# Patient Record
Sex: FEMALE | Race: BLACK OR AFRICAN AMERICAN | Employment: PART TIME | URBAN - METROPOLITAN AREA
[De-identification: names, ages, dates, MRNs, and addresses within clinical notes are randomized per-mention and may not be internally consistent; named-entity substitution may affect disease eponyms.]

---

## 2020-02-14 ENCOUNTER — OFFICE VISIT (OUTPATIENT)
Dept: PAIN MANAGEMENT | Age: 30
End: 2020-02-14
Payer: COMMERCIAL

## 2020-02-14 VITALS
HEIGHT: 62 IN | WEIGHT: 163 LBS | OXYGEN SATURATION: 99 % | RESPIRATION RATE: 16 BRPM | SYSTOLIC BLOOD PRESSURE: 108 MMHG | TEMPERATURE: 98.6 F | HEART RATE: 88 BPM | DIASTOLIC BLOOD PRESSURE: 78 MMHG | BODY MASS INDEX: 30 KG/M2

## 2020-02-14 PROCEDURE — 20553 NJX 1/MLT TRIGGER POINTS 3/>: CPT | Performed by: ANESTHESIOLOGY

## 2020-02-14 PROCEDURE — 99204 OFFICE O/P NEW MOD 45 MIN: CPT | Performed by: ANESTHESIOLOGY

## 2020-02-14 RX ORDER — BUPIVACAINE HYDROCHLORIDE 2.5 MG/ML
30 INJECTION, SOLUTION INFILTRATION; PERINEURAL ONCE
Status: DISCONTINUED | OUTPATIENT
Start: 2020-02-14 | End: 2020-02-14

## 2020-02-14 RX ORDER — NABUMETONE 750 MG/1
750 TABLET, FILM COATED ORAL 2 TIMES DAILY PRN
Qty: 60 TABLET | Refills: 1 | Status: SHIPPED
Start: 2020-02-14 | End: 2020-05-06 | Stop reason: SDUPTHER

## 2020-02-14 RX ORDER — BUPIVACAINE HYDROCHLORIDE 2.5 MG/ML
30 INJECTION, SOLUTION INFILTRATION; PERINEURAL ONCE
Status: COMPLETED | OUTPATIENT
Start: 2020-02-14 | End: 2020-02-14

## 2020-02-14 RX ORDER — CYCLOBENZAPRINE HCL 5 MG
5 TABLET ORAL 2 TIMES DAILY PRN
Qty: 30 TABLET | Refills: 1 | Status: SHIPPED | OUTPATIENT
Start: 2020-02-14 | End: 2020-03-15

## 2020-02-14 RX ADMIN — BUPIVACAINE HYDROCHLORIDE 25 MG: 2.5 INJECTION, SOLUTION INFILTRATION; PERINEURAL at 14:49

## 2020-02-14 NOTE — PROGRESS NOTES
Patient:  ANT Hills 1990  Date of Service:  20        Patient presents with complaints of left shoulder and across neck and down arm pain that started 2 months ago and has been getting unchanged. She states the pain began following Trauma work    Pain is constant and is described as aching, throbbing, stabbing, sharp, tender, burning, exhausting, nagging and numb. She rates the pain as a 10/10 on her worst day , 9/10 on her best day, and a 9/10 on average on the VAS scale. Pain does radiate to left arm and across neck. She  has numbness, tingling, weakness of the left arm. Alleviating factors include: nothing. Aggravating factors include:  movement, lifting, touching. She states that the pain does not keep her from sleeping at night. She took her last dose of nothing    She is not on NSAIDS and  is not on anticoagulation medications to include none and is managed by . Previous treatments: Physical Therapy. Personal Expectations from this treatment: return to work, increase activity and decrease pain    /78   Pulse 88   Temp 98.6 °F (37 °C) (Oral)   Resp 16   Ht 5' 2\" (1.575 m)   Wt 163 lb (73.9 kg)   SpO2 99%   BMI 29.81 kg/m²     No LMP recorded.

## 2020-02-14 NOTE — PROGRESS NOTES
[facility]        [address] [facility], [address] [facility]      [phone]                  Consult Note      Patient:  ANT Slade 1990    Date of Service:  20     Requesting Physician:  Kathryn Warren    Reason for Consult:      Patient presents with complaints of left shoulder pain    HISTORY OF PRESENT ILLNESS:      Ms. Julian Andrews is a 35 y.o. female presented today to the Pain Management Center for evaluation of Recent onset left shoulder/ shoulder blade pain. Work related injury on  when she noticed sharp pain. [facility] case. Pain mainly over the left shoulder. X-ray shoulder unremarkable. She has been managed conservatively and is doing therapy/ exercises  And is followed by Dr. Brielle Jarrett. Gets shoulder blade pain and lower neck pain. Pain is constant and is described as aching, sharp and throbbing. Occasional tingling / numbness of the left UE    Patient does not have bladder or bowel dysfunction. Alleviating factors include: rest.  Aggravating factors include: movement, lifting. Pain causes functional limitations/ limits Adl's : Yes    Nursing notes and details of the pain history reviewed. Please see intake notes for details. Previous treatments:   Physical Therapy/Chiropractic treatment : yes      Medications: - NSAID's : yes             - Membrane stabilizers : no            - Opioids : no            - Adjuvants or Others : no    TENS Unit: no    Surgeries: no- shoulder surgery/ C  spine surgery. Interventional Pain procedures/ nerve blocks: no    She has not been on anticoagulation medications no. She has not been on herbal supplements. She is not diabetic. 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     Imaging:   X-ray of the left shoulder done on 2020 (the detailed report scanned left trapezius, left lower cervical paraspinal muscle and left supraspinatus. We will give a prescription for Relafen 750 mg p.o. twice daily for as needed use. Dosing and side effects explained to the patient. Will give prescription for Flexeril 5 mg p.o. twice daily as needed for muscle spasm. Dosing and side effects explained to the patient. ZT lido patch for local use. Dosing and side effects explained to the patient. If she continues to have shoulder pain, recommend imaging (MRI) of the left shoulder and a left shoulder injection. Seems like she is in the process of getting an MRI of the shoulder. She will follow-up in 4 weeks. Encouraged the patient to follow-up with Dr. Harley Monroy for therapy and continued conservative care. Urine screen today: no    Counseling :    Patient encouraged to stay active and to watch/lose weight    Encouraged to continue Regular home exercise program as tolerated - stretching / strengthening. Smoking cessation counseling : yes    Treatment plan discussed with the patient including medication and procedure side effects. Controlled Substances Monitoring:   OARRS reviewed-consistent    Tamara Hernandez MD     2020    Patient: Phillip Sandoval  :  1990  Age:  35 y.o. Sex:  female     PRE-PROCEDURE DIAGNOSIS: Myofascial pain. POST-PROCEDURE DIAGNOSIS: Same. PROCEDURE: Left trapezius, lower cervical and supraspinatus muscle trigger point injections. SURGEON:   Tamara Hernandez MD    ANESTHESIA: local    ESTIMATED BLOOD LOSS:  None.  ______________________________________________________________________    BRIEF HISTORY: After discussing the potential risks and benefits of the procedure with the patient. Bárbara Montano did request that we proceed. A complete History & Physical was reviewed and it is unchanged.      DESCRIPTION OF PROCEDURE:   Each trigger points were marked with patient input and prepped with ChloraPrep and draped, a #25-gauge, 1.5-inch needle was passed into the area of greatest tenderness. A total of 6 trigger point injections were performed. Each trigger point  received 1.5 cc of 0.25% Marcaine and a total of 40 mg kenalog after negative aspiration of blood, air or paresthesia. The needle was removed intact and Band-Aid was applied. Disposition the patient tolerated the procedure well and there were no complications . Chiquis Navarro will follow up in our comprehensive [facility] Center as scheduled. She was encouraged to call with questions, concerns or if worsening of symptoms occurs. Erick Elise MD      Dear Dr. Debbie Jacob,   Thank you for referring Ms. Amira Deluna and allowing us to participate in her care. Please do not hesitate to contact me if you have any questions regarding her care.     Fuad Phillip MD      CC:    Yahaira Workman, [address] [place] [address], [facility]  [address] [place] [id number]

## 2020-03-13 ENCOUNTER — OFFICE VISIT (OUTPATIENT)
Dept: PAIN MANAGEMENT | Age: 30
End: 2020-03-13
Payer: COMMERCIAL

## 2020-03-13 VITALS
TEMPERATURE: 98.5 F | SYSTOLIC BLOOD PRESSURE: 108 MMHG | DIASTOLIC BLOOD PRESSURE: 70 MMHG | RESPIRATION RATE: 16 BRPM | HEART RATE: 78 BPM

## 2020-03-13 PROCEDURE — 99214 OFFICE O/P EST MOD 30 MIN: CPT | Performed by: ANESTHESIOLOGY

## 2020-03-13 PROCEDURE — 99213 OFFICE O/P EST LOW 20 MIN: CPT

## 2020-03-13 RX ORDER — IBUPROFEN 200 MG
400 TABLET ORAL PRN
COMMUNITY
End: 2022-04-17

## 2020-03-13 NOTE — PROGRESS NOTES
and glenoid are normal.  AC joint is normal.  Soft tissues are normal.  Adjacent ribs are unremarkable.     Impression: shoulder within normal limit    MRI of the left shoulder: 2020:      Urine Drug Screening:    OARRS report[de-identified]  3/13/2020: Consistent: not on chronic opioids. History reviewed. No pertinent past medical history. Past Surgical History:   Procedure Laterality Date     SECTION         Prior to Admission medications    Medication Sig Start Date End Date Taking?  Authorizing Provider   ibuprofen (ADVIL;MOTRIN) 200 MG tablet Take 400 mg by mouth as needed for Pain   Yes Historical Provider, MD   nabumetone (RELAFEN) 750 MG tablet Take 1 tablet by mouth 2 times daily as needed for Pain 20  Yes Tamara Hernandez MD   cyclobenzaprine (FLEXERIL) 5 MG tablet Take 1 tablet by mouth 2 times daily as needed for Muscle spasms 2/14/20 3/15/20 Yes Tamara Hernandez MD   Lidocaine (ZTLIDO) 1.8 % PTCH 12 hours on 12 hours off 20  Yes Tamara Hernandez MD       No Known Allergies    Social History     Socioeconomic History    Marital status: Single     Spouse name: Not on file    Number of children: Not on file    Years of education: Not on file    Highest education level: Not on file   Occupational History    Not on file   Social Needs    Financial resource strain: Not on file    Food insecurity     Worry: Not on file     Inability: Not on file    Transportation needs     Medical: Not on file     Non-medical: Not on file   Tobacco Use    Smoking status: Light Tobacco Smoker     Types: Cigarettes    Smokeless tobacco: Never Used    Tobacco comment: black and mild   Substance and Sexual Activity    Alcohol use: No    Drug use: No    Sexual activity: Yes     Partners: Male   Lifestyle    Physical activity     Days per week: Not on file     Minutes per session: Not on file    Stress: Not on file   Relationships    Social connections     Talks on phone: Not on file Gets together: Not on file     Attends Yazidism service: Not on file     Active member of club or organization: Not on file     Attends meetings of clubs or organizations: Not on file     Relationship status: Not on file    Intimate partner violence     Fear of current or ex partner: Not on file     Emotionally abused: Not on file     Physically abused: Not on file     Forced sexual activity: Not on file   Other Topics Concern    Not on file   Social History Narrative    Not on file       Family History   Problem Relation Age of Onset    Asthma Father     Asthma Brother     Asthma Brother        REVIEW OF SYSTEMS:     Jean Marie Pulliam denies fever/chills, chest pain, shortness of breath, new bowel or bladder complaints. All other review of systems was negative. PHYSICAL EXAMINATION:      /70   Pulse 78   Temp 98.5 °F (36.9 °C)   Resp 16   General:       General appearance:  Pleasant and well-hydrated, in no distress and A & O x 3  Build:Overweight  Function: Rises from seated position easily and Moves about room without difficulty     HEENT:     Head:normocephalic, atraumatic  Pupils:regular, round, equal  Sclera: icterus absent     Lungs:     Breathing:normal breathing pattern      CVS:     RRR     Abdomen:     Shape:non-distended and normal  Tenderness:none  Guarding:none     Cervical spine:     Inspection:normal  Palpation:tenderness paravertebral muscles, tenderness trapezium, left, right and positive- left side more  Range of motion:reduced flexion, extension, rotation bilaterally and is painful.   Spurling's: negative bilaterally  Facet tenderness + left side.     Thoracic spine:                Spine inspection:normal   Palpation:No tenderness over the midline and paraspinal area, bilaterally  Range of motion:normal in flexion, extension rotation bilateral and is not painful.     Lumbar spine:     Spine inspection: Normal   Palpation: Tenderness paravertebral muscles No bilaterally  Range of motion: Normal, flexion Decreased  SLR : negative bilaterally  Trochanteric bursa tenderness: negative bilaterally  CVA tenderness:No         Musculoskeletal:     Trigger points in trapezius: Yes bilaterally (left > right)  Trigger points in rhomboids: Yes left  Trigger points in Paravertebral: Yes left cervical      Extremities:  No edema     Left shoulder: Moderate tenderness over the shoulder/ subacromial area, ROM pain + mainly of abduction. Movement limited by pain.     Significant pain over the left trapezius area. Trigger points + over left trapezius, lower cervical paraspinal and supraspinatus area.     Knee:     ROM : no pain   Joint line tenderness : no     Neurological:     Sensory: Normal to light touch      Motor:   Right  5/5              Left  5/5               Right Bicep 5/5           Left Bicep 5/5              Right Triceps 5/5       Left Triceps 5/5          Right Deltoid 5/5     Left Deltoid 5/5                  Right Quadriceps 5/5          Left Quadriceps 5/5           Right Gastrocnemius 5/5    Left Gastrocnemius 5/5  Right Ant Tibialis 5/5  Left Ant Tibialis 5/5     Reflexes:    Bilaterally equal     Gait:normal Yes     Dermatology:     Skin:no rashes or lesions noted         Assessment/Plan:   Diagnosis Orders   1. Sprain of left shoulder, unspecified shoulder sprain type, initial encounter         35 y.o. female with H/O work-related injury in January 2020. She has been having left shoulder blade pain and neck pain. Examination shows significant tenderness over the left trapezius, lower cervical paraspinal muscle and supraspinatus muscle.     Her left shoulder range of motion is significantly reduced. MRI of shoulder done on 2/20/2020 reviewed and discussed the findings with the patient: No rotator cuff tear, peritendinosis noted.     Has significant tenderness over the left trapezius, cervical paraspinal muscles. Trigger point injection during the last visit only moderate relief. She has recently finished a course of steroids. Continues to work. Her significant pain is over the lower neck with features of Left sided cervical facet pain. Apparently Cervical claim is being added. She has not taken Relafen prescribed during the last visit. Recommend to take Relafen for prn use. Dosing and side effects explained. ZT lido patch for local use. Flexeril 5 mg p.o. twice daily as needed for muscle spasm. Dosing and side effects explained to the patient. If cervical issue is approved, consider Cervical spine imaging and cervical facet MBNB in future. F/U once she hears about C-spine claim. Counseling regarding the importance of ongoing exercise program, appropriate medication use and treatment plan.       Tamara Hernandez MD    CC:   Dr. Hannah Adams

## 2020-03-13 NOTE — PROGRESS NOTES
Claudell Rains presents to the [facility] on 3/13/2020. Gela Jones is complaining of pain in her neck into th left shoulder. The pain is constant. The pain is described as aching, stabbing, sharp and nagging. Pain is rated on her best day at a 5, on her worst day at a 10, and on average at a 8 on the VAS scale. She took her last dose of Motrin today. Gela Jones does not have issues with constipation. Any procedures since your last visit: Yes, TPI with 10 % relief. She is  on NSAIDS and  is not on anticoagulation medications. Pacemaker or defibrilator: No Physician managing device is N/A.       /70   Pulse 78   Temp 98.5 °F (36.9 °C)   Resp 16      No LMP recorded.

## 2020-05-06 ENCOUNTER — VIRTUAL VISIT (OUTPATIENT)
Dept: PAIN MANAGEMENT | Age: 30
End: 2020-05-06
Payer: COMMERCIAL

## 2020-05-06 PROCEDURE — 99213 OFFICE O/P EST LOW 20 MIN: CPT | Performed by: ANESTHESIOLOGY

## 2020-05-06 RX ORDER — NABUMETONE 750 MG/1
750 TABLET, FILM COATED ORAL 2 TIMES DAILY PRN
Qty: 60 TABLET | Refills: 1 | Status: SHIPPED
Start: 2020-05-06 | End: 2022-04-17

## 2020-06-17 ENCOUNTER — PREP FOR PROCEDURE (OUTPATIENT)
Dept: PAIN MANAGEMENT | Age: 30
End: 2020-06-17

## 2020-06-17 ENCOUNTER — OFFICE VISIT (OUTPATIENT)
Dept: PAIN MANAGEMENT | Age: 30
End: 2020-06-17
Payer: COMMERCIAL

## 2020-06-17 VITALS
SYSTOLIC BLOOD PRESSURE: 106 MMHG | TEMPERATURE: 98.5 F | RESPIRATION RATE: 16 BRPM | DIASTOLIC BLOOD PRESSURE: 70 MMHG | HEART RATE: 84 BPM

## 2020-06-17 PROBLEM — M54.2 CERVICALGIA: Status: ACTIVE | Noted: 2020-06-17

## 2020-06-17 PROBLEM — S13.4XXA SPRAIN OF LIGAMENTS OF CERVICAL SPINE: Status: ACTIVE | Noted: 2020-06-17

## 2020-06-17 PROBLEM — S43.402A SPRAIN OF SHOULDER, LEFT: Status: ACTIVE | Noted: 2020-06-17

## 2020-06-17 PROBLEM — M43.02 CERVICAL SPONDYLOLYSIS: Status: ACTIVE | Noted: 2020-06-17

## 2020-06-17 PROBLEM — S16.1XXA CERVICAL STRAIN: Status: ACTIVE | Noted: 2020-06-17

## 2020-06-17 PROCEDURE — 99214 OFFICE O/P EST MOD 30 MIN: CPT | Performed by: ANESTHESIOLOGY

## 2020-06-17 PROCEDURE — 99213 OFFICE O/P EST LOW 20 MIN: CPT

## 2020-06-17 RX ORDER — CYCLOBENZAPRINE HCL 5 MG
5 TABLET ORAL 2 TIMES DAILY PRN
COMMUNITY
End: 2020-11-04 | Stop reason: ALTCHOICE

## 2020-11-03 ENCOUNTER — PREP FOR PROCEDURE (OUTPATIENT)
Dept: PAIN MANAGEMENT | Age: 30
End: 2020-11-03

## 2020-11-03 ENCOUNTER — TELEPHONE (OUTPATIENT)
Dept: PAIN MANAGEMENT | Age: 30
End: 2020-11-03

## 2020-11-03 NOTE — TELEPHONE ENCOUNTER
11-3-20-received a message from [employer] workers [employer]  that her procedure has been approved. Will wait until the paperwork comes in, after 11-16-20 to schedule her. Call to her, unable to leave voice mail message, mailbox full.     Jose Martin Oseguera RN  Pain Management

## 2020-11-04 ENCOUNTER — TELEPHONE (OUTPATIENT)
Dept: PAIN MANAGEMENT | Age: 30
End: 2020-11-04

## 2020-11-04 NOTE — PROGRESS NOTES
[facility]  INSTRUCTIONS  . .......................................................................................................................................... [] Parking the day of Surgery is located in the [facility].   Upon entering the door, someone will be there to greet you    []  Bring photo ID and insurance card     [] You may have a light breakfast day of procedure    []  Wear loose comfortable clothing    []  Please follow instructions for medications as given per Dr's office     [] Stop blood thinners as per Dr's office instructions    [] You can expect a call the business day prior to procedure to notify you of your arrival time     [] Please arrange for     []  Other instructions

## 2020-11-04 NOTE — PROGRESS NOTES

## 2020-11-04 NOTE — TELEPHONE ENCOUNTER
11-4-20-upon chart review it is noted that Sharlene Holman takes relafen and advil. Call to her, advised her to hold the relafen 6 days before her 11-9-20 procedure, last dose to be 11-2-20. She states she has not taken it for a couple weeks. Also advised her to hold her advil 24 hours before her procedure. She shows an understanding.     Jose Martin Oseguera RN  Pain Management

## 2020-11-05 ENCOUNTER — HOSPITAL ENCOUNTER (OUTPATIENT)
Age: 30
Discharge: HOME | End: 2020-11-07
Payer: COMMERCIAL

## 2020-11-05 ENCOUNTER — HOSPITAL ENCOUNTER (OUTPATIENT)
Dept: GENERAL RADIOLOGY | Age: 30
Discharge: HOME | End: 2020-11-07
Payer: COMMERCIAL

## 2020-11-05 PROCEDURE — 72040 X-RAY EXAM NECK SPINE 2-3 VW: CPT

## 2020-11-09 ENCOUNTER — HOSPITAL ENCOUNTER (OUTPATIENT)
Age: 30
Setting detail: OUTPATIENT SURGERY
Discharge: HOME | End: 2020-11-09
Attending: ANESTHESIOLOGY | Admitting: ANESTHESIOLOGY
Payer: COMMERCIAL

## 2020-11-09 ENCOUNTER — HOSPITAL ENCOUNTER (OUTPATIENT)
Dept: GENERAL RADIOLOGY | Age: 30
Setting detail: OUTPATIENT SURGERY
Discharge: HOME | End: 2020-11-11
Attending: ANESTHESIOLOGY
Payer: COMMERCIAL

## 2020-11-09 VITALS
OXYGEN SATURATION: 100 % | DIASTOLIC BLOOD PRESSURE: 87 MMHG | HEART RATE: 79 BPM | SYSTOLIC BLOOD PRESSURE: 140 MMHG | TEMPERATURE: 97.7 F | BODY MASS INDEX: 29.44 KG/M2 | HEIGHT: 62 IN | RESPIRATION RATE: 16 BRPM | WEIGHT: 160 LBS

## 2020-11-09 LAB — HCG(URINE) PREGNANCY TEST: NEGATIVE

## 2020-11-09 PROCEDURE — 7100000011 HC PHASE II RECOVERY - ADDTL 15 MIN: Performed by: ANESTHESIOLOGY

## 2020-11-09 PROCEDURE — 81025 URINE PREGNANCY TEST: CPT

## 2020-11-09 PROCEDURE — 6360000002 HC RX W HCPCS: Performed by: ANESTHESIOLOGY

## 2020-11-09 PROCEDURE — 64490 INJ PARAVERT F JNT C/T 1 LEV: CPT | Performed by: ANESTHESIOLOGY

## 2020-11-09 PROCEDURE — 2500000003 HC RX 250 WO HCPCS: Performed by: ANESTHESIOLOGY

## 2020-11-09 PROCEDURE — 64492 INJ PARAVERT F JNT C/T 3 LEV: CPT | Performed by: ANESTHESIOLOGY

## 2020-11-09 PROCEDURE — 64491 INJ PARAVERT F JNT C/T 2 LEV: CPT | Performed by: ANESTHESIOLOGY

## 2020-11-09 PROCEDURE — 7100000010 HC PHASE II RECOVERY - FIRST 15 MIN: Performed by: ANESTHESIOLOGY

## 2020-11-09 PROCEDURE — 3600000002 HC SURGERY LEVEL 2 BASE: Performed by: ANESTHESIOLOGY

## 2020-11-09 PROCEDURE — 3209999900 FLUORO FOR SURGICAL PROCEDURES

## 2020-11-09 PROCEDURE — 2709999900 HC NON-CHARGEABLE SUPPLY: Performed by: ANESTHESIOLOGY

## 2020-11-09 PROCEDURE — 3600000012 HC SURGERY LEVEL 2 ADDTL 15MIN: Performed by: ANESTHESIOLOGY

## 2020-11-09 RX ORDER — DEXAMETHASONE SODIUM PHOSPHATE 10 MG/ML
INJECTION, SOLUTION INTRAMUSCULAR; INTRAVENOUS PRN
Status: DISCONTINUED | OUTPATIENT
Start: 2020-11-09 | End: 2020-11-09 | Stop reason: ALTCHOICE

## 2020-11-09 RX ORDER — BUPIVACAINE HYDROCHLORIDE 5 MG/ML
INJECTION, SOLUTION EPIDURAL; INTRACAUDAL PRN
Status: DISCONTINUED | OUTPATIENT
Start: 2020-11-09 | End: 2020-11-09 | Stop reason: ALTCHOICE

## 2020-11-09 RX ORDER — LIDOCAINE HYDROCHLORIDE 5 MG/ML
INJECTION, SOLUTION INFILTRATION; INTRAVENOUS PRN
Status: DISCONTINUED | OUTPATIENT
Start: 2020-11-09 | End: 2020-11-09 | Stop reason: ALTCHOICE

## 2020-11-09 ASSESSMENT — PAIN SCALES - GENERAL
PAINLEVEL_OUTOF10: 0

## 2020-11-09 NOTE — H&P
[address] [facility]        [address] [facility], [address] [place] [address]  Dept: [phone]    Procedure History & Physical      Ignacio Charles     HPI:    Patient  is here for cervical MBNB for neck pain. Labs/imaging studies reviewed   All question and concerns addressed including R/B/A associated with the procedure    Past Medical History:   Diagnosis Date    Back pain     Neck pain        Past Surgical History:   Procedure Laterality Date     SECTION         Prior to Admission medications    Medication Sig Start Date End Date Taking?  Authorizing Provider   nabumetone (RELAFEN) 750 MG tablet Take 1 tablet by mouth 2 times daily as needed for Pain 20  Yes Corinne Clarity, MD   ibuprofen (ADVIL;MOTRIN) 200 MG tablet Take 400 mg by mouth as needed for Pain   Yes Historical Provider, MD       No Known Allergies    Social History     Socioeconomic History    Marital status: Single     Spouse name: Not on file    Number of children: Not on file    Years of education: Not on file    Highest education level: Not on file   Occupational History    Not on file   Social Needs    Financial resource strain: Not on file    Food insecurity     Worry: Not on file     Inability: Not on file    Transportation needs     Medical: Not on file     Non-medical: Not on file   Tobacco Use    Smoking status: Current Every Day Smoker     Packs/day: 0.50     Types: Cigarettes, Cigars    Smokeless tobacco: Never Used    Tobacco comment: black and mild   Substance and Sexual Activity    Alcohol use: No    Drug use: No    Sexual activity: Yes     Partners: Male   Lifestyle    Physical activity     Days per week: Not on file     Minutes per session: Not on file    Stress: Not on file   Relationships    Social connections     Talks on phone: Not on file     Gets together: Not on file     Attends Synagogue service: Not on file     Active member of club or organization: Not on file     Attends meetings of clubs or organizations: Not on file     Relationship status: Not on file    Intimate partner violence     Fear of current or ex partner: Not on file     Emotionally abused: Not on file     Physically abused: Not on file     Forced sexual activity: Not on file   Other Topics Concern    Not on file   Social History Narrative    Not on file       Family History   Problem Relation Age of Onset    Asthma Father     Asthma Brother     Asthma Brother     No Known Problems Mother          REVIEW OF SYSTEMS:    CONSTITUTIONAL:  negative for  fevers, chills, sweats and fatigue    RESPIRATORY:  negative for  dry cough, cough with sputum, dyspnea, wheezing and chest pain    CARDIOVASCULAR:  negative for chest pain, dyspnea, palpitations, syncope    GASTROINTESTINAL:  negative for nausea, vomiting, change in bowel habits, diarrhea, constipation and abdominal pain    MUSCULOSKELETAL: negative for muscle weakness    SKIN: negative for itching or rashes. BEHAVIOR/PSYCH:  negative for poor appetite, increased appetite, decreased sleep and poor concentration    All other systems negative      PHYSICAL EXAM:    VITALS:  /73   Pulse 87   Temp 97.7 °F (36.5 °C) (Temporal)   Resp 14   Ht 5' 1.5\" (1.562 m)   Wt 160 lb (72.6 kg)   SpO2 100%   BMI 29.74 kg/m²     CONSTITUTIONAL:  awake, alert, cooperative, no apparent distress, and appears stated age    EYES: PERRLA, EOMI    LUNGS:  No increased work of breathing, no audible wheezing    CARDIOVASCULAR:  regular rate and rhythm    ABDOMEN:  Soft non tender non distended     EXTREMITIES: no signs of clubbing or cyanosis. MUSCULOSKELETAL: negative for flaccid muscle tone or spastic movements. SKIN: gross examination reveals no signs of rashes, or diaphoresis. NEURO: Cranial nerves II-XII grossly intact. No signs of agitated mood. Assessment/Plan:    Patient  is here for cervical MBNB for neck pain.     The patient was counseled at length about the risks of librado Covid-19 during their perioperative period and any recovery window from their procedure. The patient was made aware that librado Covid-19  may worsen their prognosis for recovering from their procedure  and lend to a higher morbidity and/or mortality risk. All material risks, benefits, and reasonable alternatives including postponing the procedure were discussed. The patient does wish to proceed with the procedure at this time.       Clair Morrow MD

## 2020-11-09 NOTE — OP NOTE
Operative Note      Patient: Norman Bowling  YOB: 1990  MRN: [id number]    Date of Procedure: 2020    Pre-Op Diagnosis: CERVICAL SPONDYLOSIS, cervical sprain      Post-Op Diagnosis: Same       Procedure(s):  LEFT CERVICAL MEDIAL BRANCH NERVE  BLOCK UNDER FLUOROSCOPIC GUIDANCE C3, C4, C5 AND C6     Surgeon(s):  Sharri Weiner MD    Assistant:   * No surgical staff found *    Anesthesia: None    Estimated Blood Loss (mL): Minimal    Complications: None    Specimens:   * No specimens in log *    Implants:  * No implants in log *      Drains: * No LDAs found *    Findings: good needle eplacement    Detailed Description of Procedure:   2020    Patient: Norman Bowling  :  1990  Age:  35 y.o. Sex:  female     PRE-PROCEDURE DIAGNOSIS: Cervical facet syndrome, cervical spondylosis. POST-PROCEDURE DIAGNOSIS: Same. PROCEDURE: # 1  Left Cervical medial branch blocks at  Levels C3, C4, C5 and C6    SURGEON: Sharri Weiner MD    ANESTHESIA: Local    ESTIMATED BLOOD LOSS:  None.  ______________________________________________________________________  BRIEF HISTORY:  Norman Bowling comes in today for Left cervical facet medial branch block at levels C3, C4, C5 and C6 . The potential complications of this procedure were discussed with her again today. She has elected to undergo the aforementioned procedure. Gitas complete History & Physical examination were reviewed in depth, a copy of which is in the chart. DESCRIPTION OF PROCEDURE:    After confirming written and informed consent, a time-out was performed and Gitas name and date of birth, the procedure to be performed as well as the plan for the location of the needle insertion were confirmed. The patient was brought into the procedure room and placed in the prone position on the fluoroscopy table. Standard monitors were placed and vital signs were observed throughout the procedure.  The area of the cervical spine was prepped with chloraprep and draped in the usual sterile manner. AP fluoroscopy views were used to identify and shan the mid lateral aspect of the articular pillars of the targeted levels. FOUR # 25 G spinal needles was directed until bone was contacted at each level. Once bone was contacted, the needle was walked off laterally. Lateral fluoroscopy was then utilized during final needle positioning in placing the tip of the needle in the middle of the articular pillar. After negative aspiration was confirmed, 1 cc's of marcaine 0.5% and 2 mg of Dexamethasone was injected equally at each level after negative aspiration. The needles were removed and the patient body part was cleaned and bandage was placed over the needle insertion. Disposition the patient tolerated the procedure well and there were no complications . Vital signs remained stable throughout the procedure. The patient was escorted to the recovery area where they remained until discharge and written discharge instructions for the procedure were given. Plan: Caden Syed will return to our pain management center as scheduled.      Ada Almonte MD

## 2020-11-09 NOTE — PROGRESS NOTES
Patient given discharge instructions and verbalizes understanding  Adhesive bandage x2 intact to cervical neck

## 2020-12-01 ENCOUNTER — OFFICE VISIT (OUTPATIENT)
Dept: PAIN MANAGEMENT | Age: 30
End: 2020-12-01
Payer: COMMERCIAL

## 2020-12-01 VITALS
SYSTOLIC BLOOD PRESSURE: 112 MMHG | TEMPERATURE: 99.3 F | WEIGHT: 160 LBS | RESPIRATION RATE: 16 BRPM | BODY MASS INDEX: 29.44 KG/M2 | DIASTOLIC BLOOD PRESSURE: 80 MMHG | HEART RATE: 82 BPM | HEIGHT: 62 IN

## 2020-12-01 PROCEDURE — 99214 OFFICE O/P EST MOD 30 MIN: CPT | Performed by: ANESTHESIOLOGY

## 2020-12-01 PROCEDURE — 99213 OFFICE O/P EST LOW 20 MIN: CPT | Performed by: ANESTHESIOLOGY

## 2020-12-01 NOTE — PROGRESS NOTES
[facility]  [facility], [address] [place] [address] Harry  [phone]    Follow up Note      Madan Gilliam     Date of Visit:  12/1/2020    CC:  Patient presents for follow up   Chief Complaint   Patient presents with    Follow Up After Procedure     LEFT CERVICAL MEDIAL BRANCH NERVE  BLOCK UNDER FLUOROSCOPIC GUIDANCE C3, C4, C5 AND C6        HPI:  H/o left shoulder blade/ neck pain- from a work related injury.     Work related injury on Jan 9th when she noticed sharp pain.     [facility] case. Dr. Dakota Stephenson is her POR.     Pain mainly over the left shoulder blade/ neck. Intermittent pain on the right side but predominant pain on the left side.     X-ray shoulder unremarkable. MRI of the left shoulder : NO rotator cuff tear.     She has been managed conservatively and is doing therapy/ exercises  and is followed by Dr. Libia Perry. Prior interventional procedures:   Trigger point injection helped moderately- for very short duration. S/p Left Cervical facet MBNB on 11/9/2020: Not much relief. Pain is unchanged. Change in quality of symptoms:no. Medication side effects:none. Pain causes functional limitations/ limits Adl's : Yes     Nursing notes and details of the pain history reviewed. Please see intake notes for details.     Previous treatments:   Physical Therapy/Chiropractic treatment : yes       Medications: - NSAID's : yes                        - Membrane stabilizers : no                       - Opioids : no                       - Adjuvants or Others : no     TENS Unit: no     Surgeries: no- shoulder surgery/ C  spine surgery.     She has not been on anticoagulation medications no.     She has not been on herbal supplements.       She is not diabetic.     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     Imaging:     Xray C spine: 11/5/2020:  FINDINGS:    No fracture, dislocation, or focal osseous lesion is noted.  No significant    degenerate change.  No significant soft tissue abnormality seen.              Impression    No fracture or dislocation. X-ray of the left shoulder done on 2020 (the detailed report scanned in epic) shows the following findings:     No fracture or dislocation, articular surface of the humeral head and glenoid are normal.  AC joint is normal.  Soft tissues are normal.  Adjacent ribs are unremarkable.     Impression: shoulder within normal limit    MRI of the left shoulder: 2020:      Urine Drug Screening:    OARRS report[de-identified]  3/13/2020: Consistent: not on chronic opioids. 2020: Consistent  2020; Consistent    Past Medical History:   Diagnosis Date    Back pain     Neck pain        Past Surgical History:   Procedure Laterality Date     SECTION      NERVE BLOCK Left 2020    LEFT CERVICAL MEDIAL BRANCH NERVE  BLOCK UNDER FLUOROSCOPIC GUIDANCE C3, C4, C5 AND C6 performed by Ada Almonte MD at [facility] OR       Prior to Admission medications    Medication Sig Start Date End Date Taking?  Authorizing Provider   ibuprofen (ADVIL;MOTRIN) 200 MG tablet Take 400 mg by mouth as needed for Pain   Yes Historical Provider, MD   nabumetone (RELAFEN) 750 MG tablet Take 1 tablet by mouth 2 times daily as needed for Pain  Patient not taking: Reported on 2020   Ada Almonte MD       No Known Allergies    Social History     Socioeconomic History    Marital status: Single     Spouse name: Not on file    Number of children: Not on file    Years of education: Not on file    Highest education level: Not on file   Occupational History    Not on file   Social Needs    Financial resource strain: Not on file    Food insecurity     Worry: Not on file     Inability: Not on file    Transportation needs     Medical: Not on file     Non-medical: Not on file   Tobacco Use    Smoking status: Current Every Day Smoker     Packs/day: 0.50     Types: Cigarettes, Cigars    Smokeless tobacco: Never Used    Tobacco comment: black and mild   Substance and Sexual Activity    Alcohol use: No    Drug use: No    Sexual activity: Yes     Partners: Male   Lifestyle    Physical activity     Days per week: Not on file     Minutes per session: Not on file    Stress: Not on file   Relationships    Social connections     Talks on phone: Not on file     Gets together: Not on file     Attends Yazidi service: Not on file     Active member of club or organization: Not on file     Attends meetings of clubs or organizations: Not on file     Relationship status: Not on file    Intimate partner violence     Fear of current or ex partner: Not on file     Emotionally abused: Not on file     Physically abused: Not on file     Forced sexual activity: Not on file   Other Topics Concern    Not on file   Social History Narrative    Not on file       Family History   Problem Relation Age of Onset    Asthma Father     Asthma Brother     Asthma Brother     No Known Problems Mother        REVIEW OF SYSTEMS:     Jennifer De Dios denies fever/chills, chest pain, shortness of breath, new bowel or bladder complaints. All other review of systems was negative.     PHYSICAL EXAMINATION:      /80   Pulse 82   Temp 99.3 °F (37.4 °C) (Infrared)   Resp 16   Ht 5' 2\" (1.575 m)   Wt 160 lb (72.6 kg)   BMI 29.26 kg/m²   General:       General appearance:  Pleasant and well-hydrated, in no distress and A & O x 3  Build:Overweight  Function: Rises from seated position easily and Moves about room without difficulty     HEENT:     Head:normocephalic, atraumatic  Pupils:regular, round, equal  Sclera: icterus absent     Lungs:     Breathing:normal breathing pattern      CVS:     RRR     Abdomen:     Shape:non-distended and normal  Tenderness:none  Guarding:none     Cervical spine:     Inspection:normal  Palpation:tenderness paravertebral muscles, tenderness Strain of neck muscle, sequela     6. Sprain of ligaments of cervical spine, sequela         35 y.o.  female with H/O work-related injury in January 2020. She has been having left shoulder blade pain and neck pain. MRI of shoulder done on 2/20/2020 reviewed and discussed the findings with the patient: No rotator cuff tear, arlette tendinosis noted. Trigger point injection during the previous visits provided only moderate short term relief. Continues to work- light duty. Recent cervical facet MBNB- no significant pain relief. Recommend to take Relafen for prn use. Dosing and side effects explained. ZT lido patch - did not help. Flexeril 5 mg p.o. twice daily as needed for muscle spasm. Dosing and side effects explained to the patient. At this point of time, recommend MRI of C spine. Apparently has been ordered by Dr. Nadia Bautista. Counseling regarding the importance of ongoing exercise program, appropriate medication use and treatment plan. Smoking cessation counseling done. Recommend to Continue PT for improved ROM. Counseling done regarding the importance of exercise program, PT/HEP, appropriate medication use. Detailed discussion about the procedures done. WiIl prescribe Local compound cream.   Formula #1: Diclofenac, Gabapentin, Baclofen, Lidocaine, Doxepin. Apply 1-2 grams TID over the painful area. Dispense #240 gram with X 2 refills. Use instructions and side effects explained. F/U in 6-8 weeks.       Esdras Rodriguez MD    CC:  Dr. Quin Arnold,

## 2021-06-26 VITALS
TEMPERATURE: 97.5 F | RESPIRATION RATE: 16 BRPM | SYSTOLIC BLOOD PRESSURE: 136 MMHG | DIASTOLIC BLOOD PRESSURE: 78 MMHG | OXYGEN SATURATION: 97 % | HEART RATE: 107 BPM

## 2021-06-26 LAB
ALBUMIN SERPL-MCNC: 3.9 G/DL (ref 3.5–5.2)
ALP BLD-CCNC: 73 U/L (ref 35–104)
ALT SERPL-CCNC: 10 U/L (ref 0–32)
ANION GAP SERPL CALCULATED.3IONS-SCNC: 10 MMOL/L (ref 7–16)
ANISOCYTOSIS: ABNORMAL
AST SERPL-CCNC: 13 U/L (ref 0–31)
BACTERIA: ABNORMAL /HPF
BASOPHILS ABSOLUTE: 0 E9/L (ref 0–0.2)
BASOPHILS RELATIVE PERCENT: 0.3 % (ref 0–2)
BILIRUB SERPL-MCNC: <0.2 MG/DL (ref 0–1.2)
BILIRUBIN URINE: NEGATIVE
BLASTS RELATIVE PERCENT: 0.9 % (ref 0–0)
BLOOD, URINE: ABNORMAL
BUN BLDV-MCNC: 17 MG/DL (ref 6–20)
CALCIUM SERPL-MCNC: 8.8 MG/DL (ref 8.6–10.2)
CHLORIDE BLD-SCNC: 108 MMOL/L (ref 98–107)
CLARITY: CLEAR
CO2: 24 MMOL/L (ref 22–29)
COLOR: YELLOW
CREAT SERPL-MCNC: 0.9 MG/DL (ref 0.5–1)
EOSINOPHILS ABSOLUTE: 0.19 E9/L (ref 0.05–0.5)
EOSINOPHILS RELATIVE PERCENT: 1.7 % (ref 0–6)
EPITHELIAL CELLS, UA: ABNORMAL /HPF
GFR AFRICAN AMERICAN: >60
GFR NON-AFRICAN AMERICAN: >60 ML/MIN/1.73
GLUCOSE BLD-MCNC: 62 MG/DL (ref 74–99)
GLUCOSE URINE: NEGATIVE MG/DL
HCG, URINE, POC: NEGATIVE
HCT VFR BLD CALC: 43.8 % (ref 34–48)
HEMOGLOBIN: 13.6 G/DL (ref 11.5–15.5)
KETONES, URINE: ABNORMAL MG/DL
LACTIC ACID: 0.9 MMOL/L (ref 0.5–2.2)
LEUKOCYTE ESTERASE, URINE: ABNORMAL
LIPASE: 31 U/L (ref 13–60)
LYMPHOCYTES ABSOLUTE: 4.58 E9/L (ref 1.5–4)
LYMPHOCYTES RELATIVE PERCENT: 41.7 % (ref 20–42)
Lab: NORMAL
MCH RBC QN AUTO: 28 PG (ref 26–35)
MCHC RBC AUTO-ENTMCNC: 31.1 % (ref 32–34.5)
MCV RBC AUTO: 90.1 FL (ref 80–99.9)
MONOCYTES ABSOLUTE: 0.76 E9/L (ref 0.1–0.95)
MONOCYTES RELATIVE PERCENT: 7 % (ref 2–12)
NEGATIVE QC PASS/FAIL: NORMAL
NEUTROPHILS ABSOLUTE: 5.34 E9/L (ref 1.8–7.3)
NEUTROPHILS RELATIVE PERCENT: 48.7 % (ref 43–80)
NITRITE, URINE: NEGATIVE
PDW BLD-RTO: 14.3 FL (ref 11.5–15)
PH UA: 5.5 (ref 5–9)
PLATELET # BLD: 305 E9/L (ref 130–450)
PMV BLD AUTO: 9.5 FL (ref 7–12)
POSITIVE QC PASS/FAIL: NORMAL
POTASSIUM SERPL-SCNC: 3.7 MMOL/L (ref 3.5–5)
PROTEIN UA: ABNORMAL MG/DL
RBC # BLD: 4.86 E12/L (ref 3.5–5.5)
RBC UA: ABNORMAL /HPF (ref 0–2)
SODIUM BLD-SCNC: 142 MMOL/L (ref 132–146)
SPECIFIC GRAVITY UA: >=1.03 (ref 1–1.03)
TOTAL PROTEIN: 7 G/DL (ref 6.4–8.3)
UROBILINOGEN, URINE: 1 E.U./DL
WBC # BLD: 10.9 E9/L (ref 4.5–11.5)
WBC UA: ABNORMAL /HPF (ref 0–5)

## 2021-06-26 PROCEDURE — 83605 ASSAY OF LACTIC ACID: CPT

## 2021-06-26 PROCEDURE — 83690 ASSAY OF LIPASE: CPT

## 2021-06-26 PROCEDURE — 81001 URINALYSIS AUTO W/SCOPE: CPT

## 2021-06-26 PROCEDURE — 85025 COMPLETE CBC W/AUTO DIFF WBC: CPT

## 2021-06-26 PROCEDURE — 99283 EMERGENCY DEPT VISIT LOW MDM: CPT

## 2021-06-26 PROCEDURE — 80053 COMPREHEN METABOLIC PANEL: CPT

## 2021-06-27 ENCOUNTER — HOSPITAL ENCOUNTER (EMERGENCY)
Age: 31
Discharge: LEFT BEFORE DISCHARGE | End: 2021-06-27
Payer: MEDICAID

## 2021-06-27 DIAGNOSIS — N39.0 URINARY TRACT INFECTION WITHOUT HEMATURIA, SITE UNSPECIFIED: Primary | ICD-10-CM

## 2021-06-27 DIAGNOSIS — R10.30 LOWER ABDOMINAL PAIN: ICD-10-CM

## 2021-06-27 DIAGNOSIS — R11.0 NAUSEA: ICD-10-CM

## 2021-06-27 NOTE — ED NOTES
FIRST PROVIDER CONTACT ASSESSMENT NOTE        Department of Emergency Medicine            ED  First Provider Note            6/26/21  9:14 PM EDT    Chief Complaint: Abdominal Cramping (lower abd cramping since tuesday), Diarrhea, and Nausea      History of Present Illness:    Jaz Stephens is a 28 y.o. female who presents to the emergency department for diffuse abdominal cramping, nausea, diarrhea all that started on Tuesday. Patient reports symptoms constant. Reports 3 episodes of diarrhea today. No noted vomiting no fevers noted. Focused Screening Exam:  Constitutional:  Alert, appears stated age and is in no distress. *ALLERGIES*     Patient has no known allergies.      ED Triage Vitals   BP Temp Temp Source Pulse Resp SpO2 Height Weight   -- 06/26/21 2050 06/26/21 2050 06/26/21 2050 06/26/21 2114 06/26/21 2050 -- --    97.5 °F (36.4 °C) Temporal 107 16 97 %          Initial Plan of Care:  Initiate Treatment-Testing, Proceed toTreatment Area When Bed Available for ED Attending/MLP to Continue Care    -----------------END OF FIRST PROVIDER CONTACT ASSESSMENT NOTE--------------  Electronically signed by VAMSHI Palmer CNP   DD: 6/26/21         VAMSHI Palmer CNP  06/26/21 2115

## 2021-06-27 NOTE — ED PROVIDER NOTES
Independent   HPI:  21, Time: 12:07 AM EDT         Freddi Schirmer is a 28 y.o. female presenting to the ED for diffuse abdominal cramping, nausea, diarrhea all that started on Tuesday. Patient reports symptoms constant. Reports 3 episodes of diarrhea today. No noted vomiting no fevers noted. Patient reports symptoms started 3 days ago. She denies any other family members ill with similar symptoms. She also denies noted any blood in her stool, urine or emesis. She also denies any associated back or flank pain with this. Patient reports trying over-the-counter meds at home without relief. Symptoms moderate in severity but persistent and describing them as aching. Focused Screening Exam:    Review of Systems:   A complete review of systems was performed and pertinent positives and negatives are stated within HPI, all other systems reviewed and are negative.          --------------------------------------------- PAST HISTORY ---------------------------------------------  Past Medical History:  has a past medical history of Back pain and Neck pain. Past Surgical History:  has a past surgical history that includes  section and Nerve Block (Left, 2020). Social History:  reports that she has been smoking cigarettes and cigars. She has been smoking about 0.50 packs per day. She has never used smokeless tobacco. She reports that she does not drink alcohol and does not use drugs. Family History: family history includes Asthma in her brother, brother, and father; No Known Problems in her mother. The patients home medications have been reviewed. Allergies: Patient has no known allergies.     -------------------------------------------------- RESULTS -------------------------------------------------  All laboratory and radiology results have been personally reviewed by myself   LABS:  Results for orders placed or performed during the hospital encounter of 21   CBC Auto Differential   Result Value Ref Range    WBC 10.9 4.5 - 11.5 E9/L    RBC 4.86 3.50 - 5.50 E12/L    Hemoglobin 13.6 11.5 - 15.5 g/dL    Hematocrit 43.8 34.0 - 48.0 %    MCV 90.1 80.0 - 99.9 fL    MCH 28.0 26.0 - 35.0 pg    MCHC 31.1 (L) 32.0 - 34.5 %    RDW 14.3 11.5 - 15.0 fL    Platelets 948 794 - 892 E9/L    MPV 9.5 7.0 - 12.0 fL    Neutrophils % 48.7 43.0 - 80.0 %    Lymphocytes % 41.7 20.0 - 42.0 %    Monocytes % 7.0 2.0 - 12.0 %    Eosinophils % 1.7 0.0 - 6.0 %    Basophils % 0.3 0.0 - 2.0 %    Neutrophils Absolute 5.34 1.80 - 7.30 E9/L    Lymphocytes Absolute 4.58 (H) 1.50 - 4.00 E9/L    Monocytes Absolute 0.76 0.10 - 0.95 E9/L    Eosinophils Absolute 0.19 0.05 - 0.50 E9/L    Basophils Absolute 0.00 0.00 - 0.20 E9/L    Blasts Relative 0.9 0 - 0 %    Anisocytosis 1+    Comprehensive Metabolic Panel   Result Value Ref Range    Sodium 142 132 - 146 mmol/L    Potassium 3.7 3.5 - 5.0 mmol/L    Chloride 108 (H) 98 - 107 mmol/L    CO2 24 22 - 29 mmol/L    Anion Gap 10 7 - 16 mmol/L    Glucose 62 (L) 74 - 99 mg/dL    BUN 17 6 - 20 mg/dL    CREATININE 0.9 0.5 - 1.0 mg/dL    GFR Non-African American >60 >=60 mL/min/1.73    GFR African American >60     Calcium 8.8 8.6 - 10.2 mg/dL    Total Protein 7.0 6.4 - 8.3 g/dL    Albumin 3.9 3.5 - 5.2 g/dL    Total Bilirubin <0.2 0.0 - 1.2 mg/dL    Alkaline Phosphatase 73 35 - 104 U/L    ALT 10 0 - 32 U/L    AST 13 0 - 31 U/L   Lactic Acid, Plasma   Result Value Ref Range    Lactic Acid 0.9 0.5 - 2.2 mmol/L   Lipase   Result Value Ref Range    Lipase 31 13 - 60 U/L   Urinalysis   Result Value Ref Range    Color, UA Yellow Straw/Yellow    Clarity, UA Clear Clear    Glucose, Ur Negative Negative mg/dL    Bilirubin Urine Negative Negative    Ketones, Urine TRACE (A) Negative mg/dL    Specific Gravity, UA >=1.030 1.005 - 1.030    Blood, Urine SMALL (A) Negative    pH, UA 5.5 5.0 - 9.0    Protein, UA TRACE Negative mg/dL    Urobilinogen, Urine 1.0 <2.0 E.U./dL    Nitrite, Urine Negative Negative    Leukocyte Esterase, Urine MODERATE (A) Negative   Microscopic Urinalysis   Result Value Ref Range    WBC, UA 10-20 (A) 0 - 5 /HPF    RBC, UA 1-3 0 - 2 /HPF    Epithelial Cells, UA MODERATE /HPF    Bacteria, UA MODERATE (A) None Seen /HPF   POC Pregnancy Urine   Result Value Ref Range    HCG, Urine, POC Negative Negative    Lot Number [id number]     Positive QC Pass/Fail Pass     Negative QC Pass/Fail Pass        RADIOLOGY:  Interpreted by Radiologist.  No orders to display       ------------------------- NURSING NOTES AND VITALS REVIEWED ---------------------------   The nursing notes within the ED encounter and vital signs as below have been reviewed. /78   Pulse 107   Temp 97.5 °F (36.4 °C) (Temporal)   Resp 16   SpO2 97%   Oxygen Saturation Interpretation: Normal      ---------------------------------------------------PHYSICAL EXAM--------------------------------------      Constitutional/General: Alert and oriented x3, well appearing, non toxic in NAD  Head: Normocephalic and atraumatic  Eyes: PERRL, EOMI  Mouth: Oropharynx clear, handling secretions, no trismus  Neck: Supple, full ROM,   Pulmonary: Lungs clear to auscultation bilaterally, no wheezes, rales, or rhonchi. Not in respiratory distress  Cardiovascular:  Regular rate and rhythm, no murmurs, gallops, or rubs. 2+ distal pulses  Abdomen: Soft tender, non distended, point tenderness to lower mid abdomen. Extremities: Moves all extremities x 4. Warm and well perfused  Skin: warm and dry without rash  Neurologic: GCS 15,  Psych: Normal Affect      ------------------------------ ED COURSE/MEDICAL DECISION MAKING----------------------  Medications - No data to display      ED COURSE:       Medical Decision Making: Plan will be for labs will also obtain imaging once patient gets back to the main emergency department.   Urine pregnancy negative CBC negative, chemistry panel negative, lactic acid level negative lipase negative, urinalysis is positive for urinary tract infection, will send off urine culture. I did go out to the waiting room to find patient to make her aware of these results and to recheck on her. I was not not able to locate her, nursing then when out later and still was not able to locate patient. Patient eloped. Counseling: The emergency provider has spoken with the patient and discussed todays results, in addition to providing specific details for the plan of care and counseling regarding the diagnosis and prognosis. Questions are answered at this time and they are agreeable with the plan.      --------------------------------- IMPRESSION AND DISPOSITION ---------------------------------    IMPRESSION  1. Urinary tract infection without hematuria, site unspecified    2. Nausea    3. Lower abdominal pain        DISPOSITION  Disposition: Eloped  Patient condition is good      NOTE: This report was transcribed using voice recognition software.  Every effort was made to ensure accuracy; however, inadvertent computerized transcription errors may be present     VAMSHI Jerome - CNP  06/28/21 2858  ATTENDING PROVIDER ATTESTATION:     Supervising Physician, on-site, available for consultation, non-participatory in the evaluation or care of this patient       Nena Garcia MD  06/28/21 [id number]

## 2022-04-17 ENCOUNTER — APPOINTMENT (OUTPATIENT)
Dept: CT IMAGING | Age: 32
DRG: 566 | End: 2022-04-17
Payer: MEDICAID

## 2022-04-17 ENCOUNTER — HOSPITAL ENCOUNTER (INPATIENT)
Age: 32
LOS: 2 days | Discharge: HOME | DRG: 566 | End: 2022-04-19
Attending: EMERGENCY MEDICINE | Admitting: INTERNAL MEDICINE
Payer: MEDICAID

## 2022-04-17 DIAGNOSIS — I63.9 CEREBROVASCULAR ACCIDENT (CVA), UNSPECIFIED MECHANISM (HCC): Primary | ICD-10-CM

## 2022-04-17 DIAGNOSIS — Z3A.10 10 WEEKS GESTATION OF PREGNANCY: ICD-10-CM

## 2022-04-17 LAB
ACETAMINOPHEN LEVEL: <5 MCG/ML (ref 10–30)
ALBUMIN SERPL-MCNC: 3.8 G/DL (ref 3.5–5.2)
ALP BLD-CCNC: 59 U/L (ref 35–104)
ALT SERPL-CCNC: 11 U/L (ref 0–32)
AMPHETAMINE SCREEN, URINE: NOT DETECTED
ANION GAP SERPL CALCULATED.3IONS-SCNC: 11 MMOL/L (ref 7–16)
AST SERPL-CCNC: 14 U/L (ref 0–31)
BACTERIA: ABNORMAL /HPF
BARBITURATE SCREEN URINE: NOT DETECTED
BASOPHILS ABSOLUTE: 0.03 E9/L (ref 0–0.2)
BASOPHILS RELATIVE PERCENT: 0.3 % (ref 0–2)
BENZODIAZEPINE SCREEN, URINE: NOT DETECTED
BILIRUB SERPL-MCNC: <0.2 MG/DL (ref 0–1.2)
BILIRUBIN URINE: NEGATIVE
BLOOD, URINE: ABNORMAL
BUN BLDV-MCNC: 13 MG/DL (ref 6–20)
CALCIUM SERPL-MCNC: 8.9 MG/DL (ref 8.6–10.2)
CANNABINOID SCREEN URINE: POSITIVE
CHLORIDE BLD-SCNC: 104 MMOL/L (ref 98–107)
CLARITY: ABNORMAL
CO2: 23 MMOL/L (ref 22–29)
COCAINE METABOLITE SCREEN URINE: NOT DETECTED
COLOR: YELLOW
CREAT SERPL-MCNC: 0.8 MG/DL (ref 0.5–1)
EOSINOPHILS ABSOLUTE: 0.05 E9/L (ref 0.05–0.5)
EOSINOPHILS RELATIVE PERCENT: 0.5 % (ref 0–6)
EPITHELIAL CELLS, UA: ABNORMAL /HPF
ETHANOL: <10 MG/DL (ref 0–0.08)
FENTANYL SCREEN, URINE: NOT DETECTED
GFR AFRICAN AMERICAN: >60
GFR NON-AFRICAN AMERICAN: >60 ML/MIN/1.73
GLUCOSE BLD-MCNC: 76 MG/DL (ref 74–99)
GLUCOSE URINE: NEGATIVE MG/DL
HCG, URINE, POC: POSITIVE
HCT VFR BLD CALC: 39.5 % (ref 34–48)
HEMOGLOBIN: 13.1 G/DL (ref 11.5–15.5)
IMMATURE GRANULOCYTES #: 0.05 E9/L
IMMATURE GRANULOCYTES %: 0.5 % (ref 0–5)
KETONES, URINE: 40 MG/DL
LEUKOCYTE ESTERASE, URINE: ABNORMAL
LYMPHOCYTES ABSOLUTE: 3.38 E9/L (ref 1.5–4)
LYMPHOCYTES RELATIVE PERCENT: 31.6 % (ref 20–42)
Lab: ABNORMAL
Lab: ABNORMAL
MCH RBC QN AUTO: 28.9 PG (ref 26–35)
MCHC RBC AUTO-ENTMCNC: 33.2 % (ref 32–34.5)
MCV RBC AUTO: 87.2 FL (ref 80–99.9)
METHADONE SCREEN, URINE: NOT DETECTED
MONOCYTES ABSOLUTE: 0.98 E9/L (ref 0.1–0.95)
MONOCYTES RELATIVE PERCENT: 9.2 % (ref 2–12)
NEGATIVE QC PASS/FAIL: ABNORMAL
NEUTROPHILS ABSOLUTE: 6.2 E9/L (ref 1.8–7.3)
NEUTROPHILS RELATIVE PERCENT: 57.9 % (ref 43–80)
NITRITE, URINE: POSITIVE
OPIATE SCREEN URINE: NOT DETECTED
OXYCODONE URINE: NOT DETECTED
PDW BLD-RTO: 14.2 FL (ref 11.5–15)
PH UA: 6 (ref 5–9)
PHENCYCLIDINE SCREEN URINE: NOT DETECTED
PLATELET # BLD: 325 E9/L (ref 130–450)
PMV BLD AUTO: 9.3 FL (ref 7–12)
POSITIVE QC PASS/FAIL: ABNORMAL
POTASSIUM SERPL-SCNC: 4 MMOL/L (ref 3.5–5)
PROTEIN UA: NEGATIVE MG/DL
RBC # BLD: 4.53 E12/L (ref 3.5–5.5)
RBC UA: ABNORMAL /HPF (ref 0–2)
SALICYLATE, SERUM: <0.3 MG/DL (ref 0–30)
SODIUM BLD-SCNC: 138 MMOL/L (ref 132–146)
SPECIFIC GRAVITY UA: 1.02 (ref 1–1.03)
TOTAL PROTEIN: 6.9 G/DL (ref 6.4–8.3)
TRICYCLIC ANTIDEPRESSANTS SCREEN SERUM: NEGATIVE NG/ML
UROBILINOGEN, URINE: 0.2 E.U./DL
WBC # BLD: 10.7 E9/L (ref 4.5–11.5)
WBC UA: >20 /HPF (ref 0–5)

## 2022-04-17 PROCEDURE — 81001 URINALYSIS AUTO W/SCOPE: CPT

## 2022-04-17 PROCEDURE — 96366 THER/PROPH/DIAG IV INF ADDON: CPT

## 2022-04-17 PROCEDURE — 6370000000 HC RX 637 (ALT 250 FOR IP): Performed by: NURSE PRACTITIONER

## 2022-04-17 PROCEDURE — 80143 DRUG ASSAY ACETAMINOPHEN: CPT

## 2022-04-17 PROCEDURE — 80053 COMPREHEN METABOLIC PANEL: CPT

## 2022-04-17 PROCEDURE — 87088 URINE BACTERIA CULTURE: CPT

## 2022-04-17 PROCEDURE — 70450 CT HEAD/BRAIN W/O DYE: CPT

## 2022-04-17 PROCEDURE — 96365 THER/PROPH/DIAG IV INF INIT: CPT

## 2022-04-17 PROCEDURE — 87077 CULTURE AEROBIC IDENTIFY: CPT

## 2022-04-17 PROCEDURE — 2060000000 HC ICU INTERMEDIATE R&B

## 2022-04-17 PROCEDURE — 87186 SC STD MICRODIL/AGAR DIL: CPT

## 2022-04-17 PROCEDURE — 96375 TX/PRO/DX INJ NEW DRUG ADDON: CPT

## 2022-04-17 PROCEDURE — 80179 DRUG ASSAY SALICYLATE: CPT

## 2022-04-17 PROCEDURE — 6360000002 HC RX W HCPCS: Performed by: GENERAL PRACTICE

## 2022-04-17 PROCEDURE — 85025 COMPLETE CBC W/AUTO DIFF WBC: CPT

## 2022-04-17 PROCEDURE — 99285 EMERGENCY DEPT VISIT HI MDM: CPT

## 2022-04-17 PROCEDURE — 80307 DRUG TEST PRSMV CHEM ANLYZR: CPT

## 2022-04-17 PROCEDURE — 82077 ASSAY SPEC XCP UR&BREATH IA: CPT

## 2022-04-17 PROCEDURE — 93005 ELECTROCARDIOGRAM TRACING: CPT | Performed by: EMERGENCY MEDICINE

## 2022-04-17 RX ORDER — ACETAMINOPHEN 500 MG
1000 TABLET ORAL ONCE
Status: COMPLETED | OUTPATIENT
Start: 2022-04-17 | End: 2022-04-17

## 2022-04-17 RX ORDER — METHYLPREDNISOLONE SODIUM SUCCINATE 125 MG/2ML
125 INJECTION, POWDER, LYOPHILIZED, FOR SOLUTION INTRAMUSCULAR; INTRAVENOUS ONCE
Status: COMPLETED | OUTPATIENT
Start: 2022-04-17 | End: 2022-04-17

## 2022-04-17 RX ORDER — MAGNESIUM SULFATE IN WATER 40 MG/ML
2000 INJECTION, SOLUTION INTRAVENOUS ONCE
Status: COMPLETED | OUTPATIENT
Start: 2022-04-17 | End: 2022-04-17

## 2022-04-17 RX ORDER — METOCLOPRAMIDE HYDROCHLORIDE 5 MG/ML
10 INJECTION INTRAMUSCULAR; INTRAVENOUS ONCE
Status: COMPLETED | OUTPATIENT
Start: 2022-04-17 | End: 2022-04-17

## 2022-04-17 RX ADMIN — ACETAMINOPHEN 1000 MG: 500 TABLET ORAL at 14:37

## 2022-04-17 RX ADMIN — METOCLOPRAMIDE HYDROCHLORIDE 10 MG: 5 INJECTION INTRAMUSCULAR; INTRAVENOUS at 16:36

## 2022-04-17 RX ADMIN — MAGNESIUM SULFATE HEPTAHYDRATE 2000 MG: 40 INJECTION, SOLUTION INTRAVENOUS at 16:42

## 2022-04-17 RX ADMIN — METHYLPREDNISOLONE SODIUM SUCCINATE 125 MG: 125 INJECTION, POWDER, FOR SOLUTION INTRAMUSCULAR; INTRAVENOUS at 16:36

## 2022-04-17 ASSESSMENT — ENCOUNTER SYMPTOMS
DIARRHEA: 0
ABDOMINAL DISTENTION: 0
NAUSEA: 1
SINUS PRESSURE: 0
EYE REDNESS: 0
SHORTNESS OF BREATH: 0
EYE PAIN: 0
EYE DISCHARGE: 0
BACK PAIN: 0
COUGH: 0
WHEEZING: 0
SORE THROAT: 0
VOMITING: 0

## 2022-04-17 ASSESSMENT — PAIN DESCRIPTION - FREQUENCY: FREQUENCY: CONTINUOUS

## 2022-04-17 ASSESSMENT — PAIN SCALES - GENERAL
PAINLEVEL_OUTOF10: 0
PAINLEVEL_OUTOF10: 10
PAINLEVEL_OUTOF10: 10

## 2022-04-17 ASSESSMENT — PAIN DESCRIPTION - PAIN TYPE: TYPE: ACUTE PAIN

## 2022-04-17 ASSESSMENT — PAIN DESCRIPTION - LOCATION: LOCATION: HEAD

## 2022-04-17 ASSESSMENT — PAIN - FUNCTIONAL ASSESSMENT: PAIN_FUNCTIONAL_ASSESSMENT: 0-10

## 2022-04-17 ASSESSMENT — PAIN DESCRIPTION - ORIENTATION: ORIENTATION: RIGHT;LEFT;MID;POSTERIOR;ANTERIOR

## 2022-04-17 ASSESSMENT — PAIN DESCRIPTION - DESCRIPTORS: DESCRIPTORS: ACHING;THROBBING

## 2022-04-17 NOTE — ED NOTES
Stat desk confirmed that they can run UDS from urine previously sent for UA & UCx.      Yuridia Donis RN  04/17/22 [id number]

## 2022-04-17 NOTE — ED NOTES
CT during pregnancy waiver signed by pt, witnessed by this RN.  CT notified     Nicole Rodriguez RN  04/17/22 [id number]    ADDENDUM  Faxed to [phone] [place]ager  04/17/22 [id number]

## 2022-04-17 NOTE — ED NOTES
Pt placed on cardiac monitor, BP cuff, and continuous pulse ox. Dr. Zonia Martinez at bedside to update pt on plan of care.      Katie Perdomo RN  04/17/22 1283

## 2022-04-17 NOTE — ED PROVIDER NOTES
ED  Provider Note  Admit Date/RoomTime: 4/17/2022  2:26 PM  ED Room: /     HPI:   Billy Deluna is a 33 y.o. female presenting to the ED for ehadache, beginning 4 days ago. History comes primarily from the patient. Patient Active Problem List:     Sprain of shoulder, left     Cervicalgia     Cervical spondylolysis     Cervical strain     Sprain of ligaments of cervical spine  . The complaint has been persistent, moderate in severity, improved by nothing and worsened by bright lights. Associated symptoms include numbness. Patient states that she is currently 10 weeks pregnant, and 4 days ago developed a headache that woke her up out of sleep and has been present ever since the start of the headache 4 days ago. She is also noted some intermittent numbness and tingling of her right hand, which is made it difficult to drive a vehicle or fix her hair. In addition, her significant other states that the patient had a recent episode of aphasia approximately 2 days ago where she had several minutes of word finding inability. Concerned by the persistent nature of the symptoms, she presented to [facility] emergency department for further evaluation and treatment. Review of Systems   Constitutional: Positive for activity change. Negative for chills and fever. HENT: Negative for ear pain, sinus pressure and sore throat. Eyes: Negative for pain, discharge and redness. Respiratory: Negative for cough, shortness of breath and wheezing. Cardiovascular: Negative for chest pain. Gastrointestinal: Positive for nausea. Negative for abdominal distention, diarrhea and vomiting. Genitourinary: Negative for dysuria and frequency. Musculoskeletal: Negative for arthralgias and back pain. Skin: Negative for rash and wound. Neurological: Positive for headaches. Negative for weakness. Hematological: Negative for adenopathy.    All other systems reviewed and are negative. Physical Exam  Vitals and nursing note reviewed. Constitutional:       Appearance: She is well-developed. HENT:      Head: Normocephalic and atraumatic. Eyes:      Pupils: Pupils are equal, round, and reactive to light. Cardiovascular:      Rate and Rhythm: Normal rate and regular rhythm. Pulmonary:      Effort: Pulmonary effort is normal. No respiratory distress. Breath sounds: Normal breath sounds. No wheezing or rales. Abdominal:      General: Bowel sounds are normal.      Palpations: Abdomen is soft. Tenderness: There is no abdominal tenderness. There is no guarding or rebound. Musculoskeletal:      Cervical back: Normal range of motion and neck supple. Skin:     General: Skin is warm and dry. Neurological:      Mental Status: She is alert and oriented to person, place, and time. Cranial Nerves: No cranial nerve deficit. Coordination: Coordination normal.                Procedures     MDM  Number of Diagnoses or Management Options  10 weeks gestation of pregnancy  Cerebrovascular accident (CVA), unspecified mechanism ([facility] [address]  Diagnosis management comments: Emergency department evaluation was notable for findings of a subacute infarct in a young, otherwise healthy female at 10 weeks gestation of pregnancy with associated focal neurologic deficit. This places the patient at high risk for further complication, and treatment for from close observation in the inpatient setting with neurologic assessment     This information was relayed to the patient who understood this plan of care and was amenable to the plan.   Patient was discussed with the admitting service (Dr. Karrie Macdonald) who concurred with the decision for admission, and have agreed to admit the patient to telemetry.             --------------------------------------------- PAST HISTORY ---------------------------------------------  Past Medical History:  has a past medical history of Back pain and Neck pain.    Past Surgical History:  has a past surgical history that includes  section and Nerve Block (Left, 2020). Social History:  reports that she has been smoking cigarettes and cigars. She has been smoking about 0.50 packs per day. She has never used smokeless tobacco. She reports that she does not drink alcohol and does not use drugs. Family History: family history includes Asthma in her brother, brother, and father; No Known Problems in her mother. The patients home medications have been reviewed. Allergies: Patient has no known allergies.     -------------------------------------------------- RESULTS -------------------------------------------------    LABS:  Results for orders placed or performed during the hospital encounter of 22   Urinalysis with Microscopic   Result Value Ref Range    Color, UA Yellow Straw/Yellow    Clarity, UA SL CLOUDY Clear    Glucose, Ur Negative Negative mg/dL    Bilirubin Urine Negative Negative    Ketones, Urine 40 (A) Negative mg/dL    Specific Gravity, UA 1.025 1.005 - 1.030    Blood, Urine TRACE-INTACT Negative    pH, UA 6.0 5.0 - 9.0    Protein, UA Negative Negative mg/dL    Urobilinogen, Urine 0.2 <2.0 E.U./dL    Nitrite, Urine POSITIVE (A) Negative    Leukocyte Esterase, Urine SMALL (A) Negative    WBC, UA >20 (A) 0 - 5 /HPF    RBC, UA 1-3 0 - 2 /HPF    Epithelial Cells, UA MODERATE /HPF    Bacteria, UA MODERATE (A) None Seen /HPF   Comprehensive Metabolic Panel   Result Value Ref Range    Sodium 138 132 - 146 mmol/L    Potassium 4.0 3.5 - 5.0 mmol/L    Chloride 104 98 - 107 mmol/L    CO2 23 22 - 29 mmol/L    Anion Gap 11 7 - 16 mmol/L    Glucose 76 74 - 99 mg/dL    BUN 13 6 - 20 mg/dL    CREATININE 0.8 0.5 - 1.0 mg/dL    GFR Non-African American >60 >=60 mL/min/1.73    GFR African American >60     Calcium 8.9 8.6 - 10.2 mg/dL    Total Protein 6.9 6.4 - 8.3 g/dL    Albumin 3.8 3.5 - 5.2 g/dL    Total Bilirubin <0.2 0.0 - 1.2 mg/dL    Alkaline Phosphatase 59 35 - 104 U/L    ALT 11 0 - 32 U/L    AST 14 0 - 31 U/L   CBC with Auto Differential   Result Value Ref Range    WBC 10.7 4.5 - 11.5 E9/L    RBC 4.53 3.50 - 5.50 E12/L    Hemoglobin 13.1 11.5 - 15.5 g/dL    Hematocrit 39.5 34.0 - 48.0 %    MCV 87.2 80.0 - 99.9 fL    MCH 28.9 26.0 - 35.0 pg    MCHC 33.2 32.0 - 34.5 %    RDW 14.2 11.5 - 15.0 fL    Platelets 565 488 - 571 E9/L    MPV 9.3 7.0 - 12.0 fL    Neutrophils % 57.9 43.0 - 80.0 %    Immature Granulocytes % 0.5 0.0 - 5.0 %    Lymphocytes % 31.6 20.0 - 42.0 %    Monocytes % 9.2 2.0 - 12.0 %    Eosinophils % 0.5 0.0 - 6.0 %    Basophils % 0.3 0.0 - 2.0 %    Neutrophils Absolute 6.20 1.80 - 7.30 E9/L    Immature Granulocytes # 0.05 E9/L    Lymphocytes Absolute 3.38 1.50 - 4.00 E9/L    Monocytes Absolute 0.98 (H) 0.10 - 0.95 E9/L    Eosinophils Absolute 0.05 0.05 - 0.50 E9/L    Basophils Absolute 0.03 0.00 - 0.20 E9/L   URINE DRUG SCREEN   Result Value Ref Range    Drug Screen Comment: see below    POC Pregnancy Urine Qual   Result Value Ref Range    HCG, Urine, POC Positive Negative    Lot Number [id number]     Positive QC Pass/Fail Pass     Negative QC Pass/Fail Pass    EKG 12 Lead   Result Value Ref Range    Ventricular Rate 77 BPM    Atrial Rate 77 BPM    P-R Interval 158 ms    QRS Duration 82 ms    Q-T Interval 388 ms    QTc Calculation (Bazett) 439 ms    P Axis 46 degrees    R Axis -9 degrees    T Axis -5 degrees       RADIOLOGY:  CT HEAD WO CONTRAST   Final Result   Hypodensity within the left insular cortex with abnormal white matter   hypodensity extending into the adjacent subinsular white matter and left   corona radiata. Findings may be suggestive of subacute infarction. For   further evaluation brain MRI is recommended. No intracranial hemorrhage or mass lesion. The findings were sent to the Radiology Results [address] at 5:30   pm on 4/17/2022to be communicated to a licensed caregiver.       RECOMMENDATIONS: Unavailable               ------------------------- NURSING NOTES AND VITALS REVIEWED ---------------------------  Date / Time Roomed:  4/17/2022  2:26 PM  ED Bed Assignment:  20/71    The nursing notes within the ED encounter and vital signs as below have been reviewed. Patient Vitals for the past 24 hrs:   BP Temp Temp src Pulse Resp SpO2 Height Weight   04/17/22 1745 123/74 -- -- 85 15 98 % -- --   04/17/22 1413 (!) 135/96 -- -- -- -- -- 5' 2\" (1.575 m) 163 lb (73.9 kg)   04/17/22 1411 -- 97.9 °F (36.6 °C) Oral -- -- -- -- --   04/17/22 1410 -- -- -- 97 17 99 % -- --       Oxygen Saturation Interpretation: Normal    ------------------------------------------ PROGRESS NOTES ------------------------------------------  Re-evaluation(s):  Time: 7:03 PM EDT  Patients symptoms show no change  Repeat physical examination is not changed    Counseling:  I have spoken with the patient and discussed todays results, in addition to providing specific details for the plan of care and counseling regarding the diagnosis and prognosis. Their questions are answered at this time and they are agreeable with the plan of admission.    --------------------------------- ADDITIONAL PROVIDER NOTES ---------------------------------  Consultations:  Time: 7:03 PM EDT. Spoke with Dr. Marivel Proctor. Discussed case. They will admit the patient. This patient's ED course included: a personal history and physicial examination, re-evaluation prior to disposition and multiple bedside re-evaluations    This patient has remained hemodynamically stable during their ED course. Diagnosis:  1. Cerebrovascular accident (CVA), unspecified mechanism ([facility] [address]    2. 10 weeks gestation of pregnancy        Disposition:  Patient's disposition: Admit to telemetry  Patient's condition is fair.          [address] DO  Resident  04/17/22 1903      ATTENDING PROVIDER ATTESTATION:     Chante Schultz presented to the emergency department for evaluation of Headache (HA for 4 days and had a hot sensation in right hand while fixing hair)   and was initially evaluated by the Medical Resident. See Original ED Note for H&P and ED course above. I have reviewed and discussed the case, including pertinent history (medical, surgical, family and social) and exam findings with the Medical Resident assigned to Daniel Mckinney. I have personally performed and/or participated in the history, exam, medical decision making, and procedures and agree with all pertinent clinical information. I have reviewed my findings and recommendations with the assigned Medical Resident, Daniel Mckinney and members of family present at the time of disposition. My findings/plan: The primary encounter diagnosis was Cerebrovascular accident (CVA), unspecified mechanism ([facility] [address] A diagnosis of 10 weeks gestation of pregnancy was also pertinent to this visit.   Discharge Medication List as of 4/19/2022  7:44 PM      START taking these medications    Details   aspirin 81 MG chewable tablet Take 1 tablet by mouth daily, Disp-90 tablet, R-3Normal      cephALEXin (KEFLEX) 250 MG capsule Take 1 capsule by mouth 4 times daily for 5 days, Disp-20 capsule, R-0Normal           MD Adrian Viera MD  04/19/22 2543

## 2022-04-17 NOTE — ED NOTES
Requested urine sample from patient; specimen cup provided and left and bedside. Pt verbalized understanding.      Moriah Maldonado RN  04/17/22 0863

## 2022-04-18 ENCOUNTER — APPOINTMENT (OUTPATIENT)
Dept: MRI IMAGING | Age: 32
DRG: 566 | End: 2022-04-18
Payer: MEDICAID

## 2022-04-18 ENCOUNTER — APPOINTMENT (OUTPATIENT)
Dept: ULTRASOUND IMAGING | Age: 32
DRG: 566 | End: 2022-04-18
Payer: MEDICAID

## 2022-04-18 LAB
ANION GAP SERPL CALCULATED.3IONS-SCNC: 12 MMOL/L (ref 7–16)
BASOPHILS ABSOLUTE: 0.01 E9/L (ref 0–0.2)
BASOPHILS RELATIVE PERCENT: 0.1 % (ref 0–2)
BETA-HYDROXYBUTYRATE: 0.42 MMOL/L (ref 0.02–0.27)
BUN BLDV-MCNC: 15 MG/DL (ref 6–20)
CALCIUM SERPL-MCNC: 8.9 MG/DL (ref 8.6–10.2)
CHLORIDE BLD-SCNC: 99 MMOL/L (ref 98–107)
CHOLESTEROL, TOTAL: 169 MG/DL (ref 0–199)
CO2: 18 MMOL/L (ref 22–29)
CREAT SERPL-MCNC: 0.6 MG/DL (ref 0.5–1)
EKG ATRIAL RATE: 77 BPM
EKG P AXIS: 46 DEGREES
EKG P-R INTERVAL: 158 MS
EKG Q-T INTERVAL: 388 MS
EKG QRS DURATION: 82 MS
EKG QTC CALCULATION (BAZETT): 439 MS
EKG R AXIS: -9 DEGREES
EKG T AXIS: -5 DEGREES
EKG VENTRICULAR RATE: 77 BPM
EOSINOPHILS ABSOLUTE: 0.01 E9/L (ref 0.05–0.5)
EOSINOPHILS RELATIVE PERCENT: 0.1 % (ref 0–6)
GFR AFRICAN AMERICAN: >60
GFR NON-AFRICAN AMERICAN: >60 ML/MIN/1.73
GLUCOSE BLD-MCNC: 125 MG/DL (ref 74–99)
HBA1C MFR BLD: 4.7 % (ref 4–5.6)
HCT VFR BLD CALC: 38.2 % (ref 34–48)
HDLC SERPL-MCNC: 62 MG/DL
HEMOGLOBIN: 12.6 G/DL (ref 11.5–15.5)
IMMATURE GRANULOCYTES #: 0.08 E9/L
IMMATURE GRANULOCYTES %: 0.7 % (ref 0–5)
LACTIC ACID: 0.8 MMOL/L (ref 0.5–2.2)
LDL CHOLESTEROL CALCULATED: 92 MG/DL (ref 0–99)
LYMPHOCYTES ABSOLUTE: 1.6 E9/L (ref 1.5–4)
LYMPHOCYTES RELATIVE PERCENT: 13.9 % (ref 20–42)
MCH RBC QN AUTO: 29.1 PG (ref 26–35)
MCHC RBC AUTO-ENTMCNC: 33 % (ref 32–34.5)
MCV RBC AUTO: 88.2 FL (ref 80–99.9)
MONOCYTES ABSOLUTE: 0.17 E9/L (ref 0.1–0.95)
MONOCYTES RELATIVE PERCENT: 1.5 % (ref 2–12)
NEUTROPHILS ABSOLUTE: 9.68 E9/L (ref 1.8–7.3)
NEUTROPHILS RELATIVE PERCENT: 83.7 % (ref 43–80)
PDW BLD-RTO: 13.7 FL (ref 11.5–15)
PLATELET # BLD: 315 E9/L (ref 130–450)
PMV BLD AUTO: 9.5 FL (ref 7–12)
POTASSIUM SERPL-SCNC: 4.3 MMOL/L (ref 3.5–5)
RBC # BLD: 4.33 E12/L (ref 3.5–5.5)
SODIUM BLD-SCNC: 129 MMOL/L (ref 132–146)
TRIGL SERPL-MCNC: 74 MG/DL (ref 0–149)
TSH SERPL DL<=0.05 MIU/L-ACNC: 0.28 UIU/ML (ref 0.27–4.2)
VLDLC SERPL CALC-MCNC: 15 MG/DL
WBC # BLD: 11.6 E9/L (ref 4.5–11.5)

## 2022-04-18 PROCEDURE — 86038 ANTINUCLEAR ANTIBODIES: CPT

## 2022-04-18 PROCEDURE — 6370000000 HC RX 637 (ALT 250 FOR IP): Performed by: STUDENT IN AN ORGANIZED HEALTH CARE EDUCATION/TRAINING PROGRAM

## 2022-04-18 PROCEDURE — 6360000002 HC RX W HCPCS: Performed by: STUDENT IN AN ORGANIZED HEALTH CARE EDUCATION/TRAINING PROGRAM

## 2022-04-18 PROCEDURE — 99231 SBSQ HOSP IP/OBS SF/LOW 25: CPT | Performed by: INTERNAL MEDICINE

## 2022-04-18 PROCEDURE — 81240 F2 GENE: CPT

## 2022-04-18 PROCEDURE — 85300 ANTITHROMBIN III ACTIVITY: CPT

## 2022-04-18 PROCEDURE — 2580000003 HC RX 258: Performed by: STUDENT IN AN ORGANIZED HEALTH CARE EDUCATION/TRAINING PROGRAM

## 2022-04-18 PROCEDURE — 85306 CLOT INHIBIT PROT S FREE: CPT

## 2022-04-18 PROCEDURE — 86147 CARDIOLIPIN ANTIBODY EA IG: CPT

## 2022-04-18 PROCEDURE — 85610 PROTHROMBIN TIME: CPT

## 2022-04-18 PROCEDURE — 70551 MRI BRAIN STEM W/O DYE: CPT

## 2022-04-18 PROCEDURE — 36415 COLL VENOUS BLD VENIPUNCTURE: CPT

## 2022-04-18 PROCEDURE — 83090 ASSAY OF HOMOCYSTEINE: CPT

## 2022-04-18 PROCEDURE — 82010 KETONE BODYS QUAN: CPT

## 2022-04-18 PROCEDURE — 70544 MR ANGIOGRAPHY HEAD W/O DYE: CPT

## 2022-04-18 PROCEDURE — 83605 ASSAY OF LACTIC ACID: CPT

## 2022-04-18 PROCEDURE — 83036 HEMOGLOBIN GLYCOSYLATED A1C: CPT

## 2022-04-18 PROCEDURE — 85303 CLOT INHIBIT PROT C ACTIVITY: CPT

## 2022-04-18 PROCEDURE — 85613 RUSSELL VIPER VENOM DILUTED: CPT

## 2022-04-18 PROCEDURE — 76801 OB US < 14 WKS SINGLE FETUS: CPT

## 2022-04-18 PROCEDURE — 80061 LIPID PANEL: CPT

## 2022-04-18 PROCEDURE — 6370000000 HC RX 637 (ALT 250 FOR IP): Performed by: PSYCHIATRY & NEUROLOGY

## 2022-04-18 PROCEDURE — 2060000000 HC ICU INTERMEDIATE R&B

## 2022-04-18 PROCEDURE — 85025 COMPLETE CBC W/AUTO DIFF WBC: CPT

## 2022-04-18 PROCEDURE — 86146 BETA-2 GLYCOPROTEIN ANTIBODY: CPT

## 2022-04-18 PROCEDURE — 82164 ANGIOTENSIN I ENZYME TEST: CPT

## 2022-04-18 PROCEDURE — 93880 EXTRACRANIAL BILAT STUDY: CPT

## 2022-04-18 PROCEDURE — 85307 ASSAY ACTIVATED PROTEIN C: CPT

## 2022-04-18 PROCEDURE — 83516 IMMUNOASSAY NONANTIBODY: CPT

## 2022-04-18 PROCEDURE — 84443 ASSAY THYROID STIM HORMONE: CPT

## 2022-04-18 PROCEDURE — 99222 1ST HOSP IP/OBS MODERATE 55: CPT | Performed by: PSYCHIATRY & NEUROLOGY

## 2022-04-18 PROCEDURE — 80048 BASIC METABOLIC PNL TOTAL CA: CPT

## 2022-04-18 PROCEDURE — 93880 EXTRACRANIAL BILAT STUDY: CPT | Performed by: RADIOLOGY

## 2022-04-18 PROCEDURE — 85730 THROMBOPLASTIN TIME PARTIAL: CPT

## 2022-04-18 RX ORDER — CEPHALEXIN 250 MG/1
250 CAPSULE ORAL EVERY 6 HOURS SCHEDULED
Status: DISCONTINUED | OUTPATIENT
Start: 2022-04-18 | End: 2022-04-19 | Stop reason: HOSPADM

## 2022-04-18 RX ORDER — POLYETHYLENE GLYCOL 3350 17 G/17G
17 POWDER, FOR SOLUTION ORAL DAILY PRN
Status: DISCONTINUED | OUTPATIENT
Start: 2022-04-18 | End: 2022-04-19 | Stop reason: HOSPADM

## 2022-04-18 RX ORDER — DEXTROSE MONOHYDRATE 50 MG/ML
100 INJECTION, SOLUTION INTRAVENOUS PRN
Status: DISCONTINUED | OUTPATIENT
Start: 2022-04-18 | End: 2022-04-19 | Stop reason: HOSPADM

## 2022-04-18 RX ORDER — ONDANSETRON 2 MG/ML
4 INJECTION INTRAMUSCULAR; INTRAVENOUS EVERY 6 HOURS PRN
Status: DISCONTINUED | OUTPATIENT
Start: 2022-04-18 | End: 2022-04-19 | Stop reason: HOSPADM

## 2022-04-18 RX ORDER — NICOTINE POLACRILEX 4 MG
15 LOZENGE BUCCAL PRN
Status: DISCONTINUED | OUTPATIENT
Start: 2022-04-18 | End: 2022-04-19 | Stop reason: HOSPADM

## 2022-04-18 RX ORDER — SODIUM CHLORIDE 0.9 % (FLUSH) 0.9 %
5-40 SYRINGE (ML) INJECTION EVERY 12 HOURS SCHEDULED
Status: DISCONTINUED | OUTPATIENT
Start: 2022-04-18 | End: 2022-04-19 | Stop reason: HOSPADM

## 2022-04-18 RX ORDER — SODIUM CHLORIDE 0.9 % (FLUSH) 0.9 %
5-40 SYRINGE (ML) INJECTION PRN
Status: DISCONTINUED | OUTPATIENT
Start: 2022-04-18 | End: 2022-04-19 | Stop reason: HOSPADM

## 2022-04-18 RX ORDER — ASPIRIN 81 MG/1
81 TABLET, CHEWABLE ORAL DAILY
Status: DISCONTINUED | OUTPATIENT
Start: 2022-04-18 | End: 2022-04-19 | Stop reason: HOSPADM

## 2022-04-18 RX ORDER — ONDANSETRON 4 MG/1
4 TABLET, ORALLY DISINTEGRATING ORAL EVERY 8 HOURS PRN
Status: DISCONTINUED | OUTPATIENT
Start: 2022-04-18 | End: 2022-04-19 | Stop reason: HOSPADM

## 2022-04-18 RX ORDER — DEXTROSE MONOHYDRATE 25 G/50ML
12.5 INJECTION, SOLUTION INTRAVENOUS PRN
Status: DISCONTINUED | OUTPATIENT
Start: 2022-04-18 | End: 2022-04-19 | Stop reason: HOSPADM

## 2022-04-18 RX ORDER — ACETAMINOPHEN 650 MG/1
650 SUPPOSITORY RECTAL EVERY 6 HOURS PRN
Status: DISCONTINUED | OUTPATIENT
Start: 2022-04-18 | End: 2022-04-19 | Stop reason: HOSPADM

## 2022-04-18 RX ORDER — SODIUM CHLORIDE 9 MG/ML
INJECTION, SOLUTION INTRAVENOUS PRN
Status: DISCONTINUED | OUTPATIENT
Start: 2022-04-18 | End: 2022-04-19 | Stop reason: HOSPADM

## 2022-04-18 RX ORDER — ACETAMINOPHEN 325 MG/1
650 TABLET ORAL EVERY 6 HOURS PRN
Status: DISCONTINUED | OUTPATIENT
Start: 2022-04-18 | End: 2022-04-19 | Stop reason: HOSPADM

## 2022-04-18 RX ORDER — PRENATAL WITH FERROUS FUM AND FOLIC ACID 3080; 920; 120; 400; 22; 1.84; 3; 20; 10; 1; 12; 200; 27; 25; 2 [IU]/1; [IU]/1; MG/1; [IU]/1; MG/1; MG/1; MG/1; MG/1; MG/1; MG/1; UG/1; MG/1; MG/1; MG/1; MG/1
1 TABLET ORAL DAILY
Status: DISCONTINUED | OUTPATIENT
Start: 2022-04-18 | End: 2022-04-19 | Stop reason: HOSPADM

## 2022-04-18 RX ADMIN — ACETAMINOPHEN 650 MG: 325 TABLET ORAL at 09:13

## 2022-04-18 RX ADMIN — ASPIRIN 81 MG 81 MG: 81 TABLET ORAL at 11:20

## 2022-04-18 RX ADMIN — ACETAMINOPHEN 650 MG: 325 TABLET ORAL at 16:18

## 2022-04-18 RX ADMIN — CEPHALEXIN 250 MG: 250 CAPSULE ORAL at 06:36

## 2022-04-18 RX ADMIN — CEPHALEXIN 250 MG: 250 CAPSULE ORAL at 17:34

## 2022-04-18 RX ADMIN — CEPHALEXIN 250 MG: 250 CAPSULE ORAL at 11:20

## 2022-04-18 RX ADMIN — SODIUM CHLORIDE, PRESERVATIVE FREE 10 ML: 5 INJECTION INTRAVENOUS at 09:10

## 2022-04-18 RX ADMIN — CEPHALEXIN 250 MG: 250 CAPSULE ORAL at 02:53

## 2022-04-18 RX ADMIN — ENOXAPARIN SODIUM 40 MG: 100 INJECTION SUBCUTANEOUS at 09:39

## 2022-04-18 RX ADMIN — METFORMIN HYDROCHLORIDE 1 TABLET: 500 TABLET, EXTENDED RELEASE ORAL at 09:10

## 2022-04-18 ASSESSMENT — PAIN DESCRIPTION - ORIENTATION
ORIENTATION: ANTERIOR;RIGHT;LEFT;MID
ORIENTATION: RIGHT;LEFT;MID

## 2022-04-18 ASSESSMENT — PAIN DESCRIPTION - PAIN TYPE
TYPE: OTHER (COMMENT)
TYPE: ACUTE PAIN;CHRONIC PAIN
TYPE: ACUTE PAIN

## 2022-04-18 ASSESSMENT — PAIN DESCRIPTION - ONSET: ONSET: ON-GOING

## 2022-04-18 ASSESSMENT — PAIN DESCRIPTION - LOCATION
LOCATION: HEAD

## 2022-04-18 ASSESSMENT — PAIN SCALES - GENERAL
PAINLEVEL_OUTOF10: 0
PAINLEVEL_OUTOF10: 5
PAINLEVEL_OUTOF10: 9
PAINLEVEL_OUTOF10: 5
PAINLEVEL_OUTOF10: 0
PAINLEVEL_OUTOF10: 9
PAINLEVEL_OUTOF10: 7

## 2022-04-18 ASSESSMENT — PAIN DESCRIPTION - FREQUENCY
FREQUENCY: CONTINUOUS
FREQUENCY: CONTINUOUS

## 2022-04-18 ASSESSMENT — PAIN - FUNCTIONAL ASSESSMENT: PAIN_FUNCTIONAL_ASSESSMENT: ACTIVITIES ARE NOT PREVENTED

## 2022-04-18 ASSESSMENT — PAIN DESCRIPTION - DESCRIPTORS
DESCRIPTORS: ACHING;PRESSURE
DESCRIPTORS: ACHING

## 2022-04-18 ASSESSMENT — PAIN DESCRIPTION - PROGRESSION: CLINICAL_PROGRESSION: GRADUALLY IMPROVING

## 2022-04-18 NOTE — PLAN OF CARE
Problem: Falls - Risk of:  Goal: Will remain free from falls  Description: Will remain free from falls  4/18/2022 0945 by Garcia Zamudio RN  Outcome: Met This Shift     Problem: Falls - Risk of:  Goal: Absence of physical injury  Description: Absence of physical injury  4/18/2022 0945 by Garcia Zamudio RN  Outcome: Met This Shift     Problem: Pain:  Description: Pain management should include both nonpharmacologic and pharmacologic interventions.   Goal: Pain level will decrease  Description: Pain level will decrease  Outcome: Not Met This Shift

## 2022-04-18 NOTE — PROGRESS NOTES
[address] [facility]  Internal Medicine Residency / [address]    Attending Physician Statement  I have discussed the case, including pertinent history and exam findings with the resident and the team.  I have seen and examined the patient and the key elements of the encounter have been performed by me. I agree with the assessment, plan and orders as documented by the resident. Admitted with S&S of CVA  during pregnancy in a smoker  No evidence of Landrum aneurysm  Neurology following  No arm drift   Face slight asymmetry  Grasp intact  CT with hypodensity within the left incular cortex and left corona radiata  MRI and MRA imagery to follow   No hx of cardiac disease  Keflex for colonization/UTI    Remainder of medical problems as per resident note.       Unice Galeazzi, MD [facility] SPECIALTY [facility]   Internal Medicine Residency Faculty

## 2022-04-18 NOTE — H&P
[facility]  Internal Medicine Residency Program  History and Physical    Patient:  Adan Sewell 33 y.o. female MRN: [id number]     Date of Service: 2022    Hospital Day: 1      Chief complaint: had concerns including Headache (HA for 4 days and had a hot sensation in right hand while fixing hair). History of Present Illness   The patient is a 33 y.o. pregnant (10 weeks) female with a no significant past medical history who presents for headache. Patient states that she had a pounding headache that woke her up from sleep four days ago which resolved 4 hours after it woke her up. She endorses blurry vision and dizziness with it. Then, 2 days ago she had some aphasia and slurred speech which lasted for 20 minutes and resolved. Shortly after the episode of aphasia, patient had paresthesia of the right arm. States that something like this happened one year ago and she never went to the doctor for it. Endorses nausea and vomiting as well, but attributes it to pregnancy. Denies having any fevers, chills, chest pain, shortness of breath, abdominal pain, diarrhea, constipation, loss of sensation, muscle weakness. Denies any family history of TIA, CVA. In the ED, patient was hemodynamically stable. Labs were remarkable for UTI, WBC > 20 with moderate bacteria and small LE. UDS + marijuana. CT head shows findings concerning for a left insular cortex subacute infarction. Patient was given tylenol, mag sulfate, solumedrol and reglan in the ED. She was then admitted for further management of subacute infarction.     Past Medical History:      Diagnosis Date    Back pain     Neck pain        Past Surgical History:        Procedure Laterality Date     SECTION      NERVE BLOCK Left 2020    LEFT CERVICAL MEDIAL BRANCH NERVE  BLOCK UNDER FLUOROSCOPIC GUIDANCE C3, C4, C5 AND C6 performed by Gailen Cockayne, MD at [facility] OR       Medications Prior to Admission:    Prior to Admission medications    Medication Sig Start Date End Date Taking? Authorizing Provider   Prenatal MV-Min-Fe Fum-FA-DHA (PRENATAL 1 PO) Take by mouth   Yes Historical Provider, MD       Allergies:  Patient has no known allergies. Social History:   TOBACCO:   reports that she has been smoking cigarettes and cigars. She has been smoking about 0.50 packs per day. She has never used smokeless tobacco.  ETOH:   reports no history of alcohol use. OCCUPATION:      Family History:       Problem Relation Age of Onset    Asthma Father     Asthma Brother     Asthma Brother     No Known Problems Mother        REVIEW OF SYSTEMS:    · Constitutional: No fever, no chills, no change in weight; good appetite  · HEENT: + blurred vision and headache, no ear problems, no sore throat, no rhinorrhea. · Respiratory: No cough, no sputum production, no pleuritic chest pain, no shortness of breath  · Cardiology: No angina, no dyspnea on exertion, no paroxysmal nocturnal dyspnea, no orthopnea, no palpitation, no leg swelling. · Gastroenterology: No dysphagia, no reflux; no abdominal pain, no nausea or vomiting; no constipation or diarrhea.  No hematochezia   · Genitourinary: No dysuria, no frequency, hesitancy; no hematuria  · Musculoskeletal: no joint pain, no myalgia, no change in range of movement  · Neurology: + slurred speech, paresthesia no focal weakness in extremities, no double vision  · Endocrinology: no temperature intolerance, no polyphagia, polydipsia or polyuria  · Hematology: no increased bleeding, no bruising, no lymphadenopathy  · Skin: no skin changes noticed by patient  · Psychology: no depressed mood, no suicidal ideation    Physical Exam   · Vitals: /88   Pulse 86   Temp 98 °F (36.7 °C)   Resp 16   Ht 5' 2\" (1.575 m)   Wt 163 lb (73.9 kg)   SpO2 97%   BMI 29.81 kg/m²     · General Appearance: alert and oriented to person, place and time, well developed and well- nourished, in no acute distress  · Skin: warm and dry, no rash or erythema  · Head: normocephalic and atraumatic  · Eyes: pupils equal, round, and reactive to light, extraocular eye movements intact, conjunctivae normal  · ENT: tympanic membrane, external ear and ear canal normal bilaterally, nose without deformity, nasal mucosa and turbinates normal without polyps  · Neck: supple and non-tender without mass, no thyromegaly or thyroid nodules, no cervical lymphadenopathy  · Pulmonary/Chest: clear to auscultation bilaterally- no wheezes, rales or rhonchi, normal air movement, no respiratory distress  · Cardiovascular: normal rate, regular rhythm, normal S1 and S2, no murmurs, rubs, clicks, or gallops, distal pulses intact, no carotid bruits  · Abdomen: soft, non-tender, non-distended, normal bowel sounds, no masses or organomegaly  · Extremities: no cyanosis, clubbing or edema  · Musculoskeletal: normal range of motion, no joint swelling, deformity or tenderness  · Neurologic: reflexes normal and symmetric, no cranial nerve deficit, gait, coordination and speech normal, no ataxia. Labs and Imaging Studies   Basic Labs  Recent Labs     04/17/22  1446      K 4.0      CO2 23   BUN 13   CREATININE 0.8   GLUCOSE 76   CALCIUM 8.9       Recent Labs     04/17/22  1446   WBC 10.7   RBC 4.53   HGB 13.1   HCT 39.5   MCV 87.2   MCH 28.9   MCHC 33.2   RDW 14.2      MPV 9.3         Imaging Studies:  CT HEAD WO CONTRAST   Final Result   Hypodensity within the left insular cortex with abnormal white matter   hypodensity extending into the adjacent subinsular white matter and left   corona radiata. Findings may be suggestive of subacute infarction. For   further evaluation brain MRI is recommended. No intracranial hemorrhage or mass lesion. The findings were sent to the Radiology Results [address] at 5:30   pm on 4/17/2022to be communicated to a licensed caregiver.       RECOMMENDATIONS:   Unavailable              EKG: normal sinus rhythm, nonspecific T waves changes. Resident's Assessment and Plan     Assessment:    1. Subacute CVA  2. Asymptomatic bacteruria in pregnancy  3.  Pregnancy    Plan:  · F/u TSH, HbA1c, lipid panel  · F/u MRI, carotid US, echo  · F/u neuro recs  · Continue prenatal vitamins  · Start Keflex 250 QID x 7 days  · Monitor daily CBC and BMP    PT/OT evaluation: not indicated  DVT prophylaxis/ GI prophylaxis: LVX/diet   Disposition: admit to telemetry    Charis Hernandez MD, PGY-1  Attending physician: Dr. Cecil Reece

## 2022-04-18 NOTE — CARE COORDINATION
4/18/2022  - admitted for CVA, 10 weeks pregnant. Neurology and OB/GYN consult. MRI brain, MRA head, MRV head, US carotid arteries, US transvaginal. Spoke with pt in room to discuss discharge planning. PCP is Dr Sukhwinder Padilla. Pharmacy is [facility] on [address]. Lives with her brother in a 2 story home but she will be moving to an apartment. She denies hx HHC, NOEMÍ/ARU and DME. Independent in ADLs. She plans to discharge home when medically stable. Her car is in the ED parking lot - she will call someone to come and drive her car home. SW/CM will follow.

## 2022-04-18 NOTE — PROGRESS NOTES
[facility]  Internal Medicine Residency Program  Progress Note - House Team     Patient:  Ignacio Charles 33 y.o. female MRN: [id number]     Date of Service: 4/18/2022     CC: headache, hot sensation in hand     Subjective     Patient was seen and examined this morning at bedside in no acute distress. Continues to have diffuse headache and numbness on entire right side. Denies weakness, vision changes, nausea,vomiting, cp, sob and abdominal pain. State she has h/o stroke in paternal grandfather and PE in mother. No personal history of stroke, clots or bleeding disorders. No history of miscarriages, had 2 successful pregnancies previously     Objective     Physical Exam:  Vitals: /68   Pulse 75   Temp 98 °F (36.7 °C) (Oral)   Resp 16   Ht 5' 2\" (1.575 m)   Wt 166 lb 7.2 oz (75.5 kg)   SpO2 99%   BMI 30.44 kg/m²     I & O - 24hr: No intake/output data recorded. General Appearance: alert, appears stated age and cooperative  HEENT:  Head: Normocephalic, no lesions, without obvious abnormality. Eye: Normal external eye, conjunctiva, lids cornea, DORIAN. Pharynx: Dental Hygiene adequate. Normal buccal mucosa. Normal pharynx. Neck: supple, symmetrical, trachea midline  Lung: clear to auscultation bilaterally  Heart: regular rate and rhythm, S1, S2 normal, no murmur, click, rub or gallop  Abdomen: soft, non-tender; bowel sounds normal; no masses,  no organomegaly  Extremities:  extremities normal, atraumatic, no cyanosis or edema  Musculokeletal: No joint swelling, no muscle tenderness. ROM normal in all joints of extremities. Neurologic: Mental status: Alert, oriented, thought content appropriate. Decreased sensation on entire right side. Strength 4/5 on RLE.    Subject  Pertinent Labs & Imaging Studies   mili  CBC:   Lab Results   Component Value Date    WBC 11.6 04/18/2022    RBC 4.33 04/18/2022    HGB 12.6 04/18/2022    HCT 38.2 04/18/2022    MCV 88.2 04/18/2022    MCH 29.1 04/18/2022    MCHC 33.0 04/18/2022    RDW 13.7 04/18/2022     04/18/2022    MPV 9.5 04/18/2022     WBC:    Lab Results   Component Value Date    WBC 11.6 04/18/2022     CMP:    Lab Results   Component Value Date     04/18/2022    K 4.3 04/18/2022    CL 99 04/18/2022    CO2 18 04/18/2022    BUN 15 04/18/2022    CREATININE 0.6 04/18/2022    GFRAA >60 04/18/2022    LABGLOM >60 04/18/2022    GLUCOSE 125 04/18/2022    PROT 6.9 04/17/2022    LABALBU 3.8 04/17/2022    CALCIUM 8.9 04/18/2022    BILITOT <0.2 04/17/2022    ALKPHOS 59 04/17/2022    AST 14 04/17/2022    ALT 11 04/17/2022   U/A:    Lab Results   Component Value Date    COLORU Yellow 04/17/2022    PROTEINU Negative 04/17/2022    PHUR 6.0 04/17/2022    WBCUA >20 04/17/2022    RBCUA 1-3 04/17/2022    RBCUA NONE 12/05/2013    BACTERIA MODERATE 04/17/2022    CLARITYU SL CLOUDY 04/17/2022    SPECGRAV 1.025 04/17/2022    LEUKOCYTESUR SMALL 04/17/2022    UROBILINOGEN 0.2 04/17/2022    BILIRUBINUR Negative 04/17/2022    BLOODU TRACE-INTACT 04/17/2022    GLUCOSEU Negative 04/17/2022     HgBA1c:    Lab Results   Component Value Date    LABA1C 4.7 04/18/2022     TSH:    Lab Results   Component Value Date    TSH 0.284 04/18/2022       CT HEAD WO CONTRAST   Final Result   Hypodensity within the left insular cortex with abnormal white matter   hypodensity extending into the adjacent subinsular white matter and left   corona radiata. Findings may be suggestive of subacute infarction. For   further evaluation brain MRI is recommended. No intracranial hemorrhage or mass lesion. The findings were sent to the Radiology Results [address] at 5:30   pm on 4/17/2022to be communicated to a licensed caregiver.       RECOMMENDATIONS:   Unavailable         MRI BRAIN WO CONTRAST    (Results Pending)   US CAROTID ARTERY BILATERAL    (Results Pending)        Resident's Assessment and Plan     Assessment and Plan:       Subacute CVA   Asymptomatic bacteruria in pregnancy  Pregnancy ,GA 10w     Plan:  TSH, HbA1c, lipids wnl   WBC trending up, continue keflex   Continue prenatal vitamins  Significant ketonuria, but glc 124 and nl A1C,maybe 2/2 to dehydration.  Check LA and BHB   F/u MRI, carotid US, echo  F/u neuro recs, will likely need to start ASA   MRA, MRV; if shows clotting, will need to start thrombophilia workup       PT/OT evaluation: not indicated   DVT prophylaxis/ GI prophylaxis: lovenox, diet   Disposition: continue current care    Lou Coronel MD, PGY-1  Attending physician: Dr. Андрей Singer

## 2022-04-18 NOTE — PROGRESS NOTES
Admitted to [phone] for subacute cva. Patient is alert oriented x 4. Moves all extremties. Vs wnl. Afebrile. No complaints at this time. Patient oriented to room.

## 2022-04-18 NOTE — CONSULTS
[facility]  Neurology Consult    Date:  4/18/2022  Patient Name:  Chelita Patricia  YOB: 1990  MRN: [id number]     PCP:  Dyllan Mata DO   Referring:  No ref. provider found      Chief Complaint: headache    History obtained from: patient    Assessment  Chelita Patricia is a 33 y.o. female with no significant past medical history who presented to the ED on 4/17/22 with new onset headache. CT Imaging is concerning for subacute infarction. The patient is a smoker but has no other known risk factors at this time. Plan  · MRI brain pending  · MRV and MRA head pending  · Hypercoagulability workup   · Start aspirin 81mg  · US Carotids pending  · Continue Keflex for asymptomatic bacteruria        History of Present Illness:  Chelita Patricia is a 33 y.o. pregnant, right handed female presenting for evaluation of new onset headache with concerns. of a subacute cerebrovascular accident. A headache awoke her from sleep 4 days prior to presentation at the hospital. She denies a headache history. After the onset of headache, she experienced dizziness while bending over, seeing spots of bright light, and right sided paresthesia of her arm. 2 days after the onset of headache, she experienced a 20 minute episode of aphasia and slurred speech. She has experienced an episode of aphasia prior to this ~ 1 year ago where she had trouble texting and speaking for 2 days. Today, she is experiencing headache, tingling in her R arm, and numbness on the R side of body. She describes her headache as frontal (sometimes unilateral, sometimes bilateral), constant, and throbbing. It is aggravated by bright lights and coughing. It is relieved by sleep. She rates the pain as a 7/10 at this time. She denies trouble speaking at this time. She denies history of miscarriages and autoimmune disease. Review of Systems:  Constitutional  · Fatigue:  Yes  · Weight loss: No  · Fever: No    Eyes  · Double Vision: No  · Visions loss: No    Ears, Nose, Mouth, and Throat  · Difficulty swallowing: No    Cardiovascular  · Chest Pain: No    Respiratory  · Shortness of Breath: No    Gastrointestinal  · Abdominal Pain: No (denies loss of control of bowel movements)    Genitourinary  · Difficulty with Urination: No    Integumentary  · Rash: No    Musculoskeletal  · Back Pain: Yes (started 1 wk ago in lower back)    Neurological  · Headaches: Yes  · Weakness:  Yes  · Numbness: Yes (R upper extremity)  · Seizures: No  · Difficulty with Memory: No  · Further symptoms noted in HPI    Psychiatric  · Anxiety: No  · Depression: No    Complete 10-point review of systems is negative except as noted above in my HPI      Medical History:   Past Medical History:   Diagnosis Date    Back pain     Neck pain         Surgical History:   Past Surgical History:   Procedure Laterality Date     SECTION      NERVE BLOCK Left 2020    LEFT CERVICAL MEDIAL BRANCH NERVE  BLOCK UNDER FLUOROSCOPIC GUIDANCE C3, C4, C5 AND C6 performed by Debbe Lennox, MD at [facility] OR        Family History: Family history is significant for father diagnosed with epilepsy and mother had a PE  Family History   Problem Relation Age of Onset    Asthma Father     Asthma Brother     Asthma Brother     No Known Problems Mother        Social History:  Social History     Tobacco Use    Smoking status: Current Every Day Smoker     Packs/day: 0.50     Types: Cigarettes, Cigars    Smokeless tobacco: Never Used    Tobacco comment: black and mild   Vaping Use    Vaping Use: Never used   Substance Use Topics    Alcohol use: No    Drug use: No        Current Medications:      Current Facility-Administered Medications   Medication Dose Route Frequency Provider Last Rate Last Admin    prenatal vitamin 27-1 MG tablet 1 tablet  1 tablet Oral Daily Alana Brice MD        sodium chloride flush 0.9 % injection 5-40 mL  5-40 mL IntraVENous 2 times per day Jake Colon MD        sodium chloride flush 0.9 % injection 5-40 mL  5-40 mL IntraVENous PRN Jake Colon MD        0.9 % sodium chloride infusion   IntraVENous PRN Jake Colon MD        ondansetron (ZOFRAN-ODT) disintegrating tablet 4 mg  4 mg Oral Q8H PRN Jake Colon MD        Or    ondansetron [facility]) injection 4 mg  4 mg IntraVENous Q6H PRN Jake Colon MD        polyethylene glycol [facility]) packet 17 g  17 g Oral Daily PRN Jake Colon MD        acetaminophen (TYLENOL) tablet 650 mg  650 mg Oral Q6H PRN Jake Colon MD        Or    acetaminophen (TYLENOL) suppository 650 mg  650 mg Rectal Q6H PRN Jake Colon MD        enoxaparin (LOVENOX) injection 40 mg  40 mg SubCUTAneous Daily Jake Colon MD        perflutren lipid microspheres (DEFINITY) injection 1.65 mg  1.5 mL IntraVENous ONCE PRN Jake Colon MD        cephALEXin [facility]) capsule 250 mg  250 mg Oral 4 times per day Jake Colon MD   250 mg at 04/18/22 0636    glucose (GLUTOSE) 40 % oral gel 15 g  15 g Oral PRN Isaias Mckeon MD        dextrose 50 % IV solution  12.5 g IntraVENous PRN Isaias Mckeon MD        glucagon (rDNA) injection 1 mg  1 mg IntraMUSCular PRN Isaias Mckeon MD        dextrose 5 % solution  100 mL/hr IntraVENous PRN Isaias Mckeon MD            Allergies:      No Known Allergies     Physical Examination  Vitals   Vitals:    04/18/22 0300 04/18/22 0400 04/18/22 0500 04/18/22 0600   BP: 106/60 112/62 108/60 114/68   Pulse: 82 78 69 75   Resp:       Temp:  98 °F (36.7 °C)     TempSrc:  Oral     SpO2: 98% 100% 97% 99%   Weight:       Height:            General: Patient appears in no acute distress. Awake and oriented x4. HEENT: Normocephalic, atraumatic  Chest: Clear to auscultation bilaterally  Heart: No murmurs appreciated  Extremities/Peripheral vascular: No edema/swelling noted. No cold limbs noted. Neurologic Examination    Mental Status  Alert, and oriented to person, place and time.  Speech is fluent with intact comprehension. No evidence of memory impairment. Attention and concentration appeared normal.     Cranial Nerves  II. Visual fields full to confrontation bilaterally  III, IV, VI: Pupils equally round and reactive to light, 3 to 2 mm bilaterally. EOMs: full, no nystagmus. V. Facial sensation intact to light touch bilaterally, R light touch sensation decreased over forehead  VII: Facial movements symmetric and strong  VIII: Hearing intact to voice  IX,X: Palate elevates symmetrically. No dysarthria  XI: Sternocleidomastoid and trapezius 5/5 bilaterally   XII: Tongue is midline    Motor     Right Left   Right Left   Deltoid 5 5  Hip Flexion 5 5   Biceps      5  5  Knee Extension 5 5   Triceps 5 5  Knee Flexion 5 5   Handgrip 5 5  Ankle Dorsiflexion 5 5       Ankle Plantarflexion 5 5     Tone: Normal in all four limbs    Bulk: Normal in all four limbs with no evidence of atrophy    Pronator drift: absent bilaterally    Sensation  · Light Touch: Decreased in R extremities  · Pinprick: Decreased in R extremities  · Vibration: not assessed  · Proprioception: Intact distally in all four limbs    Reflexes     Right Left   Biceps 2 2   Brachioradialis 2 2   Triceps 2 2   Patellar 2 2   Achilles 2 2   ankle clonus none none     Toes down going bilaterally.     Coordination  Rapid alternating movements normal in bilateral upper extremities  Finger to nose testing normal bilaterally      Gait  Deferred for safety/fall consideration      Labs  Recent Labs     04/17/22  1446 04/17/22  1446 04/18/22  0417      < > 129*   K 4.0   < > 4.3      < > 99   CO2 23   < > 18*   BUN 13   < > 15   CREATININE 0.8   < > 0.6   GLUCOSE 76   < > 125*   CALCIUM 8.9   < > 8.9   PROT 6.9  --   --    LABALBU 3.8  --   --    BILITOT <0.2  --   --    ALKPHOS 59  --   --    AST 14  --   --    ALT 11  --   --    WBC 10.7   < > 11.6*   RBC 4.53   < > 4.33   HGB 13.1   < > 12.6   HCT 39.5   < > 38.2   MCV 87.2   < > 88.2   MCH 28.9   < > 29.1   MCHC 33.2   < > 33.0   RDW 14.2   < > 13.7      < > 315   MPV 9.3   < > 9.5   HDL  --   --  62   LDLCALC  --   --  92   LABA1C  --   --  4.7    < > = values in this interval not displayed. Imaging  CT HEAD WO CONTRAST   Final Result   Hypodensity within the left insular cortex with abnormal white matter   hypodensity extending into the adjacent subinsular white matter and left   corona radiata. Findings may be suggestive of subacute infarction. For   further evaluation brain MRI is recommended. No intracranial hemorrhage or mass lesion. The findings were sent to the Radiology Results [address] at 5:30   pm on 4/17/2022to be communicated to a licensed caregiver.       RECOMMENDATIONS:   Unavailable         MRI BRAIN WO CONTRAST    (Results Pending)   US CAROTID ARTERY BILATERAL    (Results Pending)           Other Testing Results    Cannabinoid urine screen positive on 4/17/22        Electronically signed by Jalen Craig on 4/18/2022 at 8:32 AM

## 2022-04-19 ENCOUNTER — APPOINTMENT (OUTPATIENT)
Dept: ULTRASOUND IMAGING | Age: 32
DRG: 566 | End: 2022-04-19
Payer: MEDICAID

## 2022-04-19 VITALS
BODY MASS INDEX: 29.74 KG/M2 | TEMPERATURE: 98.7 F | WEIGHT: 161.6 LBS | RESPIRATION RATE: 21 BRPM | HEIGHT: 62 IN | SYSTOLIC BLOOD PRESSURE: 114 MMHG | DIASTOLIC BLOOD PRESSURE: 79 MMHG | OXYGEN SATURATION: 98 % | HEART RATE: 80 BPM

## 2022-04-19 LAB
ANION GAP SERPL CALCULATED.3IONS-SCNC: 14 MMOL/L (ref 7–16)
BASOPHILS ABSOLUTE: 0.02 E9/L (ref 0–0.2)
BASOPHILS RELATIVE PERCENT: 0.2 % (ref 0–2)
BUN BLDV-MCNC: 16 MG/DL (ref 6–20)
C-REACTIVE PROTEIN: 0.6 MG/DL (ref 0–0.4)
CALCIUM SERPL-MCNC: 8.8 MG/DL (ref 8.6–10.2)
CHLORIDE BLD-SCNC: 104 MMOL/L (ref 98–107)
CO2: 20 MMOL/L (ref 22–29)
CREAT SERPL-MCNC: 0.6 MG/DL (ref 0.5–1)
D DIMER: 220 NG/ML DDU
EOSINOPHILS ABSOLUTE: 0.06 E9/L (ref 0.05–0.5)
EOSINOPHILS RELATIVE PERCENT: 0.6 % (ref 0–6)
GFR AFRICAN AMERICAN: >60
GFR NON-AFRICAN AMERICAN: >60 ML/MIN/1.73
GLUCOSE BLD-MCNC: 83 MG/DL (ref 74–99)
HCT VFR BLD CALC: 37.3 % (ref 34–48)
HEMOGLOBIN: 12.8 G/DL (ref 11.5–15.5)
HOMOCYSTEINE: 7.8 UMOL/L (ref 0–15)
IMMATURE GRANULOCYTES #: 0.04 E9/L
IMMATURE GRANULOCYTES %: 0.4 % (ref 0–5)
LV EF: 55 %
LVEF MODALITY: NORMAL
LYMPHOCYTES ABSOLUTE: 5.06 E9/L (ref 1.5–4)
LYMPHOCYTES RELATIVE PERCENT: 48.8 % (ref 20–42)
MCH RBC QN AUTO: 29.5 PG (ref 26–35)
MCHC RBC AUTO-ENTMCNC: 34.3 % (ref 32–34.5)
MCV RBC AUTO: 85.9 FL (ref 80–99.9)
MONOCYTES ABSOLUTE: 0.74 E9/L (ref 0.1–0.95)
MONOCYTES RELATIVE PERCENT: 7.1 % (ref 2–12)
NEUTROPHILS ABSOLUTE: 4.44 E9/L (ref 1.8–7.3)
NEUTROPHILS RELATIVE PERCENT: 42.9 % (ref 43–80)
PDW BLD-RTO: 14 FL (ref 11.5–15)
PLATELET # BLD: 325 E9/L (ref 130–450)
PMV BLD AUTO: 9.4 FL (ref 7–12)
POTASSIUM SERPL-SCNC: 4.1 MMOL/L (ref 3.5–5)
RBC # BLD: 4.34 E12/L (ref 3.5–5.5)
SEDIMENTATION RATE, ERYTHROCYTE: 12 MM/HR (ref 0–20)
SODIUM BLD-SCNC: 138 MMOL/L (ref 132–146)
WBC # BLD: 10.4 E9/L (ref 4.5–11.5)

## 2022-04-19 PROCEDURE — 85378 FIBRIN DEGRADE SEMIQUANT: CPT

## 2022-04-19 PROCEDURE — 85230 CLOT FACTOR VII PROCONVERTIN: CPT

## 2022-04-19 PROCEDURE — 99231 SBSQ HOSP IP/OBS SF/LOW 25: CPT | Performed by: INTERNAL MEDICINE

## 2022-04-19 PROCEDURE — 85025 COMPLETE CBC W/AUTO DIFF WBC: CPT

## 2022-04-19 PROCEDURE — 86592 SYPHILIS TEST NON-TREP QUAL: CPT

## 2022-04-19 PROCEDURE — 86787 VARICELLA-ZOSTER ANTIBODY: CPT

## 2022-04-19 PROCEDURE — 80048 BASIC METABOLIC PNL TOTAL CA: CPT

## 2022-04-19 PROCEDURE — 85613 RUSSELL VIPER VENOM DILUTED: CPT

## 2022-04-19 PROCEDURE — 36415 COLL VENOUS BLD VENIPUNCTURE: CPT

## 2022-04-19 PROCEDURE — 6360000002 HC RX W HCPCS: Performed by: STUDENT IN AN ORGANIZED HEALTH CARE EDUCATION/TRAINING PROGRAM

## 2022-04-19 PROCEDURE — 86147 CARDIOLIPIN ANTIBODY EA IG: CPT

## 2022-04-19 PROCEDURE — 93971 EXTREMITY STUDY: CPT | Performed by: RADIOLOGY

## 2022-04-19 PROCEDURE — 83695 ASSAY OF LIPOPROTEIN(A): CPT

## 2022-04-19 PROCEDURE — 86146 BETA-2 GLYCOPROTEIN ANTIBODY: CPT

## 2022-04-19 PROCEDURE — 6370000000 HC RX 637 (ALT 250 FOR IP): Performed by: STUDENT IN AN ORGANIZED HEALTH CARE EDUCATION/TRAINING PROGRAM

## 2022-04-19 PROCEDURE — 93306 TTE W/DOPPLER COMPLETE: CPT

## 2022-04-19 PROCEDURE — 83090 ASSAY OF HOMOCYSTEINE: CPT

## 2022-04-19 PROCEDURE — 93970 EXTREMITY STUDY: CPT

## 2022-04-19 PROCEDURE — 86703 HIV-1/HIV-2 1 RESULT ANTBDY: CPT

## 2022-04-19 PROCEDURE — 86140 C-REACTIVE PROTEIN: CPT

## 2022-04-19 PROCEDURE — 2580000003 HC RX 258: Performed by: STUDENT IN AN ORGANIZED HEALTH CARE EDUCATION/TRAINING PROGRAM

## 2022-04-19 PROCEDURE — 85240 CLOT FACTOR VIII AHG 1 STAGE: CPT

## 2022-04-19 PROCEDURE — 6370000000 HC RX 637 (ALT 250 FOR IP): Performed by: PSYCHIATRY & NEUROLOGY

## 2022-04-19 PROCEDURE — 85651 RBC SED RATE NONAUTOMATED: CPT

## 2022-04-19 PROCEDURE — 99233 SBSQ HOSP IP/OBS HIGH 50: CPT | Performed by: NURSE PRACTITIONER

## 2022-04-19 PROCEDURE — 99253 IP/OBS CNSLTJ NEW/EST LOW 45: CPT | Performed by: OBSTETRICS & GYNECOLOGY

## 2022-04-19 PROCEDURE — 85306 CLOT INHIBIT PROT S FREE: CPT

## 2022-04-19 RX ORDER — CEPHALEXIN 250 MG/1
250 CAPSULE ORAL 4 TIMES DAILY
Qty: 20 CAPSULE | Refills: 0 | Status: SHIPPED | OUTPATIENT
Start: 2022-04-19 | End: 2022-04-24

## 2022-04-19 RX ORDER — ASPIRIN 81 MG/1
81 TABLET, CHEWABLE ORAL DAILY
Qty: 90 TABLET | Refills: 3 | Status: SHIPPED | OUTPATIENT
Start: 2022-04-20

## 2022-04-19 RX ADMIN — CEPHALEXIN 250 MG: 250 CAPSULE ORAL at 18:06

## 2022-04-19 RX ADMIN — ASPIRIN 81 MG 81 MG: 81 TABLET ORAL at 09:00

## 2022-04-19 RX ADMIN — CEPHALEXIN 250 MG: 250 CAPSULE ORAL at 12:05

## 2022-04-19 RX ADMIN — ENOXAPARIN SODIUM 40 MG: 100 INJECTION SUBCUTANEOUS at 08:58

## 2022-04-19 RX ADMIN — CEPHALEXIN 250 MG: 250 CAPSULE ORAL at 00:05

## 2022-04-19 RX ADMIN — SODIUM CHLORIDE, PRESERVATIVE FREE 10 ML: 5 INJECTION INTRAVENOUS at 00:04

## 2022-04-19 RX ADMIN — CEPHALEXIN 250 MG: 250 CAPSULE ORAL at 05:43

## 2022-04-19 RX ADMIN — ACETAMINOPHEN 650 MG: 325 TABLET ORAL at 07:50

## 2022-04-19 RX ADMIN — SODIUM CHLORIDE, PRESERVATIVE FREE 10 ML: 5 INJECTION INTRAVENOUS at 09:00

## 2022-04-19 RX ADMIN — POLYETHYLENE GLYCOL 3350 17 G: 17 POWDER, FOR SOLUTION ORAL at 08:57

## 2022-04-19 RX ADMIN — METFORMIN HYDROCHLORIDE 1 TABLET: 500 TABLET, EXTENDED RELEASE ORAL at 08:59

## 2022-04-19 ASSESSMENT — PAIN DESCRIPTION - DESCRIPTORS
DESCRIPTORS: ACHING

## 2022-04-19 ASSESSMENT — PAIN - FUNCTIONAL ASSESSMENT
PAIN_FUNCTIONAL_ASSESSMENT: ACTIVITIES ARE NOT PREVENTED

## 2022-04-19 ASSESSMENT — PAIN DESCRIPTION - PROGRESSION
CLINICAL_PROGRESSION: NOT CHANGED
CLINICAL_PROGRESSION: GRADUALLY WORSENING
CLINICAL_PROGRESSION: GRADUALLY IMPROVING
CLINICAL_PROGRESSION: GRADUALLY WORSENING

## 2022-04-19 ASSESSMENT — PAIN SCALES - GENERAL
PAINLEVEL_OUTOF10: 5
PAINLEVEL_OUTOF10: 4
PAINLEVEL_OUTOF10: 2
PAINLEVEL_OUTOF10: 1
PAINLEVEL_OUTOF10: 5

## 2022-04-19 ASSESSMENT — PAIN DESCRIPTION - ONSET
ONSET: ON-GOING

## 2022-04-19 ASSESSMENT — PAIN DESCRIPTION - FREQUENCY
FREQUENCY: CONTINUOUS

## 2022-04-19 ASSESSMENT — PAIN DESCRIPTION - LOCATION
LOCATION: HEAD

## 2022-04-19 ASSESSMENT — PAIN DESCRIPTION - ORIENTATION
ORIENTATION: RIGHT;LEFT;MID

## 2022-04-19 NOTE — DISCHARGE SUMMARY
Discharge Summary    Paige Chilel  :  1990  MRN:  [id number]    Admit date:  2022  Discharge date:      Admitting Physician:  Ruperto Roberson MD    Discharge Diagnoses:    Patient Active Problem List   Diagnosis    Sprain of shoulder, left    Cervicalgia    Cervical spondylolysis    Cervical strain    Sprain of ligaments of cervical spine    [facility]    [facility]erebrovascular accident (CVA) [facility]       Admission Condition:  fair    Discharged Condition:  good    Hospital Course: The patient is a 33 y.o. pregnant (10 weeks) female who presents for sever headache to ED. Described the headache as pounding accompanied by dizziness and blurry vision. Patient She also experienced aphasia and slurred speech which lasted for 20 minutes and resolved spontaneously. Shortly after the episode of aphasia, patient had paresthesia of the right arm. States that something like this happened one year ago and she never went to the doctor for it. In the ED labs were remarkable for UTI, WBC > 20 with moderate bacteria and small LE. UDS + marijuana. CT head shows findings concerning for a left insular cortex subacute infarction. Patient was given tylenol, mag sulfate, solumedrol and reglan in the ED. She was then admitted for further management of subacute infarction. During her stay she received IVF and Keflex and ASA. She continued to improved and her UTI completely resolved by day 3 of her hospital stay. She was discharged on .     Discharge plan:   1. Discharge home  2. Close follow up with PCP  3. Close monitoring by OB  4. Thrombophilia workup recommended    5.  Neurology follow up recommended        Discharge Medications:         Medication List      START taking these medications    aspirin 81 MG chewable tablet  Take 1 tablet by mouth daily  Start taking on: 2022     cephALEXin 250 MG capsule  Commonly known as: KEFLEX  Take 1 capsule by mouth 4 times daily for 5 days        CONTINUE taking these medications    PRENATAL 1 PO           Where to Get Your Medications      These medications were sent to [facility], [address] [address]    Phone: [phone]   · aspirin 81 MG chewable tablet  · cephALEXin 250 MG capsule         Consults:  neurology and OBGYN    Significant Diagnostic Studies:  labs, microbiology, radiology, nuclear medicine, cardiac graphics: See below    Labs:  Lab Results   Component Value Date    WBC 10.4 04/19/2022    HGB 12.8 04/19/2022    HCT 37.3 04/19/2022     04/19/2022    CHOL 169 04/18/2022    TRIG 74 04/18/2022    HDL 62 04/18/2022    ALT 11 04/17/2022    AST 14 04/17/2022     04/19/2022    K 4.1 04/19/2022     04/19/2022    CREATININE 0.6 04/19/2022    BUN 16 04/19/2022    CO2 20 (L) 04/19/2022    TSH 0.284 04/18/2022    LABA1C 4.7 04/18/2022      Lab Results   Component Value Date    COLORU Yellow 04/17/2022    NITRU POSITIVE 04/17/2022    GLUCOSEU Negative 04/17/2022    KETUA 40 04/17/2022    UROBILINOGEN 0.2 04/17/2022    BILIRUBINUR Negative 04/17/2022      No results found for: INR, PROTIME    BP Readings from Last 3 Encounters:   04/19/22 114/79   06/26/21 136/78   12/01/20 112/80         New Imaging:  US DUP LOWER EXTREMITIES BILATERAL VENOUS   Final Result   Within the visualized vessels there is no evidence for deep venous   thrombosis               MRA HEAD WO CONTRAST   Final Result   1. Acute infarct noted in the left insula/left temporal lobe/left   periventricular white matter with associated thrombosis of a superior M2   branch of the left middle cerebral artery. 2. Chronic infarcts noted in the right corona radiata and left centrum   semiovale. 3. Unremarkable MRV. MRV HEAD WO CONTRAST   Final Result   1.  Acute infarct noted in the left insula/left temporal lobe/left   periventricular white matter with associated thrombosis of a superior M2   branch of the left middle cerebral artery. 2. Chronic infarcts noted in the right corona radiata and left centrum   semiovale. 3. Unremarkable MRV. MRI BRAIN WO CONTRAST   Final Result   1. Acute infarct noted in the left insula/left temporal lobe/left   periventricular white matter with associated thrombosis of a superior M2   branch of the left middle cerebral artery. 2. Chronic infarcts noted in the right corona radiata and left centrum   semiovale. 3. Unremarkable MRV. US OB LESS THAN 14 WEEKS SINGLE OR FIRST GESTATION   Final Result   1. Single living intrauterine gestation with an estimated gestational age by   ultrasound of 9 weeks and 4 days which is concordant to the clinical age   provided of 9 weeks and 4 days. Estimated date of delivery is November 17, 2022. Fetal heart rate was 172 beats per minute. 2.  There are few intrauterine fibroids. Cervix appears closed. 3.  Normal appearance of the bilateral ovaries. There are no adnexal masses. Normal ovarian vascularity. US CAROTID ARTERY BILATERAL   Final Result   Atherosclerotic disease. No hemodynamically significant stenosis is   identified   Estimated stenosis by NASCET criteria in the proximal right carotid   artery is between 0% and 49%. Estimated stenosis by NASCET criteria in the proximal left carotid   artery is between 0% and 49%. CT HEAD WO CONTRAST   Final Result   Hypodensity within the left insular cortex with abnormal white matter   hypodensity extending into the adjacent subinsular white matter and left   corona radiata. Findings may be suggestive of subacute infarction. For   further evaluation brain MRI is recommended. No intracranial hemorrhage or mass lesion. The findings were sent to the Radiology Results [address] at 5:30   pm on 4/17/2022to be communicated to a licensed caregiver.       RECOMMENDATIONS: Unavailable               Treatments/Interventions /Studies:  Orders Placed This Encounter   Procedures    Culture, Urine    CT HEAD WO CONTRAST    MRI BRAIN WO CONTRAST    US CAROTID ARTERY BILATERAL    MRA HEAD WO CONTRAST    MRV HEAD WO CONTRAST    US OB LESS THAN 14 WEEKS SINGLE OR FIRST GESTATION    US DUP LOWER EXTREMITIES BILATERAL VENOUS    Urinalysis with Microscopic    Comprehensive Metabolic Panel    CBC with Auto Differential    Serum Drug Screen    URINE DRUG SCREEN    CBC with Auto Differential    Basic Metabolic Panel    TSH    Hemoglobin A1C    Lipid panel    Beta-Hydroxybutyrate    Lactic Acid    Miscellaneous Sendout    Angiotensin Converting Enzyme    PARIS    MPO and PR3 Antibody Reflex    Sedimentation Rate    C-Reactive Protein    VARICELLA ZOSTER ANTIBODY, IGG    VARICELLA ZOSTER ANTIBODY, IGM    HIV Screen    RPR Reflex to Titer and TPPA    Protein S Antigen, Free    Antithrombin III    Lipoprotein A (LPA)    Homocysteine    Prothrombin Gene Mutation    Factor 5 Leiden    Factor 8 Activity    Factor 7 Activity    CARDIOLIPIN AB IGG, IGM, IGA    DRVVT-Lupus Like Anticoag    B-2 GLYCOPROTEIN (IGA)    D-Dimer, Quantitative    ADULT DIET;  Regular    Telemetry monitoring - 48 hour duration    Vital signs per unit routine    Up with assistance    Notify physician    Intake and output    Place intermittent pneumatic compression device    HYPOGLYCEMIA TREATMENT: blood glucose less than 50 mg/dL and patient  ALERT and TOLERATING PO    HYPOGLYCEMIA TREATMENT: blood glucose less than 70 mg/dL and patient ALERT and TOLERATING PO    HYPOGLYCEMIA TREATMENT: blood glucose less than 70 mg/dL and patient NOT ALERT or NPO    Full Code    Inpatient consult to Hospitalist    Inpatient consult to Neurology    Inpatient consult to Obstetrics / Gynecology    Initiate Oxygen Therapy Protocol    POC Pregnancy Urine Qual    POCT Glucose    EKG 12 Lead   

## 2022-04-19 NOTE — PROGRESS NOTES
[address]   Internal Medicine Residency / [address]    Attending Physician Statement  I have discussed the case, including pertinent history and exam findings with the resident and the team.  I have seen and examined the patient and the key elements of the encounter have been performed by me. I agree with the assessment, plan and orders as documented by the resident. A&O  Very mild right arm driift  Grasp intact   Gait, speech and swallowing normal  Imagery reviewed  On ASA  Hypercoagulable workup in progress  Plan; Discharge plaaning when OK with Neurology    Remainder of medical problems as per resident note.       Unice Galeazzi, MD [facility]    Internal Medicine Residency Faculty

## 2022-04-19 NOTE — PROGRESS NOTES
[facility]  Internal Medicine Residency Program  Progress Note - House Team     Patient:  Mabel Trejo 33 y.o. female MRN: [id number]     Date of Service: 4/19/2022     CC: headache and right sided weakness     Days since admission: 2    Subjective     Overnight events: No acute overnight events     Patient seen and examined at bedside in no acute distress. Patient states she is feeling about the same as yesterday. Still endorses weakness and numbness on her right extremities and headache. Denies any tingling or problems with balance. Denies any vison changes. Patient states her mother had a miscarriage but did not know the exact reason. Denies any family history of clotting problems or strokes. Denies chest pain. Objective     Physical Exam:  Vitals: /71   Pulse 85   Temp 98.7 °F (37.1 °C) (Oral)   Resp 18   Ht 5' 2\" (1.575 m)   Wt 161 lb 9.6 oz (73.3 kg)   SpO2 98%   BMI 29.56 kg/m²     I & O - 24hr:   Intake/Output Summary (Last 24 hours) at 4/19/2022 1115  Last data filed at 4/19/2022 0015  Gross per 24 hour   Intake 450 ml   Output --   Net 450 ml      General Appearance: alert, appears stated age and cooperative  HEENT:  Head: Normocephalic, no lesions, without obvious abnormality. Neck: no adenopathy, no carotid bruit, no JVD, supple, symmetrical, trachea midline and thyroid not enlarged, symmetric, no tenderness/mass/nodules  Lung: clear to auscultation bilaterally  Heart: regular rate and rhythm, S1, S2 normal, no murmur, click, rub or gallop  Abdomen: soft, non-tender; bowel sounds normal; no masses,  no organomegaly  Extremities:  extremities normal, atraumatic, no cyanosis or edema  Musculokeletal: No joint swelling, no muscle tenderness. ROM normal in all joints of extremities. Slight arm drift downward with outstretched arms and closed eyes.    Neurologic: Mental status: Alert, oriented, thought content appropriate  Subject  Pertinent Information & Imaging Studies, Consults   mili  CBC:   Lab Results   Component Value Date    WBC 10.4 04/19/2022    RBC 4.34 04/19/2022    HGB 12.8 04/19/2022    HCT 37.3 04/19/2022    MCV 85.9 04/19/2022    MCH 29.5 04/19/2022    MCHC 34.3 04/19/2022    RDW 14.0 04/19/2022     04/19/2022    MPV 9.4 04/19/2022     BMP:    Lab Results   Component Value Date     04/19/2022    K 4.1 04/19/2022     04/19/2022    CO2 20 04/19/2022    BUN 16 04/19/2022    LABALBU 3.8 04/17/2022    CREATININE 0.6 04/19/2022    CALCIUM 8.8 04/19/2022    GFRAA >60 04/19/2022    LABGLOM >60 04/19/2022    GLUCOSE 83 04/19/2022       IMAGING:   Imaging Studies:    CT HEAD WO CONTRAST    Result Date: 4/17/2022  Hypodensity within the left insular cortex with abnormal white matter hypodensity extending into the adjacent subinsular white matter and left corona radiata. Findings may be suggestive of subacute infarction. For further evaluation brain MRI is recommended. No intracranial hemorrhage or mass lesion. The findings were sent to the Radiology Results [address] at 5:30 pm on 4/17/2022to be communicated to a licensed caregiver. RECOMMENDATIONS: Unavailable     MRA HEAD WO CONTRAST    Result Date: 4/18/2022  1. Acute infarct noted in the left insula/left temporal lobe/left periventricular white matter with associated thrombosis of a superior M2 branch of the left middle cerebral artery. 2. Chronic infarcts noted in the right corona radiata and left centrum semiovale. 3. Unremarkable MRV. US OB LESS THAN 14 WEEKS SINGLE OR FIRST GESTATION    Result Date: 4/18/2022  1. Single living intrauterine gestation with an estimated gestational age by ultrasound of 9 weeks and 4 days which is concordant to the clinical age provided of 9 weeks and 4 days. Estimated date of delivery is November 17, 2022. Fetal heart rate was 172 beats per minute. 2.  There are few intrauterine fibroids. Cervix appears closed.  3.  Normal appearance of the bilateral ovaries. There are no adnexal masses. Normal ovarian vascularity. MRV HEAD WO CONTRAST    Result Date: 4/18/2022  1. Acute infarct noted in the left insula/left temporal lobe/left periventricular white matter with associated thrombosis of a superior M2 branch of the left middle cerebral artery. 2. Chronic infarcts noted in the right corona radiata and left centrum semiovale. 3. Unremarkable MRV. MRI BRAIN WO CONTRAST    Result Date: 4/18/2022  1. Acute infarct noted in the left insula/left temporal lobe/left periventricular white matter with associated thrombosis of a superior M2 branch of the left middle cerebral artery. 2. Chronic infarcts noted in the right corona radiata and left centrum semiovale. 3. Unremarkable MRV. US CAROTID ARTERY BILATERAL    Result Date: 4/18/2022  Atherosclerotic disease. No hemodynamically significant stenosis is identified Estimated stenosis by NASCET criteria in the proximal right carotid artery is between 0% and 49%. Estimated stenosis by NASCET criteria in the proximal left carotid artery is between 0% and 49%. Notable Cultures:      Blood cultures No results found for: BC  Respiratory cultures No results found for: RESPCULTURE No results found for: LABGRAM  Urine   Urine Culture, Routine   Date Value Ref Range Status   04/17/2022 >100,000 CFU/ml  Sensitivity to follow    Preliminary     Legionella No results found for: LABLEGI  C Diff PCR No results found for: CDIFPCR  Wound culture/abscess: No results for input(s): WNDABS in the last 72 hours. Tip culture:No results for input(s): CXCATHTIP in the last 72 hours. Antibiotic  Day started   Keflex  4/18                       OXYGENATION: 98% room air     DIET: regular         Student's Assessment and Plan     Madan Gilliam is a 33 y.o. female with  has a past medical history of Back pain and Neck pain.   came here with CC   Chief Complaint   Patient presents with    Headache     HA for 4 days and had a hot sensation in right hand while fixing hair            Consults:   Neurology   OB    Assessment/Plan  Subacute CVA   MRI, MRA, MRV; if shows acute left temporal lobe infarct MCA territory  Follow neurology recommendations  Aspirin 81 mg   Echo pending   Asymptomatic bacteruria in pregnancy  Continue keflex 250 mg 4 times a day   Pregnancy ,GA 10w  Follow up outpatient in women's clinic        DVT ppx: Lovenox   GI ppx: diet    Code Status:   full      Kim Gomez  Attending physician: Dr. Viktoria Schafer

## 2022-04-19 NOTE — CARE COORDINATION
4/19/2022 - Updated CM note - Neurology following. OB/GYN consult done - to follow up after discharge at [facility]. Echo pending. Plan is to discharge home when medically stable. SW/CM will follow.

## 2022-04-19 NOTE — PROGRESS NOTES
CLINICAL PHARMACY NOTE: MEDS TO BEDS    Total # of Prescriptions Filled: 2   The following medications were delivered to the patient:  · Cephalexin 500 mg  · Aspirin low dose 81 mg chewable  · Delivered to pt    Additional Documentation:

## 2022-04-19 NOTE — PROGRESS NOTES
Physician at bedside. Patient cleared for discharge per provider. Patient instructed to call for follow up appointment with dr Gentry Webster within the week.

## 2022-04-19 NOTE — CONSULTS
OB CONSULT     Asked to see this patient who is in NICU with a CVA. Also noted to be an early pregnancy, with dating by Sonogram of [facility] 11/17/2022. Has not had any prenatal care to this point. Unable to examine here today. Will need to start prenatal vitamins and then be seen in the [facility]'s clinic for [facility] care. Marika Jimenez ordervitamins for the hospital stay and her her discharing physician send Rx to pharmacy for prenatal Vitamins homegoing. Also please get her scheduled with the [facility] for Prenatal care at discharge, [phone]. Discussed with the patient today.

## 2022-04-19 NOTE — PROGRESS NOTES
[facility]  Neurology Follow Up    Date:  4/19/2022  Patient Name:  Mabel Trejo  YOB: 1990  MRN: [id number]     Chief Complaint: headache    History obtained from: patient    Assessment  Mabel Trejo is a 33 y.o. female with no significant past medical history who presented to the ED on 4/17/22 with new onset headache. CT Imaging is concerning for subacute infarction. MRI brain shows an acute infarct in the left insula/left temporal lobe/left periventricular white matter with a thrombus in the M2 branch of the left MCA; chronic infarcts also noted in the right corona radiata and left centrum semiovale. Patient's MRV was unremarkable. Carotid ultrasound showed 0 to 49% stenosis bilaterally. The patient is a smoker but has no other known risk factors at this time. Plan  · Continue supportive care  · Hypercoagulable panel ordered   · Continue aspirin 81mg  · Await complete echo  · A1c 4.7, LDL 92  · Continue Keflex for asymptomatic bacteruria        History of Present Illness:  Mabel Trejo is a 33 y.o. pregnant, right handed female presenting for evaluation of new onset headache with concerns. of a subacute cerebrovascular accident. A headache awoke her from sleep 4 days prior to presentation at the hospital. She denies a headache history. After the onset of headache, she experienced dizziness with bending over, seeing spots of bright light, and right sided paresthesia of her arm. 2 days after the onset of headache, she experienced a 20 minute episode of aphasia and slurred speech. She has experienced an episode of aphasia prior to this ~ 1 year ago where she had trouble texting and speaking for 2 days. On initial neuro consult, she was experiencing headache, tingling in her R arm, and numbness on the R side of body. She describes her headache as frontal (sometimes unilateral, sometimes bilateral), constant, and throbbing.  It is aggravated by bright lights and coughing. It is relieved by sleep. She rated her pain as a 7/10 at the time of the initial neuro consult. She denies trouble speaking at this time. She denies history of miscarriages and autoimmune disease. Today patient states that her head feels much better. On exam she continues to have right sided numbness but no other complaints voiced. Vital signs today are stable. General: Patient appears in no acute distress. Awake and oriented x4. HEENT: Normocephalic, atraumatic  Lungs: Unlabored breaths on room air  Chest: Regular rate and rhythm  Heart: No murmurs appreciated  Extremities/Peripheral vascular: No edema/swelling noted. No cold limbs noted. Neurologic Examination    Mental Status  Alert, and oriented to person, place, time and situation. Speech is fluent with intact comprehension. No evidence of memory impairment. Attention and concentration appeared normal.  Follows commands well    Speech:  No dysarthria or aphasia    Cranial Nerves  II. Visual fields full to confrontation bilaterally  III, IV, VI: Pupils equally round and reactive to light, 3 to 2 mm bilaterally. EOMs: full, no nystagmus. V. Facial sensation intact to light touch bilaterally, R light touch sensation decreased to face  VII: Facial movements symmetric and strong  VIII: Hearing intact to voice  IX,X: Palate elevates symmetrically.  No dysarthria  XI: Sternocleidomastoid and trapezius 5/5 bilaterally   XII: Tongue is midline    Motor     Right Left   Right Left   Deltoid -5 5  Hip Flexion 5 5   Biceps      -5  5  Knee Extension 5 5   Triceps -5 5  Knee Flexion 5 5   Handgrip 5 5  Ankle Dorsiflexion 5 5       Ankle Plantarflexion 5 5     Tone: Normal in all four limbs    Bulk: Normal in all four limbs with no evidence of atrophy    Pronator drift: absent bilaterally    Sensation  · Light Touch: Decreased in R extremities  · Pinprick: Decreased in R extremities  · Vibration: not assessed  · Proprioception: Intact distally in all four limbs    Reflexes     Right Left   Biceps 2 2   Brachioradialis 2 2   Triceps 2 2   Patellar 2 2   Achilles 2 2   ankle clonus none none     Toes down going bilaterally. Coordination  Rapid alternating movements normal in bilateral upper extremities  Finger to nose testing normal bilaterally    Gait  Deferred for safety/fall consideration      Labs  Recent Labs     04/17/22  1446 04/17/22  1446 04/18/22  0417 04/18/22  0417 04/18/22  1111 04/19/22  0451      < > 129*   < >  --  138   K 4.0   < > 4.3   < >  --  4.1      < > 99   < >  --  104   CO2 23   < > 18*   < >  --  20*   BUN 13   < > 15   < >  --  16   CREATININE 0.8   < > 0.6   < >  --  0.6   GLUCOSE 76   < > 125*   < >  --  83   CALCIUM 8.9   < > 8.9   < >  --  8.8   PROT 6.9  --   --   --   --   --    LABALBU 3.8  --   --   --   --   --    BILITOT <0.2  --   --   --   --   --    ALKPHOS 59  --   --   --   --   --    AST 14  --   --   --   --   --    ALT 11  --   --   --   --   --    WBC 10.7   < > 11.6*   < >  --  10.4   RBC 4.53   < > 4.33   < >  --  4.34   HGB 13.1   < > 12.6   < >  --  12.8   HCT 39.5   < > 38.2   < >  --  37.3   MCV 87.2   < > 88.2   < >  --  85.9   MCH 28.9   < > 29.1   < >  --  29.5   MCHC 33.2   < > 33.0   < >  --  34.3   RDW 14.2   < > 13.7   < >  --  14.0      < > 315   < >  --  325   MPV 9.3   < > 9.5   < >  --  9.4   LACTA  --   --   --   --  0.8  --    HDL  --   --  62  --   --   --    LDLCALC  --   --  92  --   --   --    LABA1C  --   --  4.7  --   --   --     < > = values in this interval not displayed. Imaging  MRA HEAD WO CONTRAST   Final Result   1. Acute infarct noted in the left insula/left temporal lobe/left   periventricular white matter with associated thrombosis of a superior M2   branch of the left middle cerebral artery. 2. Chronic infarcts noted in the right corona radiata and left centrum   semiovale. 3. Unremarkable MRV. MRV HEAD WO CONTRAST   Final Result   1. Acute infarct noted in the left insula/left temporal lobe/left   periventricular white matter with associated thrombosis of a superior M2   branch of the left middle cerebral artery. 2. Chronic infarcts noted in the right corona radiata and left centrum   semiovale. 3. Unremarkable MRV. MRI BRAIN WO CONTRAST   Final Result   1. Acute infarct noted in the left insula/left temporal lobe/left   periventricular white matter with associated thrombosis of a superior M2   branch of the left middle cerebral artery. 2. Chronic infarcts noted in the right corona radiata and left centrum   semiovale. 3. Unremarkable MRV. US OB LESS THAN 14 WEEKS SINGLE OR FIRST GESTATION   Final Result   1. Single living intrauterine gestation with an estimated gestational age by   ultrasound of 9 weeks and 4 days which is concordant to the clinical age   provided of 9 weeks and 4 days. Estimated date of delivery is November 17, 2022. Fetal heart rate was 172 beats per minute. 2.  There are few intrauterine fibroids. Cervix appears closed. 3.  Normal appearance of the bilateral ovaries. There are no adnexal masses. Normal ovarian vascularity. US CAROTID ARTERY BILATERAL   Final Result   Atherosclerotic disease. No hemodynamically significant stenosis is   identified   Estimated stenosis by NASCET criteria in the proximal right carotid   artery is between 0% and 49%. Estimated stenosis by NASCET criteria in the proximal left carotid   artery is between 0% and 49%. CT HEAD WO CONTRAST   Final Result   Hypodensity within the left insular cortex with abnormal white matter   hypodensity extending into the adjacent subinsular white matter and left   corona radiata. Findings may be suggestive of subacute infarction. For   further evaluation brain MRI is recommended. No intracranial hemorrhage or mass lesion.       The findings were sent to the Radiology Results [address] at 5:30   pm on 4/17/2022to be communicated to a licensed caregiver.       RECOMMENDATIONS:   Unavailable         US LOWER EXTREMITY BILATERAL VEIN MAPPING W DVT    (Results Pending)           Other Testing Results    Cannabinoid urine screen positive on 4/17/22        Electronically signed by VAMSHI Durán NP on 4/19/2022 at 1:20 PM

## 2022-04-20 LAB
ANTI-NUCLEAR ANTIBODY (ANA): NEGATIVE
HIV-1 AND HIV-2 ANTIBODIES: NORMAL
RPR: NORMAL
URINE CULTURE, ROUTINE: NORMAL

## 2022-04-21 LAB
ANGIOTENSIN CONVERTING ENZYME: 31 U/L (ref 9–67)
FACTOR VII ACTIVITY: 133 % (ref 50–150)
FACTOR VIII ACTIVITY: 134 % (ref 50–150)
LUPUS ANTICOAG DVVT: NORMAL
MYELOPEROXIDASE AB: 0 AU/ML (ref 0–19)
SERINE PROTEASE 3 AB: 0 AU/ML (ref 0–19)
VARICELLA-ZOSTER VIRUS AB, IGG: NORMAL

## 2022-04-22 ENCOUNTER — TELEPHONE (OUTPATIENT)
Dept: ADMINISTRATIVE | Age: 32
End: 2022-04-22

## 2022-04-22 LAB
ANTICARDIOLIPIN IGA ANTIBODY: <10 APL
ANTICARDIOLIPIN IGG ANTIBODY: <10 GPL
CARDIOLIPIN AB IGM: 17 MPL
LIPOPROTEIN (A): 21 MG/DL

## 2022-04-23 LAB
BETA-2 GLYCOPROTEIN 1 IGA ANTIBODY: <10 SAU
PROTEIN S ANTIGEN, FREE: 27 % (ref 55–123)
VARICELLA ZOSTER AB IGM: 0.25 ISR

## 2022-04-25 NOTE — TELEPHONE ENCOUNTER
Called patient with no answer  A message was left for patient to call this office to schedule an appt

## 2022-04-25 NOTE — TELEPHONE ENCOUNTER
I saw her last week as a consult. She needs to see one of us in the next 2 weeks for OB intake. Please.

## 2022-04-28 LAB
Lab: NORMAL
REPORT: NORMAL
THIS TEST SENT TO: NORMAL

## 2022-05-05 ENCOUNTER — INITIAL PRENATAL (OUTPATIENT)
Dept: OBGYN | Age: 32
End: 2022-05-05
Payer: MEDICAID

## 2022-05-05 VITALS
SYSTOLIC BLOOD PRESSURE: 116 MMHG | BODY MASS INDEX: 29.45 KG/M2 | DIASTOLIC BLOOD PRESSURE: 73 MMHG | HEART RATE: 91 BPM | WEIGHT: 161 LBS

## 2022-05-05 DIAGNOSIS — I63.9 CEREBROVASCULAR ACCIDENT (CVA), UNSPECIFIED MECHANISM (HCC): ICD-10-CM

## 2022-05-05 DIAGNOSIS — Z34.91 PRENATAL CARE IN FIRST TRIMESTER: ICD-10-CM

## 2022-05-05 DIAGNOSIS — O09.91 HIGH-RISK PREGNANCY IN FIRST TRIMESTER: ICD-10-CM

## 2022-05-05 DIAGNOSIS — Z34.91 PRENATAL CARE IN FIRST TRIMESTER: Primary | ICD-10-CM

## 2022-05-05 DIAGNOSIS — Z12.4 PAP SMEAR FOR CERVICAL CANCER SCREENING: ICD-10-CM

## 2022-05-05 LAB
ABO/RH: NORMAL
AMPHETAMINE SCREEN, URINE: NOT DETECTED
ANTIBODY SCREEN: NORMAL
BARBITURATE SCREEN URINE: NOT DETECTED
BENZODIAZEPINE SCREEN, URINE: NOT DETECTED
CANNABINOID SCREEN URINE: POSITIVE
COCAINE METABOLITE SCREEN URINE: NOT DETECTED
FENTANYL SCREEN, URINE: NOT DETECTED
Lab: ABNORMAL
METHADONE SCREEN, URINE: NOT DETECTED
OPIATE SCREEN URINE: NOT DETECTED
OXYCODONE URINE: NOT DETECTED
PHENCYCLIDINE SCREEN URINE: NOT DETECTED

## 2022-05-05 PROCEDURE — 99203 OFFICE O/P NEW LOW 30 MIN: CPT | Performed by: OBSTETRICS & GYNECOLOGY

## 2022-05-05 PROCEDURE — 4004F PT TOBACCO SCREEN RCVD TLK: CPT | Performed by: OBSTETRICS & GYNECOLOGY

## 2022-05-05 PROCEDURE — 1111F DSCHRG MED/CURRENT MED MERGE: CPT | Performed by: OBSTETRICS & GYNECOLOGY

## 2022-05-05 PROCEDURE — 99213 OFFICE O/P EST LOW 20 MIN: CPT | Performed by: OBSTETRICS & GYNECOLOGY

## 2022-05-05 PROCEDURE — G8419 CALC BMI OUT NRM PARAM NOF/U: HCPCS | Performed by: OBSTETRICS & GYNECOLOGY

## 2022-05-05 PROCEDURE — G8427 DOCREV CUR MEDS BY ELIG CLIN: HCPCS | Performed by: OBSTETRICS & GYNECOLOGY

## 2022-05-05 NOTE — PROGRESS NOTES
This is a new prenatal intake today. I acuaally saw her in the Hospital on the Neuro service 2 weeks ago. Was never seen in the [facility] woman's clinic.prior to that. Had apparently a CVA as well as a UTI which was treated with Keflex. Placed on ASA at discharge and on prenatal vitamins. Not back yet Needs to see MFM rather urgently. Dating by her sonogram. Feels well today. Pelvic: Vulva:Normal   Vagina:Normal   Cx:Clear, TPP and cultures done   Ut: Mid enlarged to 10 week size   Kiel:I feel no masses. IMP: high risk pregnancy,s/p cva,     MFM asap, full labs and thromophelia panel. On vitamins.

## 2022-05-05 NOTE — PROGRESS NOTES
Patient here today for new OB visit. She states LMP was 2/10/22. Had + home pregnancy test and was seen at a women's clinic in [facility]. Patient was hospitalized in April 2022 for a CVA. Ultrasound was done during that time and she was found to be 9w4d with an EDC of 11/17/22. Assisted Dr. Nancy Aj with pelvic exam.  Pap smear obtained, labeled and hand delivered to lab. Clean catch urine collected, labeled and hand delivered to lab. Blood work drawn, labeled and sent to lab. Discharge instructions have been discussed with the patient by Dr. Nancy Aj and she voiced understanding of plan of care. Patient advised to call our office with any questions or concerns.

## 2022-05-06 LAB — HEPATITIS B SURFACE ANTIGEN INTERPRETATION: NORMAL

## 2022-05-07 LAB — URINE CULTURE, ROUTINE: NORMAL

## 2022-05-09 ENCOUNTER — ANCILLARY PROCEDURE (OUTPATIENT)
Dept: OBGYN CLINIC | Age: 32
End: 2022-05-09
Payer: MEDICAID

## 2022-05-09 ENCOUNTER — TELEPHONE (OUTPATIENT)
Dept: OBGYN CLINIC | Age: 32
End: 2022-05-09

## 2022-05-09 ENCOUNTER — INITIAL PRENATAL (OUTPATIENT)
Dept: OBGYN CLINIC | Age: 32
End: 2022-05-09
Payer: MEDICAID

## 2022-05-09 VITALS
HEART RATE: 101 BPM | SYSTOLIC BLOOD PRESSURE: 103 MMHG | DIASTOLIC BLOOD PRESSURE: 71 MMHG | WEIGHT: 161.9 LBS | BODY MASS INDEX: 29.61 KG/M2

## 2022-05-09 DIAGNOSIS — Z87.440 HISTORY OF UTI: ICD-10-CM

## 2022-05-09 DIAGNOSIS — O21.9 NAUSEA/VOMITING IN PREGNANCY: ICD-10-CM

## 2022-05-09 DIAGNOSIS — O99.119 PROTEIN S DEFICIENCY AFFECTING PREGNANCY (HCC): ICD-10-CM

## 2022-05-09 DIAGNOSIS — Z3A.12 12 WEEKS GESTATION OF PREGNANCY: Primary | ICD-10-CM

## 2022-05-09 DIAGNOSIS — I63.9 CEREBROVASCULAR ACCIDENT (CVA), UNSPECIFIED MECHANISM (HCC): ICD-10-CM

## 2022-05-09 DIAGNOSIS — D68.59 PROTEIN S DEFICIENCY AFFECTING PREGNANCY (HCC): ICD-10-CM

## 2022-05-09 DIAGNOSIS — Z86.718 HISTORY OF THROMBOSIS: ICD-10-CM

## 2022-05-09 LAB
CHLAMYDIA BY THIN PREP: NEGATIVE
GLUCOSE URINE, POC: NEGATIVE
N. GONORRHOEAE DNA, THIN PREP: NEGATIVE
PROTEIN UA: NEGATIVE
RUBELLA ANTIBODY IGG: NORMAL
SOURCE: NORMAL
VARICELLA-ZOSTER VIRUS AB, IGG: NORMAL

## 2022-05-09 PROCEDURE — 36415 COLL VENOUS BLD VENIPUNCTURE: CPT | Performed by: OBSTETRICS & GYNECOLOGY

## 2022-05-09 PROCEDURE — 81002 URINALYSIS NONAUTO W/O SCOPE: CPT | Performed by: OBSTETRICS & GYNECOLOGY

## 2022-05-09 PROCEDURE — G8427 DOCREV CUR MEDS BY ELIG CLIN: HCPCS | Performed by: OBSTETRICS & GYNECOLOGY

## 2022-05-09 PROCEDURE — 99245 OFF/OP CONSLTJ NEW/EST HI 55: CPT | Performed by: OBSTETRICS & GYNECOLOGY

## 2022-05-09 PROCEDURE — 76801 OB US < 14 WKS SINGLE FETUS: CPT | Performed by: OBSTETRICS & GYNECOLOGY

## 2022-05-09 PROCEDURE — 99203 OFFICE O/P NEW LOW 30 MIN: CPT | Performed by: OBSTETRICS & GYNECOLOGY

## 2022-05-09 PROCEDURE — 76813 OB US NUCHAL MEAS 1 GEST: CPT | Performed by: OBSTETRICS & GYNECOLOGY

## 2022-05-09 PROCEDURE — G8419 CALC BMI OUT NRM PARAM NOF/U: HCPCS | Performed by: OBSTETRICS & GYNECOLOGY

## 2022-05-09 RX ORDER — DIPHENHYDRAMINE HYDROCHLORIDE 25 MG/1
25 CAPSULE ORAL 3 TIMES DAILY
Qty: 90 TABLET | Refills: 3 | Status: SHIPPED
Start: 2022-05-09 | End: 2022-06-01

## 2022-05-09 RX ORDER — ENOXAPARIN SODIUM 100 MG/ML
40 INJECTION SUBCUTANEOUS DAILY
Qty: 12 ML | Refills: 3 | Status: ON HOLD
Start: 2022-05-09 | End: 2022-06-03 | Stop reason: HOSPADM

## 2022-05-09 NOTE — PROGRESS NOTES
May 9, 2022      Rubbie Plana, [address]  [place],   [facility]     RE:  Patt ARAUJO  : 1990   AGE: 33 y.o. This report has been created using voice recognition software. It may contain errors which are inherent in voice recognition technology. Dear Dr. Dave Arce:    I had the pleasure of meeting with Ms. Rolanda Jimenes for a consultation. As you know, Ms. Rolanda Jimenes is a 33 y.o.  at 12w4d (LMP = [address]) who was referred to our office for counseling secondary to a history of a stroke during pregnancy. The patient's medical records and laboratory workup were reviewed. The patient's history was reviewed and outlined below. Today, Ms. Rolanda Jimenes reports that she feels well. She denies any symptoms of leaking of fluid, vaginal bleeding, and/or contractions. She had a fetal ultrasound that was notable for the following. There is a single intrauterine gestation in a variable presentation with a heart rate of 159 beats per minute. The placenta is posterior. The crown rump length is consistent with 12w5d. The nuchal translucency is normal at 1.3 mm. PAST OBSTETRICAL HISTORY:     2/10/2009 -- 40 week elective IOL, primary  for arrest of dilation and non reassuring fetal heart tones. The patient reports that her heart rate also dropped. She delivered a 6lb 11oz male (Shanita Nath). She denies having any other complications with the pregnancy, delivery, and/or postpartum recovery. She reports that her son is living and well. Partner #1     -- 6 week TAB, surgical, no complications. Partner #1    Current pregnancy, partner #2        PAST GYNECOLOGICAL  HISTORY:  Negative for abnormal pap smears. Negative for sexually transmitted diseases. Negative for cervical LEEP / conization /cryosurgery. Positive for uterine surgery.  x1  Negative for ovarian or tubal surgery.      PAST MEDICAL HISTORY:    MEDICATIONS:  Prenatal vitamin  LDASA    ILLNESSES:  CVA x2 during pregnancy    BLOOD TRANSFUSIONS:  None    IMMUNIZATIONS:  Up-to-date, no flu vaccine, no COVID vaccine    SURGERIES:    No issues with anesthesia    HOSPITALIZATIONS:  Child birth, stroke x2    ALLERGIES:  NKDA, she denies any latex or shellfish allergies    SOCIAL HISTORY:  She smokes black and milds (2-3 per day), she is smoking THC daily. She reports drinking wine occasionally. She is currently working at [employer]. She was working in a [address] [place] [address] [employer]. FAMILY MEDICAL HISTORY:   Negative for congenital abnormalities, autism, genetic disease and mental retardation, not listed above. Cancer PGM -- ? Type; MGF -- throat cancer  CAD PGM  CVA None  Congenital anomalies None  DM None  DVT Mother, following a hysterectomy  Bleeding disorders None  Autoimmune disorders None    Review of Systems :   CONSTITUTIONAL : No fever, no chills   HEENT : No headache, no visual changes, no rhinorrhea, no sore throat   CARDIOVASCULAR : No pain, no palpitations, no edema   RESPIRATORY : No pain, no shortness of breath   GASTROINTESTINAL : No N/V, no D/C, no abdominal pain   GENITOURINARY : No dysuria, hematuria and no incontinence   MUSCULOSKELETAL : No myalgia, No back pain  NEUROLOGICAL : No numbness, no tingling, no tremors. No history of seizures  ALL OTHER SYSTEMS WERE REPORTED AS NEGATIVE. PERTINENT PHYSICAL EXAMINATION:   /71   Pulse 101   Wt 161 lb 14.4 oz (73.4 kg)   LMP 02/10/2022   BMI 29.61 kg/m²     Urine dipstick:   Negative for Glucose    Negative for Albumin      GENERAL:   The patient is a well developed, female who is alert cooperative and oriented times three in no acute distress. HEENT:  Normo cephalic and atraumatic. No facial edema. ABDOMEN:   Her uterus is gravid. She had no complaint of abdominal pain or tenderness. EXTREMITIES:  No peripheral edema is noted. A fetal ultrasound assessment was performed today. A report is enclosed for your review.       A long conversation was had with the patient regarding her pregnancy and management. The following counseling was provided to the patient:      Assessment & Plan:  33 y.o.  at 12w4d (LMP = [address]) with:    1. Pregnancy dating -- The patient's pregnancy dating was reviewed. The patient reports that was having monthly periods. She discontinued Depo-Provera in 2021. She reports a sure last menstrual period. Her reported LMP is 2/10/2022, GUERO 2022. The patient's first ultrasound was 1822. The crown-rump length was 9 weeks 4 days, GUERO 2022. Given the patient had a sure LMP that agreed with the 9-week ultrasound, the recommendation was made to use an GUERO of 2022 based on her LMP. Fetal growth was appropriate today. 2.  History of stroke -- The patient reports that she initially experienced symptoms of headache and aphasia in 2021. She did not seek medical care at this time. The patient presented to the emergency department on 2022 with complaints of a severe headache that woke her up from sleep. She had a headache 4 days prior to her evaluation in the emergency department. Two days prior to her evaluation, she had slurred speech and aphasia which lasted for approximately 20 minutes and then resolved. The patient then developed paresthesias on her right arm. A CT of the head was initially completed. Report stated, \" hypodensity within the left insular cortex with abnormal white matter hypodensity extending into the adjacent subinsular white matter and left corona radiata. Findings may be suggestive of subacute infarction. For further evaluation brain MRI is recommended. \"    An MRI, MRV, and MRA of the head with and completed. Per the MRI report from 2022, acute infarct noted in left insula/left temporal lobe/left periventricular white matter with associated thrombosis of a superior M2 branch of the left middle cerebral artery. Chronic infarcts noted in the right corona radiata and left centrum semiovale. The MRV was unremarkable. No dural sinus thrombosis was noted. The patient was diagnosed with a subacute CVA and admitted to the hospital.  She was also noted to have bacteriuria. She was cared for by her primary care physician and she had a neurology consultation. A thrombophilia evaluation was completed. She was started on a low-dose aspirin and received Lovenox for DVT prophylaxis. A maternal echocardiogram was completed on 4/19/2022. Per the report, the study was normal.    The patient remained stable and was discharged home on 4/19/2022. She was discharged home on Keflex and a low-dose aspirin. She was to follow-up with obstetrics and her primary care physician. The patient's thrombophilia evaluation was completed on 4/18/2022, her results included: Homocystine 5, protein C functional 111%, protein S antigen free 25% (), Antithrombin activity 99%, APC resistance 3.54 (normal), lupus anticoagulant negative, beta-2 glycoprotein antibody negative, cardiolipin antibody negative, prothrombin 2 gene mutation negative. A screening PARIS was completed and negative. A protein S antigen free was repeated on 4/19/2022 and again low at 27 (). Homocystine was repeated on 4/19/2022 and again normal at 7.8. Factor VIII activity was normal at 134% and factor VII activity was normal at 133%. Anticardiolipin antibody was repeated on 4/19/2022 and IgM was indeterminate at 17. Repeat screening for lupus anticoagulant was negative on 4/19/2022. A D-dimer was checked on 4/19/2022 and normal.  Patient's urine drug screen was positive for marijuana. The patient's urinalysis was abnormal.  The urine culture showed mixed jair including E. coli staph and Corynebacterium. No sensitivities were provided. Counseling was provided to the patient.   Pregnancy and the puerperium (postpartum period) are well-established risk factors for thromboembolism. Normal pregnancy is accompanied by an increase in clotting factors. The resulting hypercoagulable state likely evolved to protect women from hemorrhage in the event of a miscarriage and during childbirth. Thromboembolic disease during pregnancy and the puerperium is a significant cause of maternal morbidity and mortality. During pregnancy, the risk of venous thromboembolism increases 4-5 fold and the risk of arterial thromboembolism, myocardial infarction, and stroke increases 3-4 fold compared to the nonpregnant state. Postpartum, the risk of venous thromboembolism is 20 fold higher and the risk of arterial thromboembolism is similarly elevated compared to nonpregnant individuals. Venous events are more common during pregnancy accounting for 4 out of 5 thromboembolic events. However, the risk of death is higher following an arterial thrombosis. Approximately 80% of venous thromboembolic events are deep venous thromboses and approximately 20% of pulmonary emboli. The most important risk factor for thrombosis during pregnancy is a history of thrombosis. The risk for a thrombotic event is further increased in women with an underlying acquired or inherited thrombophilia. Most studies have reported and equal distribution of thrombosis risk across all trimesters of pregnancy however, 2 large conflicting studies have reported a first trimester (50% prior to 15 weeks) and third trimester (60%) predominance. Additional risk factors for thrombosis during pregnancy include multifetal gestation, varicose veins, inflammatory bowel disease, urinary tract infection, diabetes, hospitalization, obesity, and maternal age greater than 28 years. Compared to the antepartum period, the thrombosis risk is 2-5 times higher during the postpartum period. This risk is highest during the first 6 weeks postpartum and declines to prepregnancy rates by 13-18 weeks postpartum.  Risk factors for postpartum thrombosis include  section, medical co morbidities, obesity,  delivery, hemorrhage, fetal demise, advanced maternal age, hypertensive disorders of pregnancy, tobacco use, and infection. I contacted neurology following the patient's consultation to discuss her case on 2022. Per Dr. Enma Kennedy, there is no definitive etiology of the patient's arterial thrombosis. She was supposed to have been discharged on a antiplatelet agent for approximately 3 weeks and then continue a daily low-dose aspirin. She was not discharged on Plavix. Per Dr. Enma Kennedy, typically, close follow-up is recommended following an arterial thrombosis with no definitive etiology. He indicated that there would be no contraindication to prophylactic Lovenox at this time. He agreed with a hematology consultation for additional evaluation and management recommendations. The patient was also referred back to neurology for continued monitoring and care. I also referred the patient to hematology, Dr. Anastacia Brice. I contacted Dr. Anastacia Brice regarding the patient following her consultation on 2022. At this time, completion of the thrombophilia evaluation and repeat protein S testing was recommended, as ordered. She agreed with starting prophylactic Lovenox at this time. She indicated that she would see the patient for a consultation and provide additional recommendations. A prescription for Lovenox, 40 mg daily was sent to the patient's pharmacy. The patient was contacted to schedule a nursing visit for injection teaching. The risks and benefits of Lovenox during pregnancy and postpartum were reviewed with the patient during her consultation. Prophylactic anticoagulation should be continued throughout the pregnancy and for 6-8 weeks postpartum. She will be transitioned to unfractionated heparin, 10,000 units every 12 hours, at 36 weeks' gestation.   A baseline platelet count should be obtained with repeat levels at 7 and 14 days after the transition to monitor for heparin induced thrombocytopenia. Lovenox can be initiated 12 hours following a vaginal delivery and 24 hours following a  section (if there is no ongoing concern for bleeding). The risks and benefits of prophylactic anticoagulation in pregnancy were reviewed including the development of heparin-induced thrombocytopenia (HIT), osteopenia, bleeding, and poor fetal growth. · An anesthesia consultation is also recommended in the third trimester given she is on anticoagulation. · A hematology consult was recommended. A referral was provided  · A neurology consult was recommended. A referral was provided. · The patient should avoid estrogen containing birth control postpartum. Increased fetal surveillance is also recommended. Fetal growth to be monitored serially, every 3 to 4 weeks starting at 24 to 26 weeks gestation. Patient should monitor fetal kick counts daily starting at 28 weeks gestation. She should be scheduled for twice-weekly fetal testing starting at 32 weeks gestation. Delivery is recommended at/by 39 weeks gestation. Counseling will be provided regarding the radiation exposure from the CT scan. Increased risks associated with radiation exposure during pregnancy include that of early pregnancy loss, fetal malformations, poor fetal growth, and an increase for childhood cancers (increased from 2800 to 2000). Generally, these risks are not significantly increased unless the radiation exposure exceeds 50 mGy (5 rads). The estimated radiation exposure associated with a head CT is 1-10 mGy (0.1-1 rads). The International Commission of Radiologic Protection suggests that the radiation doses delivered in utero by imaging tests (such as those performed in the diagnosis of PE) present no measurable increased risk of fetal death or developmental abnormalities over the background incidence of these entities.  The [facility] of Radiation Protection and Measurements considers the risk of radiation-associated abnormalities to be negligible, at less than 50 mGy when compared with other risks of pregnancy. Per available data, diagnostic imaging studies that expose the fetus to less than 0.05 Gray (50 mGy, 5 rads), do not appear to increase the risk for fetal anomalies, intellectual disability, growth restriction, or pregnancy loss. With exposure to more than 0.05 Gy (50 mGy, 5 rads), a definitive threshold at which the risk for, locations increases has not been determined. Evidence suggests the risks begin to increase at doses above 0.1 Gy (100 mGy, 10 rads) and especially above 0.15-0.2 Gy (150-200 mGy, 15-20 rads). 3.  Genetic counseling -- The patient was counseled regarding her options for genetic screening and/or diagnostic testing. She was counseled that based on her age, her risk of having a child with Down syndrome is approximately 1/590. Her risk of having a child with any chromosomal abnormality is approximately 1/401. The patient's options for genetic screening and/or diagnostic testing were reviewed including a quad screen, NIPT, and or amniocentesis. The risks and benefits of each were discussed. After this discussion, the patient indicated that she would like to have NIPT screening. Testing was completed today. She will be contacted with results. The patient was also counseled regarding the recommendations for screening for cystic fibrosis, spinal muscular atrophy, and Fragile X. The risks and benefits of screening were reviewed. After this discussion, she indicated that she would like to also have a Horizon panel. Testing was completed today. She will be contacted with results. The patient was also counseled regarding the recommendation for maternal serum AFP to screen for open neural tube defects. Risks and benefits of screening were reviewed.   Screening is recommended at 15 to 22 weeks gestation. 4.  History of  X1 -- The patient's first pregnancy was delivered via  secondary to arrest of dilation and nonreassuring fetal heart tones. The placenta is posterior. She plans to have a repeat  for delivery. 5.  Subchronic hemorrhage -- The ultrasound images were reviewed with patient. A subchronic hemorrhage 2.6 x 1.4 x 0.9 cm. No active bleeding was noted. The patient denied having any vaginal bleeding or cramping. She is noted to be Rh+. Counseling was provided to the patient. A subchorionic hemorrhage can be associated with an increased risk for bleeding, infection, early pregnancy loss, poor fetal growth,  premature rupture of membranes,  labor and delivery, and stillbirth. Because of the increased risk for complications, close follow-up is recommended. Activity restrictions were again reviewed. The patient was counseled to avoid heavy lifting, prolonged standing, strenuous exercise, and sexual intercourse. The patient was scheduled to return in 3-4 weeks for a follow-up visit. Bleeding precautions were reviewed. 6.  Uterine fibroids -- The ultrasound images were reviewed with the patient. Multiple uterine fibroids were noted. The largest measures 2.7 x 3.3 x 2.9 cm. The patient denied any symptoms of fevers, chills, and/or abdominal pain. Counseling was provided to the patient. Fibroids are common in women of reproductive age. Most studies that monitored the growth of fibroids during pregnancy have noted that the majority of uterine fibroids have less than 10% change in volume across gestation. Most of the growth usually occurs in the first trimester. Fibroids larger than 5 cms are more likely to grow, where as smaller fibroids most likely remain stable in size. As you know, there is a higher chance of antepartum bleeding with retroplacental fibroids and a small increase in  birth.   Specifically, if placentation occurs adjacent to or overlying a fibroid. Submucosal and retroplacental fibroids with volumes more than 200 ml (corresponding to 7 cm in diameter) have the highest risk of abruption. Pain is another common complication of fibroid in pregnancy. It is especially high in fibroids greater than 5 cm in diameter. Patients with degenerating fibroids tend to have localized pain, mild leukocytosis, pyrexia and nausea. This may result from decreased perfusion in the setting of rapid growth leading to ischemia. Some studies noted increased incidence of malpresentation if the uterus has multiple fibroids. Large retroplacental fibroids that distort the uterine cavity may be associated with adverse pregnancy event including  fetal growth restriction and  labor. Overall, the risks associated with uterine fibroids in pregnancy include malpresentation, lower uterine segment obstruction, degeneration, poor fetal growth, ,  birth, and antepartum/postpartum bleeding. The size and appearance of the fibroid(s) should be reevaluated on follow-up ultrasound. A screening cervical length should be completed at her next visit. Fetal growth should be monitored serially, every 3 to 4 weeks beginning at 24 to 26 weeks gestation. Precautions were reviewed with the patient. 7.  Tobacco use in pregnancy -- The patient reports she is smoking 2-3 black and milds daily. She was counseled regarding the increased risks of smoking in pregnancy including poor fetal growth, placental abruption, PPROM/ birth, and fetal loss. Additional  risks were reviewed including asthma, allergies, infection, and an association with SIDS. Various techniques for cutting back and quitting were reviewed. She was urged to quit smoking tobacco.    8. THC Use --  The patient reports that she is smoking marijuana daily. She reports that she uses marijuana for decreased appetite.     She was counseled regarding risk of neurodevelopmental issues in children exposed to [facility] during pregnancy. Additionally, marijuana use may pose risks similar to that of cigarette use including increased risk for poor fetal growth, placental bleeding, PPROM/ delivery, and stillbirth. She was counseled not to use THC while pregnant. Random urine drug screens should be monitored during pregnancy. 9.  First/second trimester alcohol exposure -- The patient reports that she drinks wine occasionally. She reports only taking sips of wine. The patient was counseled that a safe level of alcohol consumption during pregnancy has not been determined. Determination of the impact of alcohol use during pregnancy on fetal development can be challenging because of variation in maternal alcohol clearance rates, fetal sensitivity, genetic susceptibility, maternal drinking patterns (binge drinking versus daily consumption), and confounders such a substance abuse. The patient was counseled that alcohol is a teratogen that can impact fetal growth and development at all stages during pregnancy. Currently, national guidelines and multiple medical societies recommend abstinence during pregnancy. In one large epidemiologic study, an increased rate of stillbirth was noted across all categories of alcohol intake, even after adjusting for confounders (eg, smoking, pre-pregnancy weight). There is also an increased risk for structural anomalies (craniofacial, cardiac), poor fetal growth, decreased IQ, and neurodevelopmental issues in childhood. Factors such as older age, increased parity, and  and  ethnicities are associated with an increased risk for FAS. Secondary to the increased risk for poor growth, fetal growth should be monitored serially, every 3 to 4 weeks starting at 24 to 26 weeks gestation.     Secondary to the increased risk for congenital heart anomalies, a fetal echocardiogram is recommended at 22 to 24 weeks gestation. 10.  Right ovarian mass/cyst -- The ultrasound images were reviewed with the patient. A hyperechoic mass was seen in the right ovary measuring 1.2 x 1 x 0.9 cm. No fluid was seen surrounding the area. Patient was counseled that this may represent a hemorrhagic cyst versus teratoma versus a calcification, although no shadowing was seen. The size and appearance of the ovarian mass will be reevaluated on follow-up ultrasound. 11.   Nausea and vomiting of pregnancy  -- The patients reports having daily symptoms of nausea and decreased appetite. She is occasional emesis. She has not gained any weight. She has been using marijuana to help with her appetite. She denies any signs of symptoms of dehydration. Precautions were reviewed. The patient was encouraged to eat small amounts of food every 2-3 hours during the day. She was also encouraged to stay well-hydrated. A baseline nutrition panel (CBC, CMP, magnesium, ferritin, folate, vitamin B12, vitamin D 25 OH) was recommended. This was ordered today. She was provided with an order for vitamin B6, 25 mg every 8 hours. She was counseled to call and/or return with worsening symptoms. With persistent/worsening symptoms, I would recommend the addition of Pepcid, 20 mg twice daily and or Zofran. 12.  History of bacteruria -- The patient's hospital admission in April was reviewed. Her urinalysis was positive for Nitrites, leukocyte esterase, and bacteria. She was treated with Keflex. Her urine culture showed E. Coli and mixed gram-positive organisms including staph and Corynebacterium. She had a repeat urine culture on 5/5/2022 that was negative. She denied any signs or symptoms of recurrent infection. Precautions were reviewed. Repeat urine studies were ordered.     13.  Anticardiolipin antibody IgM, indeterminate-- Antiphospholipid antibody screening was done secondary to the patient's CVA diagnosed in April 2022. Initial screening completed on 4/18/2022 as part of the thrombophilia panel was negative. Screening was repeated on 4/19/2022. Her anticardiolipin IgM antibody was indeterminate at 17 on 4/19/2022. Screening for lupus anticoagulant and beta-2 glycoprotein antibody was negative. The patient was counseled that this may be a false elevation, generally, the titers have to be greater than 20 for a true positive. The recommendation was made for the patient to continue taking a low dose aspirin, 81 mg daily. She should continue this for the remainder of pregnancy and 6 to 8 weeks postpartum. Repeat testing was ordered today. 14.  ?  Protein S deficiency -- The patient's labs were reviewed. Patient initially had a thrombophilia panel on 4/18/2022. Her protein S, antigen, free was 25% (). Testing was repeated on 4/19/2022. Her protein S, antigen, free at that time was 27% (). The patient would have been 9 weeks at the time of the testing. She was being evaluated for a recent thrombotic stroke. Testing is not considered reliable during acute thrombosis or pregnancy. Protein S decreases during pregnancy. However, protein S is generally considered deficient if <30% in the second trimester and/or <24% in the third trimester. Again, the patient had testing in the first trimester and her protein S levels were significantly decreased. It is unclear at this time if the patient truly has a protein S deficiency. This will not be able to be determined until the patient is postpartum. The patient was counseled that a protein S deficiency is rare, and seen in 0.03-0.13% of the population. It is considered a lower risk thrombophilia. If someone does have a deficiency, then the risk for a thrombosis during pregnancy is estimated to be 0.1% (w/negative personal history).   It is 0-22% with a personal history of thrombosis    Given the patient's recent stroke, the recommendation was for prophylactic anticoagulation with Lovenox. Please see above. Repeat testing was ordered today. A hematology consultation was recommended. A referral was provided. Repeat testing is recommended at 6 to 8 weeks postpartum. She should not be on an oral contraceptive pill at the time of repeat testing. The patient should avoid birth control containing estrogen. The patient was counseled to continue taking a daily low-dose aspirin. She should continue a daily low-dose aspirin for the remainder of pregnancy and 6 to 8 weeks postpartum. Given her history of a thrombotic stroke, the recommendation was also made for anticoagulation with prophylactic Lovenox. The plan was reviewed with neurology and hematology, see above. A prescription was provided following patient's consultation. She was scheduled to return for injection teaching. Increased fetal surveillance is recommended. Fetal growth    Serially, every 3 to 4 weeks starting at 24 to 26 weeks gestation. The patient should monitor fetal kick counts daily starting at 28 weeks gestation. She should be scheduled for twice-weekly fetal testing starting at 32 weeks gestation. Delivery is recommended at/by 39 weeks gestation. 13. Vaccination in pregnancy -- The patient was counseled regarding the recommendations for vaccination in pregnancy. The patient was counseled regarding the recommendations for the Tdap vaccination in pregnancy. Risks and benefits were discussed. The vaccination is typically administered between 27 and 36 weeks gestation and recommended to confer protection against whooping cough to the . The patient plans to discuss this with her primary provider at her next visit. The patient was also counseled that her partner in any individuals who will be in close contact with the  should also be vaccinated for whooping cough.     The patient was counseled regarding recommendation for the flu vaccination in pregnancy for both maternal and infant safety. Risks and benefits were reviewed. She was encouraged to have this vaccination as an outpatient. Counseling was provided regarding the recommendation for the Covid vaccination in pregnancy. I advised the patient that the [employer] of Obstetrics and Gynecology and The Society for Maternal Fetal Medicine recommend that all pregnant women be vaccinated for COVID-19. \"Data have shown that COVID-19 infection puts pregnant people at increased risk of severe complications and even death; yet only about 22% of pregnant individuals have received 1 more doses of COVID-19 vaccine according to the [employer] for Disease Control and Prevention. \"    \" Recent data have shown that more than 95% of those were hospitalized and/or dying from COVID-19 are those who have remained unvaccinated. Pregnant individuals who have decided to wait until after delivery to be vaccinated may be inadvertently exposing themselves to an increased risk of severe illness or death. \"     \" COVID-19 vaccination is the best testing to reduce maternal and fetal complications of UKUQS-51 infection among pregnant people,\" said Alise Campbell MD, president of the Society for Maternal Fetal Medicine, sub-specialists. The patient was counseled that in the event she opts not to have the Covid vaccination, she should alert her provider immediately if she test positive for Covid. Pregnant women are on the priority list for treatment with monoclonal antibody. This intervention has been shown to decrease the risk for hospitalization and complications related to Covid in pregnancy. The patient expressed verbal understanding of this counseling. --The patient was advised to call if she has any increased vaginal discharge, vaginal bleeding, contractions, abdominal pain, back pain or any new significant symptomatology prior to her next visit.  I advised her that these are signs and symptoms of cervical change and require follow-up assessment when they occur. Preeclampsia precautions were also reviewed with the patient. --I requested the patient return for a follow-up assessment in 3-4 weeks unless there is a clinical reason for her to return prior to that time. She is to call if she has any problems or questions prior to her next visit. Further evaluation and management will be dependent on her clinical presentation and the results of her testing. --The patient is to continue to follow with you in your office for ongoing obstetric care. --The total time spent on today's visit was 80 minutes. This included preparation for the visit (i.e. reviewing prior external notes and test results), performance of a medically appropriate history and examination, counseling, orders for medications, tests or other procedures, and coordination of care. Greater than 50% of the time was spent face-to-face with the patient. This time is exclusive of procedures performed. --At the conclusion of the visit, the patient appeared to have a good understanding of the issues discussed. I answered all of her questions to her satisfaction. I asked her to call if she had any additional questions prior to her next visit. --Thank you for allowing me to participate in the care of this pleasant patient. Please don't hesitate to call me if you have any questions. Sincerely,        Ivanna Sykes MD, [address] [place] [address]  [phone]    *All or parts of this note may have been generated using a voice recognition program. There may be typo, grammar, or Word substitution errors that have escaped my review of this note.

## 2022-05-09 NOTE — PROGRESS NOTES
[facility] genetic testing drawn from right antecubital after verification of  and form signed.

## 2022-05-09 NOTE — TELEPHONE ENCOUNTER
----- Message from Lynne Rich MD sent at 5/9/2022  2:49 PM EDT -----  Hi, Can you please call this patient. I spoke with both neurology and hematology. Per neurology, there is no contraindication to the patient starting Lovenox. I reviewed the case with hematology. She thought that prophylactic Lovenox was appropriate. I sent a prescription for Lovenox, 40 mg subcu daily to the patient's pharmacy. The patient needs to return the office for injection teaching. This would just be a quick nursing visit. Can you please also call the pharmacy to confirm that the correct Lovenox was ordered. The order appeared different in epic. It should be a preloaded 40 mg syringe.     Thanks, Get Mcfadden

## 2022-05-09 NOTE — TELEPHONE ENCOUNTER
I called patient to tell her about the hematology and neurology  recommendations from Dr. Austin Montero and the need to start taking Lovenox 40mg subcutaneous daily. I asked patient to return to office for a nurse visit so we can show her how to administer Lovenox. Patient is going to come on Wednesday at 215 pm for nurse visit and instructed to bring her Lovenox with her. Pt verbalizes understanding of instructions.

## 2022-05-09 NOTE — LETTER
[facility] Maternal Fetal Medicine  [address] [place] [id number]  Phone: [phone]  Fax: [phone]           Lynne Rich MD      May 9, 2022     Patient: Aneudy March   MR Number: [id number]   YOB: 1990   Date of Visit: 2022       Dear Dr. Michelle Ellis: Thank you for referring Zakiya Orozco to me for evaluation/treatment. Below are the relevant portions of my assessment and plan of care. If you have questions, please do not hesitate to call me. I look forward to following Katherine Pickens along with you. Sincerely,        Lynne Rich MD    CC providers:  MD [facility]LLAUME Jones [facility] [place] [id number]  Via In Basket       May 9, 2022      Yunior Alvarez, [address]     RE:  Clementina ARAUJO  : 1990   AGE: 33 y.o. This report has been created using voice recognition software. It may contain errors which are inherent in voice recognition technology. Dear Dr. Michelle Ellis:    I had the pleasure of meeting with Ms. Willy Villalba for a consultation. As you know, Ms. Willy Villalba is a 33 y.o.  at 12w4d (LMP = [address]) who was referred to our office for counseling secondary to a history of a stroke during pregnancy. The patient's medical records and laboratory workup were reviewed. The patient's history was reviewed and outlined below. Today, Ms. Willy Villalba reports that she feels well. She denies any symptoms of leaking of fluid, vaginal bleeding, and/or contractions. She had a fetal ultrasound that was notable for the following. There is a single intrauterine gestation in a variable presentation with a heart rate of 159 beats per minute. The placenta is posterior. The crown rump length is consistent with 12w5d. The nuchal translucency is normal at 1.3 mm.       PAST OBSTETRICAL HISTORY:     2/10/2009 -- 40 week elective IOL, primary  for arrest of dilation and non reassuring fetal heart tones. The patient reports that her heart rate also dropped. She delivered a 6lb 11oz male (Kenrick Ruiz). She denies having any other complications with the pregnancy, delivery, and/or postpartum recovery. She reports that her son is living and well. Partner #1     -- 6 week TAB, surgical, no complications. Partner #1    Current pregnancy, partner #2        PAST GYNECOLOGICAL  HISTORY:  Negative for abnormal pap smears. Negative for sexually transmitted diseases. Negative for cervical LEEP / conization /cryosurgery. Positive for uterine surgery.  x1  Negative for ovarian or tubal surgery. PAST MEDICAL HISTORY:    MEDICATIONS:  Prenatal vitamin  LDASA    ILLNESSES:  CVA x2 during pregnancy    BLOOD TRANSFUSIONS:  None    IMMUNIZATIONS:  Up-to-date, no flu vaccine, no COVID vaccine    SURGERIES:    No issues with anesthesia    HOSPITALIZATIONS:  Child birth, stroke x2    ALLERGIES:  NKDA, she denies any latex or shellfish allergies    SOCIAL HISTORY:  She smokes black and milds (2-3 per day), she is smoking THC daily. She reports drinking wine occasionally. She is currently working at [employer]. She was working in a [address] [place] [address] [employer]. FAMILY MEDICAL HISTORY:   Negative for congenital abnormalities, autism, genetic disease and mental retardation, not listed above.      Cancer PGM -- ? Type; MGF -- throat cancer  CAD PGM  CVA None  Congenital anomalies None  DM None  DVT Mother, following a hysterectomy  Bleeding disorders None  Autoimmune disorders None    Review of Systems :   CONSTITUTIONAL : No fever, no chills   HEENT : No headache, no visual changes, no rhinorrhea, no sore throat   CARDIOVASCULAR : No pain, no palpitations, no edema   RESPIRATORY : No pain, no shortness of breath   GASTROINTESTINAL : No N/V, no D/C, no abdominal pain   GENITOURINARY : No dysuria, hematuria and no incontinence   MUSCULOSKELETAL : No myalgia, No back pain  NEUROLOGICAL : No numbness, no tingling, no tremors. No history of seizures  ALL OTHER SYSTEMS WERE REPORTED AS NEGATIVE. PERTINENT PHYSICAL EXAMINATION:   /71   Pulse 101   Wt 161 lb 14.4 oz (73.4 kg)   LMP 02/10/2022   BMI 29.61 kg/m²     Urine dipstick:   Negative for Glucose    Negative for Albumin      GENERAL:   The patient is a well developed, female who is alert cooperative and oriented times three in no acute distress. HEENT:  Normo cephalic and atraumatic. No facial edema. ABDOMEN:   Her uterus is gravid. She had no complaint of abdominal pain or tenderness. EXTREMITIES:  No peripheral edema is noted. A fetal ultrasound assessment was performed today. A report is enclosed for your review. A long conversation was had with the patient regarding her pregnancy and management. The following counseling was provided to the patient:      Assessment & Plan:  33 y.o.  at 12w4d (LMP = [address]) with:    1. Pregnancy dating -- The patient's pregnancy dating was reviewed. The patient reports that was having monthly periods. She discontinued Depo-Provera in 2021. She reports a sure last menstrual period. Her reported LMP is 2/10/2022, GUERO 2022. The patient's first ultrasound was 1822. The crown-rump length was 9 weeks 4 days, GUERO 2022. Given the patient had a sure LMP that agreed with the 9-week ultrasound, the recommendation was made to use an GUERO of 2022 based on her LMP. Fetal growth was appropriate today. 2.  History of stroke -- The patient reports that she initially experienced symptoms of headache and aphasia in 2021. She did not seek medical care at this time. The patient presented to the emergency department on 2022 with complaints of a severe headache that woke her up from sleep. She had a headache 4 days prior to her evaluation in the emergency department.   Two days prior to her evaluation, she had slurred speech and aphasia which lasted for approximately 20 minutes and then resolved. The patient then developed paresthesias on her right arm. A CT of the head was initially completed. Report stated, \" hypodensity within the left insular cortex with abnormal white matter hypodensity extending into the adjacent subinsular white matter and left corona radiata. Findings may be suggestive of subacute infarction. For further evaluation brain MRI is recommended. \"    An MRI, MRV, and MRA of the head with and completed. Per the MRI report from 4/18/2022, acute infarct noted in left insula/left temporal lobe/left periventricular white matter with associated thrombosis of a superior M2 branch of the left middle cerebral artery. Chronic infarcts noted in the right corona radiata and left centrum semiovale. The MRV was unremarkable. No dural sinus thrombosis was noted. The patient was diagnosed with a subacute CVA and admitted to the hospital.  She was also noted to have bacteriuria. She was cared for by her primary care physician and she had a neurology consultation. A thrombophilia evaluation was completed. She was started on a low-dose aspirin and received Lovenox for DVT prophylaxis. A maternal echocardiogram was completed on 4/19/2022. Per the report, the study was normal.    The patient remained stable and was discharged home on 4/19/2022. She was discharged home on Keflex and a low-dose aspirin. She was to follow-up with obstetrics and her primary care physician. The patient's thrombophilia evaluation was completed on 4/18/2022, her results included: Homocystine 5, protein C functional 111%, protein S antigen free 25% (), Antithrombin activity 99%, APC resistance 3.54 (normal), lupus anticoagulant negative, beta-2 glycoprotein antibody negative, cardiolipin antibody negative, prothrombin 2 gene mutation negative. A screening PARIS was completed and negative.     A protein S antigen free was repeated on 4/19/2022 and again low at 27 (). Homocystine was repeated on 4/19/2022 and again normal at 7.8. Factor VIII activity was normal at 134% and factor VII activity was normal at 133%. Anticardiolipin antibody was repeated on 4/19/2022 and IgM was indeterminate at 17. Repeat screening for lupus anticoagulant was negative on 4/19/2022. A D-dimer was checked on 4/19/2022 and normal.  Patient's urine drug screen was positive for marijuana. The patient's urinalysis was abnormal.  The urine culture showed mixed jair including E. coli staph and Corynebacterium. No sensitivities were provided. Counseling was provided to the patient. Pregnancy and the puerperium (postpartum period) are well-established risk factors for thromboembolism. Normal pregnancy is accompanied by an increase in clotting factors. The resulting hypercoagulable state likely evolved to protect women from hemorrhage in the event of a miscarriage and during childbirth. Thromboembolic disease during pregnancy and the puerperium is a significant cause of maternal morbidity and mortality. During pregnancy, the risk of venous thromboembolism increases 4-5 fold and the risk of arterial thromboembolism, myocardial infarction, and stroke increases 3-4 fold compared to the nonpregnant state. Postpartum, the risk of venous thromboembolism is 20 fold higher and the risk of arterial thromboembolism is similarly elevated compared to nonpregnant individuals. Venous events are more common during pregnancy accounting for 4 out of 5 thromboembolic events. However, the risk of death is higher following an arterial thrombosis. Approximately 80% of venous thromboembolic events are deep venous thromboses and approximately 20% of pulmonary emboli. The most important risk factor for thrombosis during pregnancy is a history of thrombosis.  The risk for a thrombotic event is further increased in women with an underlying acquired or inherited thrombophilia. Most studies have reported and equal distribution of thrombosis risk across all trimesters of pregnancy however, 2 large conflicting studies have reported a first trimester (50% prior to 15 weeks) and third trimester (60%) predominance. Additional risk factors for thrombosis during pregnancy include multifetal gestation, varicose veins, inflammatory bowel disease, urinary tract infection, diabetes, hospitalization, obesity, and maternal age greater than 28 years. Compared to the antepartum period, the thrombosis risk is 2-5 times higher during the postpartum period. This risk is highest during the first 6 weeks postpartum and declines to prepregnancy rates by 13-18 weeks postpartum. Risk factors for postpartum thrombosis include  section, medical co morbidities, obesity,  delivery, hemorrhage, fetal demise, advanced maternal age, hypertensive disorders of pregnancy, tobacco use, and infection. I contacted neurology following the patient's consultation to discuss her case on 2022. Per Dr. Rigo Menjivar, there is no definitive etiology of the patient's arterial thrombosis. She was supposed to have been discharged on a antiplatelet agent for approximately 3 weeks and then continue a daily low-dose aspirin. She was not discharged on Plavix. Per Dr. Rigo Menjivar, typically, close follow-up is recommended following an arterial thrombosis with no definitive etiology. He indicated that there would be no contraindication to prophylactic Lovenox at this time. He agreed with a hematology consultation for additional evaluation and management recommendations. The patient was also referred back to neurology for continued monitoring and care. I also referred the patient to hematology, Dr. Kassidy Porter. I contacted Dr. Kassidy Porter regarding the patient following her consultation on 2022.   At this time, completion of the thrombophilia evaluation and repeat protein S testing was recommended, as ordered. She agreed with starting prophylactic Lovenox at this time. She indicated that she would see the patient for a consultation and provide additional recommendations. A prescription for Lovenox, 40 mg daily was sent to the patient's pharmacy. The patient was contacted to schedule a nursing visit for injection teaching. The risks and benefits of Lovenox during pregnancy and postpartum were reviewed with the patient during her consultation. Prophylactic anticoagulation should be continued throughout the pregnancy and for 6-8 weeks postpartum. She will be transitioned to unfractionated heparin, 10,000 units every 12 hours, at 36 weeks' gestation. A baseline platelet count should be obtained with repeat levels at 7 and 14 days after the transition to monitor for heparin induced thrombocytopenia. Lovenox can be initiated 12 hours following a vaginal delivery and 24 hours following a  section (if there is no ongoing concern for bleeding). The risks and benefits of prophylactic anticoagulation in pregnancy were reviewed including the development of heparin-induced thrombocytopenia (HIT), osteopenia, bleeding, and poor fetal growth. · An anesthesia consultation is also recommended in the third trimester given she is on anticoagulation. · A hematology consult was recommended. A referral was provided  · A neurology consult was recommended. A referral was provided. · The patient should avoid estrogen containing birth control postpartum. Increased fetal surveillance is also recommended. Fetal growth to be monitored serially, every 3 to 4 weeks starting at 24 to 26 weeks gestation. Patient should monitor fetal kick counts daily starting at 28 weeks gestation. She should be scheduled for twice-weekly fetal testing starting at 32 weeks gestation. Delivery is recommended at/by 39 weeks gestation. Counseling will be provided regarding the radiation exposure from the CT scan.  Increased risks associated with radiation exposure during pregnancy include that of early pregnancy loss, fetal malformations, poor fetal growth, and an increase for childhood cancers (increased from 1/2800 to 1/2000). Generally, these risks are not significantly increased unless the radiation exposure exceeds 50 mGy (5 rads). The estimated radiation exposure associated with a head CT is 1-10 mGy (0.1-1 rads). The International Commission of Radiologic Protection suggests that the radiation doses delivered in utero by imaging tests (such as those performed in the diagnosis of PE) present no measurable increased risk of fetal death or developmental abnormalities over the background incidence of these entities. The [facility] [employer] of Radiation Protection and Measurements considers the risk of radiation-associated abnormalities to be negligible, at less than 50 mGy when compared with other risks of pregnancy. Per available data, diagnostic imaging studies that expose the fetus to less than 0.05 Gray (50 mGy, 5 rads), do not appear to increase the risk for fetal anomalies, intellectual disability, growth restriction, or pregnancy loss. With exposure to more than 0.05 Gy (50 mGy, 5 rads), a definitive threshold at which the risk for, locations increases has not been determined. Evidence suggests the risks begin to increase at doses above 0.1 Gy (100 mGy, 10 rads) and especially above 0.15-0.2 Gy (150-200 mGy, 15-20 rads). 3.  Genetic counseling -- The patient was counseled regarding her options for genetic screening and/or diagnostic testing. She was counseled that based on her age, her risk of having a child with Down syndrome is approximately 1/590. Her risk of having a child with any chromosomal abnormality is approximately 1/401. The patient's options for genetic screening and/or diagnostic testing were reviewed including a quad screen, NIPT, and or amniocentesis.   The risks and benefits of each were discussed. After this discussion, the patient indicated that she would like to have NIPT screening. Testing was completed today. She will be contacted with results. The patient was also counseled regarding the recommendations for screening for cystic fibrosis, spinal muscular atrophy, and Fragile X. The risks and benefits of screening were reviewed. After this discussion, she indicated that she would like to also have a Horizon panel. Testing was completed today. She will be contacted with results. The patient was also counseled regarding the recommendation for maternal serum AFP to screen for open neural tube defects. Risks and benefits of screening were reviewed. Screening is recommended at 15 to 22 weeks gestation. 4.  History of  X1 -- The patient's first pregnancy was delivered via  secondary to arrest of dilation and nonreassuring fetal heart tones. The placenta is posterior. She plans to have a repeat  for delivery. 5.  Subchronic hemorrhage -- The ultrasound images were reviewed with patient. A subchronic hemorrhage 2.6 x 1.4 x 0.9 cm. No active bleeding was noted. The patient denied having any vaginal bleeding or cramping. She is noted to be Rh+. Counseling was provided to the patient. A subchorionic hemorrhage can be associated with an increased risk for bleeding, infection, early pregnancy loss, poor fetal growth,  premature rupture of membranes,  labor and delivery, and stillbirth. Because of the increased risk for complications, close follow-up is recommended. Activity restrictions were again reviewed. The patient was counseled to avoid heavy lifting, prolonged standing, strenuous exercise, and sexual intercourse. The patient was scheduled to return in 3-4 weeks for a follow-up visit. Bleeding precautions were reviewed. 6.  Uterine fibroids -- The ultrasound images were reviewed with the patient.   Multiple uterine fibroids were noted. The largest measures 2.7 x 3.3 x 2.9 cm. The patient denied any symptoms of fevers, chills, and/or abdominal pain. Counseling was provided to the patient. Fibroids are common in women of reproductive age. Most studies that monitored the growth of fibroids during pregnancy have noted that the majority of uterine fibroids have less than 10% change in volume across gestation. Most of the growth usually occurs in the first trimester. Fibroids larger than 5 cms are more likely to grow, where as smaller fibroids most likely remain stable in size. As you know, there is a higher chance of antepartum bleeding with retroplacental fibroids and a small increase in  birth. Specifically, if placentation occurs adjacent to or overlying a fibroid. Submucosal and retroplacental fibroids with volumes more than 200 ml (corresponding to 7 cm in diameter) have the highest risk of abruption. Pain is another common complication of fibroid in pregnancy. It is especially high in fibroids greater than 5 cm in diameter. Patients with degenerating fibroids tend to have localized pain, mild leukocytosis, pyrexia and nausea. This may result from decreased perfusion in the setting of rapid growth leading to ischemia. Some studies noted increased incidence of malpresentation if the uterus has multiple fibroids. Large retroplacental fibroids that distort the uterine cavity may be associated with adverse pregnancy event including  fetal growth restriction and  labor. Overall, the risks associated with uterine fibroids in pregnancy include malpresentation, lower uterine segment obstruction, degeneration, poor fetal growth, ,  birth, and antepartum/postpartum bleeding. The size and appearance of the fibroid(s) should be reevaluated on follow-up ultrasound. A screening cervical length should be completed at her next visit.     Fetal growth should be monitored serially, every 3 to 4 weeks beginning at 24 to 26 weeks gestation. Precautions were reviewed with the patient. 7.  Tobacco use in pregnancy -- The patient reports she is smoking 2-3 black and milds daily. She was counseled regarding the increased risks of smoking in pregnancy including poor fetal growth, placental abruption, PPROM/ birth, and fetal loss. Additional  risks were reviewed including asthma, allergies, infection, and an association with SIDS. Various techniques for cutting back and quitting were reviewed. She was urged to quit smoking tobacco.    8. THC Use --  The patient reports that she is smoking marijuana daily. She reports that she uses marijuana for decreased appetite. She was counseled regarding risk of neurodevelopmental issues in children exposed to [facility] during pregnancy. Additionally, marijuana use may pose risks similar to that of cigarette use including increased risk for poor fetal growth, placental bleeding, PPROM/ delivery, and stillbirth. She was counseled not to use THC while pregnant. Random urine drug screens should be monitored during pregnancy. 9.  First/second trimester alcohol exposure -- The patient reports that she drinks wine occasionally. She reports only taking sips of wine. The patient was counseled that a safe level of alcohol consumption during pregnancy has not been determined. Determination of the impact of alcohol use during pregnancy on fetal development can be challenging because of variation in maternal alcohol clearance rates, fetal sensitivity, genetic susceptibility, maternal drinking patterns (binge drinking versus daily consumption), and confounders such a substance abuse. The patient was counseled that alcohol is a teratogen that can impact fetal growth and development at all stages during pregnancy. Currently, national guidelines and multiple medical societies recommend abstinence during pregnancy.     In one large epidemiologic study, an increased rate of stillbirth was noted across all categories of alcohol intake, even after adjusting for confounders (eg, smoking, pre-pregnancy weight). There is also an increased risk for structural anomalies (craniofacial, cardiac), poor fetal growth, decreased IQ, and neurodevelopmental issues in childhood. Factors such as older age, increased parity, and  and  ethnicities are associated with an increased risk for FAS. Secondary to the increased risk for poor growth, fetal growth should be monitored serially, every 3 to 4 weeks starting at 24 to 26 weeks gestation. Secondary to the increased risk for congenital heart anomalies, a fetal echocardiogram is recommended at 22 to 24 weeks gestation. 10.  Right ovarian mass/cyst -- The ultrasound images were reviewed with the patient. A hyperechoic mass was seen in the right ovary measuring 1.2 x 1 x 0.9 cm. No fluid was seen surrounding the area. Patient was counseled that this may represent a hemorrhagic cyst versus teratoma versus a calcification, although no shadowing was seen. The size and appearance of the ovarian mass will be reevaluated on follow-up ultrasound. 11.   Nausea and vomiting of pregnancy  -- The patients reports having daily symptoms of nausea and decreased appetite. She is occasional emesis. She has not gained any weight. She has been using marijuana to help with her appetite. She denies any signs of symptoms of dehydration. Precautions were reviewed. The patient was encouraged to eat small amounts of food every 2-3 hours during the day. She was also encouraged to stay well-hydrated. A baseline nutrition panel (CBC, CMP, magnesium, ferritin, folate, vitamin B12, vitamin D 25 OH) was recommended. This was ordered today. She was provided with an order for vitamin B6, 25 mg every 8 hours. She was counseled to call and/or return with worsening symptoms.   With persistent/worsening symptoms, I would recommend the addition of Pepcid, 20 mg twice daily and or Zofran. 12.  History of bacteruria -- The patient's hospital admission in April was reviewed. Her urinalysis was positive for Nitrites, leukocyte esterase, and bacteria. She was treated with Keflex. Her urine culture showed E. Coli and mixed gram-positive organisms including staph and Corynebacterium. She had a repeat urine culture on 5/5/2022 that was negative. She denied any signs or symptoms of recurrent infection. Precautions were reviewed. Repeat urine studies were ordered. 13.  Anticardiolipin antibody IgM, indeterminate-- Antiphospholipid antibody screening was done secondary to the patient's CVA diagnosed in April 2022. Initial screening completed on 4/18/2022 as part of the thrombophilia panel was negative. Screening was repeated on 4/19/2022. Her anticardiolipin IgM antibody was indeterminate at 17 on 4/19/2022. Screening for lupus anticoagulant and beta-2 glycoprotein antibody was negative. The patient was counseled that this may be a false elevation, generally, the titers have to be greater than 20 for a true positive. The recommendation was made for the patient to continue taking a low dose aspirin, 81 mg daily. She should continue this for the remainder of pregnancy and 6 to 8 weeks postpartum. Repeat testing was ordered today. 14.  ?  Protein S deficiency -- The patient's labs were reviewed. Patient initially had a thrombophilia panel on 4/18/2022. Her protein S, antigen, free was 25% (). Testing was repeated on 4/19/2022. Her protein S, antigen, free at that time was 27% (). The patient would have been 9 weeks at the time of the testing. She was being evaluated for a recent thrombotic stroke. Testing is not considered reliable during acute thrombosis or pregnancy. Protein S decreases during pregnancy.   However, protein S is generally considered deficient if <30% in the second trimester and/or <24% in the third trimester. Again, the patient had testing in the first trimester and her protein S levels were significantly decreased. It is unclear at this time if the patient truly has a protein S deficiency. This will not be able to be determined until the patient is postpartum. The patient was counseled that a protein S deficiency is rare, and seen in 0.03-0.13% of the population. It is considered a lower risk thrombophilia. If someone does have a deficiency, then the risk for a thrombosis during pregnancy is estimated to be 0.1% (w/negative personal history). It is 0-22% with a personal history of thrombosis    Given the patient's recent stroke, the recommendation was for prophylactic anticoagulation with Lovenox. Please see above. Repeat testing was ordered today. A hematology consultation was recommended. A referral was provided. Repeat testing is recommended at 6 to 8 weeks postpartum. She should not be on an oral contraceptive pill at the time of repeat testing. The patient should avoid birth control containing estrogen. The patient was counseled to continue taking a daily low-dose aspirin. She should continue a daily low-dose aspirin for the remainder of pregnancy and 6 to 8 weeks postpartum. Given her history of a thrombotic stroke, the recommendation was also made for anticoagulation with prophylactic Lovenox. The plan was reviewed with neurology and hematology, see above. A prescription was provided following patient's consultation. She was scheduled to return for injection teaching. Increased fetal surveillance is recommended. Fetal growth    Serially, every 3 to 4 weeks starting at 24 to 26 weeks gestation. The patient should monitor fetal kick counts daily starting at 28 weeks gestation. She should be scheduled for twice-weekly fetal testing starting at 32 weeks gestation.   Delivery is recommended at/by 39 weeks gestation. 13. Vaccination in pregnancy -- The patient was counseled regarding the recommendations for vaccination in pregnancy. The patient was counseled regarding the recommendations for the Tdap vaccination in pregnancy. Risks and benefits were discussed. The vaccination is typically administered between 27 and 36 weeks gestation and recommended to confer protection against whooping cough to the . The patient plans to discuss this with her primary provider at her next visit. The patient was also counseled that her partner in any individuals who will be in close contact with the  should also be vaccinated for whooping cough. The patient was counseled regarding recommendation for the flu vaccination in pregnancy for both maternal and infant safety. Risks and benefits were reviewed. She was encouraged to have this vaccination as an outpatient. Counseling was provided regarding the recommendation for the Covid vaccination in pregnancy. I advised the patient that the [facility] of Obstetrics and Gynecology and The Society for Maternal Fetal Medicine recommend that all pregnant women be vaccinated for COVID-19. \"Data have shown that COVID-19 infection puts pregnant people at increased risk of severe complications and even death; yet only about 22% of pregnant individuals have received 1 more doses of COVID-19 vaccine according to the [employer] for Disease Control and Prevention. \"    \" Recent data have shown that more than 95% of those were hospitalized and/or dying from COVID-19 are those who have remained unvaccinated. Pregnant individuals who have decided to wait until after delivery to be vaccinated may be inadvertently exposing themselves to an increased risk of severe illness or death. \"     \" COVID-19 vaccination is the best testing to reduce maternal and fetal complications of LIXAK-54 infection among pregnant people,\" said Andrew Maher MD, president of the Society for Maternal Fetal Medicine, sub-specialists. The patient was counseled that in the event she opts not to have the Covid vaccination, she should alert her provider immediately if she test positive for Covid. Pregnant women are on the priority list for treatment with monoclonal antibody. This intervention has been shown to decrease the risk for hospitalization and complications related to Covid in pregnancy. The patient expressed verbal understanding of this counseling. --The patient was advised to call if she has any increased vaginal discharge, vaginal bleeding, contractions, abdominal pain, back pain or any new significant symptomatology prior to her next visit. I advised her that these are signs and symptoms of cervical change and require follow-up assessment when they occur. Preeclampsia precautions were also reviewed with the patient. --I requested the patient return for a follow-up assessment in 3-4 weeks unless there is a clinical reason for her to return prior to that time. She is to call if she has any problems or questions prior to her next visit. Further evaluation and management will be dependent on her clinical presentation and the results of her testing. --The patient is to continue to follow with you in your office for ongoing obstetric care. --The total time spent on today's visit was 80 minutes. This included preparation for the visit (i.e. reviewing prior external notes and test results), performance of a medically appropriate history and examination, counseling, orders for medications, tests or other procedures, and coordination of care. Greater than 50% of the time was spent face-to-face with the patient. This time is exclusive of procedures performed. --At the conclusion of the visit, the patient appeared to have a good understanding of the issues discussed. I answered all of her questions to her satisfaction.  I asked her to call if she had any additional questions prior to her next visit. --Thank you for allowing me to participate in the care of this pleasant patient. Please don't hesitate to call me if you have any questions. Sincerely,        Tammy Waters MD, [address] [place] [address]  [phone]    *All or parts of this note may have been generated using a voice recognition program. There may be typo, grammar, or Word substitution errors that have escaped my review of this note.

## 2022-05-09 NOTE — PROGRESS NOTES
Pt here for bpp/hx of stroke  Pt voiced may feel flutters or gas feeling  Pt denies lof vag bleeding or contractions

## 2022-05-10 LAB
HPV SAMPLE: NORMAL
HPV TYPE 16: NOT DETECTED
HPV TYPE 18: NOT DETECTED
HPV, HIGH RISK OTHER: NOT DETECTED
INTERPRETATION: NORMAL
SOURCE: NORMAL

## 2022-05-11 ENCOUNTER — NURSE ONLY (OUTPATIENT)
Dept: OBGYN CLINIC | Age: 32
End: 2022-05-11
Payer: MEDICAID

## 2022-05-11 ENCOUNTER — OFFICE VISIT (OUTPATIENT)
Dept: ONCOLOGY | Age: 32
End: 2022-05-11
Payer: MEDICAID

## 2022-05-11 ENCOUNTER — HOSPITAL ENCOUNTER (OUTPATIENT)
Dept: INFUSION THERAPY | Age: 32
Discharge: HOME | End: 2022-05-11

## 2022-05-11 VITALS
HEART RATE: 81 BPM | TEMPERATURE: 98.1 F | BODY MASS INDEX: 29.3 KG/M2 | WEIGHT: 159.2 LBS | OXYGEN SATURATION: 100 % | SYSTOLIC BLOOD PRESSURE: 113 MMHG | HEIGHT: 62 IN | DIASTOLIC BLOOD PRESSURE: 78 MMHG

## 2022-05-11 DIAGNOSIS — N88.3 SHORT CERVIX: ICD-10-CM

## 2022-05-11 DIAGNOSIS — I63.9 ACUTE CEREBROVASCULAR ACCIDENT (CVA) (HCC): Primary | ICD-10-CM

## 2022-05-11 PROBLEM — Z86.718 HISTORY OF THROMBOSIS: Status: ACTIVE | Noted: 2022-05-11

## 2022-05-11 PROBLEM — D68.59 PROTEIN S DEFICIENCY AFFECTING PREGNANCY (HCC): Status: ACTIVE | Noted: 2022-05-11

## 2022-05-11 PROBLEM — O99.119 PROTEIN S DEFICIENCY AFFECTING PREGNANCY (HCC): Status: ACTIVE | Noted: 2022-05-11

## 2022-05-11 PROBLEM — O21.9 NAUSEA/VOMITING IN PREGNANCY: Status: ACTIVE | Noted: 2022-05-11

## 2022-05-11 PROBLEM — Z87.440 HISTORY OF UTI: Status: ACTIVE | Noted: 2022-05-11

## 2022-05-11 PROCEDURE — G8419 CALC BMI OUT NRM PARAM NOF/U: HCPCS | Performed by: INTERNAL MEDICINE

## 2022-05-11 PROCEDURE — 99214 OFFICE O/P EST MOD 30 MIN: CPT | Performed by: INTERNAL MEDICINE

## 2022-05-11 PROCEDURE — 4004F PT TOBACCO SCREEN RCVD TLK: CPT | Performed by: INTERNAL MEDICINE

## 2022-05-11 PROCEDURE — 99205 OFFICE O/P NEW HI 60 MIN: CPT | Performed by: INTERNAL MEDICINE

## 2022-05-11 PROCEDURE — 96372 THER/PROPH/DIAG INJ SC/IM: CPT

## 2022-05-11 PROCEDURE — 99211 OFF/OP EST MAY X REQ PHY/QHP: CPT

## 2022-05-11 PROCEDURE — 1111F DSCHRG MED/CURRENT MED MERGE: CPT | Performed by: INTERNAL MEDICINE

## 2022-05-11 PROCEDURE — G8427 DOCREV CUR MEDS BY ELIG CLIN: HCPCS | Performed by: INTERNAL MEDICINE

## 2022-05-11 NOTE — PROGRESS NOTES
[facility]  1990 33 y.o. Referring Physician:Dr. Estephania Rollins MD    PCP: Natasha Wasserman DO    Vitals:    22 1514   BP: 113/78   Pulse: 81   Temp: 98.1 °F (36.7 °C)   SpO2: 100%        Wt Readings from Last 3 Encounters:   22 159 lb 3.2 oz (72.2 kg)   22 161 lb 14.4 oz (73.4 kg)   22 161 lb (73 kg)        Body mass index is 29.12 kg/m².           Chief Complaint:   Chief Complaint   Patient presents with    New Patient     hx of thrombosis, protein s deficiency           LMP: 2-10-22    Age at first Menses: patient doesn't remeber    : 3    Para: 1          Current Outpatient Medications:     vitamin B-6 (PYRIDOXINE) 25 MG tablet, Take 1 tablet by mouth in the morning, at noon, and at bedtime, Disp: 90 tablet, Rfl: 3    enoxaparin (LOVENOX) 40 MG/0.4ML, Inject 0.4 mLs into the skin daily, Disp: 12 mL, Rfl: 3    aspirin 81 MG chewable tablet, Take 1 tablet by mouth daily, Disp: 90 tablet, Rfl: 3    Prenatal MV-Min-Fe Fum-FA-DHA (PRENATAL 1 PO), Take by mouth, Disp: , Rfl:        Past Medical History:   Diagnosis Date    Back pain     [facility]    Neck pain        Past Surgical History:   Procedure Laterality Date     SECTION      DILATION AND CURETTAGE      NERVE BLOCK Left 2020    LEFT CERVICAL MEDIAL BRANCH NERVE  BLOCK UNDER FLUOROSCOPIC GUIDANCE C3, C4, C5 AND C6 performed by Yves Galeano MD at [facility] OR       Family History   Problem Relation Age of Onset    Asthma Father     Asthma Brother     Asthma Brother     No Known Problems Mother        Social History     Socioeconomic History    Marital status: Single     Spouse name: Not on file    Number of children: Not on file    Years of education: Not on file    Highest education level: Not on file   Occupational History    Not on file   Tobacco Use    Smoking status: Current Every Day Smoker     Packs/day: 0.50     Types: Cigarettes, Cigars    Smokeless tobacco: Never Used    Tobacco comment: black and mild   Vaping Use    Vaping Use: Never used   Substance and Sexual Activity    Alcohol use: No    Drug use: Yes     Types: Marijuana Paullette Nim)    Sexual activity: Yes     Partners: Male   Other Topics Concern    Not on file   Social History Narrative    Not on file     Social Determinants of Health     Financial Resource Strain:     Difficulty of Paying Living Expenses: Not on file   Food Insecurity:     Worried About Running Out of Food in the Last Year: Not on file    Aileen of Food in the Last Year: Not on file   Transportation Needs:     Lack of Transportation (Medical): Not on file    Lack of Transportation (Non-Medical): Not on file   Physical Activity:     Days of Exercise per Week: Not on file    Minutes of Exercise per Session: Not on file   Stress:     Feeling of Stress : Not on file   Social Connections:     Frequency of Communication with Friends and Family: Not on file    Frequency of Social Gatherings with Friends and Family: Not on file    Attends Catholic Services: Not on file    Active Member of [address] [place] [address] or Organizations: Not on file    Attends Club or Organization Meetings: Not on file    Marital Status: Not on file   Intimate Partner Violence:     Fear of Current or Ex-Partner: Not on file    Emotionally Abused: Not on file    Physically Abused: Not on file    Sexually Abused: Not on file   Housing Stability:     Unable to Pay for Housing in the Last Year: Not on file    Number of [place] in the Last Year: Not on file    Unstable Housing in the Last Year: Not on file           Occupation: [employer] Carras[employer]illo  Retired:  NO          REVIEW OF SYSTEMS: <<For Level 5, 10 or more systems>>     Pacemaker/Defibulator/ICD:  No    Mediport: No           FALLS RISK SCREENING ASSESSMENT    Instructions:  Assess the patient and [place] the appropriate indicators that are present for fall risk identification.    Total the numbers circled and assign a fall risk score from Table 2.  Reassess patient at a minimum every 12 weeks or with status change. Assessment   Date  5/11/2022     1. Mental Ability: confusion/cognitively impaired No - 0       2. Elimination Issues: incontinence, frequency No - 0       3. Ambulatory: use of assistive devices (walker, cane, off-loading devices), attached to equipment (IV pole, oxygen) No - 0     4. Sensory Limitations: dizziness, vertigo, impaired vision No - 0       5. Age Less than 65 years - 0       6. Medication: diuretics, strong analgesics, hypnotics, sedatives, antihypertensive agents   No - 0   7. Falls:  recent history of falls within the last 3 months (not to include slipping or tripping)   No - 0   TOTAL 0    If score of 4 or greater was education given? No       TABLE 2   Risk Score Risk Level Plan of Care   0-3 Little or  No Risk 1. Provide assistance as indicated for ambulation activities  2. Reorient confused/cognitively impaired patient  3. Call-light/bell within patient's reach  4. Chair/bed in low position, stretcher/bed with siderails up except when performing patient care activities  5. Educate patient/family/caregiver on falls prevention  6.  Reassess in 12 weeks or with any noted change in patient condition which places them at a risk for a fall   4-6 Moderate Risk 1. Provide assistance as indicated for ambulation activities  2. Reorient confused/cognitively impaired patient  3. Call-light/bell within patient's reach  4. Chair/bed in low position, stretcher/bed with siderails up except when performing patient care activities  5. Educate patient/family/caregiver on falls prevention  6. Falls risk precaution (Yellow sticker Level II) placed on patient chart   7 or   Higher High Risk 1. Place patient in easily observable treatment room  2. Patient attended at all times by family member or staff  3. Provide assistance as indicated for ambulation activities  4. Reorient confused/cognitively impaired patient  5. Call-light/bell within patient's reach  6. Chair/bed in low position, stretcher/bed with siderails up except when performing patient care activities  7. Educate patient/family/caregiver on falls prevention  8.   Falls risk precaution (Yellow sticker Level III) placed on patient chart                     Lety Crane RN

## 2022-05-11 NOTE — PROGRESS NOTES
[address] [facility] MEDICAL ONCOLOGY  [address] [place], [place] [address] [address]  Dept: [phone]  Loc: [phone]  Attending Consult Note      Reason for Visit:   Stroke, reduced prot S. Referring Physician:  Herbert Hartley MD    PCP:  Quique Muro DO    History of Present Illness:     Ms. Lars Burroughs is a very pleasant 71-year-old lady, G3, P1, who was referred to the hematology office evaluation of decreased protein S, and stroke during pregnancy. The patient reports having symptoms of confusion, aphasia and headaches in November 2021, lasted for about 4 days, she did not seek medical attention for those symptoms. Had presented to the ED on 4/17/2022 with severe headaches, 2 days prior she had slurred speech, and aphasia, which lasted for about 20 minutes, she then developed paresthesias in her right upper extremity. CT was done, revealing hypodensity within the left insular cortex with abnormal white matter hypodensity extending into the adjacent subinsular white matter and left corona radiator, findings may be suggestive of subacute infarction, subsequently she had brain MRI, MRV and MR a done, revealing acute infarct noted in the left insula/left temporal lobe/left periventricular white matter with associated thrombosis of the superior M2 branch of the left middle cerebral artery, and infarcts noted in the right corona radiata and left centrum semiovale, the time the patient was 8 weeks pregnant. She was started on baby aspirin, she had been sent for inherited thrombophilia, she does not have prothrombin gene mutation, PC resistance 3.54, protein C was 1 1 1%, protein S 25%, Antithrombin III 90%, repeat protein S was 27%, patient anticoagulant was negative, antibeta-2 glycoprotein antibodies were negative, initial testing for anticardiolipin antibodies was negative, repeat testing had revealed an IgM of 17. [place] was negative.   Urine drug screen was positive for marijuana. Currently pregnant at 12 weeks 5 days, miscarriages, her mother had a PE when she was in her 45s, it had occurred after hysterectomy. Review of Systems;  CONSTITUTIONAL: No fever, chills. Good appetite feeling tired. ENMT: Eyes: No diplopia; Nose: No epistaxis. Mouth: No sore throat. RESPIRATORY: No hemoptysis, shortness of breath on exertion, cough. CARDIOVASCULAR: No chest pain, palpitations. GASTROINTESTINAL: pos for nausea , novomiting, abdominal pain, diarrhea/constipation. GENITOURINARY: No dysuria, urinary frequency, hematuria. NEURO: No syncope, presyncope, headache. Remainder:  ROS NEGATIVE    Past Medical History:      Diagnosis Date    Back pain     CVA (cerebral vascular accident) ([address] 2022    Neck pain      Patient Active Problem List   Diagnosis    Sprain of shoulder, left    Cervicalgia    Cervical spondylolysis    Cervical strain    Sprain of ligaments of cervical spine    Acute cerebrovascular accident (CVA) ([address]    Cerebrovascular accident (CVA) ([address]    Nausea/vomiting in pregnancy    History of UTI    History of thrombosis    Protein S deficiency affecting pregnancy ([address]        Past Surgical History:      Procedure Laterality Date     SECTION      DILATION AND CURETTAGE      NERVE BLOCK Left 2020    LEFT CERVICAL MEDIAL BRANCH NERVE  BLOCK UNDER FLUOROSCOPIC GUIDANCE C3, C4, C5 AND C6 performed by Wesly Sood MD at [facility] OR       Family History:  Family History   Problem Relation Age of Onset    Asthma Father     Asthma Brother     Asthma Brother     No Known Problems Mother        Medications:  Reviewed and reconciled.     Social History:  Social History     Socioeconomic History    Marital status: Single     Spouse name: Not on file    Number of children: Not on file    Years of education: Not on file    Highest education level: Not on file   Occupational History    Not on file   Tobacco Use  Smoking status: Current Every Day Smoker     Packs/day: 0.50     Types: Cigarettes, Cigars    Smokeless tobacco: Never Used    Tobacco comment: black and mild   Vaping Use    Vaping Use: Never used   Substance and Sexual Activity    Alcohol use: No    Drug use: Yes     Types: Marijuana [place])    Sexual activity: Yes     Partners: Male   Other Topics Concern    Not on file   Social History Narrative    Not on file     Social Determinants of Health     Financial Resource Strain:     Difficulty of Paying Living Expenses: Not on file   Food Insecurity:     Worried About Running Out of Food in the Last Year: Not on file    Aileen of Food in the Last Year: Not on file   Transportation Needs:     Lack of Transportation (Medical): Not on file    Lack of Transportation (Non-Medical):  Not on file   Physical Activity:     Days of Exercise per Week: Not on file    Minutes of Exercise per Session: Not on file   Stress:     Feeling of Stress : Not on file   Social Connections:     Frequency of Communication with Friends and Family: Not on file    Frequency of Social Gatherings with Friends and Family: Not on file    Attends [employer] Services: Not on file    Active Member of [address] [place] [address] or Organizations: Not on file    Attends Club or Organization Meetings: Not on file    Marital Status: Not on file   Intimate Partner Violence:     Fear of Current or Ex-Partner: Not on file    Emotionally Abused: Not on file    Physically Abused: Not on file    Sexually Abused: Not on file   Housing Stability:     Unable to Pay for Housing in the Last Year: Not on file    Number of [place] in the Last Year: Not on file    Unstable Housing in the Last Year: Not on file       Allergies:  No Known Allergies    Physical Exam:  /78   Pulse 81   Temp 98.1 °F (36.7 °C)   Ht 5' 2\" (1.575 m)   Wt 159 lb 3.2 oz (72.2 kg)   LMP 02/10/2022   SpO2 100%   BMI 29.12 kg/m²   GENERAL: Alert, oriented x 3, not in acute distress. HEENT: PERRLA; EOMI. Oropharynx clear. NECK: Supple. No palpable cervical or supraclavicular lymphadenopathy. LUNGS: Good air entry bilaterally. No wheezing, crackles or rhonchi. CARDIOVASCULAR: Regular rate. No murmurs, rubs or gallops. ABDOMEN: Uterus, soft. Non-tender, non-distended. Positive bowel sounds. EXTREMITIES: Without clubbing, cyanosis, or edema. NEUROLOGIC: No focal deficits. ECOG PS 0    Impression/Plan:     Ms. Jean Pierre Espino is a very pleasant 79-year-old lady, G3, P1, who was referred to the hematology office evaluation of decreased protein S, and stroke during pregnancy. The patient reports having symptoms of confusion, aphasia and headaches in November 2021, lasted for about 4 days, she did not seek medical attention for those symptoms. Had presented to the ED on 4/17/2022 with severe headaches, 2 days prior she had slurred speech, and aphasia, which lasted for about 20 minutes, she then developed paresthesias in her right upper extremity. CT was done, revealing hypodensity within the left insular cortex with abnormal white matter hypodensity extending into the adjacent subinsular white matter and left corona radiator, findings may be suggestive of subacute infarction, subsequently she had brain MRI, MRV and MR a done, revealing acute infarct noted in the left insula/left temporal lobe/left periventricular white matter with associated thrombosis of the superior M2 branch of the left middle cerebral artery, and infarcts noted in the right corona radiata and left centrum semiovale, the time the patient was 8 weeks pregnant.   She was started on baby aspirin, she had been sent for inherited thrombophilia, she does not have prothrombin gene mutation, PC resistance 3.54, protein C was 1 1 1%, protein S 25%, Antithrombin III 90%, repeat protein S was 27%, patient anticoagulant was negative, antibeta-2 glycoprotein antibodies were negative, initial testing for anticardiolipin antibodies was negative, repeat testing had revealed an IgM of 17. [place] was negative. Urine drug screen was positive for marijuana. The patient's brother has sickle cell trait, her mother had a PE when she was in her 45s, it had occurred after hysterectomy. The patient had a stroke while pregnant, reports having logical symptoms in November 2021, protein S was reduced to 25-27%, recent pregnancy and in the setting of stroke, HIV screen was negative, repeat anticardiolipin IgM antibodies titer was slightly increased, at 16, could be secondary to inflammation, her mother had VTE at a young age but was provoked, the patient is on baby aspirin, agree with Lovenox at a prophylactic dose, the patient will have teach for the administration of the Lovenox today, sickle cell screen, I counseled the patient on smoking cessation. RTC in 4 to 6 weeks. Thank you for allowing us to participate in the care of Ms. Starr Clayton.     Isabelle Lozano MD   HEMATOLOGY/MEDICAL [address] MEDICAL ONCOLOGY  [address] [place] [id number]  Dept: [phone] Rikki Nate: [phone]

## 2022-05-11 NOTE — PROGRESS NOTES
Pt here for injection teaching for Lovenox. Pt brought her own lovenox with her and given instructions on giving medication to herself. I gave patient medication in her left upper abdomen with instructions. Pt receptive to teaching and verbalizes need to give herself medication everyday alternating sites on abdomen. Patient given printed lovenox instructions.

## 2022-05-13 ENCOUNTER — HOSPITAL ENCOUNTER (OUTPATIENT)
Age: 32
Discharge: HOME | End: 2022-05-13
Payer: MEDICAID

## 2022-05-13 DIAGNOSIS — I63.9 ACUTE CEREBROVASCULAR ACCIDENT (CVA) (HCC): ICD-10-CM

## 2022-05-13 DIAGNOSIS — Z86.718 HISTORY OF THROMBOSIS: ICD-10-CM

## 2022-05-13 DIAGNOSIS — Z87.440 HISTORY OF UTI: ICD-10-CM

## 2022-05-13 DIAGNOSIS — Z3A.12 12 WEEKS GESTATION OF PREGNANCY: ICD-10-CM

## 2022-05-13 DIAGNOSIS — O99.119 PROTEIN S DEFICIENCY AFFECTING PREGNANCY (HCC): ICD-10-CM

## 2022-05-13 DIAGNOSIS — D68.59 PROTEIN S DEFICIENCY AFFECTING PREGNANCY (HCC): ICD-10-CM

## 2022-05-13 LAB
ALBUMIN SERPL-MCNC: 3.8 G/DL (ref 3.5–5.2)
ALP BLD-CCNC: 63 U/L (ref 35–104)
ALT SERPL-CCNC: 7 U/L (ref 0–32)
ANION GAP SERPL CALCULATED.3IONS-SCNC: 15 MMOL/L (ref 7–16)
AST SERPL-CCNC: 13 U/L (ref 0–31)
BACTERIA: NORMAL /HPF
BILIRUB SERPL-MCNC: <0.2 MG/DL (ref 0–1.2)
BILIRUBIN URINE: NEGATIVE
BLOOD, URINE: NEGATIVE
BUN BLDV-MCNC: 12 MG/DL (ref 6–20)
CALCIUM SERPL-MCNC: 9 MG/DL (ref 8.6–10.2)
CHLORIDE BLD-SCNC: 105 MMOL/L (ref 98–107)
CLARITY: CLEAR
CO2: 20 MMOL/L (ref 22–29)
COLOR: YELLOW
CREAT SERPL-MCNC: 0.7 MG/DL (ref 0.5–1)
CREATININE URINE: 189 MG/DL (ref 29–226)
FERRITIN: 67 NG/ML
FOLATE: 19.8 NG/ML (ref 4.8–24.2)
GFR AFRICAN AMERICAN: >60
GFR NON-AFRICAN AMERICAN: >60 ML/MIN/1.73
GLUCOSE BLD-MCNC: 73 MG/DL (ref 74–99)
GLUCOSE URINE: NEGATIVE MG/DL
HBA1C MFR BLD: 4.8 % (ref 4–5.6)
HOMOCYSTEINE: 6.4 UMOL/L (ref 0–15)
KETONES, URINE: NEGATIVE MG/DL
LACTATE DEHYDROGENASE: 144 U/L (ref 135–214)
LEUKOCYTE ESTERASE, URINE: NEGATIVE
Lab: NORMAL
MAGNESIUM: 2 MG/DL (ref 1.6–2.6)
NITRITE, URINE: NEGATIVE
PH UA: 6.5 (ref 5–9)
POTASSIUM SERPL-SCNC: 3.7 MMOL/L (ref 3.5–5)
PROTEIN PROTEIN: 22 MG/DL (ref 0–12)
PROTEIN UA: NEGATIVE MG/DL
PROTEIN/CREAT RATIO: 0.1
PROTEIN/CREAT RATIO: 0.1 (ref 0–0.2)
RBC UA: NORMAL /HPF (ref 0–2)
REPORT: NORMAL
SODIUM BLD-SCNC: 140 MMOL/L (ref 132–146)
SPECIFIC GRAVITY UA: 1.02 (ref 1–1.03)
T3 FREE: 3 PG/ML (ref 2–4.4)
T4 FREE: 1.16 NG/DL (ref 0.93–1.7)
THIS TEST SENT TO: NORMAL
TOTAL PROTEIN: 7 G/DL (ref 6.4–8.3)
TSH SERPL DL<=0.05 MIU/L-ACNC: 0.88 UIU/ML (ref 0.27–4.2)
URIC ACID, SERUM: 3 MG/DL (ref 2.4–5.7)
UROBILINOGEN, URINE: 0.2 E.U./DL
VITAMIN B-12: 423 PG/ML (ref 211–946)
VITAMIN D 25-HYDROXY: 9 NG/ML (ref 30–100)
WBC UA: NORMAL /HPF (ref 0–5)

## 2022-05-13 PROCEDURE — 85660 RBC SICKLE CELL TEST: CPT

## 2022-05-13 PROCEDURE — 82746 ASSAY OF FOLIC ACID SERUM: CPT

## 2022-05-13 PROCEDURE — 84439 ASSAY OF FREE THYROXINE: CPT

## 2022-05-13 PROCEDURE — 85306 CLOT INHIBIT PROT S FREE: CPT

## 2022-05-13 PROCEDURE — 84550 ASSAY OF BLOOD/URIC ACID: CPT

## 2022-05-13 PROCEDURE — 82607 VITAMIN B-12: CPT

## 2022-05-13 PROCEDURE — 83090 ASSAY OF HOMOCYSTEINE: CPT

## 2022-05-13 PROCEDURE — 84156 ASSAY OF PROTEIN URINE: CPT

## 2022-05-13 PROCEDURE — 83735 ASSAY OF MAGNESIUM: CPT

## 2022-05-13 PROCEDURE — 82306 VITAMIN D 25 HYDROXY: CPT

## 2022-05-13 PROCEDURE — 36415 COLL VENOUS BLD VENIPUNCTURE: CPT

## 2022-05-13 PROCEDURE — 86146 BETA-2 GLYCOPROTEIN ANTIBODY: CPT

## 2022-05-13 PROCEDURE — 86800 THYROGLOBULIN ANTIBODY: CPT

## 2022-05-13 PROCEDURE — 82728 ASSAY OF FERRITIN: CPT

## 2022-05-13 PROCEDURE — 84443 ASSAY THYROID STIM HORMONE: CPT

## 2022-05-13 PROCEDURE — 80053 COMPREHEN METABOLIC PANEL: CPT

## 2022-05-13 PROCEDURE — 86147 CARDIOLIPIN ANTIBODY EA IG: CPT

## 2022-05-13 PROCEDURE — 87088 URINE BACTERIA CULTURE: CPT

## 2022-05-13 PROCEDURE — 86376 MICROSOMAL ANTIBODY EACH: CPT

## 2022-05-13 PROCEDURE — 83615 LACTATE (LD) (LDH) ENZYME: CPT

## 2022-05-13 PROCEDURE — 82570 ASSAY OF URINE CREATININE: CPT

## 2022-05-13 PROCEDURE — 84481 FREE ASSAY (FT-3): CPT

## 2022-05-13 PROCEDURE — 83036 HEMOGLOBIN GLYCOSYLATED A1C: CPT

## 2022-05-13 PROCEDURE — 81001 URINALYSIS AUTO W/SCOPE: CPT

## 2022-05-14 LAB — SICKLE CELL SCREEN: NEGATIVE

## 2022-05-15 LAB — URINE CULTURE, ROUTINE: NORMAL

## 2022-05-16 ENCOUNTER — TELEPHONE (OUTPATIENT)
Dept: OBGYN CLINIC | Age: 32
End: 2022-05-16

## 2022-05-16 LAB
ANTICARDIOLIPIN IGA ANTIBODY: <10 APL
ANTICARDIOLIPIN IGG ANTIBODY: <10 GPL
BETA-2 GLYCOPROTEIN 1 IGG ANTIBODY: <10 SGU
BETA-2 GLYCOPROTEIN 1 IGM ANTIBODY: <10 SMU
CARDIOLIPIN AB IGM: <10 MPL

## 2022-05-17 LAB — PROTEIN S ANTIGEN, TOTAL: 64 % (ref 63–126)

## 2022-05-18 LAB
PROTEIN S ANTIGEN, FREE: 28 % (ref 55–123)
THYROGLOBULIN ANTIBODY: 20 IU/ML (ref 0–40)
THYROID PEROXIDASE (TPO) ABS: 5.1 IU/ML (ref 0–25)

## 2022-05-25 ENCOUNTER — TELEPHONE (OUTPATIENT)
Dept: OBGYN CLINIC | Age: 32
End: 2022-05-25

## 2022-05-25 DIAGNOSIS — R79.89 LOW VITAMIN D LEVEL: Primary | ICD-10-CM

## 2022-05-25 RX ORDER — MELATONIN
2000 DAILY
Qty: 90 TABLET | Refills: 2 | Status: SHIPPED | OUTPATIENT
Start: 2022-05-25

## 2022-05-25 NOTE — TELEPHONE ENCOUNTER
I called and left message for Jared Herrera to call back for recommendations from Dr. Jensen Murillo for lab work results.

## 2022-05-26 ENCOUNTER — TELEPHONE (OUTPATIENT)
Dept: OBGYN CLINIC | Age: 32
End: 2022-05-26

## 2022-05-26 NOTE — TELEPHONE ENCOUNTER
Patient returned call and left message and I returned her call and left message for her to call back for recommendations from Dr. Moni Cedillo. I left message for patient to call us back but told her Vitamin D level low and prescription for Vitamin D called into pharmacy and for her to take 2000 IU daily.

## 2022-05-27 ENCOUNTER — TELEPHONE (OUTPATIENT)
Dept: OBGYN CLINIC | Age: 32
End: 2022-05-27

## 2022-05-27 NOTE — TELEPHONE ENCOUNTER
Patient called back and notified of Dr. Guerita Serna recommendations to take Vitamin D 2000 IU units daily.   Pt verbalizes understanding of instructions and to keep follow up appt 5/31/2022

## 2022-05-31 ENCOUNTER — APPOINTMENT (OUTPATIENT)
Dept: MRI IMAGING | Age: 32
End: 2022-05-31
Payer: MEDICAID

## 2022-05-31 ENCOUNTER — HOSPITAL ENCOUNTER (EMERGENCY)
Age: 32
Discharge: ANOTHER HOSPITAL | End: 2022-06-01
Attending: EMERGENCY MEDICINE
Payer: MEDICAID

## 2022-05-31 ENCOUNTER — APPOINTMENT (OUTPATIENT)
Dept: CT IMAGING | Age: 32
End: 2022-05-31
Payer: MEDICAID

## 2022-05-31 ENCOUNTER — CLINICAL DOCUMENTATION (OUTPATIENT)
Dept: OBGYN CLINIC | Age: 32
End: 2022-05-31

## 2022-05-31 DIAGNOSIS — I63.9 CEREBROVASCULAR ACCIDENT (CVA), UNSPECIFIED MECHANISM (HCC): Primary | ICD-10-CM

## 2022-05-31 LAB
ALBUMIN SERPL-MCNC: 3.4 G/DL (ref 3.5–5.2)
ALP BLD-CCNC: 64 U/L (ref 35–104)
ALT SERPL-CCNC: 9 U/L (ref 0–32)
ANION GAP SERPL CALCULATED.3IONS-SCNC: 10 MMOL/L (ref 7–16)
APTT: 27.4 SEC (ref 24.5–35.1)
AST SERPL-CCNC: 12 U/L (ref 0–31)
BASOPHILS ABSOLUTE: 0.03 E9/L (ref 0–0.2)
BASOPHILS RELATIVE PERCENT: 0.3 % (ref 0–2)
BILIRUB SERPL-MCNC: <0.2 MG/DL (ref 0–1.2)
BUN BLDV-MCNC: 12 MG/DL (ref 6–20)
CALCIUM SERPL-MCNC: 8.8 MG/DL (ref 8.6–10.2)
CHLORIDE BLD-SCNC: 103 MMOL/L (ref 98–107)
CO2: 22 MMOL/L (ref 22–29)
CREAT SERPL-MCNC: 0.7 MG/DL (ref 0.5–1)
EOSINOPHILS ABSOLUTE: 0.07 E9/L (ref 0.05–0.5)
EOSINOPHILS RELATIVE PERCENT: 0.8 % (ref 0–6)
GFR AFRICAN AMERICAN: >60
GFR NON-AFRICAN AMERICAN: >60 ML/MIN/1.73
GLUCOSE BLD-MCNC: 74 MG/DL (ref 74–99)
HCT VFR BLD CALC: 35.8 % (ref 34–48)
HEMOGLOBIN: 11.9 G/DL (ref 11.5–15.5)
IMMATURE GRANULOCYTES #: 0.05 E9/L
IMMATURE GRANULOCYTES %: 0.5 % (ref 0–5)
INR BLD: 0.9
LYMPHOCYTES ABSOLUTE: 2.77 E9/L (ref 1.5–4)
LYMPHOCYTES RELATIVE PERCENT: 30.4 % (ref 20–42)
MCH RBC QN AUTO: 29.5 PG (ref 26–35)
MCHC RBC AUTO-ENTMCNC: 33.2 % (ref 32–34.5)
MCV RBC AUTO: 88.6 FL (ref 80–99.9)
MONOCYTES ABSOLUTE: 0.78 E9/L (ref 0.1–0.95)
MONOCYTES RELATIVE PERCENT: 8.6 % (ref 2–12)
NEUTROPHILS ABSOLUTE: 5.4 E9/L (ref 1.8–7.3)
NEUTROPHILS RELATIVE PERCENT: 59.4 % (ref 43–80)
PDW BLD-RTO: 13.9 FL (ref 11.5–15)
PLATELET # BLD: 338 E9/L (ref 130–450)
PMV BLD AUTO: 9.6 FL (ref 7–12)
POTASSIUM REFLEX MAGNESIUM: 3.8 MMOL/L (ref 3.5–5)
PROTHROMBIN TIME: 9.6 SEC (ref 9.3–12.4)
RBC # BLD: 4.04 E12/L (ref 3.5–5.5)
SODIUM BLD-SCNC: 135 MMOL/L (ref 132–146)
TOTAL PROTEIN: 6.3 G/DL (ref 6.4–8.3)
WBC # BLD: 9.1 E9/L (ref 4.5–11.5)

## 2022-05-31 PROCEDURE — 96375 TX/PRO/DX INJ NEW DRUG ADDON: CPT

## 2022-05-31 PROCEDURE — 6360000002 HC RX W HCPCS: Performed by: EMERGENCY MEDICINE

## 2022-05-31 PROCEDURE — 96372 THER/PROPH/DIAG INJ SC/IM: CPT

## 2022-05-31 PROCEDURE — 85610 PROTHROMBIN TIME: CPT

## 2022-05-31 PROCEDURE — 70496 CT ANGIOGRAPHY HEAD: CPT

## 2022-05-31 PROCEDURE — 80053 COMPREHEN METABOLIC PANEL: CPT

## 2022-05-31 PROCEDURE — 6360000004 HC RX CONTRAST MEDICATION: Performed by: RADIOLOGY

## 2022-05-31 PROCEDURE — 70450 CT HEAD/BRAIN W/O DYE: CPT

## 2022-05-31 PROCEDURE — 96365 THER/PROPH/DIAG IV INF INIT: CPT

## 2022-05-31 PROCEDURE — 99285 EMERGENCY DEPT VISIT HI MDM: CPT

## 2022-05-31 PROCEDURE — 0042T CT BRAIN PERFUSION: CPT

## 2022-05-31 PROCEDURE — 70551 MRI BRAIN STEM W/O DYE: CPT

## 2022-05-31 PROCEDURE — 70498 CT ANGIOGRAPHY NECK: CPT

## 2022-05-31 PROCEDURE — 70544 MR ANGIOGRAPHY HEAD W/O DYE: CPT

## 2022-05-31 PROCEDURE — 6370000000 HC RX 637 (ALT 250 FOR IP): Performed by: EMERGENCY MEDICINE

## 2022-05-31 PROCEDURE — 93005 ELECTROCARDIOGRAM TRACING: CPT | Performed by: EMERGENCY MEDICINE

## 2022-05-31 PROCEDURE — 85025 COMPLETE CBC W/AUTO DIFF WBC: CPT

## 2022-05-31 PROCEDURE — 85730 THROMBOPLASTIN TIME PARTIAL: CPT

## 2022-05-31 RX ORDER — ENOXAPARIN SODIUM 100 MG/ML
40 INJECTION SUBCUTANEOUS ONCE
Status: COMPLETED | OUTPATIENT
Start: 2022-05-31 | End: 2022-05-31

## 2022-05-31 RX ORDER — METOCLOPRAMIDE HYDROCHLORIDE 5 MG/ML
10 INJECTION INTRAMUSCULAR; INTRAVENOUS ONCE
Status: COMPLETED | OUTPATIENT
Start: 2022-05-31 | End: 2022-05-31

## 2022-05-31 RX ORDER — MAGNESIUM SULFATE IN WATER 40 MG/ML
2000 INJECTION, SOLUTION INTRAVENOUS ONCE
Status: COMPLETED | OUTPATIENT
Start: 2022-05-31 | End: 2022-05-31

## 2022-05-31 RX ORDER — OXYCODONE HYDROCHLORIDE AND ACETAMINOPHEN 5; 325 MG/1; MG/1
1 TABLET ORAL ONCE
Status: COMPLETED | OUTPATIENT
Start: 2022-05-31 | End: 2022-05-31

## 2022-05-31 RX ORDER — DIPHENHYDRAMINE HYDROCHLORIDE 50 MG/ML
25 INJECTION INTRAMUSCULAR; INTRAVENOUS ONCE
Status: COMPLETED | OUTPATIENT
Start: 2022-05-31 | End: 2022-05-31

## 2022-05-31 RX ORDER — ASPIRIN 81 MG/1
324 TABLET, CHEWABLE ORAL ONCE
Status: COMPLETED | OUTPATIENT
Start: 2022-05-31 | End: 2022-05-31

## 2022-05-31 RX ADMIN — ASPIRIN 81 MG CHEWABLE TABLET 324 MG: 81 TABLET CHEWABLE at 23:33

## 2022-05-31 RX ADMIN — OXYCODONE AND ACETAMINOPHEN 1 TABLET: 5; 325 TABLET ORAL at 20:21

## 2022-05-31 RX ADMIN — MAGNESIUM SULFATE HEPTAHYDRATE 2000 MG: 40 INJECTION, SOLUTION INTRAVENOUS at 16:06

## 2022-05-31 RX ADMIN — DIPHENHYDRAMINE HYDROCHLORIDE 25 MG: 50 INJECTION, SOLUTION INTRAMUSCULAR; INTRAVENOUS at 15:59

## 2022-05-31 RX ADMIN — ENOXAPARIN SODIUM 40 MG: 100 INJECTION SUBCUTANEOUS at 23:32

## 2022-05-31 RX ADMIN — IOPAMIDOL 100 ML: 755 INJECTION, SOLUTION INTRAVENOUS at 14:50

## 2022-05-31 RX ADMIN — METOCLOPRAMIDE 10 MG: 5 INJECTION, SOLUTION INTRAMUSCULAR; INTRAVENOUS at 15:59

## 2022-05-31 ASSESSMENT — PAIN - FUNCTIONAL ASSESSMENT: PAIN_FUNCTIONAL_ASSESSMENT: NONE - DENIES PAIN

## 2022-05-31 ASSESSMENT — PAIN SCALES - GENERAL: PAINLEVEL_OUTOF10: 8

## 2022-05-31 ASSESSMENT — PAIN DESCRIPTION - LOCATION: LOCATION: HEAD

## 2022-05-31 NOTE — ED PROVIDER NOTES
HPI:  22,   Time: 2:22 PM EDT       Madan Gilliam is a 33 y.o. female presenting to the ED for ha/left side  numbness, beginning 6 hrs ago. The complaint has been persistent, moderate in severity, and worsened by nothing. Bib private vechicle, from [facility] office, g31 approx 15 wks ga. Pt hx cva, protein s deficiency, on lovenox. No motor issues, no coordination issues. Has spotting vision since prvs cva. Hx ha since prvs cva. State right sided sx in april    Review of Systems:   Pertinent positives and negatives are stated within HPI, all other systems reviewed and are negative.          --------------------------------------------- PAST HISTORY ---------------------------------------------  Past Medical History:  has a past medical history of Back pain, CVA (cerebral vascular accident) ([address] and Neck pain. Past Surgical History:  has a past surgical history that includes  section; Nerve Block (Left, 2020); and Dilation & curettage. Social History:  reports that she has been smoking cigarettes and cigars. She has been smoking about 0.50 packs per day. She has never used smokeless tobacco. She reports current drug use. Drug: Marijuana [place]). She reports that she does not drink alcohol. Family History: family history includes Asthma in her brother, brother, and father; No Known Problems in her mother. The patients home medications have been reviewed. Allergies: Patient has no known allergies. ---------------------------------------------------PHYSICAL EXAM--------------------------------------    Constitutional/General: Alert and oriented x3, well appearing, non toxic in NAD  Head: Normocephalic and atraumatic  Eyes: PERRL, EOMI, conjunctive normal, sclera non icteric  Mouth: Oropharynx clear, handling secretions,   Neck: Supple, full ROM,  Respiratory: Lungs clear to auscultation bilaterally, no wheezes, rales, or rhonchi.  Not in respiratory distress  Cardiovascular: Regular rate. Regular rhythm. No murmurs, gallops, or rubs. 2+ distal pulses  Chest: No chest wall tenderness  GI:  Abdomen Soft, Non tender, Non distended. Musculoskeletal: Moves all extremities x 4. Warm and well perfused, no clubbing, cyanosis, or edema. Capillary refill <3 seconds  Integument: skin warm and dry. No rashes. Lymphatic: no lymphadenopathy noted  Neurologic: GCS 15,  symmetric strength 5/5 in the upper and lower extremities bilaterally, left face/arm/leg sensory deficit, cn2-12 intact, nih 1  Psychiatric: Normal Affect    -------------------------------------------------- RESULTS -------------------------------------------------  I have personally reviewed all laboratory and imaging results for this patient. Results are listed below.      LABS:  Results for orders placed or performed during the hospital encounter of 05/31/22   CBC with Auto Differential   Result Value Ref Range    WBC 9.1 4.5 - 11.5 E9/L    RBC 4.04 3.50 - 5.50 E12/L    Hemoglobin 11.9 11.5 - 15.5 g/dL    Hematocrit 35.8 34.0 - 48.0 %    MCV 88.6 80.0 - 99.9 fL    MCH 29.5 26.0 - 35.0 pg    MCHC 33.2 32.0 - 34.5 %    RDW 13.9 11.5 - 15.0 fL    Platelets 237 211 - 355 E9/L    MPV 9.6 7.0 - 12.0 fL    Neutrophils % 59.4 43.0 - 80.0 %    Immature Granulocytes % 0.5 0.0 - 5.0 %    Lymphocytes % 30.4 20.0 - 42.0 %    Monocytes % 8.6 2.0 - 12.0 %    Eosinophils % 0.8 0.0 - 6.0 %    Basophils % 0.3 0.0 - 2.0 %    Neutrophils Absolute 5.40 1.80 - 7.30 E9/L    Immature Granulocytes # 0.05 E9/L    Lymphocytes Absolute 2.77 1.50 - 4.00 E9/L    Monocytes Absolute 0.78 0.10 - 0.95 E9/L    Eosinophils Absolute 0.07 0.05 - 0.50 E9/L    Basophils Absolute 0.03 0.00 - 0.20 E9/L   Comprehensive Metabolic Panel w/ Reflex to MG   Result Value Ref Range    Sodium 135 132 - 146 mmol/L    Potassium reflex Magnesium 3.8 3.5 - 5.0 mmol/L    Chloride 103 98 - 107 mmol/L    CO2 22 22 - 29 mmol/L    Anion Gap 10 7 - 16 mmol/L    Glucose 74 74 - 99 mg/dL BUN 12 6 - 20 mg/dL    CREATININE 0.7 0.5 - 1.0 mg/dL    GFR Non-African American >60 >=60 mL/min/1.73    GFR African American >60     Calcium 8.8 8.6 - 10.2 mg/dL    Total Protein 6.3 (L) 6.4 - 8.3 g/dL    Albumin 3.4 (L) 3.5 - 5.2 g/dL    Total Bilirubin <0.2 0.0 - 1.2 mg/dL    Alkaline Phosphatase 64 35 - 104 U/L    ALT 9 0 - 32 U/L    AST 12 0 - 31 U/L   Protime-INR   Result Value Ref Range    Protime 9.6 9.3 - 12.4 sec    INR 0.9    APTT   Result Value Ref Range    aPTT 27.4 24.5 - 35.1 sec   EKG 12 Lead   Result Value Ref Range    Ventricular Rate 85 BPM    Atrial Rate 85 BPM    P-R Interval 152 ms    QRS Duration 82 ms    Q-T Interval 358 ms    QTc Calculation (Bazett) 426 ms    P Axis 30 degrees    R Axis -13 degrees    T Axis 0 degrees       RADIOLOGY:  Interpreted by Radiologist.  MRI BRAIN WO CONTRAST   Final Result   MRI OF THE BRAIN WITHOUT CONTRAST:      1. Tiny foci of acute or early subacute infarction in the right frontal lobe,   with single tiny foci each within the right lateral basal ganglia, external   capsule and insular cortex. 2. No hemorrhage, mass effect or midline shift. MRV OF THE BRAIN WITHOUT CONTRAST:      1. Mild stenosis in the left lateral transverse sinus, which may be due to an   arachnoid granulation. 2. No evidence of dural venous thrombosis. RECOMMENDATIONS:   Unavailable         MRV HEAD WO CONTRAST   Final Result   MRI OF THE BRAIN WITHOUT CONTRAST:      1. Tiny foci of acute or early subacute infarction in the right frontal lobe,   with single tiny foci each within the right lateral basal ganglia, external   capsule and insular cortex. 2. No hemorrhage, mass effect or midline shift. MRV OF THE BRAIN WITHOUT CONTRAST:      1. Mild stenosis in the left lateral transverse sinus, which may be due to an   arachnoid granulation. 2. No evidence of dural venous thrombosis. RECOMMENDATIONS:   Unavailable         CTA HEAD W CONTRAST   Final Result   1.  No acute intracranial abnormality. 2. Left insular encephalomalacia at the site of prior infarct. 3. No perfusion mismatch. 4. Unremarkable CTA of the head and neck. Specifically, no evidence of   residual thrombus in the left middle cerebral artery. CTA NECK W CONTRAST   Final Result   1. No acute intracranial abnormality. 2. Left insular encephalomalacia at the site of prior infarct. 3. No perfusion mismatch. 4. Unremarkable CTA of the head and neck. Specifically, no evidence of   residual thrombus in the left middle cerebral artery. CT BRAIN PERFUSION   Final Result   1. No acute intracranial abnormality. 2. Left insular encephalomalacia at the site of prior infarct. 3. No perfusion mismatch. 4. Unremarkable CTA of the head and neck. Specifically, no evidence of   residual thrombus in the left middle cerebral artery. CT HEAD WO CONTRAST   Final Result   1. No acute intracranial abnormality. 2. Left insular encephalomalacia at the site of prior infarct. 3. No perfusion mismatch. 4. Unremarkable CTA of the head and neck. Specifically, no evidence of   residual thrombus in the left middle cerebral artery. EKG:  This EKG is signed and interpreted by the EP. Time: 1445  Rate: 85  Rhythm: Sinus  Interpretation: non-specific EKG  Comparison: None      ------------------------- NURSING NOTES AND VITALS REVIEWED ---------------------------   The nursing notes within the ED encounter and vital signs as below have been reviewed by myself. /60   Pulse 79   Temp 97.8 °F (36.6 °C)   Resp 14   Ht 5' 2\" (1.575 m)   Wt 160 lb (72.6 kg)   LMP 02/10/2022   SpO2 99%   BMI 29.26 kg/m²   Oxygen Saturation Interpretation: Normal    The patients available past medical records and past encounters were reviewed.         ------------------------------ ED COURSE/MEDICAL DECISION MAKING----------------------  Medications   iopamidol (ISOVUE-370) 76 % injection 100 mL (100 mLs IntraVENous Given 5/31/22 1450)   metoclopramide (REGLAN) injection 10 mg (10 mg IntraVENous Given 5/31/22 1559)   diphenhydrAMINE (BENADRYL) injection 25 mg (25 mg IntraVENous Given 5/31/22 1559)   magnesium sulfate 2000 mg in 50 mL IVPB premix (0 mg IntraVENous Stopped 5/31/22 1725)   oxyCODONE-acetaminophen (PERCOCET) 5-325 MG per tablet 1 tablet (1 tablet Oral Given 5/31/22 2021)   enoxaparin (LOVENOX) injection 40 mg (40 mg SubCUTAneous Given 5/31/22 2332)   aspirin chewable tablet 324 mg (324 mg Oral Given 5/31/22 2333)   oxyCODONE-acetaminophen (PERCOCET) 5-325 MG per tablet 1 tablet (1 tablet Oral Given 6/1/22 0214)   acetaminophen (TYLENOL) tablet 650 mg (650 mg Oral Given 6/1/22 1149)   oxyCODONE-acetaminophen (PERCOCET) 5-325 MG per tablet 1 tablet (1 tablet Oral Given 6/1/22 1702)   metoclopramide (REGLAN) injection 10 mg (10 mg IntraVENous Given 6/1/22 2219)   diphenhydrAMINE (BENADRYL) injection 25 mg (25 mg IntraVENous Given 6/1/22 2218)         ED COURSE:  ED Course as of 06/02/22 0603   Tue May 31, 2022   1918 D/w dr Jett Tracy will accept patient to [facility] [NICHOLAS]      ED Course User Index  [NICHOLAS] Zhane Calix,        Medical Decision Making:    Pt cva sx with ha, hx same, on lovenox/asa. Chart reviewed, had m2 thrombus in April on left side. nih 1, discussed with telestroke, rec ct/cta/perfusion. Neg for acute issues, discussed with telestroke again, rec migraine cocktail and re eval.  If sx resolved, felt could go home with outpt fu. If sx persist, transfer for neuro eval/mri. Pt updated, agreeable with poc      This patient's ED course included: a personal history and physicial examination    This patient has remained hemodynamically stable during their ED course. Re-Evaluations:             Re-evaluation.   Patients symptoms show no change    Re-examination  5/31/22   340 PM EDT  No change        Vital Signs:   Vitals:    06/01/22 0530 06/01/22 0800 06/01/22 1424 06/01/22 1841 BP: 102/68 98/66 105/79 111/60   Pulse: 90 84 81 79   Resp: 16 14 14 14   Temp:  97.8 °F (36.6 °C)     SpO2: 98% 97% 97% 99%   Weight:       Height:                 Consultations:             telestroke    Critical Care: 35 min        Counseling: The emergency provider has spoken with the patient and discussed todays results, in addition to providing specific details for the plan of care and counseling regarding the diagnosis and prognosis. Questions are answered at this time and they are agreeable with the plan.       --------------------------------- IMPRESSION AND DISPOSITION ---------------------------------    IMPRESSION  1. Cerebrovascular accident (CVA), unspecified mechanism ([facility]        DISPOSITION  Disposition: sign out   Patient condition is fair    NOTE: This report was transcribed using voice recognition software.  Every effort was made to ensure accuracy; however, inadvertent computerized transcription errors may be present        Katie House MD  06/02/22 6312

## 2022-05-31 NOTE — ED NOTES
Pt moved from Rm 30 to RM 19. Report given to Mady Lawson RN. Care relinquished.      Elin Veras RN  05/31/22 1916

## 2022-05-31 NOTE — ED NOTES
Pt moved to rm 19, report given to Les Lu RN. Care relinquished to this RN.      Risa Fuentes RN  05/31/22 8966

## 2022-05-31 NOTE — ED NOTES
Name: Mabel Trejo  : 1990  MRN: [id number]    Date: 2022    Benefits of immediately proceeding with Radiology exam outweigh the risks and therefore the following is being waived:      [x] Pregnancy test    [] Protocol for Iodine allergy    [] MRI questionnaire    [] BUN/Creatinine        DO Jesse Espinoza DO  22 1404

## 2022-05-31 NOTE — PROGRESS NOTES
@5105 access center notified of Stroke alert  L side numbness  LKW 8am this morning  NIH 1  @1433 Dr. Brynn Beauchamp spoke with Dr. Luca Bishop neurologist  @7393 Dr. Amira Carolina spoke with access center to initiate transfer  @6812 Dr. Amira Carolina spoke with Dr. Shree Silva accepting physician

## 2022-05-31 NOTE — ED NOTES
Patient signed out to me Seven Mehta by Dr. Salty Hawley. Patient is on Lovenox. She had a NIH stroke scale of 1. Her onset was greater than 6 hours per Dr. Salty Hawley and LKW was  0800. CT showed no LVO. Patient was not a tPA candidate. Patient symptoms did not resolve with MRI cocktail per neurology recommendation MRI was ordered which showed acute or subacute small infarcts. Patient is agreeable to admission at [facility]. Patient is 15 weeks pregnant she has not pass the viability threshold. Patient has no active OB/GYN issues today.   Case discussed with hospitalist Dr. Zofia Yoder who will admit the patient      Vitals:    05/31/22 1700   BP: 120/78   Pulse: 90   Resp: 16   Temp:    SpO2: 100%     Results for orders placed or performed during the hospital encounter of 05/31/22   CBC with Auto Differential   Result Value Ref Range    WBC 9.1 4.5 - 11.5 E9/L    RBC 4.04 3.50 - 5.50 E12/L    Hemoglobin 11.9 11.5 - 15.5 g/dL    Hematocrit 35.8 34.0 - 48.0 %    MCV 88.6 80.0 - 99.9 fL    MCH 29.5 26.0 - 35.0 pg    MCHC 33.2 32.0 - 34.5 %    RDW 13.9 11.5 - 15.0 fL    Platelets 589 797 - 487 E9/L    MPV 9.6 7.0 - 12.0 fL    Neutrophils % 59.4 43.0 - 80.0 %    Immature Granulocytes % 0.5 0.0 - 5.0 %    Lymphocytes % 30.4 20.0 - 42.0 %    Monocytes % 8.6 2.0 - 12.0 %    Eosinophils % 0.8 0.0 - 6.0 %    Basophils % 0.3 0.0 - 2.0 %    Neutrophils Absolute 5.40 1.80 - 7.30 E9/L    Immature Granulocytes # 0.05 E9/L    Lymphocytes Absolute 2.77 1.50 - 4.00 E9/L    Monocytes Absolute 0.78 0.10 - 0.95 E9/L    Eosinophils Absolute 0.07 0.05 - 0.50 E9/L    Basophils Absolute 0.03 0.00 - 0.20 E9/L   Comprehensive Metabolic Panel w/ Reflex to MG   Result Value Ref Range    Sodium 135 132 - 146 mmol/L    Potassium reflex Magnesium 3.8 3.5 - 5.0 mmol/L    Chloride 103 98 - 107 mmol/L    CO2 22 22 - 29 mmol/L    Anion Gap 10 7 - 16 mmol/L    Glucose 74 74 - 99 mg/dL    BUN 12 6 - 20 mg/dL    CREATININE 0.7 0.5 - 1.0 mg/dL GFR Non-African American >60 >=60 mL/min/1.73    GFR African American >60     Calcium 8.8 8.6 - 10.2 mg/dL    Total Protein 6.3 (L) 6.4 - 8.3 g/dL    Albumin 3.4 (L) 3.5 - 5.2 g/dL    Total Bilirubin <0.2 0.0 - 1.2 mg/dL    Alkaline Phosphatase 64 35 - 104 U/L    ALT 9 0 - 32 U/L    AST 12 0 - 31 U/L   Protime-INR   Result Value Ref Range    Protime 9.6 9.3 - 12.4 sec    INR 0.9    APTT   Result Value Ref Range    aPTT 27.4 24.5 - 35.1 sec   EKG 12 Lead   Result Value Ref Range    Ventricular Rate 85 BPM    Atrial Rate 85 BPM    P-R Interval 152 ms    QRS Duration 82 ms    Q-T Interval 358 ms    QTc Calculation (Bazett) 426 ms    P Axis 30 degrees    R Axis -13 degrees    T Axis 0 degrees       1. Cerebrovascular accident (CVA), unspecified mechanism ([address]          Dispo:   Admit at [facility], DO  05/31/22 1924

## 2022-06-01 ENCOUNTER — HOSPITAL ENCOUNTER (INPATIENT)
Age: 32
LOS: 1 days | Discharge: ANOTHER HOSPITAL | DRG: 566 | End: 2022-06-03
Attending: FAMILY MEDICINE | Admitting: FAMILY MEDICINE
Payer: MEDICAID

## 2022-06-01 VITALS
HEIGHT: 62 IN | SYSTOLIC BLOOD PRESSURE: 111 MMHG | TEMPERATURE: 97.8 F | WEIGHT: 160 LBS | HEART RATE: 79 BPM | BODY MASS INDEX: 29.44 KG/M2 | RESPIRATION RATE: 14 BRPM | OXYGEN SATURATION: 99 % | DIASTOLIC BLOOD PRESSURE: 60 MMHG

## 2022-06-01 LAB
EKG ATRIAL RATE: 85 BPM
EKG P AXIS: 30 DEGREES
EKG P-R INTERVAL: 152 MS
EKG Q-T INTERVAL: 358 MS
EKG QRS DURATION: 82 MS
EKG QTC CALCULATION (BAZETT): 426 MS
EKG R AXIS: -13 DEGREES
EKG T AXIS: 0 DEGREES
EKG VENTRICULAR RATE: 85 BPM

## 2022-06-01 PROCEDURE — 6370000000 HC RX 637 (ALT 250 FOR IP): Performed by: EMERGENCY MEDICINE

## 2022-06-01 PROCEDURE — 6360000002 HC RX W HCPCS: Performed by: EMERGENCY MEDICINE

## 2022-06-01 PROCEDURE — 96376 TX/PRO/DX INJ SAME DRUG ADON: CPT

## 2022-06-01 RX ORDER — OXYCODONE HYDROCHLORIDE AND ACETAMINOPHEN 5; 325 MG/1; MG/1
1 TABLET ORAL ONCE
Status: COMPLETED | OUTPATIENT
Start: 2022-06-01 | End: 2022-06-01

## 2022-06-01 RX ORDER — MULTIVITAMIN WITH IRON
100 TABLET ORAL 3 TIMES DAILY PRN
COMMUNITY

## 2022-06-01 RX ORDER — ACETAMINOPHEN 325 MG/1
650 TABLET ORAL ONCE
Status: COMPLETED | OUTPATIENT
Start: 2022-06-01 | End: 2022-06-01

## 2022-06-01 RX ORDER — METOCLOPRAMIDE HYDROCHLORIDE 5 MG/ML
10 INJECTION INTRAMUSCULAR; INTRAVENOUS ONCE
Status: COMPLETED | OUTPATIENT
Start: 2022-06-01 | End: 2022-06-01

## 2022-06-01 RX ORDER — DIPHENHYDRAMINE HYDROCHLORIDE 50 MG/ML
25 INJECTION INTRAMUSCULAR; INTRAVENOUS ONCE
Status: COMPLETED | OUTPATIENT
Start: 2022-06-01 | End: 2022-06-01

## 2022-06-01 RX ADMIN — DIPHENHYDRAMINE HYDROCHLORIDE 25 MG: 50 INJECTION, SOLUTION INTRAMUSCULAR; INTRAVENOUS at 22:18

## 2022-06-01 RX ADMIN — METOCLOPRAMIDE 10 MG: 5 INJECTION, SOLUTION INTRAMUSCULAR; INTRAVENOUS at 22:19

## 2022-06-01 RX ADMIN — OXYCODONE AND ACETAMINOPHEN 1 TABLET: 5; 325 TABLET ORAL at 17:02

## 2022-06-01 RX ADMIN — ACETAMINOPHEN 650 MG: 325 TABLET ORAL at 11:49

## 2022-06-01 RX ADMIN — OXYCODONE AND ACETAMINOPHEN 1 TABLET: 5; 325 TABLET ORAL at 02:14

## 2022-06-01 ASSESSMENT — PAIN DESCRIPTION - LOCATION
LOCATION: HEAD
LOCATION: HEAD

## 2022-06-01 ASSESSMENT — PAIN SCALES - GENERAL
PAINLEVEL_OUTOF10: 10
PAINLEVEL_OUTOF10: 8
PAINLEVEL_OUTOF10: 10
PAINLEVEL_OUTOF10: 2

## 2022-06-01 ASSESSMENT — PAIN - FUNCTIONAL ASSESSMENT: PAIN_FUNCTIONAL_ASSESSMENT: NONE - DENIES PAIN

## 2022-06-01 NOTE — ED NOTES
@0273 patient is going to room  8205, number for report is [phone]     Nallely Gautam  06/01/22 5110

## 2022-06-02 ENCOUNTER — APPOINTMENT (OUTPATIENT)
Dept: MRI IMAGING | Age: 32
DRG: 566 | End: 2022-06-02
Attending: FAMILY MEDICINE
Payer: MEDICAID

## 2022-06-02 LAB
ALBUMIN SERPL-MCNC: 3.2 G/DL (ref 3.5–5.2)
ALP BLD-CCNC: 59 U/L (ref 35–104)
ALT SERPL-CCNC: 9 U/L (ref 0–32)
AMPHETAMINE SCREEN, URINE: NOT DETECTED
ANION GAP SERPL CALCULATED.3IONS-SCNC: 10 MMOL/L (ref 7–16)
AST SERPL-CCNC: 12 U/L (ref 0–31)
BARBITURATE SCREEN URINE: NOT DETECTED
BENZODIAZEPINE SCREEN, URINE: NOT DETECTED
BILIRUB SERPL-MCNC: <0.2 MG/DL (ref 0–1.2)
BUN BLDV-MCNC: 9 MG/DL (ref 6–20)
CALCIUM SERPL-MCNC: 8.5 MG/DL (ref 8.6–10.2)
CANNABINOID SCREEN URINE: POSITIVE
CHLORIDE BLD-SCNC: 100 MMOL/L (ref 98–107)
CHOLESTEROL, TOTAL: 190 MG/DL (ref 0–199)
CO2: 22 MMOL/L (ref 22–29)
COCAINE METABOLITE SCREEN URINE: NOT DETECTED
CREAT SERPL-MCNC: 0.7 MG/DL (ref 0.5–1)
FENTANYL SCREEN, URINE: NOT DETECTED
GFR AFRICAN AMERICAN: >60
GFR NON-AFRICAN AMERICAN: >60 ML/MIN/1.73
GLUCOSE BLD-MCNC: 73 MG/DL (ref 74–99)
HBA1C MFR BLD: 4.9 % (ref 4–5.6)
HCT VFR BLD CALC: 36.4 % (ref 34–48)
HDLC SERPL-MCNC: 69 MG/DL
HEMOGLOBIN: 12.1 G/DL (ref 11.5–15.5)
LDL CHOLESTEROL CALCULATED: 94 MG/DL (ref 0–99)
Lab: ABNORMAL
MCH RBC QN AUTO: 29.2 PG (ref 26–35)
MCHC RBC AUTO-ENTMCNC: 33.2 % (ref 32–34.5)
MCV RBC AUTO: 87.7 FL (ref 80–99.9)
METHADONE SCREEN, URINE: NOT DETECTED
OPIATE SCREEN URINE: NOT DETECTED
OXYCODONE URINE: POSITIVE
PDW BLD-RTO: 14.1 FL (ref 11.5–15)
PHENCYCLIDINE SCREEN URINE: NOT DETECTED
PLATELET # BLD: 320 E9/L (ref 130–450)
PMV BLD AUTO: 10 FL (ref 7–12)
POTASSIUM REFLEX MAGNESIUM: 3.7 MMOL/L (ref 3.5–5)
RBC # BLD: 4.15 E12/L (ref 3.5–5.5)
SODIUM BLD-SCNC: 132 MMOL/L (ref 132–146)
TOTAL PROTEIN: 6.2 G/DL (ref 6.4–8.3)
TRIGL SERPL-MCNC: 137 MG/DL (ref 0–149)
VLDLC SERPL CALC-MCNC: 27 MG/DL
WBC # BLD: 8.2 E9/L (ref 4.5–11.5)

## 2022-06-02 PROCEDURE — 97165 OT EVAL LOW COMPLEX 30 MIN: CPT

## 2022-06-02 PROCEDURE — 85027 COMPLETE CBC AUTOMATED: CPT

## 2022-06-02 PROCEDURE — 70544 MR ANGIOGRAPHY HEAD W/O DYE: CPT

## 2022-06-02 PROCEDURE — 36415 COLL VENOUS BLD VENIPUNCTURE: CPT

## 2022-06-02 PROCEDURE — 6370000000 HC RX 637 (ALT 250 FOR IP): Performed by: FAMILY MEDICINE

## 2022-06-02 PROCEDURE — 6360000002 HC RX W HCPCS: Performed by: FAMILY MEDICINE

## 2022-06-02 PROCEDURE — 6370000000 HC RX 637 (ALT 250 FOR IP): Performed by: INTERNAL MEDICINE

## 2022-06-02 PROCEDURE — 97161 PT EVAL LOW COMPLEX 20 MIN: CPT

## 2022-06-02 PROCEDURE — 92523 SPEECH SOUND LANG COMPREHEN: CPT | Performed by: SPEECH-LANGUAGE PATHOLOGIST

## 2022-06-02 PROCEDURE — 80061 LIPID PANEL: CPT

## 2022-06-02 PROCEDURE — 80307 DRUG TEST PRSMV CHEM ANLYZR: CPT

## 2022-06-02 PROCEDURE — 83036 HEMOGLOBIN GLYCOSYLATED A1C: CPT

## 2022-06-02 PROCEDURE — 80053 COMPREHEN METABOLIC PANEL: CPT

## 2022-06-02 PROCEDURE — 99222 1ST HOSP IP/OBS MODERATE 55: CPT

## 2022-06-02 PROCEDURE — 2060000000 HC ICU INTERMEDIATE R&B

## 2022-06-02 PROCEDURE — 6370000000 HC RX 637 (ALT 250 FOR IP)

## 2022-06-02 RX ORDER — ACETAMINOPHEN 500 MG
500 TABLET ORAL EVERY 4 HOURS PRN
Status: DISCONTINUED | OUTPATIENT
Start: 2022-06-02 | End: 2022-06-04 | Stop reason: HOSPADM

## 2022-06-02 RX ORDER — ONDANSETRON 2 MG/ML
4 INJECTION INTRAMUSCULAR; INTRAVENOUS EVERY 6 HOURS PRN
Status: DISCONTINUED | OUTPATIENT
Start: 2022-06-02 | End: 2022-06-04 | Stop reason: HOSPADM

## 2022-06-02 RX ORDER — ASPIRIN 300 MG/1
300 SUPPOSITORY RECTAL DAILY
Status: DISCONTINUED | OUTPATIENT
Start: 2022-06-02 | End: 2022-06-04 | Stop reason: HOSPADM

## 2022-06-02 RX ORDER — POLYETHYLENE GLYCOL 3350 17 G/17G
17 POWDER, FOR SOLUTION ORAL DAILY PRN
Status: DISCONTINUED | OUTPATIENT
Start: 2022-06-02 | End: 2022-06-04 | Stop reason: HOSPADM

## 2022-06-02 RX ORDER — ONDANSETRON 4 MG/1
4 TABLET, ORALLY DISINTEGRATING ORAL EVERY 8 HOURS PRN
Status: DISCONTINUED | OUTPATIENT
Start: 2022-06-02 | End: 2022-06-04 | Stop reason: HOSPADM

## 2022-06-02 RX ORDER — ASPIRIN 81 MG/1
81 TABLET ORAL DAILY
Status: DISCONTINUED | OUTPATIENT
Start: 2022-06-02 | End: 2022-06-04 | Stop reason: HOSPADM

## 2022-06-02 RX ORDER — ENOXAPARIN SODIUM 100 MG/ML
40 INJECTION SUBCUTANEOUS DAILY
Status: DISCONTINUED | OUTPATIENT
Start: 2022-06-02 | End: 2022-06-03

## 2022-06-02 RX ORDER — ACETAMINOPHEN 325 MG/1
650 TABLET ORAL EVERY 6 HOURS PRN
Status: DISCONTINUED | OUTPATIENT
Start: 2022-06-02 | End: 2022-06-02

## 2022-06-02 RX ORDER — VITAMIN B COMPLEX
2000 TABLET ORAL DAILY
Status: DISCONTINUED | OUTPATIENT
Start: 2022-06-02 | End: 2022-06-04 | Stop reason: HOSPADM

## 2022-06-02 RX ADMIN — ENOXAPARIN SODIUM 40 MG: 100 INJECTION SUBCUTANEOUS at 09:00

## 2022-06-02 RX ADMIN — Medication 2000 UNITS: at 09:00

## 2022-06-02 RX ADMIN — ACETAMINOPHEN 325MG 650 MG: 325 TABLET ORAL at 18:00

## 2022-06-02 RX ADMIN — ASPIRIN 81 MG: 81 TABLET, COATED ORAL at 09:00

## 2022-06-02 RX ADMIN — ACETAMINOPHEN 325MG 650 MG: 325 TABLET ORAL at 11:55

## 2022-06-02 RX ADMIN — ACETAMINOPHEN 500 MG: 500 TABLET ORAL at 23:15

## 2022-06-02 ASSESSMENT — PAIN DESCRIPTION - DESCRIPTORS
DESCRIPTORS: SHARP;DISCOMFORT;ACHING
DESCRIPTORS: DISCOMFORT;ACHING;HEAVINESS
DESCRIPTORS: DULL;ACHING;DISCOMFORT
DESCRIPTORS: ACHING;CRUSHING;DISCOMFORT

## 2022-06-02 ASSESSMENT — PAIN SCALES - GENERAL
PAINLEVEL_OUTOF10: 4
PAINLEVEL_OUTOF10: 3
PAINLEVEL_OUTOF10: 7
PAINLEVEL_OUTOF10: 5
PAINLEVEL_OUTOF10: 7
PAINLEVEL_OUTOF10: 8
PAINLEVEL_OUTOF10: 4
PAINLEVEL_OUTOF10: 9
PAINLEVEL_OUTOF10: 0

## 2022-06-02 ASSESSMENT — PAIN DESCRIPTION - FREQUENCY
FREQUENCY: CONTINUOUS
FREQUENCY: CONTINUOUS

## 2022-06-02 ASSESSMENT — PAIN DESCRIPTION - ORIENTATION
ORIENTATION: LEFT
ORIENTATION: LEFT
ORIENTATION: LEFT;RIGHT

## 2022-06-02 ASSESSMENT — PAIN DESCRIPTION - LOCATION
LOCATION: HEAD

## 2022-06-02 ASSESSMENT — PAIN DESCRIPTION - PAIN TYPE: TYPE: ACUTE PAIN

## 2022-06-02 ASSESSMENT — PAIN DESCRIPTION - ONSET: ONSET: ON-GOING

## 2022-06-02 NOTE — PROGRESS NOTES
Occupational Therapy  OCCUPATIONAL THERAPY INITIAL EVALUATION     [facility] [employer] [id number] [facility]  [address] [facility] Kamlesh Hernandez                                                Patient Name: Ravin Omer  MRN: [id number]  : 1990  Room: [facility]/1087-X    [address] [place] [address] [id number]    Referring Provider: John Lipscomb   Specific Provider Orders/Date: OT eval and treat 22    Diagnosis: Acute cerebrovascular accident (CVA) ([facility] [I63.9]   Pt admitted to hospital on 22 for headache and L side numbness    Pertinent Medical History:  has a past medical history of Back pain, CVA (cerebral vascular accident) ([facility] and Neck pain. Precautions:  15 wks pregnant, L UE sensation deficits (does not impede function)    Assessment of current deficits    [] Functional mobility  []ADLs  [] Strength               []Cognition    [] Functional transfers   [] IADLs         [] Safety Awareness   []Endurance    [] Fine Coordination              [x] Balance      [] Vision/perception   [x]Sensation     []Gross Motor Coordination  [] ROM  [] Delirium                   [] Motor Control     OT PLAN OF CARE   OT POC based on physician orders, patient diagnosis and results of clinical assessment    Frequency/Duration n/a      OTMRS  Modified [place] Scale (MRS)  Score     Description  0             No symptoms  1             No significant disability despite symptoms  2             Slight disability; able to look after own affairs  3             Moderate disability; able to ambulate without assist/ requires assist with ADLs  4             Moderate/Severe disability;requires assist to ambulate/assist with ADLs  5             Severe disability;bedridden/incontinent   6               Score: 1    Recommended Adaptive Equipment: none    Home Living: Pt lives with son (15 y/o) in 2 floor apt.    Bath and bedroom on 2nd floor - flight of stairs, 1 handrail    Bathroom setup: tub/shower with grab bars   Equipment owned: n/a    Prior Level of Function: independent with ADLs , independent with IADLs; ambulated independently w/o AD   Driving: yes    Pain Level: Pt c/o headache this session     Cognition: A&O: 4/4; Follows 1-2 step directions   Memory:  good    Sequencing:  good    Problem solving:  good    Judgement/safety:  good      Functional Assessment:  AM-PAC Daily Activity Raw Score: 24/24   Initial Eval Status  Date: 6/2/22 Treatment Status  Date:    Feeding Independent      Grooming Independent      UB Dressing Independent      LB Dressing Modified [place]      Bathing Modified [place]     Toileting Modified [place]      Bed Mobility  Supine to sit: Independent   Sit to supine: Independent      Functional Transfers Independent   Various surfaces     Functional Mobility Independent   In room and bathroom     Balance Sitting:     Static:  Independent    Dynamic:Independent  Standing: Independent     Activity Tolerance Good     Visual/  Perceptual Glasses: no  Visual scanning: WFL  Perception: WFL                  Hand Dominance R   AROM (PROM) Strength Additional Info:    RUE  WFL 5/5 good  and wfl FMC/dexterity noted during ADL tasks       LUE WFL 5/5 good  and wfl FMC/dexterity noted during ADL tasks  L/R finger opp: wfl  L/R finger><nose: wfl     Hearing: WFL   Sensation:  C/o intermittent numbness L UE/LE  Light touch: diminished L UE, L facial   Tone: WFL   Edema: none noted    Comments: Upon arrival patient lying in bed. Therapist educated pt on role of OT. RN clearance. At end of session, patient lying in bed (per pt request) with call light and phone within reach, all lines and tubes intact. Evaluation only - pt independent/mod I w/ self-care tasks and functional ambulation. Due to c/o diminished sensation L UE, L facial, reinforced safety awareness and modified techniques during ADL's.  Pt verbalized and demonstrated good understanding. Additional OT not indicated at this time, no home going needs. Please re-consult if change. Treatment: OT treatment provided this date includes: Facilitation of bed mobility, unsupported sitting balance, functional transfers (various surfaces), standing tolerance tasks (while incorporating light functional reaching impacting ADLs and functional activity) and functional ambulation tasks (w/ education/skilled cuing on hand placement, posture, body mechanics, energy conservation techniques and safety). Therapist facilitated self-care retraining: LB self-care tasks, simulated toileting task and standing grooming tasks while educating pt on modified techniques and safety (d/t L UE and facial sensation deficits). Skilled monitoring of HR, O2 sats and pts response to treatment. Patient and/or family were instructed on functional diagnosis and OT plan of care. Demonstrated good understanding. Eval Complexity: Low    Time In: 13:45  Time Out: 14:02      Min Units   OT Eval Low 97165  X  1   OT Eval Medium 72520      OT Eval High 28312      OT Re-Eval [id number]       Therapeutic Ex 97599       Therapeutic Activities 83657       ADL/Self Care 10977       Orthotic Management 43050       Manual 57131     Neuro Re-Ed 50898       Non-Billable Time          Evaluation Time additionally includes thorough review of current medical information, gathering information on past medical history/social history and prior level of function, interpretation of standardized testing/informal observation of tasks, assessment of data and development of plan of care and goals.         JORDY Paritda/L #[id number]

## 2022-06-02 NOTE — H&P
Hospitalist History & Physical      PCP: Angy Verdin DO    Date of Service: Pt seen/examined on 2022     Chief Complaint:  had no chief complaint listed for this encounter. History Of Present Illness:    Ms. Ambreen Uriostegui, a 33y.o. year old female  who  has a past medical history of Back pain, CVA (cerebral vascular accident) ([address] and Neck pain. Patient presented to the [facility] emergency department with headache and left-sided numbness. Patient is 15 weeks pregnant. 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 Has a history of a prior CVA. MRI of the brain shows tiny foci of acute or early subacute infarction in the right frontal lobe with single tiny foci each within the right lateral basal ganglia, external capsule and insular cortex. Patient was then transferred to [facility] for further neurological evaluation. Past Medical History:   Diagnosis Date    Back pain     CVA (cerebral vascular accident) ([address] 2022    Neck pain        Past Surgical History:   Procedure Laterality Date     SECTION      DILATION AND CURETTAGE      NERVE BLOCK Left 2020    LEFT CERVICAL MEDIAL BRANCH NERVE  BLOCK UNDER FLUOROSCOPIC GUIDANCE C3, C4, C5 AND C6 performed by Griffin Kraus MD at [facility] OR       Prior to Admission medications    Medication Sig Start Date End Date Taking?  Authorizing Provider   vitamin B-6 (PYRIDOXINE) 100 MG tablet Take 100 mg by mouth 3 times daily as needed (FOR NAUSEA)    Historical MD Yuki   vitamin D3 (CHOLECALCIFEROL) 25 MCG (1000 UT) TABS tablet Take 2 tablets by mouth daily 22   Azra Benoit MD   enoxaparin (LOVENOX) 40 MG/0.4ML Inject 0.4 mLs into the skin daily 22   Azra Benoit MD   aspirin 81 MG chewable tablet Take 1 tablet by mouth daily 22   Jose M Rand MD   Prenatal MV-Min-Fe Fum-FA-DHA (PRENATAL 1 PO) Take 1 tablet by mouth daily     Historical MD Yuki Allergies:  Patient has no known allergies. Social History:    TOBACCO:   reports that she has been smoking cigarettes and cigars. She has been smoking about 0.50 packs per day. She has never used smokeless tobacco.  ETOH:   reports no history of alcohol use. Family History:    Reviewed in detail and negative for DM, CAD, Cancer, CVA. Positive as follows\"      Problem Relation Age of Onset    Asthma Father     Asthma Brother     Asthma Brother     No Known Problems Mother        REVIEW OF SYSTEMS:   Pertinent positives as noted in the HPI. All other systems reviewed and negative. PHYSICAL EXAM:  /66   Pulse 72   Temp 98 °F (36.7 °C) (Oral)   Resp 16   LMP 02/10/2022   SpO2 98%   General appearance: No apparent distress, appears stated age and cooperative. HEENT: Normal cephalic, atraumatic without obvious deformity. Pupils equal, round, and reactive to light. Extra ocular muscles intact. Conjunctivae/corneas clear. Neck: Supple, with full range of motion. No jugular venous distention. Trachea midline. Respiratory: CTA  Cardiovascular: RRR  Abdomen: S/NT/ND  Musculoskeletal: No clubbing, cyanosis, edema of bilateral lower extremities. Brisk capillary refill. Skin: Normal skin color. No rashes or lesions. Neurologic:  Neurovascularly intact without any focal sensory/motor deficits. Cranial nerves: II-XII intact, grossly non-focal.  Psychiatric: Alert and oriented, thought content appropriate, normal insight    Reviewed EKG and CXR personally      CBC:   Recent Labs     05/31/22  1430   WBC 9.1   RBC 4.04   HGB 11.9   HCT 35.8   MCV 88.6   RDW 13.9        BMP:   Recent Labs     05/31/22  1430      K 3.8      CO2 22   BUN 12   CREATININE 0.7     LFT:  Recent Labs     05/31/22  1430   PROT 6.3*   ALKPHOS 64   ALT 9   AST 12   BILITOT <0.2     CE:  No results for input(s): Miguel Cantu in the last 72 hours.   PT/INR:   Recent Labs     05/31/22  1430   INR 0.9 APTT 27.4     BNP: No results for input(s): BNP in the last 72 hours. ESR:   Lab Results   Component Value Date    SEDRATE 12 04/19/2022     CRP:   Lab Results   Component Value Date    CRP 0.6 (H) 04/19/2022     D Dimer:   Lab Results   Component Value Date    DDIMER 220 04/19/2022      Folate and B12:   Lab Results   Component Value Date    UGLMGPUS11 875 05/13/2022   ,   Lab Results   Component Value Date    FOLATE 19.8 05/13/2022     Lactic Acid:   Lab Results   Component Value Date    LACTA 0.8 04/18/2022     Thyroid Studies:   Lab Results   Component Value Date    TSH 0.876 05/13/2022       Oupatient labs:  Lab Results   Component Value Date    CHOL 169 04/18/2022    TRIG 74 04/18/2022    HDL 62 04/18/2022    LDLCALC 92 04/18/2022    TSH 0.876 05/13/2022    INR 0.9 05/31/2022    LABA1C 4.8 05/13/2022       Urinalysis:    Lab Results   Component Value Date    NITRU Negative 05/13/2022    WBCUA NONE 05/13/2022    BACTERIA NONE SEEN 05/13/2022    RBCUA NONE 05/13/2022    RBCUA NONE 12/05/2013    BLOODU Negative 05/13/2022    SPECGRAV 1.025 05/13/2022    GLUCOSEU Negative 05/13/2022       Imaging:  CT HEAD WO CONTRAST    Result Date: 5/31/2022  EXAMINATION: CTA OF THE HEAD WITH CONTRAST; CT OF THE HEAD WITHOUT CONTRAST; CTA OF THE NECK; CT OF THE HEAD WITH CONTRAST 5/31/2022 2:52 pm; 5/31/2022 2:51 pm: TECHNIQUE: CTA of the head/brain was performed with the administration of intravenous contrast. Multiplanar reformatted images are provided for review. MIP images are provided for review.  Automated exposure control, iterative reconstruction, and/or weight based adjustment of the mA/kV was utilized to reduce the radiation dose to as low as reasonably achievable.; CT of the head was performed without the administration of intravenous contrast. Automated exposure control, iterative reconstruction, and/or weight based adjustment of the mA/kV was utilized to reduce the radiation dose to as low as reasonably stenosis by NASCET criteria. VERTEBRAL ARTERIES: No dissection, arterial injury, or significant stenosis. SOFT TISSUES: The lung apices are clear. No cervical or superior mediastinal lymphadenopathy. The larynx and pharynx are unremarkable. No acute abnormality of the salivary and thyroid glands. BONES: No acute osseous abnormality. CTA HEAD: ANTERIOR CIRCULATION: No significant stenosis of the intracranial internal carotid, anterior cerebral, or middle cerebral arteries. No evidence of residual thrombus in the left middle cerebral artery superior M2 segment. Incidental azygous A2 configuration of the anterior cerebral arteries. No aneurysm. POSTERIOR CIRCULATION: No significant stenosis of the vertebral, basilar, or posterior cerebral arteries. Bilateral posterior communicating arteries present. No aneurysm. OTHER: No dural venous sinus thrombosis on this non-dedicated study. CT PERFUSION: EXAM QUALITY: The examination is diagnostic with appropriate arterial inflow and venous outflow curves, and diagnostic perfusion maps. CORE INFARCT: The total area of ischemic core is 0 mL (CBF<30% volume). TOTAL HYPOPERFUSION: The total area of hypoperfusion is 0 mL (Tmax>6s volume). PENUMBRA: The penumbra (mismatch) volume is 0 mL and is located in the n/a. The mismatch ratio is n/a.     1. No acute intracranial abnormality. 2. Left insular encephalomalacia at the site of prior infarct. 3. No perfusion mismatch. 4. Unremarkable CTA of the head and neck. Specifically, no evidence of residual thrombus in the left middle cerebral artery. CTA NECK W CONTRAST    Result Date: 5/31/2022  EXAMINATION: CTA OF THE HEAD WITH CONTRAST; CT OF THE HEAD WITHOUT CONTRAST; CTA OF THE NECK; CT OF THE HEAD WITH CONTRAST 5/31/2022 2:52 pm; 5/31/2022 2:51 pm: TECHNIQUE: CTA of the head/brain was performed with the administration of intravenous contrast. Multiplanar reformatted images are provided for review.   MIP images are provided for review. Automated exposure control, iterative reconstruction, and/or weight based adjustment of the mA/kV was utilized to reduce the radiation dose to as low as reasonably achievable.; CT of the head was performed without the administration of intravenous contrast. Automated exposure control, iterative reconstruction, and/or weight based adjustment of the mA/kV was utilized to reduce the radiation dose to as low as reasonably achievable.; CTA of the neck was performed with the administration of intravenous contrast. Multiplanar reformatted images are provided for review. MIP images are provided for review. Stenosis of the internal carotid arteries measured using NASCET criteria. Automated exposure control, iterative reconstruction, and/or weight based adjustment of the mA/kV was utilized to reduce the radiation dose to as low as reasonably achievable.; CT of the head/brain was performed with the administration of intravenous contrast. Multiplanar reformatted images are provided for review. Automated exposure control, iterative reconstruction, and/or weight based adjustment of the mA/kV was utilized to reduce the radiation dose to as low as reasonably achievable. Noncontrast CT of the head with reconstructed 2-D images are also provided for review. COMPARISON: MRI/MRA 04/18/2022, CT head 04/17/2022 HISTORY: ORDERING SYSTEM PROVIDED HISTORY: left sided sensory TECHNOLOGIST PROVIDED HISTORY: Reason for exam:->left sided sensory Has a \"code stroke\" or \"stroke alert\" been called? ->Yes Decision Support Exception - unselect if not a suspected or confirmed emergency medical condition->Emergency Medical Condition ([place]); FINDINGS: CT HEAD: BRAIN/VENTRICLES:  Left insular encephalomalacia at the site of prior infarct. No evidence of new infarct or acute intracranial hemorrhage. No hydrocephalus or extra-axial fluid collection. Midline maintained. Basal cisterns patent.  ORBITS: The visualized portion of the orbits demonstrate no acute abnormality. SINUSES:  The visualized paranasal sinuses and mastoid air cells demonstrate no acute abnormality. SOFT TISSUES/SKULL: No acute abnormality of the visualized skull or soft tissues. CTA NECK: AORTIC ARCH/ARCH VESSELS: No dissection or arterial injury. No significant stenosis of the brachiocephalic or subclavian arteries. CAROTID ARTERIES: No dissection, arterial injury, or hemodynamically significant stenosis by NASCET criteria. VERTEBRAL ARTERIES: No dissection, arterial injury, or significant stenosis. SOFT TISSUES: The lung apices are clear. No cervical or superior mediastinal lymphadenopathy. The larynx and pharynx are unremarkable. No acute abnormality of the salivary and thyroid glands. BONES: No acute osseous abnormality. CTA HEAD: ANTERIOR CIRCULATION: No significant stenosis of the intracranial internal carotid, anterior cerebral, or middle cerebral arteries. No evidence of residual thrombus in the left middle cerebral artery superior M2 segment. Incidental azygous A2 configuration of the anterior cerebral arteries. No aneurysm. POSTERIOR CIRCULATION: No significant stenosis of the vertebral, basilar, or posterior cerebral arteries. Bilateral posterior communicating arteries present. No aneurysm. OTHER: No dural venous sinus thrombosis on this non-dedicated study. CT PERFUSION: EXAM QUALITY: The examination is diagnostic with appropriate arterial inflow and venous outflow curves, and diagnostic perfusion maps. CORE INFARCT: The total area of ischemic core is 0 mL (CBF<30% volume). TOTAL HYPOPERFUSION: The total area of hypoperfusion is 0 mL (Tmax>6s volume). PENUMBRA: The penumbra (mismatch) volume is 0 mL and is located in the n/a. The mismatch ratio is n/a.     1. No acute intracranial abnormality. 2. Left insular encephalomalacia at the site of prior infarct. 3. No perfusion mismatch. 4. Unremarkable CTA of the head and neck.   Specifically, no evidence of residual thrombus in the left middle cerebral artery. CT BRAIN PERFUSION    Result Date: 5/31/2022  EXAMINATION: CTA OF THE HEAD WITH CONTRAST; CT OF THE HEAD WITHOUT CONTRAST; CTA OF THE NECK; CT OF THE HEAD WITH CONTRAST 5/31/2022 2:52 pm; 5/31/2022 2:51 pm: TECHNIQUE: CTA of the head/brain was performed with the administration of intravenous contrast. Multiplanar reformatted images are provided for review. MIP images are provided for review. Automated exposure control, iterative reconstruction, and/or weight based adjustment of the mA/kV was utilized to reduce the radiation dose to as low as reasonably achievable.; CT of the head was performed without the administration of intravenous contrast. Automated exposure control, iterative reconstruction, and/or weight based adjustment of the mA/kV was utilized to reduce the radiation dose to as low as reasonably achievable.; CTA of the neck was performed with the administration of intravenous contrast. Multiplanar reformatted images are provided for review. MIP images are provided for review. Stenosis of the internal carotid arteries measured using NASCET criteria. Automated exposure control, iterative reconstruction, and/or weight based adjustment of the mA/kV was utilized to reduce the radiation dose to as low as reasonably achievable.; CT of the head/brain was performed with the administration of intravenous contrast. Multiplanar reformatted images are provided for review. Automated exposure control, iterative reconstruction, and/or weight based adjustment of the mA/kV was utilized to reduce the radiation dose to as low as reasonably achievable. Noncontrast CT of the head with reconstructed 2-D images are also provided for review. COMPARISON: MRI/MRA 04/18/2022, CT head 04/17/2022 HISTORY: ORDERING SYSTEM PROVIDED HISTORY: left sided sensory TECHNOLOGIST PROVIDED HISTORY: Reason for exam:->left sided sensory Has a \"code stroke\" or \"stroke alert\" been called? ->Yes Decision Support Exception - unselect if not a suspected or confirmed emergency medical condition->Emergency Medical Condition ([place]); FINDINGS: CT HEAD: BRAIN/VENTRICLES:  Left insular encephalomalacia at the site of prior infarct. No evidence of new infarct or acute intracranial hemorrhage. No hydrocephalus or extra-axial fluid collection. Midline maintained. Basal cisterns patent. ORBITS: The visualized portion of the orbits demonstrate no acute abnormality. SINUSES:  The visualized paranasal sinuses and mastoid air cells demonstrate no acute abnormality. SOFT TISSUES/SKULL: No acute abnormality of the visualized skull or soft tissues. CTA NECK: AORTIC ARCH/ARCH VESSELS: No dissection or arterial injury. No significant stenosis of the brachiocephalic or subclavian arteries. CAROTID ARTERIES: No dissection, arterial injury, or hemodynamically significant stenosis by NASCET criteria. VERTEBRAL ARTERIES: No dissection, arterial injury, or significant stenosis. SOFT TISSUES: The lung apices are clear. No cervical or superior mediastinal lymphadenopathy. The larynx and pharynx are unremarkable. No acute abnormality of the salivary and thyroid glands. BONES: No acute osseous abnormality. CTA HEAD: ANTERIOR CIRCULATION: No significant stenosis of the intracranial internal carotid, anterior cerebral, or middle cerebral arteries. No evidence of residual thrombus in the left middle cerebral artery superior M2 segment. Incidental azygous A2 configuration of the anterior cerebral arteries. No aneurysm. POSTERIOR CIRCULATION: No significant stenosis of the vertebral, basilar, or posterior cerebral arteries. Bilateral posterior communicating arteries present. No aneurysm. OTHER: No dural venous sinus thrombosis on this non-dedicated study. CT PERFUSION: EXAM QUALITY: The examination is diagnostic with appropriate arterial inflow and venous outflow curves, and diagnostic perfusion maps.  CORE INFARCT: The total area of ischemic core is 0 mL (CBF<30% volume). TOTAL HYPOPERFUSION: The total area of hypoperfusion is 0 mL (Tmax>6s volume). PENUMBRA: The penumbra (mismatch) volume is 0 mL and is located in the n/a. The mismatch ratio is n/a.     1. No acute intracranial abnormality. 2. Left insular encephalomalacia at the site of prior infarct. 3. No perfusion mismatch. 4. Unremarkable CTA of the head and neck. Specifically, no evidence of residual thrombus in the left middle cerebral artery. MRV HEAD WO CONTRAST    Result Date: 5/31/2022  EXAMINATION: MRI OF THE BRAIN WITHOUT CONTRAST; MRV OF THE HEAD WITHOUT CONTRAST 5/31/2022 5:45 pm TECHNIQUE: Multiplanar multisequence MRI of the brain was performed without the administration of intravenous contrast.; Multiplanar multisequence MRV of the head was performed without the administration of intravenous contrast. COMPARISON: None. HISTORY: ORDERING SYSTEM PROVIDED HISTORY: stroke like symptoms, negative cta/ctp, pregnant, headache TECHNOLOGIST PROVIDED HISTORY: Reason for exam:->stroke like symptoms, negative cta/ctp, pregnant, headache What is the sedation requirement?->None Decision Support Exception - unselect if not a suspected or confirmed emergency medical condition->Emergency Medical Condition ([place]); ORDERING SYSTEM PROVIDED HISTORY: stroke like sxm hsx protein s def on lovenox, headache TECHNOLOGIST PROVIDED HISTORY: Reason for exam:->stroke like sxm hsx protein s def on lovenox, headache What is the sedation requirement?->None Decision Support Exception - unselect if not a suspected or confirmed emergency medical condition->Emergency Medical Condition ([place]) FINDINGS: MRI BRAIN: INTRACRANIAL STRUCTURES/VENTRICLES: Tiny foci of acute early subacute infarction in the right cerebral hemisphere including within the corona radiata and centrum semiovale. A small focus is seen in the right frontal subcortical white matter.   Tiny foci seen in the right insular cortex external capsule based adjustment of the mA/kV was utilized to reduce the radiation dose to as low as reasonably achievable.; CTA of the neck was performed with the administration of intravenous contrast. Multiplanar reformatted images are provided for review. MIP images are provided for review. Stenosis of the internal carotid arteries measured using NASCET criteria. Automated exposure control, iterative reconstruction, and/or weight based adjustment of the mA/kV was utilized to reduce the radiation dose to as low as reasonably achievable.; CT of the head/brain was performed with the administration of intravenous contrast. Multiplanar reformatted images are provided for review. Automated exposure control, iterative reconstruction, and/or weight based adjustment of the mA/kV was utilized to reduce the radiation dose to as low as reasonably achievable. Noncontrast CT of the head with reconstructed 2-D images are also provided for review. COMPARISON: MRI/MRA 04/18/2022, CT head 04/17/2022 HISTORY: ORDERING SYSTEM PROVIDED HISTORY: left sided sensory TECHNOLOGIST PROVIDED HISTORY: Reason for exam:->left sided sensory Has a \"code stroke\" or \"stroke alert\" been called? ->Yes Decision Support Exception - unselect if not a suspected or confirmed emergency medical condition->Emergency Medical Condition ([place]); FINDINGS: CT HEAD: BRAIN/VENTRICLES:  Left insular encephalomalacia at the site of prior infarct. No evidence of new infarct or acute intracranial hemorrhage. No hydrocephalus or extra-axial fluid collection. Midline maintained. Basal cisterns patent. ORBITS: The visualized portion of the orbits demonstrate no acute abnormality. SINUSES:  The visualized paranasal sinuses and mastoid air cells demonstrate no acute abnormality. SOFT TISSUES/SKULL: No acute abnormality of the visualized skull or soft tissues. CTA NECK: AORTIC ARCH/ARCH VESSELS: No dissection or arterial injury.   No significant stenosis of the brachiocephalic or subclavian arteries. CAROTID ARTERIES: No dissection, arterial injury, or hemodynamically significant stenosis by NASCET criteria. VERTEBRAL ARTERIES: No dissection, arterial injury, or significant stenosis. SOFT TISSUES: The lung apices are clear. No cervical or superior mediastinal lymphadenopathy. The larynx and pharynx are unremarkable. No acute abnormality of the salivary and thyroid glands. BONES: No acute osseous abnormality. CTA HEAD: ANTERIOR CIRCULATION: No significant stenosis of the intracranial internal carotid, anterior cerebral, or middle cerebral arteries. No evidence of residual thrombus in the left middle cerebral artery superior M2 segment. Incidental azygous A2 configuration of the anterior cerebral arteries. No aneurysm. POSTERIOR CIRCULATION: No significant stenosis of the vertebral, basilar, or posterior cerebral arteries. Bilateral posterior communicating arteries present. No aneurysm. OTHER: No dural venous sinus thrombosis on this non-dedicated study. CT PERFUSION: EXAM QUALITY: The examination is diagnostic with appropriate arterial inflow and venous outflow curves, and diagnostic perfusion maps. CORE INFARCT: The total area of ischemic core is 0 mL (CBF<30% volume). TOTAL HYPOPERFUSION: The total area of hypoperfusion is 0 mL (Tmax>6s volume). PENUMBRA: The penumbra (mismatch) volume is 0 mL and is located in the n/a. The mismatch ratio is n/a.     1. No acute intracranial abnormality. 2. Left insular encephalomalacia at the site of prior infarct. 3. No perfusion mismatch. 4. Unremarkable CTA of the head and neck. Specifically, no evidence of residual thrombus in the left middle cerebral artery.      MRI BRAIN WO CONTRAST    Result Date: 5/31/2022  EXAMINATION: MRI OF THE BRAIN WITHOUT CONTRAST; MRV OF THE HEAD WITHOUT CONTRAST 5/31/2022 5:45 pm TECHNIQUE: Multiplanar multisequence MRI of the brain was performed without the administration of intravenous contrast.; Multiplanar multisequence MRV of the head was performed without the administration of intravenous contrast. COMPARISON: None. HISTORY: ORDERING SYSTEM PROVIDED HISTORY: stroke like symptoms, negative cta/ctp, pregnant, headache TECHNOLOGIST PROVIDED HISTORY: Reason for exam:->stroke like symptoms, negative cta/ctp, pregnant, headache What is the sedation requirement?->None Decision Support Exception - unselect if not a suspected or confirmed emergency medical condition->Emergency Medical Condition ([place]); ORDERING SYSTEM PROVIDED HISTORY: stroke like sxm hsx protein s def on lovenox, headache TECHNOLOGIST PROVIDED HISTORY: Reason for exam:->stroke like sxm hsx protein s def on lovenox, headache What is the sedation requirement?->None Decision Support Exception - unselect if not a suspected or confirmed emergency medical condition->Emergency Medical Condition ([place]) FINDINGS: MRI BRAIN: INTRACRANIAL STRUCTURES/VENTRICLES: Tiny foci of acute early subacute infarction in the right cerebral hemisphere including within the corona radiata and centrum semiovale. A small focus is seen in the right frontal subcortical white matter. Tiny foci seen in the right insular cortex external capsule and lateral basal ganglia as well. In area of encephalomalacia is seen in the left insular cortex. Chronic lacunar infarctions seen left periatrial white matter. No hemorrhage, positive mass effect or midline shift. No hydrocephalus or extra-axial fluid is seen. ORBITS: The visualized portion of the orbits demonstrate no acute abnormality. SINUSES: The visualized paranasal sinuses and mastoid air cells are well aerated. BONES/SOFT TISSUES: The bone marrow signal intensity appears normal. The soft tissues demonstrate no acute abnormality. MRV HEAD: No focal thrombus is seen of the major dural venous sinuses. Mild stenosis is seen in the lateral aspect of the left transverse sinus, which may be related to an arachnoid granulation.      MRI OF THE BRAIN WITHOUT CONTRAST: 1. Tiny foci of acute or early subacute infarction in the right frontal lobe, with single tiny foci each within the right lateral basal ganglia, external capsule and insular cortex. 2. No hemorrhage, mass effect or midline shift. MRV OF THE BRAIN WITHOUT CONTRAST: 1. Mild stenosis in the left lateral transverse sinus, which may be due to an arachnoid granulation. 2. No evidence of dural venous thrombosis. RECOMMENDATIONS: Unavailable       ASSESSMENT:  -Acute CVA  -Left-sided numbness  -15 weeks pregnant  -History of protein S deficiency      PLAN:  -Admit to medicine  -Consult neurology  -Aspirin, Lovenox  -PT/OT/SLP  -Check A1c and lipid panel  -Continue home medication        Diet: No diet orders on file  Code Status: Prior  Surrogate decision maker confirmed with patient:   Extended Emergency Contact Information  Primary Emergency Contact: [address] [place] [address]  Phone: [phone]  Relation: Parent   needed? No    DVT Prophylaxis: []Lovenox []Heparin []PCD [] [address] []Encouraged ambulation  Disposition: []Med/Surg [] Intermediate [] ICU/CCU  Admit status: [] Observation [] Inpatient     +++++++++++++++++++++++++++++++++++++++++++++++++  Roxana Ruano, DO  +++++++++++++++++++++++++++++++++++++++++++++++++  NOTE: This report was transcribed using voice recognition software. Every effort was made to ensure accuracy; however, inadvertent computerized transcription errors may be present.

## 2022-06-02 NOTE — PROGRESS NOTES
SPEECH/LANGUAGE PATHOLOGY  SPEECH/LANGUAGE/COGNITIVE EVALUATION   and PLAN OF CARE      PATIENT NAME:  Clement Mejia  (female)     MRN:  [id number]    :  1990  (33 y.o.)  STATUS:  Inpatient: Room 8512/8512-A    TODAY'S DATE:  2022  REFERRING PROVIDER:    Radha Stratton   SPECIFIC PROVIDER ORDER: speech language pathology (SLP) eval and treat  Date of order:  22  REASON FOR REFERRAL: CVA  EVALUATING THERAPIST: BARBI Medina    ADMITTING DIAGNOSIS: Acute cerebrovascular accident (CVA) ([facility] [I63.9]    VISIT DIAGNOSIS:      SPEECH THERAPY  PLAN OF CARE   The speech therapy  POC is established based on physician order, speech pathology diagnosis and results of clinical assessment     SPEECH PATHOLOGY DIAGNOSIS:    Within function limits    Speech Pathology intervention is not warranted at this time. Conditions Requiring Skilled Therapeutic Intervention for speech, language and/or cognition  Not applicable     Specific Speech Therapy Interventions to Include:   Not applicable    Specific instructions for next treatment:    Not applicable    SHORT/LONG TERM GOALS  Not applicable      Patient goals: Patient/family involved in developing goals and treatment plan:   Treatment goals discussed with Patient    The Patient understand(s) the diagnosis, prognosis and plan of care   The patient/family Agreed with above,     This plan may be re-evaluated and revised as warranted. Rehabilitation Potential/Prognosis: not applicable                CLINICAL ASSESSMENT:  MOTOR SPEECH       Oral Peripheral Examination   Adequate lingual/labial strength     Parameters of Speech Production  Respiration:  Adequate for speech production  Articulation:  Within functional limits  Resonance:  Within functional limits  Quality:   Within functional limits  Pitch:     Within functional limits  Intensity: Within functional limits  Fluency:  Intact  Prosody Intact    RECEPTIVE LANGUAGE    Comprehension of Yes/No Questions: Within functional limits    Process  Simple Verbal Commands:   Within functional limits  Process Intermediate Verbal Commands:   Within functional limits  Process Complex Verbal Commands:     Within functional limits    Comprehension of Conversation:      Within functional limits      EXPRESSIVE LANGUAGE     Serials: Functional    Imitation:  Words   Functional   Sentences Functional    Naming:  (Modality used:  Verbal)  Confrontation Naming  Functional  Functional Description  Functional  Response Naming: Functional    Conversation:      Conversation was within functional limits    COGNITION     Attention/Orientation  Attention: Sustained attention   Orientation:  Oriented to Person, Place, Date, Reason for hospitalization    Memory   Immediate Recall: Repeated 3/3    Delayed Recall:   Recalled  3/3  Long Term Recall:   Recalled Address, Birthdate, Age, and Family    Organization/Problem Solving/Reasoning   Verbal Sequencing:   Functional        Verbal Problem solving:   Functional          CLINICAL OBSERVATIONS NOTED DURING THE EVALUATION  Within functional limits                  EDUCATION:   The Speech Language Pathologist (SLP) completed education regarding results of evaluation and that intervention is not warranted at this time. Learner: Patient  Education: Reviewed results and recommendations of this evaluation  Evaluation of Education:  Verbalizes understanding    Evaluation Time includes thorough review of current medical information, gathering information on past medical history/social history and prior level of function, completion of standardized testing/informal observation of tasks, assessment of data and education on plan of care and goals. CPT code:    44293  eval speech sound lang comprehension      The admitting diagnosis and active problem list, as listed below have been reviewed prior to initiation of this evaluation.         ACTIVE PROBLEM LIST:   Patient Active Problem List Diagnosis    Sprain of shoulder, left    Cervicalgia    Cervical spondylolysis    Cervical strain    Sprain of ligaments of cervical spine    [facility] ([facility]A) [facility] [address]    Cerebrovascular accident (CVA) ([facility] [address]    Nausea/vomiting in pregnancy    History of UTI    History of thrombosis    Protein S deficiency affecting pregnancy ([facility] [address]       Sandrita Duron, CCC-SLP  Speech-Language Pathologist  [id number]  6/2/2022

## 2022-06-02 NOTE — PROGRESS NOTES
Physical Therapy Initial Assessment       Name: Aneudy March  : 1990  MRN: [id number]      Date of Service: 2022    Evaluating PT:  Tonya Reyes PT, DPT [id number]    Room #:  8771/7459-R  Diagnosis:  Acute cerebrovascular accident (CVA) [facility]) [I63.9]  PMHx/PSHx:    Past Medical History:   Diagnosis Date    Back pain     CVA (cerebral vascular accident) [facility]) 2022    Neck pain      Procedure/Surgery:  None this admission   Reason for admission: acute CVA  Precautions:  Low fall risk, pregnant   Equipment Needs: none at this time     SUBJECTIVE:  Pt lives in two story home with 15 y/o son - bed/bath on second floor of home and 1 [address]. Pt ambulated with no AD PTA, does not own DME. OBJECTIVE:   Initial Evaluation  Date: 3/60/1481   AM-PAC 6 Clicks 41/34   Was pt agreeable to Eval/treatment? Yes   Does pt have pain?  Headache, jaw pain - did not rate    Bed Mobility  Rolling: ind  Supine to sit: ind  Sit to supine: ind  Scooting: ind   Transfers Sit to stand: ind  Stand to sit: ind  Stand pivot: NT   Ambulation    100 feet with no AD ind   Stair negotiation: ascended and descended  pt declined stair navigation    ROM BUE:  Refer to OT eval   BLE:  WNL   Strength BUE:  Refer to OT eval   BLE:  WNL   Balance Sitting EOB:  ind  Dynamic Standing:  ind     Pt is A & O x 4  Sensation:  Pt notes mild numbness in jaw   Edema:  None noted    Vitals:  Blood Pressure at rest -- Blood Pressure post session --   Heart Rate at rest -- Heart Rate post session --   SPO2 at rest -- SPO2 post session --     Therapeutic Exercises:  none performed this visit    Patient education  Pt educated on role of PT    Patient response to education:   Pt verbalized understanding Pt demonstrated skill Pt requires further education in this area   x x      ASSESSMENT:    Conditions Requiring Skilled Therapeutic Intervention:  []Decreased strength     []Decreased ROM  []Decreased functional mobility  []Decreased balance []Decreased endurance   []Decreased posture  []Decreased sensation  []Decreased coordination   []Decreased vision  []Decreased safety awareness   []Increased pain     Comments:  RN cleared pt for PT treatment. Pt supine in bed on arrival and agreeable to PT treatment. Pt with no strength, sensation deficits at this time except some numbness in jaw area. Pt ambulated multiple laps in room, notes she is moving at baseline and has fatigue after walking but this is normal for her at this time d/t pregnancy. Pt with no mobility deficits at this time and will be DC from PT; pt is agreeable to this. Pt left supine in bed with all needs met and call light in reach. Pt's/ family goals   1. Return home safely. Patient and or family understand(s) diagnosis, prognosis, and plan of care.   Yes    PHYSICAL THERAPY PLAN OF CARE:  PT POC is established based on physician order and patient diagnosis     Referring provider/PT Order:    06/02/22 0015  PT evaluation and treat  Start:  06/02/22 0015,   End:  06/02/22 0015,   ONE TIME,   Standing Count:  1 Occurrences,   R         Silvio Cohn DO       Diagnosis:  Acute cerebrovascular accident (CVA) ([facility] [address] [I63.9]  Specific instructions for next treatment:  n/a    Current Treatment Recommendations:     [] Strengthening to improve independence with functional mobility   [] ROM to improve independence with functional mobility   [] Balance Training to improve static/dynamic balance and to reduce fall risk  [] Endurance Training to improve activity tolerance during functional mobility   [] Transfer Training to improve safety and independence with all functional transfers   [] Gait Training to improve gait mechanics, endurance and assess need for appropriate assistive device  [] Stair Training in preparation for safe discharge home and/or into the community   [] Positioning to prevent skin breakdown and contractures  [] Safety and Education Training   [] Patient/Caregiver Education [] HEP  [] Other     PT long term treatment goals are located in above grid    Frequency of treatments: no further PT treatment needed     Time in  1430  Time out  1440    Total Treatment Time  0 minutes     Evaluation Time includes thorough review of current medical information, gathering information on past medical history/social history and prior level of function, completion of standardized testing/informal observation of tasks, assessment of data and education on plan of care and goals.     CPT codes:  [x] Low Complexity PT evaluation 49224  [] Moderate Complexity PT evaluation 03658  [] High Complexity PT evaluation 31515  [] PT Re-evaluation 34109  [] Gait training 71594 -- minutes  [] Manual therapy [id number] -- minutes  [] Therapeutic activities 20309 -- minutes  [] Therapeutic exercises 40279 -- minutes  [] Neuromuscular reeducation 01525 -- minutes     Zelalem Smith, PT, DPT  [id number]

## 2022-06-02 NOTE — CONSULTS
[facility]  Neurology Consult    Date:  6/2/2022  Patient Name:  Shanika Szymanski  YOB: 1990  MRN: [id number]     PCP:  Natasha Wasserman DO   Referring:  Chicho Moeller MD      Chief Complaint: L sided numbness    History obtained from: patient    Assessment  Shanika Szymanski is a 33 y.o. female with a history of previous CVA, and protein S deficiency admitted with headache and left sided numbness which was caused from right frontal lobe infarct. Plan  · MRA without contrast  · CK, sed rate  · Notch 3 testing  · Tylenol prn for headache  · Hematology consult to manage protein S deficiency        History of Present Illness:  Shanika Szymanski is a 33 y.o.  female presenting for evaluation of right sided numbness and headache. She was brought to [facility] ER on 5/31 for a right frontal stroke. She has a history of Protein S deficiency and is 15 weeks pregnant. She was recently admitted with a previous stroke and was referred to hematology for her protein deficiency. She was started on therapeutic Lovenox. Review of Systems:  Constitutional  · Weight loss: No  · Fever: No    Eyes  · Double Vision: No  · Visions loss: No    Ears, Nose, Mouth, and Throat  · Difficulty swallowing: No    Cardiovascular  · Chest Pain: No    Respiratory  · Shortness of Breath: No    Gastrointestinal  · Abdominal Pain: No    Genitourinary  · Difficulty with Urination: No    Integumentary  · Rash: No    Musculoskeletal  · Back Pain: No    Neurological  · Headaches: Yes  · Weakness: No  · Numbness:  Yes  · Seizures: No  · Difficulty with Memory: No  · Further symptoms noted in HPI    Psychiatric  · Anxiety: No  · Depression: No    Complete 10-point review of systems is negative except as noted above in my HPI    Medical History:   Past Medical History:   Diagnosis Date    Back pain     CVA (cerebral vascular accident) ([facility] [address] 04/2022 11/2021    Neck pain         Surgical History:   Past Surgical History:   Procedure Laterality Date     SECTION      DILATION AND CURETTAGE      NERVE BLOCK Left 2020    LEFT CERVICAL MEDIAL BRANCH NERVE  BLOCK UNDER FLUOROSCOPIC GUIDANCE C3, C4, C5 AND C6 performed by Anahi Mdcowell MD at [facility] OR        Family History:   Family History   Problem Relation Age of Onset    Asthma Father     Asthma Brother     Asthma Brother     No Known Problems Mother        Social History:  Social History     Tobacco Use    Smoking status: Current Every Day Smoker     Packs/day: 0.50     Types: Cigarettes, Cigars    Smokeless tobacco: Never Used    Tobacco comment: black and mild   Vaping Use    Vaping Use: Never used   Substance Use Topics    Alcohol use: No    Drug use: Yes     Types: Marijuana Charlaine Leland)        Current Medications:      Current Facility-Administered Medications   Medication Dose Route Frequency Provider Last Rate Last Admin    Vitamin D (CHOLECALCIFEROL) tablet 2,000 Units  2,000 Units Oral Daily Jade Sabal, DO   2,000 Units at 22 0900    ondansetron (ZOFRAN-ODT) disintegrating tablet 4 mg  4 mg Oral Q8H PRN Jade Sabal, DO        Or    ondansetron (ZOFRAN) injection 4 mg  4 mg IntraVENous Q6H PRN Jade Sabal, DO        polyethylene glycol (GLYCOLAX) packet 17 g  17 g Oral Daily PRN Jade Sabal, DO        enoxaparin (LOVENOX) injection 40 mg  40 mg SubCUTAneous Daily Jade Sabal, DO   40 mg at 22 0900    aspirin EC tablet 81 mg  81 mg Oral Daily Jade Sabal, DO   81 mg at 22 0900    Or    aspirin suppository 300 mg  300 mg Rectal Daily Jade Sabal, DO        acetaminophen (TYLENOL) tablet 650 mg  650 mg Oral Q6H PRN VAMSHI Coats - CNP   650 mg at 22 1155        Allergies:      No Known Allergies     Physical Examination  Vitals   Vitals:    22 0405 22 0738 22 1155 22 1503   BP: 106/64 103/67 (!) 99/58 (!) 109/58   Pulse: 71 74 75 80   Resp: 16 16 16 18 Temp: 97.8 °F (36.6 °C) 98 °F (36.7 °C) 98.3 °F (36.8 °C) 98.3 °F (36.8 °C)   TempSrc: Temporal Temporal Infrared Temporal   SpO2: 99% 100% 99% 96%   Weight:       Height:            General: Patient appears in no acute distress. Awake and oriented x 4. HEENT: Normocephalic, atraumatic  Chest: Unlabored, on room air  Heart: No chest pain or palpitations  Extremities/Peripheral vascular: No edema/swelling noted. No cold limbs noted. Neurologic Examination    Mental Status  Alert, and oriented to person, place and time. Speech is fluent with intact comprehension. No evidence of memory impairment. Attention and concentration appeared normal.     Cranial Nerves  II. Visual fields full to confrontation bilaterally. III, IV, VI: Pupils equally round and reactive to light, 3 to 2 mm bilaterally. EOMs: full, no nystagmus. V. Facial sensation intact to light touch bilaterally  VII: Facial movements symmetric and strong  VIII: Hearing intact to voice  IX,X: Palate elevates symmetrically.  No dysarthria  XI: Sternocleidomastoid and trapezius 5/5 bilaterally   XII: Tongue is midline    Motor     Right Left   Right Left   Deltoid 5 5  Hip Flexion 5 5   Biceps      5  5  Knee Extension 5 5   Triceps 5 5  Knee Flexion 5 5   Handgrip 5 5  Ankle Dorsiflexion 5 5       Ankle Plantarflexion 5 5     Tone: Normal in all four limbs    Bulk: Normal in all four limbs with no evidence of atrophy    Pronator drift: absent bilaterally    Sensation  · Light Touch: Intact distally in right limbs, diminished in left limbs  · Pinprick: Intact distally in right  Limbs, diminished in left limbs  · Vibration: Intact distally in all four limbs    Reflexes     Right Left   Biceps 2 2   Brachioradialis 2 2   Triceps 2 2   Patellar 2 2   Achilles 2 2   ankle clonus none none   Babinski absent absent     Coordination  Rapid alternating movements normal in bilateral upper extremities  Finger to nose testing normal bilaterally  Heel to shin testing normal bilaterally    Gait  Deferred for safety/fall consideration      Labs  Recent Labs     06/02/22  0724      K 3.7      CO2 22   BUN 9   CREATININE 0.7   GLUCOSE 73*   CALCIUM 8.5*   PROT 6.2*   LABALBU 3.2*   BILITOT <0.2   ALKPHOS 59   AST 12   ALT 9   WBC 8.2   RBC 4.15   HGB 12.1   HCT 36.4   MCV 87.7   MCH 29.2   MCHC 33.2   RDW 14.1      MPV 10.0   HDL 69   LDLCALC 94   LABA1C 4.9       Imaging  MRI            1. Tiny foci of acute or early subacute infarction in the right frontal lobe,  with single tiny foci each within the right lateral basal ganglia, external  capsule and insular cortex. 2. No hemorrhage, mass effect or midline shift. MRV OF THE BRAIN WITHOUT CONTRAST:  1. Mild stenosis in the left lateral transverse sinus, which may be due to an  arachnoid granulation. 2. No evidence of dural venous thrombosis.         I have personally reviewed the above images:            Electronically signed by VAMSHI Hurtado CNP on 6/2/2022 at 3:56 PM

## 2022-06-02 NOTE — PROGRESS NOTES
Hospitalist Progress Note      Synopsis: Patient admitted on 6/1/2022   Ms. Ambreen Uriostegui, a 33y.o. year old female who has a past medical history of Back pain, CVA (cerebral vascular accident) ([address] and Neck pain. Patient presented to the [facility] emergency department with headache and left-sided numbness. Patient is 15 weeks pregnant. 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 Has a history of a prior CVA. MRI of the brain shows tiny foci of acute or early subacute infarction in the right frontal lobe with single tiny foci each within the right lateral basal ganglia, external capsule and insular cortex. Patient was then transferred to [facility] for further neurological evaluation. Subjective    Sitting up in bed. She is not in distress. She is quite concerned. Most of her symptoms have reversed. Exam:  /67   Pulse 74   Temp 98 °F (36.7 °C) (Temporal)   Resp 16   Ht 5' 2\" (1.575 m)   Wt 159 lb (72.1 kg)   LMP 02/10/2022   SpO2 100%   BMI 29.08 kg/m²   General appearance: No apparent distress, appears stated age and cooperative. HEENT: Pupils equal, round, and reactive to light. Conjunctivae/corneas clear. Neck: Supple. No jugular venous distention. Trachea midline. Respiratory:  Normal respiratory effort. Clear to auscultation, bilaterally without Rales/Wheezes/Rhonchi. Cardiovascular: Regular rate and rhythm with normal S1/S2 without murmurs, rubs or gallops. Abdomen: Soft, non-tender, non-distended with normal bowel sounds. Musculoskeletal: No clubbing, cyanosis or edema bilaterally. Brisk capillary refill. 2+ lower extremity pulses (dorsalis pedis).    Skin:  No rashes    Neurologic: awake, alert and following commands     Medications:  Reviewed    Infusion Medications   Scheduled Medications    Vitamin D  2,000 Units Oral Daily    enoxaparin  40 mg SubCUTAneous Daily    aspirin  81 mg Oral Daily    Or    aspirin  300 mg Rectal Daily     PRN Meds: ondansetron **OR** ondansetron, polyethylene glycol    I/O  No intake or output data in the 24 hours ending 06/02/22 0910    Labs:   Recent Labs     05/31/22  1430 06/02/22  0724   WBC 9.1 8.2   HGB 11.9 12.1   HCT 35.8 36.4    320       Recent Labs     05/31/22  1430      K 3.8      CO2 22   BUN 12   CREATININE 0.7   CALCIUM 8.8       Recent Labs     05/31/22  1430   PROT 6.3*   ALKPHOS 64   ALT 9   AST 12   BILITOT <0.2       Recent Labs     05/31/22  1430   INR 0.9       No results for input(s): Lorenso Favre in the last 72 hours. Chronic labs:  Lab Results   Component Value Date    CHOL 169 04/18/2022    TRIG 74 04/18/2022    HDL 62 04/18/2022    LDLCALC 92 04/18/2022    TSH 0.876 05/13/2022    INR 0.9 05/31/2022    LABA1C 4.8 05/13/2022       Microbiology:  Pending  No results for input(s): BC in the last 72 hours. No results for input(s): ORG in the last 72 hours. No results for input(s): Reagan Emmet in the last 72 hours. No results for input(s): STREPNEUMAGU in the last 72 hours. No results for input(s): LP1UAG in the last 72 hours. No results for input(s): ASO in the last 72 hours. No results for input(s): CULTRESP in the last 72 hours. No results for input(s): PROCAL in the last 72 hours. Radiology:  CT HEAD WO CONTRAST    Result Date: 5/31/2022  1. No acute intracranial abnormality. 2. Left insular encephalomalacia at the site of prior infarct. 3. No perfusion mismatch. 4. Unremarkable CTA of the head and neck. Specifically, no evidence of residual thrombus in the left middle cerebral artery. CTA NECK W CONTRAST    Result Date: 5/31/2022  1. No acute intracranial abnormality. 2. Left insular encephalomalacia at the site of prior infarct. 3. No perfusion mismatch. 4. Unremarkable CTA of the head and neck. Specifically, no evidence of residual thrombus in the left middle cerebral artery. CT BRAIN PERFUSION    Result Date: 5/31/2022  1.  No acute intracranial abnormality. 2. Left insular encephalomalacia at the site of prior infarct. 3. No perfusion mismatch. 4. Unremarkable CTA of the head and neck. Specifically, no evidence of residual thrombus in the left middle cerebral artery. MRV HEAD WO CONTRAST    Result Date: 5/31/2022  MRI OF THE BRAIN WITHOUT CONTRAST: 1. Tiny foci of acute or early subacute infarction in the right frontal lobe, with single tiny foci each within the right lateral basal ganglia, external capsule and insular cortex. 2. No hemorrhage, mass effect or midline shift. MRV OF THE BRAIN WITHOUT CONTRAST: 1. Mild stenosis in the left lateral transverse sinus, which may be due to an arachnoid granulation. 2. No evidence of dural venous thrombosis. RECOMMENDATIONS: Unavailable     CTA HEAD W CONTRAST    Result Date: 5/31/2022  1. No acute intracranial abnormality. 2. Left insular encephalomalacia at the site of prior infarct. 3. No perfusion mismatch. 4. Unremarkable CTA of the head and neck. Specifically, no evidence of residual thrombus in the left middle cerebral artery. MRI BRAIN WO CONTRAST    Result Date: 5/31/2022  MRI OF THE BRAIN WITHOUT CONTRAST: 1. Tiny foci of acute or early subacute infarction in the right frontal lobe, with single tiny foci each within the right lateral basal ganglia, external capsule and insular cortex. 2. No hemorrhage, mass effect or midline shift. MRV OF THE BRAIN WITHOUT CONTRAST: 1. Mild stenosis in the left lateral transverse sinus, which may be due to an arachnoid granulation. 2. No evidence of dural venous thrombosis. RECOMMENDATIONS: Unavailable     ASSESSMENT:    Principal Problem:    Acute cerebrovascular accident (CVA) ([facility] [address]  Resolved Problems:    * No resolved hospital problems. *    Headache  Left-sided numbness  15-week pregnancy  Protein S deficiency  PLAN:  1. She denies having skipped any medications. She has had a previous stroke.   She has protein S deficiency  Neurologic deficits were reversed  MRI in the ER did reveal tiny foci of acute or early subacute infarction of the right frontal lobe with single tiny foci each within the right lateral basal ganglia and external capsule and insular cortex but no hemorrhage  2. Await neurology input  3. Blood pressure looks quite stable          Diet: ADULT DIET; Regular  Code Status: Full Code  PT/OT Eval Status:   Independent for needs  DVT Prophylaxis:   Lovenox in place at full dose  Recommended disposition at discharge:   Likely home    +++++++++++++++++++++++++++++++++++++++++++++++++  Dalia Richmond MD   [facility].  +++++++++++++++++++++++++++++++++++++++++++++++++  NOTE: This report was transcribed using voice recognition software. Every effort was made to ensure accuracy; however, inadvertent computerized transcription errors may be present.

## 2022-06-03 VITALS
TEMPERATURE: 97.3 F | WEIGHT: 159 LBS | BODY MASS INDEX: 29.26 KG/M2 | OXYGEN SATURATION: 99 % | DIASTOLIC BLOOD PRESSURE: 67 MMHG | RESPIRATION RATE: 18 BRPM | HEIGHT: 62 IN | HEART RATE: 85 BPM | SYSTOLIC BLOOD PRESSURE: 103 MMHG

## 2022-06-03 LAB
SEDIMENTATION RATE, ERYTHROCYTE: 35 MM/HR (ref 0–20)
TOTAL CK: 26 U/L (ref 20–180)

## 2022-06-03 PROCEDURE — 85651 RBC SED RATE NONAUTOMATED: CPT

## 2022-06-03 PROCEDURE — 86022 PLATELET ANTIBODIES: CPT

## 2022-06-03 PROCEDURE — 99255 IP/OBS CONSLTJ NEW/EST HI 80: CPT | Performed by: INTERNAL MEDICINE

## 2022-06-03 PROCEDURE — 2060000000 HC ICU INTERMEDIATE R&B

## 2022-06-03 PROCEDURE — 36415 COLL VENOUS BLD VENIPUNCTURE: CPT

## 2022-06-03 PROCEDURE — 6370000000 HC RX 637 (ALT 250 FOR IP): Performed by: FAMILY MEDICINE

## 2022-06-03 PROCEDURE — 82550 ASSAY OF CK (CPK): CPT

## 2022-06-03 PROCEDURE — 6370000000 HC RX 637 (ALT 250 FOR IP): Performed by: INTERNAL MEDICINE

## 2022-06-03 PROCEDURE — 99233 SBSQ HOSP IP/OBS HIGH 50: CPT | Performed by: NURSE PRACTITIONER

## 2022-06-03 RX ORDER — ENOXAPARIN SODIUM 100 MG/ML
1 INJECTION SUBCUTANEOUS DAILY
Status: DISCONTINUED | OUTPATIENT
Start: 2022-06-04 | End: 2022-06-03

## 2022-06-03 RX ORDER — ENOXAPARIN SODIUM 100 MG/ML
60 INJECTION SUBCUTANEOUS 2 TIMES DAILY
Qty: 36 ML | Refills: 0 | Status: SHIPPED | OUTPATIENT
Start: 2022-06-03 | End: 2022-07-03

## 2022-06-03 RX ORDER — ENOXAPARIN SODIUM 100 MG/ML
60 INJECTION SUBCUTANEOUS 2 TIMES DAILY
Status: DISCONTINUED | OUTPATIENT
Start: 2022-06-03 | End: 2022-06-04 | Stop reason: HOSPADM

## 2022-06-03 RX ADMIN — ACETAMINOPHEN 500 MG: 500 TABLET ORAL at 09:18

## 2022-06-03 RX ADMIN — ACETAMINOPHEN 500 MG: 500 TABLET ORAL at 13:25

## 2022-06-03 RX ADMIN — Medication 2000 UNITS: at 09:20

## 2022-06-03 RX ADMIN — ASPIRIN 81 MG: 81 TABLET, COATED ORAL at 09:18

## 2022-06-03 ASSESSMENT — PAIN DESCRIPTION - DESCRIPTORS
DESCRIPTORS: ACHING
DESCRIPTORS: ACHING

## 2022-06-03 ASSESSMENT — PAIN SCALES - GENERAL
PAINLEVEL_OUTOF10: 0
PAINLEVEL_OUTOF10: 0
PAINLEVEL_OUTOF10: 7
PAINLEVEL_OUTOF10: 7

## 2022-06-03 ASSESSMENT — PAIN DESCRIPTION - LOCATION
LOCATION: HEAD
LOCATION: HEAD

## 2022-06-03 NOTE — DISCHARGE SUMMARY
Hospital Medicine Discharge Summary    Patient ID: Mabel Trejo      Patient's PCP: Tennille Richardson DO    Admit Date: 6/1/2022     Discharge Date:   6/3/2022    Admitting Physician: Vianey Burks DO     Discharge Physician: Ta Rowell MD     Discharge Diagnoses: Active Hospital Problems    Diagnosis Date Noted    Acute cerebrovascular accident (CVA) ([facility] [address] [I63.9] 04/17/2022       The patient was seen and examined on day of discharge and this discharge summary is in conjunction with any daily progress note from day of discharge. Hospital Course:     Ms. Mabel Trejo, a 33y.o. year old female who has a past medical history of Back pain, CVA (cerebral vascular accident) ([address] and Neck pain. Patient presented to the [facility] emergency department with headache and left-sided numbness. Patient is 15 weeks pregnant. 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 Has a history of a prior CVA. MRI of the brain shows tiny foci of acute or early subacute infarction in the right frontal lobe with single tiny foci each within the right lateral basal ganglia, external capsule and insular cortex. Patient was then transferred to [facility] for further neurological evaluation. Patient's Lovenox was increased. She was seen by neurology. Her MRI and MRV were unrevealing with exception of mild stenosis of the left lateral transverse sinus. Her sedimentation rate was 35 with a CK of 26. At the point of presentation she had no acute issues. She was supposed to follow-up with her high risk fetal medicine however the physician is only present at [facility]. She is able to use Tylenol as needed for headaches. Her not 3 testing and had testing are pending.   Hematology still following to manage protein S deficiency          Exam:     /67   Pulse 85   Temp 97.3 °F (36.3 °C) (Temporal)   Resp 18   Ht 5' 2\" (1.575 m)   Wt 159 lb (72.1 kg)   LMP 02/10/2022   SpO2 99%   BMI 29.08 kg/m²     General appearance: No apparent distress, appears stated age and cooperative. HEENT: Pupils equal, round, and reactive to light. Conjunctivae/corneas clear. Neck: Supple, with full range of motion. No jugular venous distention. Trachea midline. Respiratory:  Normal respiratory effort. Clear to auscultation, bilaterally without Rales/Wheezes/Rhonchi. Cardiovascular: Regular rate and rhythm with normal S1/S2 without murmurs, rubs or gallops. Abdomen: Soft, non-tender, non-distended with normal bowel sounds. Musculoskeletal: No clubbing, cyanosis or edema bilaterally. Full range of motion without deformity. Skin: Skin color, texture, turgor normal.  No rashes or lesions. Neurologic:  Neurovascularly intact without any focal sensory/motor deficits. Cranial nerves: II-XII intact, grossly non-focal.  Psychiatric: Alert and oriented, thought content appropriate, normal insight      Consults:     IP CONSULT TO NEUROLOGY  IP CONSULT TO HEM/ONC  IP CONSULT TO MATERNAL FETAL MEDICINE/  IP CONSULT TO MATERNAL FETAL MEDICINE/    Significant Diagnostic Studies:   CT HEAD WO CONTRAST    Result Date: 2022  1. No acute intracranial abnormality. 2. Left insular encephalomalacia at the site of prior infarct. 3. No perfusion mismatch. 4. Unremarkable CTA of the head and neck. Specifically, no evidence of residual thrombus in the left middle cerebral artery. MRA HEAD WO CONTRAST    Result Date: 2022  Unremarkable exam RECOMMENDATIONS: Unavailable     CTA NECK W CONTRAST    Result Date: 2022  1. No acute intracranial abnormality. 2. Left insular encephalomalacia at the site of prior infarct. 3. No perfusion mismatch. 4. Unremarkable CTA of the head and neck. Specifically, no evidence of residual thrombus in the left middle cerebral artery. CT BRAIN PERFUSION    Result Date: 2022  1. No acute intracranial abnormality.  2. Left insular encephalomalacia at the site of prior infarct. 3. No perfusion mismatch. 4. Unremarkable CTA of the head and neck. Specifically, no evidence of residual thrombus in the left middle cerebral artery. MRV HEAD WO CONTRAST    Result Date: 5/31/2022  MRI OF THE BRAIN WITHOUT CONTRAST: 1. Tiny foci of acute or early subacute infarction in the right frontal lobe, with single tiny foci each within the right lateral basal ganglia, external capsule and insular cortex. 2. No hemorrhage, mass effect or midline shift. MRV OF THE BRAIN WITHOUT CONTRAST: 1. Mild stenosis in the left lateral transverse sinus, which may be due to an arachnoid granulation. 2. No evidence of dural venous thrombosis. RECOMMENDATIONS: Unavailable     CTA HEAD W CONTRAST    Result Date: 5/31/2022  1. No acute intracranial abnormality. 2. Left insular encephalomalacia at the site of prior infarct. 3. No perfusion mismatch. 4. Unremarkable CTA of the head and neck. Specifically, no evidence of residual thrombus in the left middle cerebral artery. MRI BRAIN WO CONTRAST    Result Date: 5/31/2022  MRI OF THE BRAIN WITHOUT CONTRAST: 1. Tiny foci of acute or early subacute infarction in the right frontal lobe, with single tiny foci each within the right lateral basal ganglia, external capsule and insular cortex. 2. No hemorrhage, mass effect or midline shift. MRV OF THE BRAIN WITHOUT CONTRAST: 1. Mild stenosis in the left lateral transverse sinus, which may be due to an arachnoid granulation. 2. No evidence of dural venous thrombosis. RECOMMENDATIONS: Unavailable     Disposition: For home stable  Discharge Instructions/Follow-up: Given    Code Status:  Full Code     Activity: activity as tolerated    Diet: regular diet    Labs:  For convenience and continuity at follow-up the following most recent labs are provided:      CBC:    Lab Results   Component Value Date    WBC 8.2 06/02/2022    HGB 12.1 06/02/2022    HCT 36.4 06/02/2022     06/02/2022       Renal:    Lab Results Component Value Date     06/02/2022    K 3.7 06/02/2022     06/02/2022    CO2 22 06/02/2022    BUN 9 06/02/2022    CREATININE 0.7 06/02/2022    CALCIUM 8.5 06/02/2022       Discharge Medications:     Current Discharge Medication List           Details   enoxaparin (LOVENOX) 60 MG/0.6ML Inject 0.6 mLs into the skin 2 times daily  Qty: 36 mL, Refills: 0              Details   vitamin B-6 (PYRIDOXINE) 100 MG tablet Take 100 mg by mouth 3 times daily as needed (FOR NAUSEA)      vitamin D3 (CHOLECALCIFEROL) 25 MCG (1000 UT) TABS tablet Take 2 tablets by mouth daily  Qty: 90 tablet, Refills: 2    Associated Diagnoses: Low vitamin D level      aspirin 81 MG chewable tablet Take 1 tablet by mouth daily  Qty: 90 tablet, Refills: 3      Prenatal MV-Min-Fe Fum-FA-DHA (PRENATAL 1 PO) Take 1 tablet by mouth daily              Time Spent on discharge is more than 30 minutes in the examination, evaluation, counseling and review of medications and discharge plan.       Signed:    Fredis Crowley MD   6/3/2022

## 2022-06-03 NOTE — PROGRESS NOTES
Try to reach Dr Lizzy Amador x 2. Transferred to [facility]  who transferred me to OB-gyn floor. PEYTON Barkley RN talked to [employer] from OB/GYN floor and was instructed to get in touch with Dr Anupama Lr. Perfected served Dr Anupama Lr x 3 with no response. Pt discharged. PEYTON Barkley RN instructed pt to make appt with Dr Pam Wagner.

## 2022-06-03 NOTE — PROGRESS NOTES
[facility]  Neurology Follow Up    Date:  6/3/2022  Patient Name:  Clement Mejia  YOB: 1990  MRN: [id number]     Chief Complaint: L sided numbness    Vitals at present time are stable    There is no family members present at bedside currently    Adarsh Mckeon is a 33 y.o. female with a history of previous CVA and protein S deficiency admitted with headache and left sided numbness which was caused from right frontal lobe infarct. MRA and MRV were unrevealing with exception of mild stenosis in the left lateral transverse sinus. CK 26, ESR 35    At present time patient reports headache with no other complaints voiced. Patient desires to go home. Spoke to hematology KELLIE and Dr. Chrissy Wooten on plan of care. Increase Lovenox. Patient should also follow-up with her OB as soon as possible which was advised during my visit with her today. All questions and concerns addressed with patient today. Plan  · Continue supportive care  · Increase Lovenox 1 mg/kg--- spoke to hematology  · Notch 3 testing and HIT pending  · Await recommendations from Dr Miguel Ángel Oreilly for high risk fetal medicine  · Tylenol prn for headache  · Hematology following to manage protein S deficiency    Okay to discharge from neuro standpoint once medically clear and okay with others. Stroke clinic in 1 to 2 weeks. Patient to follow-up with Dr. Keene Leventhal next week    History of Present Illness:  Clement Mejia is a 33 y.o.  female presenting for evaluation of right sided numbness and headache. She was brought to [facility] ER on 5/31 for a right frontal stroke. She has a history of Protein S deficiency and is 15 weeks pregnant. She was recently admitted with a previous stroke and was referred to hematology for her protein deficiency. She was started on therapeutic Lovenox.        Current Medications:      Current Facility-Administered Medications   Medication Dose Route Frequency Provider Last Rate Last Admin    [START ON 6/4/2022] enoxaparin (LOVENOX) injection 70 mg  1 mg/kg SubCUTAneous Daily VAMSHI Porter - CNP        Vitamin D (CHOLECALCIFEROL) tablet 2,000 Units  2,000 Units Oral Daily Sharlynn Clause, DO   2,000 Units at 06/03/22 0920    ondansetron (ZOFRAN-ODT) disintegrating tablet 4 mg  4 mg Oral Q8H PRN Miranda Gomezuse, DO        Or    ondansetron [facility]) injection 4 mg  4 mg IntraVENous Q6H PRN Sharlynn Clause, DO        polyethylene glycol (GLYCOLAX) packet 17 g  17 g Oral Daily PRN Miranda Clause, DO        aspirin EC tablet 81 mg  81 mg Oral Daily Sharfarideh Clause, DO   81 mg at 06/03/22 7085    Or    aspirin suppository 300 mg  300 mg Rectal Daily Miranda Clause, DO        acetaminophen (TYLENOL) tablet 500 mg  500 mg Oral Q4H PRN Lisa Delarosa MD   500 mg at 06/03/22 1325        Allergies:      No Known Allergies     Physical Examination  Vitals   Vitals:    06/02/22 2315 06/03/22 0400 06/03/22 0800 06/03/22 1212   BP: (!) 94/50 (!) 102/52 102/62 103/67   Pulse: 78 85 82 85   Resp: 16 16 16 18   Temp: 98 °F (36.7 °C) 97.7 °F (36.5 °C) 97.9 °F (36.6 °C) 97.3 °F (36.3 °C)   TempSrc: Temporal Temporal Oral Temporal   SpO2: 99% 98% 100% 99%   Weight:       Height:            General: Awake, alert, appears stated age, cooperative, no distress  HEENT: Normocephalic, atraumatic  Lungs: Unlabored, on room air  Heart: No chest pain or palpitations  Extremities/Peripheral vascular: No edema/swelling noted. No cold limbs noted. Neurologic Examination    Mental Status  Awake, alert, oriented x4. Speech is fluent with intact comprehension. No evidence of memory impairment. Attention and concentration appeared normal.     Cranial Nerves  II. Visual fields full to confrontation bilaterally. III, IV, VI: Pupils equally round and reactive to light, 3 to 2 mm bilaterally. EOMs: full, no nystagmus.    V. Facial sensation intact to light touch bilaterally  VII: Facial movements symmetric and strong  VIII: Hearing intact to voice  IX,X: Palate elevates symmetrically. No dysarthria  XI: Sternocleidomastoid and trapezius 5/5 bilaterally   XII: Tongue is midline    Motor     Right Left   Right Left   Deltoid 5 5  Hip Flexion 5 5   Biceps      5  5  Knee Extension 5 5   Triceps 5 5  Knee Flexion 5 5   Handgrip 5 5  Ankle Dorsiflexion 5 5       Ankle Plantarflexion 5 5     Tone: Normal in all four limbs    Bulk: Normal in all four limbs with no evidence of atrophy    Pronator drift: absent bilaterally    Sensation  · Light Touch: Intact distally in right limbs, diminished in left limbs      Reflexes     Right Left   Biceps 2 2   Brachioradialis 2 2   Triceps 2 2   Patellar 2 2   Achilles 2 2   ankle clonus none none   Babinski absent absent     Coordination  Rapid alternating movements normal in bilateral upper extremities  Finger to nose testing normal bilaterally  Heel to shin testing normal bilaterally    Gait  Deferred for safety/fall consideration      Labs  Recent Labs     06/02/22  0724 06/03/22  0558     --    K 3.7  --      --    CO2 22  --    BUN 9  --    CREATININE 0.7  --    GLUCOSE 73*  --    CALCIUM 8.5*  --    PROT 6.2*  --    LABALBU 3.2*  --    BILITOT <0.2  --    ALKPHOS 59  --    AST 12  --    ALT 9  --    WBC 8.2  --    RBC 4.15  --    HGB 12.1  --    HCT 36.4  --    MCV 87.7  --    MCH 29.2  --    MCHC 33.2  --    RDW 14.1  --      --    MPV 10.0  --    HDL 69  --    LDLCALC 94  --    LABA1C 4.9  --    SEDRATE  --  35*       Imaging          MRI brain:   1. Tiny foci of acute or early subacute infarction in the right frontal lobe,  with single tiny foci each within the right lateral basal ganglia, external  capsule and insular cortex. 2. No hemorrhage, mass effect or midline shift. MRV OF THE BRAIN WITHOUT CONTRAST:  1. Mild stenosis in the left lateral transverse sinus, which may be due to an  arachnoid granulation. 2. No evidence of dural venous thrombosis.     MRI head without contrast 6/2: Unremarkable    I have personally reviewed all labs and neuroimaging this admission            Electronically signed by VAMSHI Mims NP on 6/3/2022 at 3:55 PM

## 2022-06-03 NOTE — PROGRESS NOTES
MICHAEL Note    S:  The patient is a 32 yo  at 16w1d (LMP = 9 wk US) who presented to the hospital on 2022 secondary to symptoms concerning for a recurrent stroke. The patient was admitted to the hospital and found to have another stroke. She is being treated with therapeutic anticoagulation. The plan is for discharge home today. The patient reports that she feels much better. She denies any leaking of fluid, vaginal bleeding, cramping, and/or contractions.       O:   Patient Vitals for the past 24 hrs:   BP Temp Temp src Pulse Resp SpO2   22 1212 103/67 97.3 °F (36.3 °C) Temporal 85 18 99 %   22 0800 102/62 97.9 °F (36.6 °C) Oral 82 16 100 %   22 0400 (!) 102/52 97.7 °F (36.5 °C) Temporal 85 16 98 %   22 2315 (!) 94/50 98 °F (36.7 °C) Temporal 78 16 99 %   22 2030 100/75 97.6 °F (36.4 °C) Oral 77 18 --          LABS:        Admission on 2022   Component Date Value Ref Range Status    WBC 2022 8.2  4.5 - 11.5 E9/L Final    RBC 2022 4.15  3.50 - 5.50 E12/L Final    Hemoglobin 2022 12.1  11.5 - 15.5 g/dL Final    Hematocrit 2022 36.4  34.0 - 48.0 % Final    MCV 2022 87.7  80.0 - 99.9 fL Final    MCH 2022 29.2  26.0 - 35.0 pg Final    MCHC 2022 33.2  32.0 - 34.5 % Final    RDW 2022 14.1  11.5 - 15.0 fL Final    Platelets  320  130 - 450 E9/L Final    MPV 2022 10.0  7.0 - 12.0 fL Final    Hemoglobin A1C 2022 4.9  4.0 - 5.6 % Final    Cholesterol, Total 2022 190  0 - 199 mg/dL Final    Triglycerides 2022 137  0 - 149 mg/dL Final    HDL 2022 69  >40 mg/dL Final    LDL Calculated 2022 94  0 - 99 mg/dL Final    VLDL Cholesterol Calculated 2022 27  mg/dL Final    Sodium 2022 132  132 - 146 mmol/L Final    Potassium reflex Magnesium 2022 3.7  3.5 - 5.0 mmol/L Final    Chloride 2022 100  98 - 107 mmol/L Final    CO2 2022 22  22 - 29 mmol/L Final    Anion Gap 2022 10  7 - 16 mmol/L Final    Glucose 2022 73* 74 - 99 mg/dL Final    BUN 2022 9  6 - 20 mg/dL Final    CREATININE 2022 0.7  0.5 - 1.0 mg/dL Final    GFR Non- 2022 >60  >=60 mL/min/1.73 Final    GFR  2022 >60   Final    Calcium 2022 8.5* 8.6 - 10.2 mg/dL Final    Total Protein 2022 6.2* 6.4 - 8.3 g/dL Final    Albumin 2022 3.2* 3.5 - 5.2 g/dL Final    Total Bilirubin 2022 <0.2  0.0 - 1.2 mg/dL Final    Alkaline Phosphatase 2022 59  35 - 104 U/L Final    ALT 2022 9  0 - 32 U/L Final    AST 2022 12  0 - 31 U/L Final    Amphetamine Screen, Urine 2022 NOT DETECTED  Negative <1000 ng/mL Final    Barbiturate Screen, Ur 2022 NOT DETECTED  Negative < 200 ng/mL Final    Benzodiazepine Screen, Urine 2022 NOT DETECTED  Negative < 200 ng/mL Final    Cannabinoid Scrn, Ur 2022 POSITIVE* Negative < 50ng/mL Final    Cocaine Metabolite Screen, Urine 2022 NOT DETECTED  Negative < 300 ng/mL Final    Opiate Scrn, Ur 2022 NOT DETECTED  Negative < 300ng/mL Final    PCP Screen, Urine 2022 NOT DETECTED  Negative < 25 ng/mL Final    Methadone Screen, Urine 2022 NOT DETECTED  Negative <300 ng/mL Final    Oxycodone Urine 2022 POSITIVE* Negative <100 ng/mL Final    FENTANYL SCREEN, URINE 2022 NOT DETECTED  Negative <1 ng/mL Final    Drug Screen Comment: 2022 see below   Final    Sed Rate 2022 35* 0 - 20 mm/Hr Final    Total CK 2022 26  20 - 180 U/L Final         A/P:  33 y.o.  at 16w1d (LMP = [address]) with:    1. CVA -- The patient is currently being managed by neurology and hematology. The patient is to be discharged home today. Neurology called regarding the safety of therapeutic anticoagulation. Again, therapeutic Lovenox can be used during pregnancy.     -- The was counseled to call maternal-fetal medicine to set up outpatient follow-up. The patient will be scheduled to return to the office in a week. Precautions to call and/or return sooner were reviewed. The patient was also counseled to follow-up with hematology and neurology as scheduled. 2.  Pregnancy -- Recommend checking fetal heart tones at least daily. 3 .  Routine OB -- Daily PNV  --GBS unknown    4. Fetal well-being --fetal heart tones daily  -- Follow-up with MFM as scheduled as an outpatient    5. DVT prophylaxis -- Recommend SCDs or CHARANJIT hose      --If you have any questions regarding her management, please contact me at your convenience and thank you for allowing me to participate in her care. Zaynab Benson MD, [address] [address] [facility]  [phone]        *All or parts of this note may have been generated using a voice recognition program. There may be typo, grammar, or Word substitution errors that have escaped my review of this note.

## 2022-06-03 NOTE — PROGRESS NOTES
Hospitalist Progress Note      Synopsis: Patient admitted on 6/1/2022   Ms. Daniel Mckinney, a 33y.o. year old female who has a past medical history of Back pain, CVA (cerebral vascular accident) ([address] and Neck pain. Patient presented to the [facility] emergency department with headache and left-sided numbness. Patient is 15 weeks pregnant. 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 Has a history of a prior CVA. MRI of the brain shows tiny foci of acute or early subacute infarction in the right frontal lobe with single tiny foci each within the right lateral basal ganglia, external capsule and insular cortex. Patient was then transferred to [facility] for further neurological evaluation. Subjective    Sitting up in bed. She is not in distress. She is quite concerned. Most of her symptoms have reversed. Jade 3 [address]  She is feeling better. She has been cleared for discharge    Exam:  /67   Pulse 85   Temp 97.3 °F (36.3 °C) (Temporal)   Resp 18   Ht 5' 2\" (1.575 m)   Wt 159 lb (72.1 kg)   LMP 02/10/2022   SpO2 99%   BMI 29.08 kg/m²   General appearance: No apparent distress, appears stated age and cooperative. HEENT: Pupils equal, round, and reactive to light. Conjunctivae/corneas clear. Neck: Supple. No jugular venous distention. Trachea midline. Respiratory:  Normal respiratory effort. Clear to auscultation, bilaterally without Rales/Wheezes/Rhonchi. Cardiovascular: Regular rate and rhythm with normal S1/S2 without murmurs, rubs or gallops. Abdomen: Soft, non-tender, non-distended with normal bowel sounds. Musculoskeletal: No clubbing, cyanosis or edema bilaterally. Brisk capillary refill. 2+ lower extremity pulses (dorsalis pedis).    Skin:  No rashes    Neurologic: awake, alert and following commands     Medications:  Reviewed    Infusion Medications   Scheduled Medications    enoxaparin  60 mg SubCUTAneous BID    Vitamin D  2,000 Units Oral Daily    aspirin 81 mg Oral Daily    Or    aspirin  300 mg Rectal Daily     PRN Meds: ondansetron **OR** ondansetron, polyethylene glycol, acetaminophen    I/O  No intake or output data in the 24 hours ending 06/03/22 1732    Labs:   Recent Labs     06/02/22 0724   WBC 8.2   HGB 12.1   HCT 36.4          Recent Labs     06/02/22  0724      K 3.7      CO2 22   BUN 9   CREATININE 0.7   CALCIUM 8.5*       Recent Labs     06/02/22  0724   PROT 6.2*   ALKPHOS 59   ALT 9   AST 12   BILITOT <0.2       No results for input(s): INR in the last 72 hours. Recent Labs     06/03/22  0558   CKTOTAL 26       Chronic labs:  Lab Results   Component Value Date    CHOL 190 06/02/2022    TRIG 137 06/02/2022    HDL 69 06/02/2022    LDLCALC 94 06/02/2022    TSH 0.876 05/13/2022    INR 0.9 05/31/2022    LABA1C 4.9 06/02/2022       Microbiology:  Pending  No results for input(s): BC in the last 72 hours. No results for input(s): ORG in the last 72 hours. No results for input(s): April  in the last 72 hours. No results for input(s): STREPNEUMAGU in the last 72 hours. No results for input(s): LP1UAG in the last 72 hours. No results for input(s): ASO in the last 72 hours. No results for input(s): CULTRESP in the last 72 hours. No results for input(s): PROCAL in the last 72 hours. Radiology:  CT HEAD WO CONTRAST    Result Date: 5/31/2022  1. No acute intracranial abnormality. 2. Left insular encephalomalacia at the site of prior infarct. 3. No perfusion mismatch. 4. Unremarkable CTA of the head and neck. Specifically, no evidence of residual thrombus in the left middle cerebral artery. CTA NECK W CONTRAST    Result Date: 5/31/2022  1. No acute intracranial abnormality. 2. Left insular encephalomalacia at the site of prior infarct. 3. No perfusion mismatch. 4. Unremarkable CTA of the head and neck. Specifically, no evidence of residual thrombus in the left middle cerebral artery.      CT BRAIN PERFUSION    Result Date: 5/31/2022  1. No acute intracranial abnormality. 2. Left insular encephalomalacia at the site of prior infarct. 3. No perfusion mismatch. 4. Unremarkable CTA of the head and neck. Specifically, no evidence of residual thrombus in the left middle cerebral artery. MRV HEAD WO CONTRAST    Result Date: 5/31/2022  MRI OF THE BRAIN WITHOUT CONTRAST: 1. Tiny foci of acute or early subacute infarction in the right frontal lobe, with single tiny foci each within the right lateral basal ganglia, external capsule and insular cortex. 2. No hemorrhage, mass effect or midline shift. MRV OF THE BRAIN WITHOUT CONTRAST: 1. Mild stenosis in the left lateral transverse sinus, which may be due to an arachnoid granulation. 2. No evidence of dural venous thrombosis. RECOMMENDATIONS: Unavailable     CTA HEAD W CONTRAST    Result Date: 5/31/2022  1. No acute intracranial abnormality. 2. Left insular encephalomalacia at the site of prior infarct. 3. No perfusion mismatch. 4. Unremarkable CTA of the head and neck. Specifically, no evidence of residual thrombus in the left middle cerebral artery. MRI BRAIN WO CONTRAST    Result Date: 5/31/2022  MRI OF THE BRAIN WITHOUT CONTRAST: 1. Tiny foci of acute or early subacute infarction in the right frontal lobe, with single tiny foci each within the right lateral basal ganglia, external capsule and insular cortex. 2. No hemorrhage, mass effect or midline shift. MRV OF THE BRAIN WITHOUT CONTRAST: 1. Mild stenosis in the left lateral transverse sinus, which may be due to an arachnoid granulation. 2. No evidence of dural venous thrombosis. RECOMMENDATIONS: Unavailable     ASSESSMENT:    Principal Problem:    Acute cerebrovascular accident (CVA) ([facility] [address]  Resolved Problems:    * No resolved hospital problems. *    Headache  Left-sided numbness  15-week pregnancy  Protein S deficiency  PLAN:  1. She denies having skipped any medications. She has had a previous stroke.   She has protein S deficiency  Neurologic deficits were reversed  MRI in the ER did reveal tiny foci of acute or early subacute infarction of the right frontal lobe with single tiny foci each within the right lateral basal ganglia and external capsule and insular cortex but no hemorrhage  2. Await neurology input  3. Blood pressure looks quite stable    Jade 3  She is feeling quite a bit better. Neurology has seen her. She has been okayed for discharge. Her dose of Lovenox has been increased and she has been counseled to stop smoking  She plans to follow-up with a high risk fetal medicine physician in [address]: ADULT DIET; Regular  Code Status: Full Code  PT/OT Eval Status:   Independent for needs  DVT Prophylaxis:   Lovenox in place at full dose  Recommended disposition at discharge:   Likely home    +++++++++++++++++++++++++++++++++++++++++++++++++  Wilmer Weaver MD   [facility].  +++++++++++++++++++++++++++++++++++++++++++++++++  NOTE: This report was transcribed using voice recognition software. Every effort was made to ensure accuracy; however, inadvertent computerized transcription errors may be present.

## 2022-06-03 NOTE — CONSULTS
Department of [facility] Oncology  Attending Consult Note      Reason for Visit:   CVA in pregnancy, known protein S deficiency     Referring Physician:  Dr. Ted Lou    PCP:  Blanca Christina DO    History of Present Illness:  33year old female G3, P1, known to hematology for CVA during pregnancy with a protein S deficiency. She had presented to the ED on 4/17/2022 with severe headaches, 2 days prior she had slurred speech, and aphasia, which lasted for about 20 minutes, she then developed paresthesias in her right upper extremity. CT was done, revealing hypodensity within the left insular cortex with abnormal white matter hypodensity extending into the adjacent subinsular white matter and left corona radiator, findings may be suggestive of subacute infarction, subsequently she had brain MRI, MRV and MR a done, revealing acute infarct noted in the left insula/left temporal lobe/left periventricular white matter with associated thrombosis of the superior M2 branch of the left middle cerebral artery, and infarcts noted in the right corona radiata and left centrum semiovale, the time the patient was 8 weeks pregnant. She was started on baby aspirin, she had been sent for inherited thrombophilia, she does not have prothrombin gene mutation, PC resistance 3.54, protein C was 1 1 1%, protein S 25%, Antithrombin III 90%, repeat protein S was 27%, patient anticoagulant was negative, antibeta-2 glycoprotein antibodies were negative, initial testing for anticardiolipin antibodies was negative, repeat testing had revealed an IgM of 17. [place] was negative. Urine drug screen was positive for marijuana. She had a UTI that was treated with Keflex  She was DC'd home 4/19 on 81 mg aspirin and prophylactic lovenox. Her mother had a provoked PE in her 45s s/p hysterectomy. Patient is currently 15 weeks pregnant.     [facility] and was having stroke-like symptoms including numbness to her left cheek/jaw and arm and headache-she was sent to the ER. NIH score of 1 on arrival and she was not a tPA candidate. MRI was completed showing tiny foci of acute or early subacute infarction in the right frontal lobe, with single tiny foci each within the right lateral basal ganglia, external capsule and insular cortex. No hemorrhage, mass-effect or midline shift. She was sent to [facility] (1-) for further workup. MRV of the brain without contrast: Mild stenosis in the left lateral transverse sinus, which may be due to an arachnoid granulation. No evidence of dural venous thrombosis      Review of Systems;  CONSTITUTIONAL: Denies fever or chills  HEENT: Positive for numbness/pain to the jaw area. Denies diplopia, tinnitus, difficulty swallowing   Respiratory: Denies SOB, hemoptysis or cough  Cardiac; Denies chest pain or palpitation   GI Denies nausea or vomiting, denies hematochezia or melena   :  Denies dysuria or hematuria   Extremities: Denies swelling   Neuro: Positive for headache and numbness to left jaw. Denies numbness to extremities. Denies weakness. Intact sensation       Past Medical History:      Diagnosis Date    Back pain     CVA (cerebral vascular accident) ([facility] [address] 2022    Neck pain        Past Surgical History:      Procedure Laterality Date     SECTION      DILATION AND CURETTAGE      NERVE BLOCK Left 2020    LEFT CERVICAL MEDIAL BRANCH NERVE  BLOCK UNDER FLUOROSCOPIC GUIDANCE C3, C4, C5 AND C6 performed by Donnell Jung MD at [facility] OR       Family History:  Family History   Problem Relation Age of Onset    Asthma Father     Asthma Brother     Asthma Brother     No Known Problems Mother        Medications:  Reviewed and reconciled.     Social History:  Social History     Socioeconomic History    Marital status: Single     Spouse name: Not on file    Number of children: Not on file    Years of education: Not on file    Highest education level: Not on file   Occupational History    Not on file   Tobacco Use    Smoking status: Current Every Day Smoker     Packs/day: 0.50     Types: Cigarettes, Cigars    Smokeless tobacco: Never Used    Tobacco comment: black and mild   Vaping Use    Vaping Use: Never used   Substance and Sexual Activity    Alcohol use: No    Drug use: Yes     Types: Marijuana Norval Remak)    Sexual activity: Yes     Partners: Male   Other Topics Concern    Not on file   Social History Narrative    Not on file     Social Determinants of Health     Financial Resource Strain:     Difficulty of Paying Living Expenses: Not on file   Food Insecurity:     Worried About Running Out of Food in the Last Year: Not on file    Aileen of Food in the Last Year: Not on file   Transportation Needs:     Lack of Transportation (Medical): Not on file    Lack of Transportation (Non-Medical):  Not on file   Physical Activity:     Days of Exercise per Week: Not on file    Minutes of Exercise per Session: Not on file   Stress:     Feeling of Stress : Not on file   Social Connections:     Frequency of Communication with Friends and Family: Not on file    Frequency of Social Gatherings with Friends and Family: Not on file    Attends Oriental orthodox Services: Not on file    Active Member of Clubs or Organizations: Not on file    Attends Club or Organization Meetings: Not on file    Marital Status: Not on file   Intimate Partner Violence:     Fear of Current or Ex-Partner: Not on file    Emotionally Abused: Not on file    Physically Abused: Not on file    Sexually Abused: Not on file   Housing Stability:     Unable to Pay for Housing in the Last Year: Not on file    Number of [place] in the Last Year: Not on file    Unstable Housing in the Last Year: Not on file       Allergies:  No Known Allergies    Physical Exam:  /62   Pulse 82   Temp 97.9 °F (36.6 °C) (Oral)   Resp 16   Ht 5' 2\" (1.575 m)   Wt 159 lb (72.1 kg)   LMP 02/10/2022   SpO2 100%   BMI 29.08 kg/m² Physical Exam  General: No acute distress, A&O x3  HEENT: PERRL, EOMI, without mucositis   Neck: no lymphadenopathy   Cardiac: RRR, pulses intact  Respiratory: Clear to auscultation   Abdominal: soft nontender  Extremities: no clubbing or cyanosis   Skin: no rash or lesion   Neuro: full ROM, equal strength bilaterally, decreased sensation to left arm, intact sensation to lower extremities       ECOG PS 1    Impression/Plan:  33year old female G3, P1, known to hematology for stroke during pregnancy with a decrease protein S. She had presented to the ED on 4/17/2022 with severe headaches, 2 days prior she had slurred speech, and aphasia, which lasted for about 20 minutes, she then developed paresthesias in her right upper extremity. CT was done, revealing hypodensity within the left insular cortex with abnormal white matter hypodensity extending into the adjacent subinsular white matter and left corona radiator, findings may be suggestive of subacute infarction, subsequently she had brain MRI, MRV and MR a done, revealing acute infarct noted in the left insula/left temporal lobe/left periventricular white matter with associated thrombosis of the superior M2 branch of the left middle cerebral artery, and infarcts noted in the right corona radiata and left centrum semiovale, the time the patient was 8 weeks pregnant. She was started on baby aspirin, she had been sent for inherited thrombophilia, she does not have prothrombin gene mutation, PC resistance 3.54, protein C was 1 1 1%, protein S 25%, Antithrombin III 90%, repeat protein S was 27%, patient anticoagulant was negative, antibeta-2 glycoprotein antibodies were negative, initial testing for anticardiolipin antibodies was negative, repeat testing had revealed an IgM of 17. [place] was negative. Urine drug screen was positive for marijuana. She ahd a UTI that was treatedwith Keflex  She was DC home 4/19 on 81 mg aspirin and prophylactic lovenox.      Her mother had a provoked PE in her 45s s/p hysterectomy. Patient is currently 15 weeks pregnant. She was Dr. Mercy Gandhi office for follow up and was having stroke like symptoms including numbness to her left cheek/jaw and arm and headache-she was sent to the ER. NIH score of 1 on arrival and she was not a tPA candidate. MRI was completed showing tiny foci of acute or early subacute infarction in the right frontal lobe, with single tiny foci each within the right lateral basal ganglia, external capsule and insular cortex. No hemorrhage, mass-effect or midline shift. She was sent to [facility] ([facility]-) for further workup. MRV of the brain without contrast: Mild stenosis in the left lateral transverse sinus, which may be due to an arachnoid granulation. No evidence of dural venous thrombosis    -Neurology on board, d/w Dr Shari Meza, ordered therapeutic Lovenox (1mg/kg) due to second stroke in pregnancy despite prophylactic lovenox. HIT pending. Sed rate: 35, NOTCH3 pending   -Sickle cell screening negative  -Dr Dereck Obando for high risk fetal medicine consulted, Dr. Mercy Gandhi only rounds at [facility].   -I counseled the patient on smoking cessation. Thank you for allowing us to participate in the care of VAMSHI Trivedi - CNP      The patient was seen and examined, I agree with Teena PANG's  H&P, assessment and plan as outlined.      Madelene Soulier, MD   HEMATOLOGY/MEDICAL [facility]  [address] MED ONCOLOGY  [address] [place] [id number]  Dept: [address] [place]: [phone]

## 2022-06-06 LAB — HEPARIN PF4 ANTIBODY: 0.13 OD

## 2022-06-07 ENCOUNTER — TELEPHONE (OUTPATIENT)
Dept: OBGYN CLINIC | Age: 32
End: 2022-06-07

## 2022-06-08 ENCOUNTER — TELEPHONE (OUTPATIENT)
Dept: OBGYN | Age: 32
End: 2022-06-08

## 2022-06-08 ENCOUNTER — ROUTINE PRENATAL (OUTPATIENT)
Dept: OBGYN CLINIC | Age: 32
End: 2022-06-08
Payer: MEDICAID

## 2022-06-08 ENCOUNTER — ANCILLARY PROCEDURE (OUTPATIENT)
Dept: OBGYN CLINIC | Age: 32
End: 2022-06-08
Payer: MEDICAID

## 2022-06-08 VITALS
WEIGHT: 157.38 LBS | SYSTOLIC BLOOD PRESSURE: 111 MMHG | HEART RATE: 96 BPM | DIASTOLIC BLOOD PRESSURE: 74 MMHG | BODY MASS INDEX: 28.96 KG/M2 | HEIGHT: 62 IN

## 2022-06-08 DIAGNOSIS — I63.9 CEREBROVASCULAR ACCIDENT (CVA), UNSPECIFIED MECHANISM (HCC): ICD-10-CM

## 2022-06-08 DIAGNOSIS — E55.9 VITAMIN D DEFICIENCY: ICD-10-CM

## 2022-06-08 DIAGNOSIS — Z87.440 HISTORY OF UTI: ICD-10-CM

## 2022-06-08 DIAGNOSIS — R79.89 LOW VITAMIN D LEVEL: ICD-10-CM

## 2022-06-08 DIAGNOSIS — Z15.89 HETEROZYGOUS MTHFR MUTATION C677T: ICD-10-CM

## 2022-06-08 DIAGNOSIS — Z3A.16 16 WEEKS GESTATION OF PREGNANCY: Primary | ICD-10-CM

## 2022-06-08 DIAGNOSIS — Z86.718 HISTORY OF THROMBOSIS: ICD-10-CM

## 2022-06-08 DIAGNOSIS — D68.59 PROTEIN S DEFICIENCY AFFECTING PREGNANCY (HCC): ICD-10-CM

## 2022-06-08 DIAGNOSIS — O99.119 PROTEIN S DEFICIENCY AFFECTING PREGNANCY (HCC): ICD-10-CM

## 2022-06-08 LAB
GLUCOSE URINE, POC: NEGATIVE
PROTEIN UA: POSITIVE

## 2022-06-08 PROCEDURE — 81002 URINALYSIS NONAUTO W/O SCOPE: CPT | Performed by: OBSTETRICS & GYNECOLOGY

## 2022-06-08 PROCEDURE — G8419 CALC BMI OUT NRM PARAM NOF/U: HCPCS | Performed by: OBSTETRICS & GYNECOLOGY

## 2022-06-08 PROCEDURE — 99213 OFFICE O/P EST LOW 20 MIN: CPT | Performed by: OBSTETRICS & GYNECOLOGY

## 2022-06-08 PROCEDURE — 4004F PT TOBACCO SCREEN RCVD TLK: CPT | Performed by: OBSTETRICS & GYNECOLOGY

## 2022-06-08 PROCEDURE — 1111F DSCHRG MED/CURRENT MED MERGE: CPT | Performed by: OBSTETRICS & GYNECOLOGY

## 2022-06-08 PROCEDURE — 76805 OB US >/= 14 WKS SNGL FETUS: CPT | Performed by: OBSTETRICS & GYNECOLOGY

## 2022-06-08 PROCEDURE — G8427 DOCREV CUR MEDS BY ELIG CLIN: HCPCS | Performed by: OBSTETRICS & GYNECOLOGY

## 2022-06-08 PROCEDURE — 76817 TRANSVAGINAL US OBSTETRIC: CPT | Performed by: OBSTETRICS & GYNECOLOGY

## 2022-06-08 PROCEDURE — 99213 OFFICE O/P EST LOW 20 MIN: CPT

## 2022-06-08 PROCEDURE — 99215 OFFICE O/P EST HI 40 MIN: CPT | Performed by: OBSTETRICS & GYNECOLOGY

## 2022-06-08 NOTE — PATIENT INSTRUCTIONS
Please arrive for your scheduled appointment at least 15 minutes early with your actual insurance card+ a photo ID. Also if you need any refills ordered or have questions, it may take up 48 hours to reply. Please allow ample time for your refills. Call me when you use last refill. Thank you for your cooperation. Call your primary obstetrician with bleeding, leaking of fluid, abdominal tenderness, headache, blurry vision, epigastric pain and increased urinary frequency. Any questions contact Karyn Alberta at [phone]. If you are experiencing an emergency and need immediate help, call [phone] or go to go emergency room or labor and delivery. if you are sick, not feeling well or have an infectious process going on please reschedule your appointment by calling [phone]. Also if any family members are not feeling well, please do not bring them to your appointment. We appreciate your cooperation. We are doing this in order to protect our pregnant mothers+ their babies. Patient Education        Weeks 14 to 18 of Your Pregnancy: Care Instructions  Overview     During this time, you may start to \"show,\" so that you look pregnant to people around you. You may also notice some changes in your skin, such as itchy spotson your palms or acne on your face. Your baby is now able to pass urine. And your baby's first stool (meconium) is starting to collect in your baby's intestines. Hair is also starting to grow onyour baby's head. At your next visit, between weeks 18 and 20, your doctor may do an ultrasound test. The test allows your doctor to check for certain problems. Your doctor can also tell the sex of your baby. So this a good time to think about whetheryou want to know. Talk to your doctor about getting a flu shot to help keep you healthy duringyour pregnancy. As your pregnancy moves along, it's common to worry or feel anxious. Your body is changing a lot.  And you are thinking about giving birth, the health of your baby, and becoming a parent. You can talk to your doctor about any anxiety andstress you feel. Follow-up care is a key part of your treatment and safety. Be sure to make and go to all appointments, and call your doctor if you are having problems. It's also a good idea to know your test results and keep alist of the medicines you take. How can you care for yourself at home? Reduce stress     Ask for help with cooking and housekeeping.      Figure out who or what causes your stress. Avoid these people or situations as much as possible.      Relax every day. Taking 10- to 15-minute breaks can make a big difference. Take a walk, listen to music, or take a warm bath.      Learn relaxation techniques at prenatal or yoga class. Or buy a relaxation tape.      List your fears about having a baby and becoming a parent. Share the list with someone you trust. Decide which worries are really small, and try to let them go. Exercise     If you did not exercise much before pregnancy, start slowly. Walking is best. [employer], and do a little more every day.      Brisk walking, easy jogging, low-impact aerobics, water aerobics, and yoga are good choices. Some sports, such as scuba diving, horseback riding, downhill skiing, gymnastics, and water skiing, are not a good idea.      Try to do at least 2½ hours a week of moderate exercise, such as a fast walk. One way to do this is to be active 30 minutes a day, at least 5 days a week.      Wear loose clothing. And wear shoes and a bra that provide good support.      Warm up and cool down to start and finish your exercise.      If you want to use weights, be sure to use light weights. They reduce stress on your joints.    Stay at the best weight for you     Experts recommend that you gain about 1 pound a month during the first 3 months of your pregnancy.      Experts recommend that you gain about 1 pound a week during your last 6 months of pregnancy, for a total weight gain of 25 to 35 pounds.      If you are underweight, you will need to gain more weight (about 28 to 40 pounds).      If you are overweight, you may not need to gain as much weight (about 15 to 25 pounds).      If you are gaining weight too fast, use common sense. Exercise every day, and limit sweets, fast foods, and fats. Choose lean meats, fruits, and vegetables.      If you are having twins or more, your doctor may refer you to a dietitian. Where can you learn more? Go to https://chpepiceweb.[email or web address] org and sign in to your [employer] account. Enter U015 in the [employer] box to learn more about \"Weeks 14 to 18 of Your Pregnancy: Care Instructions. \"     If you do not have an account, please click on the \"Sign Up Now\" link. Current as of: June 16, 2021               Content Version: 13.2  © 6823-3036 [employer]. Care instructions adapted under license by [facility] ([facility]). If you have questions about a medical condition or this instruction, always ask your healthcare professional. [place] [address] any warranty or liability for your use of this information. Patient Education        Learning About When to Call Your Doctor During Pregnancy (Up to 20 Weeks)  Overview     It's common to have concerns about what might be a problem during your pregnancy. Most pregnancies don't have any serious problems. But it's stillimportant to know when to call your doctor if you have certain symptoms. These are general suggestions. Your doctor may give you some more informationabout when to call. When to call your doctor (up to 20 weeks)  Call 911 anytime you think you may need emergency care. For example, call if:   You passed out (lost consciousness). Call your doctor now or seek immediate medical care if:   You have a fever.  You have vaginal bleeding.  You are dizzy or lightheaded, or you feel like you may faint.  You have symptoms of a urinary tract infection.

## 2022-06-08 NOTE — TELEPHONE ENCOUNTER
CHW contacted patient to  Introduce her to Centering. Patient stated to CHW that her last appointment with DR. Senait Villalpando was back in 2022 and has not been seen in our office since, due to being hospitalize on 2022 and discharge on 6/3/2022. Patient agreed to participate in Cent[facility] GRP 64 S 1 on 2022 @ 1:30. Patient aslo stated she is seeing her high risk doctor (DR. Meeks)  And was advised to deliver at the [facility] By  . CHW  Schedule patient with Dr Franchesca Perry for a possible  Consult at 31 weeks .

## 2022-06-08 NOTE — LETTER
[facility] AT [facility] Maternal Fetal Medicine  [address] [facility] [address]  Phone: [phone]  Fax: [phone]           Zaynab Benson MD      2022     Patient: Ravin Omer   MR Number: [id number]   YOB: 1990   Date of Visit: 2022       Dear Dr. Michelle Blum: Thank you for referring Katty Petersen to me for evaluation/treatment. Below are the relevant portions of my assessment and plan of care. If you have questions, please do not hesitate to call me. I look forward to following Trinidad Jefferywell along with you. Sincerely,        Zaynab Benson MD     providers:  [facility] [address]  [facility]       2022      Belinda Brewster, [address],  [address] [place] [address]     RE:  Foster ARAUJO  : 1990   AGE: 33 y.o. This report has been created using voice recognition software. It may contain errors which are inherent in voice recognition technology. Dear Dr. Michelle Blum:      I had the pleasure of meeting with Ms. Mallory Eagle for a return consultation. As you know, Ms. Mallory Eagle  is a 33 y.o.  at [place] [address] (LMP = 9 wk US) who is being followed by our office for a history of a maternal stroke. Today, Ms. Mallory Eagle reports that she feels well. She denies any symptoms of leaking of fluid, vaginal bleeding, and/or contractions. She had a fetal ultrasound that was notable for the following. There is a single intrauterine gestation in a variable presentation with a heart rate of 157 beats per minute. The placenta is posterior. The amniotic fluid index is normal.  The composite gestational age is 17w2d. Transvaginal cervical length 4.2 cm without funneling. Multiple uterine fibroids are seen with the largest measuring 3.6 x 2.5 x 3.2 cm in the posterior wall of the uterus. This is posterior to the placenta.   A subchronic hemorrhage is again seen measuring 5.9 x 0.8 x 2.3 cm.      PERTINENT PHYSICAL EXAMINATION:   /74   Pulse 96   Ht 5' 2\" (1.575 m)   Wt 157 lb 6 oz (71.4 kg)   LMP 02/10/2022   BMI 28.78 kg/m²     Urine dipstick:   Negative for Glucose    Trace for Albumin      A fetal ultrasound assessment was performed today. A report is enclosed for your review. Assessment & Plan:  33 y.o.  at [facility] (LMP = [address]) with:    1. Pregnancy dating -- The patient's pregnancy dating was previously reviewed. The patient reports that was having monthly periods. She discontinued Depo-Provera in 2021. She reports a sure last menstrual period. Her reported LMP was 2/10/2022, GUERO 2022.     The patient's first ultrasound was 1822. The crown-rump length was 9 weeks 4 days, GUERO 2022.     Given the patient had a sure LMP that agreed with the 9-week ultrasound, the recommendation was made to use an GUERO of 2022 based on her LMP. Fetal growth was appropriate today.        2. History of stroke during pregnancy  -- The patient reports that she initially experienced symptoms of headache and aphasia in 2021. She did not seek medical care at this time.     The patient presented to the emergency department on 2022 with complaints of a severe headache that woke her up from sleep. She had a headache 4 days prior to her evaluation in the emergency department. Two days prior to her evaluation, she had slurred speech and aphasia which lasted for approximately 20 minutes and then resolved. The patient then developed paresthesias on her right arm.     A CT of the head was initially completed. Report stated, \" hypodensity within the left insular cortex with abnormal white matter hypodensity extending into the adjacent subinsular white matter and left corona radiata. Findings may be suggestive of subacute infarction. For further evaluation brain MRI is recommended. \"     An MRI, MRV, and MRA of the head with and completed.   Per the MRI report from 4/18/2022, acute infarct noted in left insula/left temporal lobe/left periventricular white matter with associated thrombosis of a superior M2 branch of the left middle cerebral artery. Chronic infarcts noted in the right corona radiata and left centrum semiovale. The MRV was unremarkable. No dural sinus thrombosis was noted.     The patient was diagnosed with a subacute CVA and admitted to the hospital.  She was also noted to have bacteriuria.     She was cared for by her primary care physician and she had a neurology consultation. A thrombophilia evaluation was completed. She was started on a low-dose aspirin and received Lovenox for DVT prophylaxis.     A maternal echocardiogram was completed on 4/19/2022. Per the report, the study was normal.     The patient remained stable and was discharged home on 4/19/2022. She was discharged home on Keflex and a low-dose aspirin. She was to follow-up with obstetrics and her primary care physician.     The patient's thrombophilia evaluation was completed on 4/18/2022, her results included: Homocystine 5, protein C functional 111%, protein S antigen free 25% (), Antithrombin activity 99%, APC resistance 3.54 (normal), lupus anticoagulant negative, beta-2 glycoprotein antibody negative, cardiolipin antibody negative, prothrombin 2 gene mutation negative. A screening PARIS was completed and negative.     A protein S antigen free was repeated on 4/19/2022 and again low at 27 (). Homocystine was repeated on 4/19/2022 and again normal at 7.8.     Factor VIII activity was normal at 134% and factor VII activity was normal at 133%. Anticardiolipin antibody was repeated on 4/19/2022 and IgM was indeterminate at 17. Repeat screening for lupus anticoagulant was negative on 4/19/2022. A D-dimer was checked on 4/19/2022 and normal.  Patient's urine drug screen was positive for marijuana.   The patient's urinalysis was abnormal.  The urine culture showed mixed jair including E. coli staph and Corynebacterium. No sensitivities were provided. The patient was seen for her initial consultation with [facility] on 5/9/2022. Counseling was provided and the recommendation was made for prophylactic anticoagulation with Lovenox, 40 mg daily. Both hematology and neurology were contacted regarding the patient and plan of care. The patient returned to the [facility] office on 5/31/2022 for a follow-up consultation. Prior to her visit, she reported to the nurse that she had recurrent strokelike symptoms including left-sided numbness and a headache. She was immediately taken to the emergency department for evaluation. She was readmitted to the hospital on 5/31/2022 with an acute CVA. A CT of the head was repeated on 5/31/2022. Per the impression, there was left insular encephalomalacia at the site of prior infarct. It was an unremarkable CTA of the head and neck. Specifically, there was no evidence of residual thrombus in the left middle cerebral artery. The patient then had an MRI/MRV/MRA of the head without contrast.  Per the impression on the report, tiny foci of acute or early subacute infarction were seen in the right frontal lobe, with single tiny foci each within the right lateral basal ganglia, external capsule, and insular cortex. Mild stenosis was noted in the left lateral transverse sinus, which may be due to an arachnoid granulation. There is no evidence of dural venous thrombosis. She was evaluated by neurology on 6/2/2022. The recommendation was made for therapeutic anticoagulation with Lovenox. She was also evaluated by hematology. She was discharged home on 6/3/2022. The patient returns the office today at 16 weeks and 6 days for follow-up. She reports that she feels well. She still has intermittent headache symptoms that she rates as a 6 out of 10. She reports that she is tolerating the therapeutic Lovenox well.     The patient requested a referral for additional evaluation in [facility]. The patient was counseled that this would be reasonable given her complicated pregnancy. Additionally, it may be in the patient's best interest to plan for delivery at a center in which neurology and/or neurosurgery would be available in the event the patient had any complications during delivery or postpartum. The patient was agreeable with the plan. She requested a referral to the [facility] clinic for additional evaluation and management. A referral was provided. The patient's neurologist, Dr. Evelin Prater, was also contacted regarding the patient. He agreed with the plan outlined above.     Counseling was previously provided to the patient. Counseling was reviewed. The patient did not have any additional questions or concerns. Pregnancy and the puerperium (postpartum period) are well-established risk factors for thromboembolism. Normal pregnancy is accompanied by an increase in clotting factors. The resulting hypercoagulable state likely evolved to protect women from hemorrhage in the event of a miscarriage and during childbirth. Thromboembolic disease during pregnancy and the puerperium is a significant cause of maternal morbidity and mortality. During pregnancy, the risk of venous thromboembolism increases 4-5 fold and the risk of arterial thromboembolism, myocardial infarction, and stroke increases 3-4 fold compared to the nonpregnant state. Postpartum, the risk of venous thromboembolism is 20 fold higher and the risk of arterial thromboembolism is similarly elevated compared to nonpregnant individuals. Venous events are more common during pregnancy accounting for 4 out of 5 thromboembolic events. However, the risk of death is higher following an arterial thrombosis. Approximately 80% of venous thromboembolic events are deep venous thromboses and approximately 20% of pulmonary emboli.   The most important risk factor for thrombosis during pregnancy is a history of thrombosis. The risk for a thrombotic event is further increased in women with an underlying acquired or inherited thrombophilia.        Most studies have reported and equal distribution of thrombosis risk across all trimesters of pregnancy however, 2 large conflicting studies have reported a first trimester (50% prior to 15 weeks) and third trimester (60%) predominance. Additional risk factors for thrombosis during pregnancy include multifetal gestation, varicose veins, inflammatory bowel disease, urinary tract infection, diabetes, hospitalization, obesity, and maternal age greater than 28 years. Compared to the antepartum period, the thrombosis risk is 2-5 times higher during the postpartum period. This risk is highest during the first 6 weeks postpartum and declines to prepregnancy rates by 13-18 weeks postpartum. Risk factors for postpartum thrombosis include  section, medical co morbidities, obesity,  delivery, hemorrhage, fetal demise, advanced maternal age, hypertensive disorders of pregnancy, tobacco use, and infection.     The patient was counseled to continue treatment with therapeutic Lovenox and a daily low-dose aspirin.     Therapeutic anticoagulation should be continued throughout the pregnancy and for at least 6-8 weeks postpartum. Lovenox can be initiated 12 hours following a vaginal delivery and 24 hours following a  section (if there is no ongoing concern for bleeding).    The risks and benefits of therapeutic anticoagulation in pregnancy were reviewed including the development of heparin-induced thrombocytopenia (HIT), osteopenia, bleeding, and poor fetal growth.       · An anesthesia consultation is also recommended in the third trimester given she is on anticoagulation. · A hematology consult was recommended. A referral was provided  · A neurology consult was recommended. A referral was provided.   · The patient was referred to the [facility] clinic for additional maternal and fetal evaluation. · The patient should avoid estrogen containing birth control postpartum.     Increased fetal surveillance is also recommended. Fetal growth should be monitored serially, every 3 to 4 weeks starting at 24 to 26 weeks gestation. The Patient should monitor fetal kick counts daily starting at 28 weeks gestation. She should be scheduled for twice-weekly fetal testing starting at 32 weeks gestation. Delivery is recommended at/by 39 weeks gestation.     Counseling was provided regarding the radiation exposure from the CT scan. Increased risks associated with radiation exposure during pregnancy include that of early pregnancy loss, fetal malformations, poor fetal growth, and an increase for childhood cancers (increased from 1/2800 to 1/2000). Generally, these risks are not significantly increased unless the radiation exposure exceeds 50 mGy (5 rads). The estimated radiation exposure associated with a head CT is 1-10 mGy (0.1-1 rads).      The International Commission of Radiologic Protection suggests that the radiation doses delivered in utero by imaging tests (such as those performed in the diagnosis of PE) present no measurable increased risk of fetal death or developmental abnormalities over the background incidence of these entities. The [employer] of Radiation Protection and Measurements considers the risk of radiation-associated abnormalities to be negligible, at less than 50 mGy when compared with other risks of pregnancy.     Per available data, diagnostic imaging studies that expose the fetus to less than 0.05 Gray (50 mGy, 5 rads), do not appear to increase the risk for fetal anomalies, intellectual disability, growth restriction, or pregnancy loss. With exposure to more than 0.05 Gy (50 mGy, 5 rads), a definitive threshold at which the risk for, locations increases has not been determined.   Evidence suggests the risks begin to increase at doses above 0.1 Gy (100 mGy, 10 rads) and especially above 0.15-0.2 Gy (150-200 mGy, 15-20 rads).      3.  Genetic counseling -- The patient was previously counseled regarding her options for genetic screening and/or diagnostic testing. She opted for screening with NIPT. Screening was completed on 2022. Her results were low risk for aneuploidy. The reported fetal fraction was 28% the reported fetal sex was female.     The patient also opted for a [employer] 14 panel. Screening was completed on 2022. The results were reviewed with the patient. She is a silent carrier for alpha thalassemia. Additional counseling was provided, please see below. Screening was otherwise negative for cystic fibrosis, spinal muscular atrophy, and Fragile X.     The patient was also counseled regarding the recommendation for maternal serum AFP to screen for open neural tube defects. Risks and benefits of screening were reviewed. Screening is recommended at 15 to 22 weeks gestation. Testing was ordered today.     4.  History of  X1 -- The patient's first pregnancy was delivered via  secondary to arrest of dilation and nonreassuring fetal heart tones. The placenta is posterior. She plans to have a repeat  for delivery.      5.  Subchronic hemorrhage -- The ultrasound images were reviewed with patient. A subchronic hemorrhage was seen measuring 5.9 x 0.8 x 2.3 cm. The size is somewhat increased compared to prior imaging. Previously, at 12 weeks 4 days, the subchorionic hemorrhage measured 2.6 x 1.4 x 0.9 cm. No active bleeding was noted. The patient denied having any vaginal bleeding or cramping. She is noted to be Rh+.     Counseling was previously provided to the patient. Counseling was reviewed. The patient did not have any additional questions or concerns.   A subchorionic hemorrhage can be associated with an increased risk for bleeding, infection, early pregnancy loss, poor fetal growth,  premature rupture of membranes,  labor and delivery, and stillbirth. Because of the increased risk for complications, close follow-up is recommended.     Activity restrictions were again reviewed. The patient was counseled to avoid heavy lifting, prolonged standing, strenuous exercise, and sexual intercourse.       The patient was scheduled to return in 3-4 weeks for a follow-up visit.     Bleeding precautions were reviewed.     6.  Uterine fibroids -- The ultrasound images were reviewed with the patient. Multiple uterine fibroids were noted. The largest fibroid seen was in the posterior wall of the uterus and it measured 3.6 x 2.5 x 3.2 cm. It was posterior to the placenta. The size and appearance are stable compared to previous imaging. The patient denied any symptoms of fevers, chills, and/or abdominal pain. Counseling was previously provided to the patient. Counseling was reviewed.   The patient did not have any additional questions or concerns.     Fibroids are common in women of reproductive age. [employer] studies that monitored the growth of fibroids during pregnancy have noted that the majority of uterine fibroids have less than 10% change in volume across gestation. [facility] of the growth usually occurs in the first trimester.  Fibroids larger than 5 cms are more likely to grow, where as smaller fibroids most likely remain stable in size.  As you know, there is a higher chance of antepartum bleeding with retroplacental fibroids and a small increase in  birth.  Specifically, if placentation occurs adjacent to or overlying a fibroid.  Submucosal and retroplacental fibroids with volumes more than 200 ml (corresponding to 7 cm in diameter) have the highest risk of abruption.  Pain is another common complication of fibroid in pregnancy.  It is especially high in fibroids greater than 5 cm in diameter.  Patients with degenerating fibroids tend to have localized pain, mild leukocytosis, pyrexia and nausea.  This may result from decreased perfusion in the setting of rapid growth leading to ischemia.  Some studies noted increased incidence of malpresentation if the uterus has multiple fibroids.   Large retroplacental fibroids that distort the uterine cavity may be associated with adverse pregnancy event including  fetal growth restriction and  labor.       Overall, the risks associated with uterine fibroids in pregnancy include malpresentation, lower uterine segment obstruction, degeneration, poor fetal growth, ,  birth, and antepartum/postpartum bleeding.     The size and appearance of the fibroid(s) should be reevaluated on follow-up ultrasound.       A screening cervical length  was completed today and normal at 4.2 cm without funneling.     Fetal growth should be monitored serially, every 3 to 4 weeks beginning at 24 to 26 weeks gestation. Precautions were reviewed with the patient.     7. Tobacco use in pregnancy -- The patient reports she is still smoking 3-4 black and milds daily. She was again counseled regarding the increased risks of smoking in pregnancy including poor fetal growth, placental abruption, PPROM/ birth, and fetal loss. Additional  risks were again reviewed including asthma, allergies, infection, and an association with SIDS. Various techniques for cutting back and quitting were again reviewed. She was again urged to quit smoking tobacco.     She was counseled that smoking cessation is especially important in her case given her recurrent thrombotic events. She expressed verbal understanding of this counseling.     8. THC Use --  The patient previously reported that she was smoking marijuana daily. She reports that she was using marijuana for decreased appetite. The patient reports that she has stopped smoking marijuana. Counseling was previously provided to the patient. Counseling was reviewed today.   The patient did not have any additional questions or concerns.     She was counseled regarding risk of neurodevelopmental issues in children exposed to [facility] during pregnancy. Additionally, marijuana use may pose risks similar to that of cigarette use including increased risk for poor fetal growth, placental bleeding, PPROM/ delivery, and stillbirth. She was counseled not to use THC while pregnant. Random urine drug screens should be monitored during pregnancy.     9.  First/second trimester alcohol exposure -- The patient previously reported that she drinks wine occasionally. She reports only taking sips of wine. Today, the patient reports that she has stopped drinking alcohol. Counseling was previously provided to the patient. Counseling was reviewed. The patient did not have any additional questions or concerns.     The patient was again counseled that a safe level of alcohol consumption during pregnancy has not been determined. Determination of the impact of alcohol use during pregnancy on fetal development can be challenging because of variation in maternal alcohol clearance rates, fetal sensitivity, genetic susceptibility, maternal drinking patterns (binge drinking versus daily consumption), and confounders such a substance abuse. The patient was counseled that alcohol is a teratogen that can impact fetal growth and development at all stages during pregnancy. Currently, national guidelines and multiple medical societies recommend abstinence during pregnancy.     In one large epidemiologic study, an increased rate of stillbirth was noted across all categories of alcohol intake, even after adjusting for confounders (eg, smoking, pre-pregnancy weight). There is also an increased risk for structural anomalies (craniofacial, cardiac), poor fetal growth, decreased IQ, and neurodevelopmental issues in childhood.   Factors such as older age, increased parity, and  and  ethnicities are associated with an increased risk for FAS.    Secondary to the increased risk for poor growth, fetal growth should be monitored serially, every 3 to 4 weeks starting at 24 to 26 weeks gestation.     Secondary to the increased risk for congenital heart anomalies, a fetal echocardiogram is recommended at 22 to 24 weeks gestation.     10. Right ovarian mass/cyst -- The ultrasound images were reviewed with the patient. The right ovarian mass previously described was not seen today. At 12 weeks 4 days, A hyperechoic mass was seen in the right ovary measuring 1.2 x 1 x 0.9 cm. No fluid was seen surrounding the area. Patient was counseled that this may represent a hemorrhagic cyst versus teratoma versus a calcification, although no shadowing was seen. The size and appearance of the ovarian mass will be reevaluated on follow-up ultrasound. The adnexa will be reevaluated on follow-up exam.     11. Nausea and vomiting of pregnancy  -- The patients previously reported having daily symptoms of nausea and decreased appetite. She is occasional emesis. She has lost 4 pounds since her last visit. She had been using marijuana to help with her appetite, but she stopped. She denies any signs of symptoms of dehydration. Precautions were reviewed. Today, the patient reports that her symptoms are stable. She denied having any signs or symptoms of dehydration.     The patient was again encouraged to eat small amounts of food every 2-3 hours during the day. She was also encouraged to stay well-hydrated.     A baseline nutrition panel (CBC, CMP, magnesium, ferritin, folate, vitamin B12, vitamin D 25 OH) was recommended. Baseline testing was completed on 5/13/2022, her results included: A CBC was ordered but not done by the lab. A CBC was completed on 5/31/22, H/H 11.9/35.8, MCV 88.6, ,000.   The remainder of the test results are from 5/13/2022 and included: Potassium 3.7, creatinine 0.7, calcium 9, ALT 7, AST 13, magnesium 2, uric acid 3, , ferritin 67, folate 19.8, vitamin B12 423, vitamin D 9, TSH 0.876, free T4 1.16, free T3 3, TPO negative, antithyroglobulin antibody negative, urinalysis negative, urine culture mixed, urine protein creatinine ratio 0.1, beta-2 lipoprotein antibody negative, anticardiolipin antibody negative, protein S antigen free, 28 (), protein S antigen total 64 (%). --Additional recommendations are below.     The patient was counseled to continue taking vitamin B6, 25 mg every 8 hours. She was counseled to call and/or return with worsening symptoms. Again, with persistent/worsening symptoms, I would recommend the addition of Pepcid, 20 mg twice daily and or Zofran.     12. History of bacteruria -- The patient's hospital admission in April was reviewed. Her urinalysis was positive for Nitrites, leukocyte esterase, and bacteria. She was treated with Keflex. Her urine culture showed E. Coli and mixed gram-positive organisms including staph and Corynebacterium. She had a repeat urine culture on 5/5/2022 that was negative. She denied any signs or symptoms of recurrent infection. Precautions were reviewed. A repeat urine culture was completed on 5/13/2022 and showed less than 10,000 CFU per mL of mixed gram-positive organisms.     13. Anticardiolipin antibody IgM, indeterminate-- Antiphospholipid antibody screening was done secondary to the patient's CVA diagnosed in April 2022. Initial screening completed on 4/18/2022 as part of the thrombophilia panel was negative. Screening was repeated on 4/19/2022. Her anticardiolipin IgM antibody was indeterminate at 17 on 4/19/2022. Screening for lupus anticoagulant and beta-2 glycoprotein antibody was negative. The patient was counseled that this may be a false elevation, generally, the titers have to be greater than 20 for a true positive.   The recommendation was made for the patient to continue taking a low dose aspirin, 81 mg daily. She should continue this for the remainder of pregnancy and 6 to 8 weeks postpartum. Repeat testing was completed on 5/13/2022 and negative. The results were reviewed with the patient.     14. Protein S deficiency -- The patient's labs were reviewed. Patient initially had a thrombophilia panel on 4/18/2022. Her protein S, antigen, free was 25% (). Testing was repeated on 4/19/2022. Her protein S, antigen, free at that time was 27% (). The patient would have been 9 weeks at the time of the testing. She was being evaluated for a recent thrombotic stroke. Testing is not considered reliable during acute thrombosis or pregnancy.     Protein S decreases during pregnancy. However, protein S is generally considered deficient if <30% in the second trimester and/or <24% in the third trimester.     Again, the patient had testing in the first trimester and her protein S levels were significantly decreased.     It is unclear at this time if the patient truly has a protein S deficiency. This will not be able to be determined until the patient is postpartum.       The patient was counseled that a protein S deficiency is rare, and seen in 0.03-0.13% of the population. It is considered a lower risk thrombophilia. If someone does have a deficiency, then the risk for a thrombosis during pregnancy is estimated to be 0.1% (w/negative personal history). It is 0-22% with a personal history of thrombosis     Given the patient's recurrent, the recommendation was for therapeutic anticoagulation with Lovenox. Please see above.     Repeat testing was ordered today.     A hematology consultation was previously recommended. The patient is following with hematology.     Repeat testing is recommended at 6 to 8 weeks postpartum. She should not be on an oral contraceptive pill at the time of repeat testing.   The patient should avoid birth control containing estrogen.     The patient was counseled to continue taking a daily low-dose aspirin. She should continue a daily low-dose aspirin for the remainder of pregnancy and 6 to 8 weeks postpartum. Given her history of a thrombotic stroke, the recommendation was also made for anticoagulation with prophylactic Lovenox. The plan was reviewed with neurology and hematology, see above. A prescription was provided following patient's consultation. She was scheduled to return for injection teaching.     Increased fetal surveillance is recommended. Fetal growth     Serially, every 3 to 4 weeks starting at 24 to 26 weeks gestation. The patient should monitor fetal kick counts daily starting at 28 weeks gestation. She should be scheduled for twice-weekly fetal testing starting at 32 weeks gestation. Delivery is recommended at/by 39 weeks gestation.     15. Vaccination in pregnancy -- The patient was  previously counseled regarding the recommendations for vaccination in pregnancy. Counseling was reviewed. The patient did not have any additional questions or concerns.     The patient was counseled regarding the recommendations for the Tdap vaccination in pregnancy. Risks and benefits were discussed. The vaccination is typically administered between 27 and 36 weeks gestation and recommended to confer protection against whooping cough to the . The patient plans to discuss this with her primary provider at her next visit. The patient was also counseled that her partner in any individuals who will be in close contact with the  should also be vaccinated for whooping cough.     The patient was counseled regarding recommendation for the flu vaccination in pregnancy for both maternal and infant safety. Risks and benefits were reviewed. Counseling was provided regarding the recommendation for the Covid vaccination in pregnancy.   I advised the patient that the [employer] of [employer]stetrics and Gynecology and The Society for Maternal Fetal Medicine recommend that all pregnant women be vaccinated for COVID-19. \"Data have shown that COVID-19 infection puts pregnant people at increased risk of severe complications and even death; yet only about 22% of pregnant individuals have received 1 more doses of COVID-19 vaccine according to the [employer] for Disease Control and Prevention. \"     \" Recent data have shown that more than 95% of those were hospitalized and/or dying from COVID-19 are those who have remained unvaccinated. Pregnant individuals who have decided to wait until after delivery to be vaccinated may be inadvertently exposing themselves to an increased risk of severe illness or death. \"      \" COVID-19 vaccination is the best testing to reduce maternal and fetal complications of [id number]-57 infection among pregnant people,\" said Julian Kinsey MD, president of the Society for Maternal Fetal Medicine, sub-specialists.     The patient was counseled that in the event she opts not to have the Covid vaccination, she should alert her provider immediately if she test positive for Covid. Pregnant women are on the priority list for treatment with monoclonal antibody. This intervention has been shown to decrease the risk for hospitalization and complications related to Covid in pregnancy.     The patient expressed verbal understanding of this counseling. 16.  Vitamin D deficiency -- The patient's vitamin D was deficient at 9  --Recommend vitamin D3 2000 IU daily  --Monitor nutrition panel q4-6 weeks (CBC, CMP, magnesium, ferritin, folate, vitamin B12, vitamin D25OH)  --Repeat testing ordered today  --Follow up with PCP for long term monitoring and management    17. 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 She was noted to be heterozygote for the MTHFR c.665C>T variant (previously designated as C677T).   Counseling was provided today regarding the implications and management of this gene mutation in pregnancy. She was counseled that this gene mutation affects folic acid metabolism. It is fairly common and found in 20-30% of the population. It is generally only a problem if homocysteine levels are elevated. In the past, this gene mutation was thought to be associated with increased obstetric risks including thrombosis, early recurrent pregnancy loss, placental abruption, hypertensive disorders of pregnancy, poor fetal growth, and fetal loss. More recent studies did not report strong associations with these risks. Because this genetic mutation affects folic acid metabolism, there is an increased risk for folic acid deficiency and other nutritional deficiencies in women with this condition. There is also an increased risk for having a child with an open neural tube defect in women with this mutation, especially if they're homozygous for the C677T mutation. Presently, this condition is not thought to be clinically significant. Generally, additional vitamin supplementation is recommended with folic acid or methyl folate, vitamin B6, and vitamin B12. The patient was counseled to take a low-dose aspirin. Fetal growth should be followed serially. She was counseled that there is a 50% chance that she will pass this mutation off to her child(sandra). She should relay this information to the pediatrician who cares for her child(sandra). She was also encouraged to review this diagnosis with her PCP. Because of this history, a baseline nutrition panel and homocysteine level were recommended. She was provided with orders for testing. 25.  Silent carrier for alpha thalassemia -- The patient's records were reviewed. She is noted to be a silent carrier for alpha thalassemia. Counseling was provided with the patient. --Alpha thalassemia minima is another term for this condition. This condition results from the loss of a single alpha-chain gene (ie a-/aa).   Blood tests are usually normal, though the red cells may be small, leading to a suspicion that the patient is a silent carrier. The only way to confirm a silent carrier is by DNA studies. This is usually a benign carrier state. Affected individuals are not typically anemic and their hemoglobin analysis is normal. These conditions may remain undiagnosed, or they may be detected incidentally on a routine complete blood count during evaluation of an unrelated condition or in the setting of preconception testing and counseling. The patient's partner has not had testing for this condition. Carrier screening is recommended. The patient plans to discuss this with her partner. If the patient's partner is not a carrier for alpha thalassemia, then there is a 50% chance that her child/children will also be a carrier for alpha thalassemia minima. If her partner is also a carrier of alpha thalassemia, there is an increased risk for having an affected child. Without having her partner tested, definitive counseling cannot be provided. This information should be relayed to pediatrics. I would defer any additional  evaluation to pediatrics. 19.   Migraine headaches -- The patient reports having continued headache symptoms since discharge from the hospital.  The headaches are a 6 out of 10 at the worst.  She reports having daily headaches. She has been using tylenol with some relief. She reports having associated symptoms of photosensitivity and phono sensitivity. The patient was counseled that migraine headaches can improve during pregnancy. However, in some patients symptoms are exacerbated. She was counseled regarding the use of magnesium oxide 400 mg twice daily and riboflavin 100 mg daily in reducing the frequency and intensity of migraine headaches. Prescriptions were provided.      Precautions were reviewed, and she was counseled that if she develops the worst headache of her life or a headache with neurological deficits, to present immediately for evaluation. She expressed verbal understanding of this counseling. Because of the patient's headache symptoms, a baseline nutrition panel and thyroid function testing was recommended. Baseline testing was completed on 5/13/2022. The results are summarized above. --I requested the patient return for a follow-up assessment in 3 weeks unless there is a clinical reason for her to return prior to that time. She is to call if she has any problems or questions prior to her next visit. Further evaluation and management will be dependent on her clinical presentation and the results of her testing. --The patient was advised to call if she has any increased vaginal discharge, vaginal bleeding, contractions, abdominal pain, back pain or any new significant symptomatology prior to her next visit. I advised her that these are signs and symptoms of cervical change and require follow-up assessment when they occur. Preeclampsia precautions were also reviewed with the patient. --The patient was also counseled to call and/or return with any concerns for decreased fetal activity. --The patient is to continue to follow with you in your office for ongoing obstetric care. --The total time spent on today's visit was 45 minutes. This included preparation for the visit (i.e. reviewing prior external notes and test results), performance of a medically appropriate history and examination, counseling, orders for medications, tests or other procedures, and coordination of care. Greater than 50% of the time was spent face-to-face with the patient. This time is exclusive of procedures performed. I answered all of  the patient's questions to her satisfaction. I asked her to call if she had any additional questions prior to her next visit. --At the conclusion of the visit, the patient appeared to have a good understanding of the issues discussed.   I answered all of her questions to her satisfaction. I asked her to call if she had any additional questions prior to her next visit. --Thank you for allowing me to participate in the care of this pleasant patient. Please don't hesitate to call me if you have any questions. Sincerely,      Shelly Weiss MD, [address]  [phone]      *All or parts of this note may have been generated using a voice recognition program. There may be typo, grammar, or Word substitution errors that have escaped my review of this note.

## 2022-06-08 NOTE — PROGRESS NOTES
Pt here for follow up appt  Pt went home from hospital on Friday due to having stroke like symptoms  Pt denies any bleeding/cramping  Pt is c/o headaches and asking about prescription magnesium

## 2022-06-08 NOTE — PROGRESS NOTES
2022      [address] [place], [address]  [facility],   [facility]     RE:  Chelsy ARAUJO  : 1990   AGE: 33 y.o. This report has been created using voice recognition software. It may contain errors which are inherent in voice recognition technology. Dear Dr. Shanta Montoya:      I had the pleasure of meeting with Ms. Mansoor Zepeda for a return consultation. As you know, Ms. Mansoor Zepeda  is a 33 y.o.  at [place] [address] (LMP = 9 wk US) who is being followed by our office for a history of a maternal stroke. Today, Ms. Mansoor Zepeda reports that she feels well. She denies any symptoms of leaking of fluid, vaginal bleeding, and/or contractions. She had a fetal ultrasound that was notable for the following. There is a single intrauterine gestation in a variable presentation with a heart rate of 157 beats per minute. The placenta is posterior. The amniotic fluid index is normal.  The composite gestational age is 17w2d. Transvaginal cervical length 4.2 cm without funneling. Multiple uterine fibroids are seen with the largest measuring 3.6 x 2.5 x 3.2 cm in the posterior wall of the uterus. This is posterior to the placenta. A subchronic hemorrhage is again seen measuring 5.9 x 0.8 x 2.3 cm. PERTINENT PHYSICAL EXAMINATION:   /74   Pulse 96   Ht 5' 2\" (1.575 m)   Wt 157 lb 6 oz (71.4 kg)   LMP 02/10/2022   BMI 28.78 kg/m²     Urine dipstick:   Negative for Glucose    Trace for Albumin      A fetal ultrasound assessment was performed today. A report is enclosed for your review. Assessment & Plan:  33 y.o.  at [place] [address] (LMP = [address]) with:    1. Pregnancy dating -- The patient's pregnancy dating was previously reviewed. The patient reports that was having monthly periods. She discontinued Depo-Provera in 2021. She reports a sure last menstrual period. Her reported LMP was 2/10/2022, GUERO 2022.     The patient's first ultrasound was 1822.   The crown-rump length was 9 weeks 4 days, GUERO 11/17/2022.     Given the patient had a sure LMP that agreed with the 9-week ultrasound, the recommendation was made to use an GUERO of 11/17/2022 based on her LMP. Fetal growth was appropriate today.        2. History of stroke during pregnancy  -- The patient reports that she initially experienced symptoms of headache and aphasia in November 2021. She did not seek medical care at this time.     The patient presented to the emergency department on April 17, 2022 with complaints of a severe headache that woke her up from sleep. She had a headache 4 days prior to her evaluation in the emergency department. Two days prior to her evaluation, she had slurred speech and aphasia which lasted for approximately 20 minutes and then resolved. The patient then developed paresthesias on her right arm.     A CT of the head was initially completed. Report stated, \" hypodensity within the left insular cortex with abnormal white matter hypodensity extending into the adjacent subinsular white matter and left corona radiata. Findings may be suggestive of subacute infarction. For further evaluation brain MRI is recommended. \"     An MRI, MRV, and MRA of the head with and completed. Per the MRI report from 4/18/2022, acute infarct noted in left insula/left temporal lobe/left periventricular white matter with associated thrombosis of a superior M2 branch of the left middle cerebral artery. Chronic infarcts noted in the right corona radiata and left centrum semiovale. The MRV was unremarkable. No dural sinus thrombosis was noted.     The patient was diagnosed with a subacute CVA and admitted to the hospital.  She was also noted to have bacteriuria.     She was cared for by her primary care physician and she had a neurology consultation. A thrombophilia evaluation was completed.   She was started on a low-dose aspirin and received Lovenox for DVT prophylaxis.     A maternal echocardiogram was completed on 4/19/2022. Per the report, the study was normal.     The patient remained stable and was discharged home on 4/19/2022. She was discharged home on Keflex and a low-dose aspirin. She was to follow-up with obstetrics and her primary care physician.     The patient's thrombophilia evaluation was completed on 4/18/2022, her results included: Homocystine 5, protein C functional 111%, protein S antigen free 25% (), Antithrombin activity 99%, APC resistance 3.54 (normal), lupus anticoagulant negative, beta-2 glycoprotein antibody negative, cardiolipin antibody negative, prothrombin 2 gene mutation negative. A screening PARIS was completed and negative.     A protein S antigen free was repeated on 4/19/2022 and again low at 27 (). Homocystine was repeated on 4/19/2022 and again normal at 7.8.     Factor VIII activity was normal at 134% and factor VII activity was normal at 133%. Anticardiolipin antibody was repeated on 4/19/2022 and IgM was indeterminate at 17. Repeat screening for lupus anticoagulant was negative on 4/19/2022. A D-dimer was checked on 4/19/2022 and normal.  Patient's urine drug screen was positive for marijuana. The patient's urinalysis was abnormal.  The urine culture showed mixed jair including E. coli staph and Corynebacterium. No sensitivities were provided. The patient was seen for her initial consultation with [facility] on 5/9/2022. Counseling was provided and the recommendation was made for prophylactic anticoagulation with Lovenox, 40 mg daily. Both hematology and neurology were contacted regarding the patient and plan of care. The patient returned to the [facility] office on 5/31/2022 for a follow-up consultation. Prior to her visit, she reported to the nurse that she had recurrent strokelike symptoms including left-sided numbness and a headache. She was immediately taken to the emergency department for evaluation.   She was readmitted to the hospital on 5/31/2022 with an acute CVA.      A CT of the head was repeated on 5/31/2022. Per the impression, there was left insular encephalomalacia at the site of prior infarct. It was an unremarkable CTA of the head and neck. Specifically, there was no evidence of residual thrombus in the left middle cerebral artery. The patient then had an MRI/MRV/MRA of the head without contrast.  Per the impression on the report, tiny foci of acute or early subacute infarction were seen in the right frontal lobe, with single tiny foci each within the right lateral basal ganglia, external capsule, and insular cortex. Mild stenosis was noted in the left lateral transverse sinus, which may be due to an arachnoid granulation. There is no evidence of dural venous thrombosis. She was evaluated by neurology on 6/2/2022. The recommendation was made for therapeutic anticoagulation with Lovenox. She was also evaluated by hematology. She was discharged home on 6/3/2022. The patient returns the office today at 16 weeks and 6 days for follow-up. She reports that she feels well. She still has intermittent headache symptoms that she rates as a 6 out of 10. She reports that she is tolerating the therapeutic Lovenox well. The patient requested a referral for additional evaluation in [place]. The patient was counseled that this would be reasonable given her complicated pregnancy. Additionally, it may be in the patient's best interest to plan for delivery at a center in which neurology and/or neurosurgery would be available in the event the patient had any complications during delivery or postpartum. The patient was agreeable with the plan. She requested a referral to the [place] clinic for additional evaluation and management. A referral was provided. The patient's neurologist, Dr. Marija Cantu, was also contacted regarding the patient. He agreed with the plan outlined above.     Counseling was previously provided to the patient.    Counseling was reviewed. The patient did not have any additional questions or concerns. Pregnancy and the puerperium (postpartum period) are well-established risk factors for thromboembolism. Normal pregnancy is accompanied by an increase in clotting factors. The resulting hypercoagulable state likely evolved to protect women from hemorrhage in the event of a miscarriage and during childbirth. Thromboembolic disease during pregnancy and the puerperium is a significant cause of maternal morbidity and mortality. During pregnancy, the risk of venous thromboembolism increases 4-5 fold and the risk of arterial thromboembolism, myocardial infarction, and stroke increases 3-4 fold compared to the nonpregnant state. Postpartum, the risk of venous thromboembolism is 20 fold higher and the risk of arterial thromboembolism is similarly elevated compared to nonpregnant individuals. Venous events are more common during pregnancy accounting for 4 out of 5 thromboembolic events. However, the risk of death is higher following an arterial thrombosis. Approximately 80% of venous thromboembolic events are deep venous thromboses and approximately 20% of pulmonary emboli. The most important risk factor for thrombosis during pregnancy is a history of thrombosis. The risk for a thrombotic event is further increased in women with an underlying acquired or inherited thrombophilia.        Most studies have reported and equal distribution of thrombosis risk across all trimesters of pregnancy however, 2 large conflicting studies have reported a first trimester (50% prior to 15 weeks) and third trimester (60%) predominance. Additional risk factors for thrombosis during pregnancy include multifetal gestation, varicose veins, inflammatory bowel disease, urinary tract infection, diabetes, hospitalization, obesity, and maternal age greater than 28 years.  Compared to the antepartum period, the thrombosis risk is 2-5 times higher during the postpartum period. This risk is highest during the first 6 weeks postpartum and declines to prepregnancy rates by 13-18 weeks postpartum. Risk factors for postpartum thrombosis include  section, medical co morbidities, obesity,  delivery, hemorrhage, fetal demise, advanced maternal age, hypertensive disorders of pregnancy, tobacco use, and infection.     The patient was counseled to continue treatment with therapeutic Lovenox and a daily low-dose aspirin.     Therapeutic anticoagulation should be continued throughout the pregnancy and for at least 6-8 weeks postpartum. Lovenox can be initiated 12 hours following a vaginal delivery and 24 hours following a  section (if there is no ongoing concern for bleeding).    The risks and benefits of therapeutic anticoagulation in pregnancy were reviewed including the development of heparin-induced thrombocytopenia (HIT), osteopenia, bleeding, and poor fetal growth.       · An anesthesia consultation is also recommended in the third trimester given she is on anticoagulation. · A hematology consult was recommended. A referral was provided  · A neurology consult was recommended. A referral was provided. · The patient was referred to the [facility] clinic for additional maternal and fetal evaluation. · The patient should avoid estrogen containing birth control postpartum.     Increased fetal surveillance is also recommended. Fetal growth should be monitored serially, every 3 to 4 weeks starting at 24 to 26 weeks gestation. The Patient should monitor fetal kick counts daily starting at 28 weeks gestation. She should be scheduled for twice-weekly fetal testing starting at 32 weeks gestation. Delivery is recommended at/by 39 weeks gestation.     Counseling was provided regarding the radiation exposure from the CT scan.  Increased risks associated with radiation exposure during pregnancy include that of early pregnancy loss, fetal malformations, poor fetal growth, and an increase for childhood cancers (increased from 1/2800 to 1/2000). Generally, these risks are not significantly increased unless the radiation exposure exceeds 50 mGy (5 rads). The estimated radiation exposure associated with a head CT is 1-10 mGy (0.1-1 rads).      The International Commission of Radiologic Protection suggests that the radiation doses delivered in utero by imaging tests (such as those performed in the diagnosis of PE) present no measurable increased risk of fetal death or developmental abnormalities over the background incidence of these entities. The [facility] [employer] of Radiation Protection and Measurements considers the risk of radiation-associated abnormalities to be negligible, at less than 50 mGy when compared with other risks of pregnancy.     Per available data, diagnostic imaging studies that expose the fetus to less than 0.05 Gray (50 mGy, 5 rads), do not appear to increase the risk for fetal anomalies, intellectual disability, growth restriction, or pregnancy loss. With exposure to more than 0.05 Gy (50 mGy, 5 rads), a definitive threshold at which the risk for, locations increases has not been determined. Evidence suggests the risks begin to increase at doses above 0.1 Gy (100 mGy, 10 rads) and especially above 0.15-0.2 Gy (150-200 mGy, 15-20 rads).      3.  Genetic counseling -- The patient was previously counseled regarding her options for genetic screening and/or diagnostic testing. She opted for screening with NIPT. Screening was completed on 5/9/2022. Her results were low risk for aneuploidy. The reported fetal fraction was 28% the reported fetal sex was female.     The patient also opted for a Horizon 14 panel. Screening was completed on 5/9/2022. The results were reviewed with the patient. She is a silent carrier for alpha thalassemia. Additional counseling was provided, please see below.   Screening was otherwise negative for cystic fibrosis, spinal muscular appearance are stable compared to previous imaging. The patient denied any symptoms of fevers, chills, and/or abdominal pain. Counseling was previously provided to the patient. Counseling was reviewed.   The patient did not have any additional questions or concerns.     Fibroids are common in women of reproductive age. Dagmar Palomino studies that monitored the growth of fibroids during pregnancy have noted that the majority of uterine fibroids have less than 10% change in volume across gestation. [facility] of the growth usually occurs in the first trimester.  Fibroids larger than 5 cms are more likely to grow, where as smaller fibroids most likely remain stable in size.  As you know, there is a higher chance of antepartum bleeding with retroplacental fibroids and a small increase in  birth.  Specifically, if placentation occurs adjacent to or overlying a fibroid.  Submucosal and retroplacental fibroids with volumes more than 200 ml (corresponding to 7 cm in diameter) have the highest risk of abruption.  Pain is another common complication of fibroid in pregnancy.  It is especially high in fibroids greater than 5 cm in diameter.  Patients with degenerating fibroids tend to have localized pain, mild leukocytosis, pyrexia and nausea.  This may result from decreased perfusion in the setting of rapid growth leading to ischemia.  Some studies noted increased incidence of malpresentation if the uterus has multiple fibroids.   Large retroplacental fibroids that distort the uterine cavity may be associated with adverse pregnancy event including  fetal growth restriction and  labor.       Overall, the risks associated with uterine fibroids in pregnancy include malpresentation, lower uterine segment obstruction, degeneration, poor fetal growth, ,  birth, and antepartum/postpartum bleeding.     The size and appearance of the fibroid(s) should be reevaluated on follow-up ultrasound.       A screening cervical length  was completed today and normal at 4.2 cm without funneling.     Fetal growth should be monitored serially, every 3 to 4 weeks beginning at 24 to 26 weeks gestation. Precautions were reviewed with the patient.     7. Tobacco use in pregnancy -- The patient reports she is still smoking 3-4 black and milds daily. She was again counseled regarding the increased risks of smoking in pregnancy including poor fetal growth, placental abruption, PPROM/ birth, and fetal loss. Additional  risks were again reviewed including asthma, allergies, infection, and an association with SIDS. Various techniques for cutting back and quitting were again reviewed. She was again urged to quit smoking tobacco.     She was counseled that smoking cessation is especially important in her case given her recurrent thrombotic events. She expressed verbal understanding of this counseling.     8. THC Use --  The patient previously reported that she was smoking marijuana daily. She reports that she was using marijuana for decreased appetite. The patient reports that she has stopped smoking marijuana. Counseling was previously provided to the patient. Counseling was reviewed today. The patient did not have any additional questions or concerns.     She was counseled regarding risk of neurodevelopmental issues in children exposed to [facility] during pregnancy. Additionally, marijuana use may pose risks similar to that of cigarette use including increased risk for poor fetal growth, placental bleeding, PPROM/ delivery, and stillbirth. She was counseled not to use THC while pregnant. Random urine drug screens should be monitored during pregnancy.     9.  First/second trimester alcohol exposure -- The patient previously reported that she drinks wine occasionally. She reports only taking sips of wine. Today, the patient reports that she has stopped drinking alcohol.     Counseling was previously provided to the patient. Counseling was reviewed. The patient did not have any additional questions or concerns.     The patient was again counseled that a safe level of alcohol consumption during pregnancy has not been determined. Determination of the impact of alcohol use during pregnancy on fetal development can be challenging because of variation in maternal alcohol clearance rates, fetal sensitivity, genetic susceptibility, maternal drinking patterns (binge drinking versus daily consumption), and confounders such a substance abuse. The patient was counseled that alcohol is a teratogen that can impact fetal growth and development at all stages during pregnancy. Currently, national guidelines and multiple medical societies recommend abstinence during pregnancy.     In one large epidemiologic study, an increased rate of stillbirth was noted across all categories of alcohol intake, even after adjusting for confounders (eg, smoking, pre-pregnancy weight). There is also an increased risk for structural anomalies (craniofacial, cardiac), poor fetal growth, decreased IQ, and neurodevelopmental issues in childhood. Factors such as older age, increased parity, and  and  ethnicities are associated with an increased risk for FAS.    Secondary to the increased risk for poor growth, fetal growth should be monitored serially, every 3 to 4 weeks starting at 24 to 26 weeks gestation.     Secondary to the increased risk for congenital heart anomalies, a fetal echocardiogram is recommended at 22 to 24 weeks gestation.     10. Right ovarian mass/cyst -- The ultrasound images were reviewed with the patient. The right ovarian mass previously described was not seen today. At 12 weeks 4 days, A hyperechoic mass was seen in the right ovary measuring 1.2 x 1 x 0.9 cm. No fluid was seen surrounding the area.   Patient was counseled that this may represent a hemorrhagic cyst versus teratoma versus a calcification, although no shadowing was seen. The size and appearance of the ovarian mass will be reevaluated on follow-up ultrasound. The adnexa will be reevaluated on follow-up exam.     11. Nausea and vomiting of pregnancy  -- The patients previously reported having daily symptoms of nausea and decreased appetite. She is occasional emesis. She has lost 4 pounds since her last visit. She had been using marijuana to help with her appetite, but she stopped. She denies any signs of symptoms of dehydration. Precautions were reviewed. Today, the patient reports that her symptoms are stable. She denied having any signs or symptoms of dehydration.     The patient was again encouraged to eat small amounts of food every 2-3 hours during the day. She was also encouraged to stay well-hydrated.     A baseline nutrition panel (CBC, CMP, magnesium, ferritin, folate, vitamin B12, vitamin D 25 OH) was recommended. Baseline testing was completed on 5/13/2022, her results included: A CBC was ordered but not done by the lab. A CBC was completed on 5/31/22, H/H 11.9/35.8, MCV 88.6, ,000. The remainder of the test results are from 5/13/2022 and included: Potassium 3.7, creatinine 0.7, calcium 9, ALT 7, AST 13, magnesium 2, uric acid 3, , ferritin 67, folate 19.8, vitamin B12 423, vitamin D 9, TSH 0.876, free T4 1.16, free T3 3, TPO negative, antithyroglobulin antibody negative, urinalysis negative, urine culture mixed, urine protein creatinine ratio 0.1, beta-2 lipoprotein antibody negative, anticardiolipin antibody negative, protein S antigen free, 28 (), protein S antigen total 64 (%). --Additional recommendations are below.     The patient was counseled to continue taking vitamin B6, 25 mg every 8 hours. She was counseled to call and/or return with worsening symptoms.     Again, with persistent/worsening symptoms, I would recommend the addition of Pepcid, 20 mg twice daily and or Zofran.     12. History of bacteruria -- The patient's hospital admission in April was reviewed. Her urinalysis was positive for Nitrites, leukocyte esterase, and bacteria. She was treated with Keflex. Her urine culture showed E. Coli and mixed gram-positive organisms including staph and Corynebacterium. She had a repeat urine culture on 5/5/2022 that was negative. She denied any signs or symptoms of recurrent infection. Precautions were reviewed. A repeat urine culture was completed on 5/13/2022 and showed less than 10,000 CFU per mL of mixed gram-positive organisms.     13. Anticardiolipin antibody IgM, indeterminate-- Antiphospholipid antibody screening was done secondary to the patient's CVA diagnosed in April 2022. Initial screening completed on 4/18/2022 as part of the thrombophilia panel was negative. Screening was repeated on 4/19/2022. Her anticardiolipin IgM antibody was indeterminate at 17 on 4/19/2022. Screening for lupus anticoagulant and beta-2 glycoprotein antibody was negative. The patient was counseled that this may be a false elevation, generally, the titers have to be greater than 20 for a true positive. The recommendation was made for the patient to continue taking a low dose aspirin, 81 mg daily. She should continue this for the remainder of pregnancy and 6 to 8 weeks postpartum. Repeat testing was completed on 5/13/2022 and negative. The results were reviewed with the patient.     14. Protein S deficiency -- The patient's labs were reviewed. Patient initially had a thrombophilia panel on 4/18/2022. Her protein S, antigen, free was 25% (). Testing was repeated on 4/19/2022. Her protein S, antigen, free at that time was 27% (). The patient would have been 9 weeks at the time of the testing. She was being evaluated for a recent thrombotic stroke.   Testing is not considered reliable during acute thrombosis or pregnancy.     Protein S decreases during pregnancy. However, protein S is generally considered deficient if <30% in the second trimester and/or <24% in the third trimester.     Again, the patient had testing in the first trimester and her protein S levels were significantly decreased.     It is unclear at this time if the patient truly has a protein S deficiency. This will not be able to be determined until the patient is postpartum.       The patient was counseled that a protein S deficiency is rare, and seen in 0.03-0.13% of the population. It is considered a lower risk thrombophilia. If someone does have a deficiency, then the risk for a thrombosis during pregnancy is estimated to be 0.1% (w/negative personal history). It is 0-22% with a personal history of thrombosis     Given the patient's recurrent, the recommendation was for therapeutic anticoagulation with Lovenox. Please see above.     Repeat testing was ordered today.     A hematology consultation was previously recommended. The patient is following with hematology.     Repeat testing is recommended at 6 to 8 weeks postpartum. She should not be on an oral contraceptive pill at the time of repeat testing. The patient should avoid birth control containing estrogen.     The patient was counseled to continue taking a daily low-dose aspirin. She should continue a daily low-dose aspirin for the remainder of pregnancy and 6 to 8 weeks postpartum. Given her history of a thrombotic stroke, the recommendation was also made for anticoagulation with prophylactic Lovenox. The plan was reviewed with neurology and hematology, see above. A prescription was provided following patient's consultation. She was scheduled to return for injection teaching.     Increased fetal surveillance is recommended. Fetal growth     Serially, every 3 to 4 weeks starting at 24 to 26 weeks gestation. The patient should monitor fetal kick counts daily starting at 28 weeks gestation.   She should be scheduled for twice-weekly fetal testing starting at 32 weeks gestation. Delivery is recommended at/by 39 weeks gestation.     15. Vaccination in pregnancy -- The patient was  previously counseled regarding the recommendations for vaccination in pregnancy. Counseling was reviewed. The patient did not have any additional questions or concerns.     The patient was counseled regarding the recommendations for the Tdap vaccination in pregnancy. Risks and benefits were discussed. The vaccination is typically administered between 27 and 36 weeks gestation and recommended to confer protection against whooping cough to the . The patient plans to discuss this with her primary provider at her next visit. The patient was also counseled that her partner in any individuals who will be in close contact with the  should also be vaccinated for whooping cough.     The patient was counseled regarding recommendation for the flu vaccination in pregnancy for both maternal and infant safety. Risks and benefits were reviewed. Counseling was provided regarding the recommendation for the Covid vaccination in pregnancy. I advised the patient that the [employer] of Obstetrics and Gynecology and The Society for Maternal Fetal Medicine recommend that all pregnant women be vaccinated for COVID-19. \"Data have shown that COVID-19 infection puts pregnant people at increased risk of severe complications and even death; yet only about 22% of pregnant individuals have received 1 more doses of COVID-19 vaccine according to the [employer] for Disease Control and Prevention. \"     \" Recent data have shown that more than 95% of those were hospitalized and/or dying from COVID-19 are those who have remained unvaccinated. Pregnant individuals who have decided to wait until after delivery to be vaccinated may be inadvertently exposing themselves to an increased risk of severe illness or death. \"      \" COVID-19 vaccination is the best testing to reduce maternal and fetal complications of [id number]-07 infection among pregnant people,\" said Vy Guy MD, president of the Society for Maternal Fetal Medicine, sub-specialists.     The patient was counseled that in the event she opts not to have the Covid vaccination, she should alert her provider immediately if she test positive for Covid. Pregnant women are on the priority list for treatment with monoclonal antibody. This intervention has been shown to decrease the risk for hospitalization and complications related to Covid in pregnancy.     The patient expressed verbal understanding of this counseling. 16.  Vitamin D deficiency -- The patient's vitamin D was deficient at 9  --Recommend vitamin D3 2000 IU daily  --Monitor nutrition panel q4-6 weeks (CBC, CMP, magnesium, ferritin, folate, vitamin B12, vitamin D25OH)  --Repeat testing ordered today  --Follow up with PCP for long term monitoring and management    17. 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 She was noted to be heterozygote for the MTHFR c.665C>T variant (previously designated as C677T). Counseling was provided today regarding the implications and management of this gene mutation in pregnancy. She was counseled that this gene mutation affects folic acid metabolism. It is fairly common and found in 20-30% of the population. It is generally only a problem if homocysteine levels are elevated. In the past, this gene mutation was thought to be associated with increased obstetric risks including thrombosis, early recurrent pregnancy loss, placental abruption, hypertensive disorders of pregnancy, poor fetal growth, and fetal loss. More recent studies did not report strong associations with these risks.  Because this genetic mutation affects folic acid metabolism, there is an increased risk for folic acid deficiency and other nutritional deficiencies in women with this condition. There is also an increased risk for having a child with an open neural tube defect in women with this mutation, especially if they're homozygous for the C677T mutation. Presently, this condition is not thought to be clinically significant. Generally, additional vitamin supplementation is recommended with folic acid or methyl folate, vitamin B6, and vitamin B12. The patient was counseled to take a low-dose aspirin. Fetal growth should be followed serially. She was counseled that there is a 50% chance that she will pass this mutation off to her child(sandra). She should relay this information to the pediatrician who cares for her child(sandra). She was also encouraged to review this diagnosis with her PCP. Because of this history, a baseline nutrition panel and homocysteine level were recommended. She was provided with orders for testing. 25.  Silent carrier for alpha thalassemia -- The patient's records were reviewed. She is noted to be a silent carrier for alpha thalassemia. Counseling was provided with the patient. --Alpha thalassemia minima is another term for this condition. This condition results from the loss of a single alpha-chain gene (ie a-/aa). Blood tests are usually normal, though the red cells may be small, leading to a suspicion that the patient is a silent carrier. The only way to confirm a silent carrier is by DNA studies. This is usually a benign carrier state. Affected individuals are not typically anemic and their hemoglobin analysis is normal. These conditions may remain undiagnosed, or they may be detected incidentally on a routine complete blood count during evaluation of an unrelated condition or in the setting of preconception testing and counseling. The patient's partner has not had testing for this condition. Carrier screening is recommended. The patient plans to discuss this with her partner.     If the patient's partner is not a carrier for alpha thalassemia, then there is a 50% chance that her child/children will also be a carrier for alpha thalassemia minima. If her partner is also a carrier of alpha thalassemia, there is an increased risk for having an affected child. Without having her partner tested, definitive counseling cannot be provided. This information should be relayed to pediatrics. I would defer any additional  evaluation to pediatrics. 19.   Migraine headaches -- The patient reports having continued headache symptoms since discharge from the hospital.  The headaches are a 6 out of 10 at the worst.  She reports having daily headaches. She has been using tylenol with some relief. She reports having associated symptoms of photosensitivity and phono sensitivity. The patient was counseled that migraine headaches can improve during pregnancy. However, in some patients symptoms are exacerbated. She was counseled regarding the use of magnesium oxide 400 mg twice daily and riboflavin 100 mg daily in reducing the frequency and intensity of migraine headaches. Prescriptions were provided. Precautions were reviewed, and she was counseled that if she develops the worst headache of her life or a headache with neurological deficits, to present immediately for evaluation. She expressed verbal understanding of this counseling. Because of the patient's headache symptoms, a baseline nutrition panel and thyroid function testing was recommended. Baseline testing was completed on 2022. The results are summarized above. --I requested the patient return for a follow-up assessment in 3 weeks unless there is a clinical reason for her to return prior to that time. She is to call if she has any problems or questions prior to her next visit. Further evaluation and management will be dependent on her clinical presentation and the results of her testing.      --The patient was advised to call if she has any increased vaginal discharge, vaginal bleeding, contractions, abdominal pain, back pain or any new significant symptomatology prior to her next visit. I advised her that these are signs and symptoms of cervical change and require follow-up assessment when they occur. Preeclampsia precautions were also reviewed with the patient. --The patient was also counseled to call and/or return with any concerns for decreased fetal activity. --The patient is to continue to follow with you in your office for ongoing obstetric care. --The total time spent on today's visit was 45 minutes. This included preparation for the visit (i.e. reviewing prior external notes and test results), performance of a medically appropriate history and examination, counseling, orders for medications, tests or other procedures, and coordination of care. Greater than 50% of the time was spent face-to-face with the patient. This time is exclusive of procedures performed. I answered all of  the patient's questions to her satisfaction. I asked her to call if she had any additional questions prior to her next visit. --At the conclusion of the visit, the patient appeared to have a good understanding of the issues discussed. I answered all of her questions to her satisfaction. I asked her to call if she had any additional questions prior to her next visit. --Thank you for allowing me to participate in the care of this pleasant patient. Please don't hesitate to call me if you have any questions. Sincerely,      Uriel Blackwood MD, [place] [address]  [phone]      *All or parts of this note may have been generated using a voice recognition program. There may be typo, grammar, or Word substitution errors that have escaped my review of this note.

## 2022-06-10 ENCOUNTER — TELEPHONE (OUTPATIENT)
Dept: OBGYN CLINIC | Age: 32
End: 2022-06-10

## 2022-06-10 NOTE — TELEPHONE ENCOUNTER
I left another message for patient to return call  as she needs to have another lab test doner and we can send lab requisition to correct place.

## 2022-06-14 ENCOUNTER — TELEPHONE (OUTPATIENT)
Dept: OBGYN CLINIC | Age: 32
End: 2022-06-14

## 2022-06-16 ENCOUNTER — TELEPHONE (OUTPATIENT)
Dept: NEUROLOGY | Age: 32
End: 2022-06-16

## 2022-06-16 NOTE — TELEPHONE ENCOUNTER
----- Message from VAMSHI Elder CNP sent at 6/6/2022 10:04 AM EDT -----  Regarding: Stroke Clinic Referral    ----- Message -----  From: Deborah Ny MD  Sent: 6/4/2022   6:30 AM EDT  To: VAMSHI Elder CNP

## 2022-06-17 ENCOUNTER — OFFICE VISIT (OUTPATIENT)
Dept: NEUROLOGY | Age: 32
End: 2022-06-17
Payer: MEDICAID

## 2022-06-17 ENCOUNTER — TELEPHONE (OUTPATIENT)
Dept: OBGYN CLINIC | Age: 32
End: 2022-06-17

## 2022-06-17 ENCOUNTER — HOSPITAL ENCOUNTER (OUTPATIENT)
Age: 32
Discharge: HOME | End: 2022-06-17
Payer: MEDICAID

## 2022-06-17 VITALS
HEART RATE: 98 BPM | OXYGEN SATURATION: 99 % | HEIGHT: 62 IN | SYSTOLIC BLOOD PRESSURE: 101 MMHG | WEIGHT: 160 LBS | TEMPERATURE: 98.3 F | BODY MASS INDEX: 29.44 KG/M2 | DIASTOLIC BLOOD PRESSURE: 67 MMHG

## 2022-06-17 DIAGNOSIS — O99.119 PROTEIN S DEFICIENCY AFFECTING PREGNANCY (HCC): ICD-10-CM

## 2022-06-17 DIAGNOSIS — R51.9 CHRONIC DAILY HEADACHE: ICD-10-CM

## 2022-06-17 DIAGNOSIS — Z86.718 HISTORY OF THROMBOSIS: ICD-10-CM

## 2022-06-17 DIAGNOSIS — I63.9 CEREBROVASCULAR ACCIDENT (CVA), UNSPECIFIED MECHANISM (HCC): ICD-10-CM

## 2022-06-17 DIAGNOSIS — E55.9 VITAMIN D DEFICIENCY: ICD-10-CM

## 2022-06-17 DIAGNOSIS — Z87.440 HISTORY OF UTI: ICD-10-CM

## 2022-06-17 DIAGNOSIS — Z3A.12 12 WEEKS GESTATION OF PREGNANCY: ICD-10-CM

## 2022-06-17 DIAGNOSIS — D68.59 PROTEIN S DEFICIENCY AFFECTING PREGNANCY (HCC): ICD-10-CM

## 2022-06-17 DIAGNOSIS — Z3A.16 16 WEEKS GESTATION OF PREGNANCY: ICD-10-CM

## 2022-06-17 DIAGNOSIS — I63.9 CEREBROVASCULAR ACCIDENT (CVA), UNSPECIFIED MECHANISM (HCC): Primary | ICD-10-CM

## 2022-06-17 DIAGNOSIS — Z3A.18 18 WEEKS GESTATION OF PREGNANCY: ICD-10-CM

## 2022-06-17 PROBLEM — R79.89 LOW VITAMIN D LEVEL: Status: ACTIVE | Noted: 2022-06-17

## 2022-06-17 PROBLEM — Z15.89 HETEROZYGOUS MTHFR MUTATION C677T: Status: ACTIVE | Noted: 2022-06-17

## 2022-06-17 LAB
ALBUMIN SERPL-MCNC: 3.2 G/DL (ref 3.5–5.2)
ALP BLD-CCNC: 60 U/L (ref 35–104)
ALT SERPL-CCNC: 15 U/L (ref 0–32)
ANION GAP SERPL CALCULATED.3IONS-SCNC: 14 MMOL/L (ref 7–16)
AST SERPL-CCNC: 17 U/L (ref 0–31)
BACTERIA: ABNORMAL /HPF
BASOPHILS ABSOLUTE: 0.02 E9/L (ref 0–0.2)
BASOPHILS RELATIVE PERCENT: 0.2 % (ref 0–2)
BILIRUB SERPL-MCNC: <0.2 MG/DL (ref 0–1.2)
BILIRUBIN URINE: NEGATIVE
BLOOD, URINE: NEGATIVE
BUN BLDV-MCNC: 13 MG/DL (ref 6–20)
C3 COMPLEMENT: 110 MG/DL (ref 90–180)
C4 COMPLEMENT: 23 MG/DL (ref 10–40)
CALCIUM SERPL-MCNC: 8.3 MG/DL (ref 8.6–10.2)
CHLORIDE BLD-SCNC: 103 MMOL/L (ref 98–107)
CLARITY: CLEAR
CO2: 20 MMOL/L (ref 22–29)
COLOR: YELLOW
CREAT SERPL-MCNC: 0.6 MG/DL (ref 0.5–1)
CREATININE URINE: 182 MG/DL (ref 29–226)
EOSINOPHILS ABSOLUTE: 0.06 E9/L (ref 0.05–0.5)
EOSINOPHILS RELATIVE PERCENT: 0.7 % (ref 0–6)
EPITHELIAL CELLS, UA: ABNORMAL /HPF
FERRITIN: 70 NG/ML
FOLATE: 11.2 NG/ML (ref 4.8–24.2)
GFR AFRICAN AMERICAN: >60
GFR NON-AFRICAN AMERICAN: >60 ML/MIN/1.73
GLUCOSE BLD-MCNC: 125 MG/DL (ref 74–99)
GLUCOSE URINE: NEGATIVE MG/DL
HBA1C MFR BLD: 4.9 % (ref 4–5.6)
HCT VFR BLD CALC: 33.7 % (ref 34–48)
HEMOGLOBIN: 11.2 G/DL (ref 11.5–15.5)
HOMOCYSTEINE: 6.6 UMOL/L (ref 0–15)
IMMATURE GRANULOCYTES #: 0.15 E9/L
IMMATURE GRANULOCYTES %: 1.7 % (ref 0–5)
KETONES, URINE: NEGATIVE MG/DL
LACTATE DEHYDROGENASE: 126 U/L (ref 135–214)
LEUKOCYTE ESTERASE, URINE: NEGATIVE
LUPUS ANTICOAG DVVT: NORMAL
LYMPHOCYTES ABSOLUTE: 2.77 E9/L (ref 1.5–4)
LYMPHOCYTES RELATIVE PERCENT: 32.2 % (ref 20–42)
MAGNESIUM: 1.6 MG/DL (ref 1.6–2.6)
MCH RBC QN AUTO: 28.9 PG (ref 26–35)
MCHC RBC AUTO-ENTMCNC: 33.2 % (ref 32–34.5)
MCV RBC AUTO: 87.1 FL (ref 80–99.9)
MONOCYTES ABSOLUTE: 0.77 E9/L (ref 0.1–0.95)
MONOCYTES RELATIVE PERCENT: 8.9 % (ref 2–12)
NEUTROPHILS ABSOLUTE: 4.84 E9/L (ref 1.8–7.3)
NEUTROPHILS RELATIVE PERCENT: 56.3 % (ref 43–80)
NITRITE, URINE: NEGATIVE
PDW BLD-RTO: 14.1 FL (ref 11.5–15)
PH UA: 6.5 (ref 5–9)
PLATELET # BLD: 363 E9/L (ref 130–450)
PMV BLD AUTO: 10.4 FL (ref 7–12)
POTASSIUM SERPL-SCNC: 3.6 MMOL/L (ref 3.5–5)
PROTEIN PROTEIN: 48 MG/DL (ref 0–12)
PROTEIN UA: NEGATIVE MG/DL
PROTEIN/CREAT RATIO: 0.3
PROTEIN/CREAT RATIO: 0.3 (ref 0–0.2)
RBC # BLD: 3.87 E12/L (ref 3.5–5.5)
RBC UA: ABNORMAL /HPF (ref 0–2)
RHEUMATOID FACTOR: <10 IU/ML (ref 0–13)
SODIUM BLD-SCNC: 137 MMOL/L (ref 132–146)
SPECIFIC GRAVITY UA: 1.02 (ref 1–1.03)
T3 FREE: 2.7 PG/ML (ref 2–4.4)
T4 FREE: 0.97 NG/DL (ref 0.93–1.7)
TOTAL PROTEIN: 6.3 G/DL (ref 6.4–8.3)
TSH SERPL DL<=0.05 MIU/L-ACNC: 0.51 UIU/ML (ref 0.27–4.2)
URIC ACID, SERUM: 3.7 MG/DL (ref 2.4–5.7)
UROBILINOGEN, URINE: 1 E.U./DL
VITAMIN B-12: 428 PG/ML (ref 211–946)
VITAMIN D 25-HYDROXY: 12 NG/ML (ref 30–100)
WBC # BLD: 8.6 E9/L (ref 4.5–11.5)
WBC UA: ABNORMAL /HPF (ref 0–5)

## 2022-06-17 PROCEDURE — 83036 HEMOGLOBIN GLYCOSYLATED A1C: CPT

## 2022-06-17 PROCEDURE — 85305 CLOT INHIBIT PROT S TOTAL: CPT

## 2022-06-17 PROCEDURE — 80053 COMPREHEN METABOLIC PANEL: CPT

## 2022-06-17 PROCEDURE — 82306 VITAMIN D 25 HYDROXY: CPT

## 2022-06-17 PROCEDURE — 84550 ASSAY OF BLOOD/URIC ACID: CPT

## 2022-06-17 PROCEDURE — 82570 ASSAY OF URINE CREATININE: CPT

## 2022-06-17 PROCEDURE — 1111F DSCHRG MED/CURRENT MED MERGE: CPT | Performed by: NURSE PRACTITIONER

## 2022-06-17 PROCEDURE — 86160 COMPLEMENT ANTIGEN: CPT

## 2022-06-17 PROCEDURE — 86147 CARDIOLIPIN ANTIBODY EA IG: CPT

## 2022-06-17 PROCEDURE — 85025 COMPLETE CBC W/AUTO DIFF WBC: CPT

## 2022-06-17 PROCEDURE — 81001 URINALYSIS AUTO W/SCOPE: CPT

## 2022-06-17 PROCEDURE — 82607 VITAMIN B-12: CPT

## 2022-06-17 PROCEDURE — 86146 BETA-2 GLYCOPROTEIN ANTIBODY: CPT

## 2022-06-17 PROCEDURE — 82746 ASSAY OF FOLIC ACID SERUM: CPT

## 2022-06-17 PROCEDURE — 83735 ASSAY OF MAGNESIUM: CPT

## 2022-06-17 PROCEDURE — 86431 RHEUMATOID FACTOR QUANT: CPT

## 2022-06-17 PROCEDURE — G8419 CALC BMI OUT NRM PARAM NOF/U: HCPCS | Performed by: NURSE PRACTITIONER

## 2022-06-17 PROCEDURE — 84481 FREE ASSAY (FT-3): CPT

## 2022-06-17 PROCEDURE — 36415 COLL VENOUS BLD VENIPUNCTURE: CPT

## 2022-06-17 PROCEDURE — G8427 DOCREV CUR MEDS BY ELIG CLIN: HCPCS | Performed by: NURSE PRACTITIONER

## 2022-06-17 PROCEDURE — 85306 CLOT INHIBIT PROT S FREE: CPT

## 2022-06-17 PROCEDURE — 83615 LACTATE (LD) (LDH) ENZYME: CPT

## 2022-06-17 PROCEDURE — 83090 ASSAY OF HOMOCYSTEINE: CPT

## 2022-06-17 PROCEDURE — 86376 MICROSOMAL ANTIBODY EACH: CPT

## 2022-06-17 PROCEDURE — 85613 RUSSELL VIPER VENOM DILUTED: CPT

## 2022-06-17 PROCEDURE — 86038 ANTINUCLEAR ANTIBODIES: CPT

## 2022-06-17 PROCEDURE — 84439 ASSAY OF FREE THYROXINE: CPT

## 2022-06-17 PROCEDURE — 82728 ASSAY OF FERRITIN: CPT

## 2022-06-17 PROCEDURE — 99214 OFFICE O/P EST MOD 30 MIN: CPT | Performed by: NURSE PRACTITIONER

## 2022-06-17 PROCEDURE — 84443 ASSAY THYROID STIM HORMONE: CPT

## 2022-06-17 PROCEDURE — 87088 URINE BACTERIA CULTURE: CPT

## 2022-06-17 PROCEDURE — 4004F PT TOBACCO SCREEN RCVD TLK: CPT | Performed by: NURSE PRACTITIONER

## 2022-06-17 PROCEDURE — 82105 ALPHA-FETOPROTEIN SERUM: CPT

## 2022-06-17 PROCEDURE — 84156 ASSAY OF PROTEIN URINE: CPT

## 2022-06-17 RX ORDER — ADHESIVE TAPE 3"X 2.3 YD
200 TAPE, NON-MEDICATED TOPICAL DAILY
Qty: 30 TABLET | Refills: 4 | Status: SHIPPED | OUTPATIENT
Start: 2022-06-17

## 2022-06-17 NOTE — PROGRESS NOTES
[address] [facility]. Stan Zuniga M.D., F.A.C.P. Kalina Weinstein, DNP, APRN, ACNS-BC  Vanessa Locke. Peggy Luis, MSN, APRN-FNP-C  Ilya Ribeiro, MSN, APRN-FNP-C  Nivia Mahajan, DENILSON, PA-C  Divine Doe, MSN, APRN-FNP-C  [address] [facility], [place] [address]  [place], [address]  Phone: [phone]  Fax: [phone]         Grayson Loyd is a 33 y.o. right handed female     Patient is a hospital follow-up for stroke    PMH stroke and protein S deficiency    Presented for evaluation of right sided numbness and headache June 2022. She was brought to [facility] ER on 5/31 for a right frontal stroke. She has a history of Protein S deficiency and is 15 weeks pregnant. She was recently admitted with a previous stroke and was referred to hematology for her protein deficiency. She was started on therapeutic Lovenox. MRI brain shows infarct to right frontal lobe, right BG, external capsule and insular cortex. MRV of brain showed no venous thrombosis but did show mild stenosis and left lateral transverse sinus. Today patient notes that she continues to take her Lovenox twice a day. She is following with hematology, for OB/GYN and high risk OB GYN. She has complaints of headaches and is currently taking Tylenol. She is 18 weeks pregnant and due her baby in November at [facility] due to high risk. She has no deficits from her stroke.     No chest pain or palpitations  No coughing or wheezing    No vertigo, lightheadedness or loss of consciousness  No falls, tripping or stumbling  No incontinence of bowels or bladder  No itching or bruising appreciated  No numbness, tingling or focal arm/leg weakness    ROS otherwise negative      Objective:     Vitals:    06/17/22 1103   BP: 101/67   Site: Right Upper Arm   Pulse: 98   Temp: 98.3 °F (36.8 °C)   SpO2: 99%   Weight: 160 lb (72.6 kg)   Height: 5' 2\" (1.575 m)     LMP 02/10/2022   Afebrile     General appearance: alert, appears stated age, cooperative and no distress  Head: normocephalic, without obvious abnormality, atraumatic  Eyes: conjunctivae/corneas clear  Neck: supple, symmetrical, trachea midline   Lungs: clear to auscultation bilaterally  Heart: regular rate and rhythm, S1, S2 normal  Abdomen: soft, non-tender; bowel sounds normal  Extremities:  normal, atraumatic, no cyanosis or edema  Skin: color, texture, turgor normal---no rashes or lesions       Mental Status: Alert, oriented, thought content appropriate    Appropriate attention/concentration  Intact fundus of knowledge  Intact memories  Repetition intact    Speech: No dysarthria  Language: No aphasias      Cranial Nerves:  I: smell NA   II: visual acuity  NA   II: visual fields Full to confrontation   II: pupils DORIAN   III,VII: ptosis None   III,IV,VI: extraocular muscles  Full ROM   V: mastication Normal   V: facial light touch sensation  Normal   V,VII: corneal reflex     VII: facial muscle function - upper  Normal   VII: facial muscle function - lower Normal   VIII: hearing Normal   IX: soft palate elevation  Normal   IX,X: gag reflex    XI: trapezius strength  5/5   XI: sternocleidomastoid strength 5/5   XI: neck extension strength  5/5   XII: tongue strength  Normal     Motor:  5/5 throughout  Normal bulk and tone  No drift   No abnormal movements    Sensory:  LT normal    Coordination:   FN, FFM and SUKHJINDER normal  HS normal    Gait:  Normal    DTR:   2+ throughout    Laboratory/Radiology:     CBC with Differential:    Lab Results   Component Value Date    WBC 8.2 06/02/2022    RBC 4.15 06/02/2022    HGB 12.1 06/02/2022    HCT 36.4 06/02/2022     06/02/2022    MCV 87.7 06/02/2022    MCH 29.2 06/02/2022    MCHC 33.2 06/02/2022    RDW 14.1 06/02/2022    SEGSPCT 35 12/05/2013    BLASTSPCT 0.9 06/26/2021    LYMPHOPCT 30.4 05/31/2022    MONOPCT 8.6 05/31/2022    BASOPCT 0.3 05/31/2022    MONOSABS 0.78 05/31/2022    LYMPHSABS 2.77 05/31/2022    EOSABS 0.07 05/31/2022    BASOSABS 0.03 05/31/2022 CMP:    Lab Results   Component Value Date     06/02/2022    K 3.7 06/02/2022     06/02/2022    CO2 22 06/02/2022    BUN 9 06/02/2022    CREATININE 0.7 06/02/2022    GFRAA >60 06/02/2022    LABGLOM >60 06/02/2022    GLUCOSE 73 06/02/2022    PROT 6.2 06/02/2022    LABALBU 3.2 06/02/2022    CALCIUM 8.5 06/02/2022    BILITOT <0.2 06/02/2022    ALKPHOS 59 06/02/2022    AST 12 06/02/2022    ALT 9 06/02/2022     Hepatic Function Panel:    Lab Results   Component Value Date    ALKPHOS 59 06/02/2022    ALT 9 06/02/2022    AST 12 06/02/2022    PROT 6.2 06/02/2022    BILITOT <0.2 06/02/2022    BILIDIR 0.1 06/15/2012    LABALBU 3.2 06/02/2022     HgBA1c:    Lab Results   Component Value Date    LABA1C 4.9 06/02/2022     FLP:    Lab Results   Component Value Date    TRIG 137 06/02/2022    HDL 69 06/02/2022    LDLCALC 94 06/02/2022    LABVLDL 27 06/02/2022     MRI brain shows infarct to right frontal lobe, right BG, external capsule and insular cortex. MRV of brain showed no venous thrombosis but did show mild stenosis and left lateral transverse sinus.     All labs and imaging studies reviewed independently today    Assessment:     Right frontal lobe stroke in patient with protein S deficiency  --- LDL 94 and A1c 4.9  --- Stroke risk factors include: History of stroke, protein S deficiency, current everyday smoker    Chronic daily headaches during pregnancy    Plan:     Continue to follow with hematology    Continue to follow with OB/GYN and high risk OB/GYN    Continue Lovenox    Started magnesium 200 mg daily    Follow-up in 6 months    Call with any questions or concerns      Lawrence Rogers, VAMSHI - CNP  11:04 AM  6/17/2022

## 2022-06-19 LAB — URINE CULTURE, ROUTINE: NORMAL

## 2022-06-20 LAB
ANTI-NUCLEAR ANTIBODY (ANA): NEGATIVE
ANTICARDIOLIPIN IGA ANTIBODY: <10 APL
ANTICARDIOLIPIN IGG ANTIBODY: <10 GPL
BETA-2 GLYCOPROTEIN 1 IGG ANTIBODY: <10 SGU
BETA-2 GLYCOPROTEIN 1 IGM ANTIBODY: <10 SMU
CARDIOLIPIN AB IGM: <10 MPL
PROTEIN S ANTIGEN, FREE: 25 % (ref 55–123)
PROTEIN S, FUNCTIONAL: 35 % (ref 57–131)

## 2022-06-21 ENCOUNTER — TELEPHONE (OUTPATIENT)
Dept: OBGYN CLINIC | Age: 32
End: 2022-06-21

## 2022-06-21 LAB — THYROID PEROXIDASE (TPO) ABS: <4 IU/ML (ref 0–25)

## 2022-06-21 NOTE — TELEPHONE ENCOUNTER
Talked to Jose D in send out lab to inquire about AFP. States that pt had drawn on 6/17 but has not been sent out as of yet. All info needed was available and he states that it will be sent out today.

## 2022-06-22 ENCOUNTER — OFFICE VISIT (OUTPATIENT)
Dept: ONCOLOGY | Age: 32
End: 2022-06-22
Payer: MEDICAID

## 2022-06-22 ENCOUNTER — HOSPITAL ENCOUNTER (OUTPATIENT)
Dept: INFUSION THERAPY | Age: 32
Discharge: HOME | End: 2022-06-22

## 2022-06-22 VITALS
TEMPERATURE: 97.7 F | HEART RATE: 92 BPM | BODY MASS INDEX: 29.81 KG/M2 | DIASTOLIC BLOOD PRESSURE: 69 MMHG | WEIGHT: 162 LBS | HEIGHT: 62 IN | SYSTOLIC BLOOD PRESSURE: 101 MMHG | OXYGEN SATURATION: 100 %

## 2022-06-22 DIAGNOSIS — I63.9 ACUTE CEREBROVASCULAR ACCIDENT (CVA) (HCC): Primary | ICD-10-CM

## 2022-06-22 PROCEDURE — 99212 OFFICE O/P EST SF 10 MIN: CPT

## 2022-06-22 PROCEDURE — 4004F PT TOBACCO SCREEN RCVD TLK: CPT | Performed by: INTERNAL MEDICINE

## 2022-06-22 PROCEDURE — 1111F DSCHRG MED/CURRENT MED MERGE: CPT | Performed by: INTERNAL MEDICINE

## 2022-06-22 PROCEDURE — 99214 OFFICE O/P EST MOD 30 MIN: CPT | Performed by: INTERNAL MEDICINE

## 2022-06-22 PROCEDURE — G8427 DOCREV CUR MEDS BY ELIG CLIN: HCPCS | Performed by: INTERNAL MEDICINE

## 2022-06-22 PROCEDURE — G8419 CALC BMI OUT NRM PARAM NOF/U: HCPCS | Performed by: INTERNAL MEDICINE

## 2022-06-22 NOTE — PROGRESS NOTES
**Patient provided with discharge instructions. All questions answered. Patient understands follow up plan of care.      *

## 2022-06-22 NOTE — PROGRESS NOTES
[address] [facility] MEDICAL ONCOLOGY  [address] [place], [place] [address] [address]  Dept: [phone] Rikki Nate: [phone]  Attending  Note      Reason for Visit:   Stroke, reduced prot S. Referring Physician:  Mirella Jefferson MD    PCP:  Julia Perrin DO    History of Present Illness:     Ms. Rolanda Jimenes is a very pleasant 70-year-old lady, G3, P1, who was referred to the hematology office evaluation of decreased protein S, and stroke during pregnancy. The patient reports having symptoms of confusion, aphasia and headaches in November 2021, lasted for about 4 days, she did not seek medical attention for those symptoms. Had presented to the ED on 4/17/2022 with severe headaches, 2 days prior she had slurred speech, and aphasia, which lasted for about 20 minutes, she then developed paresthesias in her right upper extremity. CT was done, revealing hypodensity within the left insular cortex with abnormal white matter hypodensity extending into the adjacent subinsular white matter and left corona radiator, findings may be suggestive of subacute infarction, subsequently she had brain MRI, MRV and MR a done, revealing acute infarct noted in the left insula/left temporal lobe/left periventricular white matter with associated thrombosis of the superior M2 branch of the left middle cerebral artery, and infarcts noted in the right corona radiata and left centrum semiovale, the time the patient was 8 weeks pregnant. She was started on baby aspirin, she had been sent for inherited thrombophilia, she does not have prothrombin gene mutation, PC resistance 3.54, protein C was 1 1 1%, protein S 25%, Antithrombin III 90%, repeat protein S was 27%, patient anticoagulant was negative, antibeta-2 glycoprotein antibodies were negative, initial testing for anticardiolipin antibodies was negative, repeat testing had revealed an IgM of 17. [place] was negative.   Urine drug screen was positive for marijuana. The patient was on prophylactic dose of Lovenox. She was admitted to the hospital on 5/31/2022 with a stroke, brain MRI had revealed tiny foci of acute or early subacute infarction in the right frontal lobe, with single tiny foci each within the right lateral basal ganglia, external capsule and insular cortex. An MRV was without evidence of dural venous thrombosis, mild stenosis in the left lateral transverse sinus. Which may be due to an arachnoid granulation. MRA was unremarkable. The patient was seen by neurology, we agreed on therapeutic dose of Lovenox, she is doing well since discharge from the hospital, she is cut down on smoking cigarettes, only 1 cig per day, she has not been using any drugs. Her mother had a PE when she was in her 45s, it had occurred after hysterectomy. Review of Systems;  CONSTITUTIONAL: No fever, chills. Good appetite feeling tired. ENMT: Eyes: No diplopia; Nose: No epistaxis. Mouth: No sore throat. RESPIRATORY: No hemoptysis, shortness of breath on exertion, cough. CARDIOVASCULAR: No chest pain, palpitations. GASTROINTESTINAL: pos for nausea , novomiting, abdominal pain, diarrhea/constipation. GENITOURINARY: No dysuria, urinary frequency, hematuria. NEURO: No syncope, presyncope, headache.   Remainder:  ROS NEGATIVE    Past Medical History:      Diagnosis Date    Back pain     CVA (cerebral vascular accident) ([address] 04/2022 11/2021    Neck pain      Patient Active Problem List   Diagnosis    Sprain of shoulder, left    Cervicalgia    Cervical spondylolysis    Cervical strain    Sprain of ligaments of cervical spine    Acute cerebrovascular accident (CVA) ([address]    Cerebrovascular accident (CVA) ([address]    Nausea/vomiting in pregnancy    History of UTI    History of thrombosis    Protein S deficiency affecting pregnancy ([address]    BMI 28.0-28.9,adult    Vitamin D deficiency    Low vitamin D level    Heterozygous MTHFR mutation [id number]        Past Surgical History:      Procedure Laterality Date     SECTION      DILATION AND CURETTAGE      NERVE BLOCK Left 2020    LEFT CERVICAL MEDIAL BRANCH NERVE  BLOCK UNDER FLUOROSCOPIC GUIDANCE C3, C4, C5 AND C6 performed by Yves Galeano MD at [facility] OR       Family History:  Family History   Problem Relation Age of Onset    Asthma Father     Asthma Brother     Asthma Brother     No Known Problems Mother        Medications:  Reviewed and reconciled. Social History:  Social History     Socioeconomic History    Marital status: Single     Spouse name: Not on file    Number of children: Not on file    Years of education: Not on file    Highest education level: Not on file   Occupational History    Not on file   Tobacco Use    Smoking status: Current Every Day Smoker     Packs/day: 0.50     Types: Cigarettes, Cigars    Smokeless tobacco: Never Used    Tobacco comment: black and mild   Vaping Use    Vaping Use: Never used   Substance and Sexual Activity    Alcohol use: No    Drug use: Yes     Types: Marijuana Zollie Axe)    Sexual activity: Yes     Partners: Male   Other Topics Concern    Not on file   Social History Narrative    Not on file     Social Determinants of Health     Financial Resource Strain:     Difficulty of Paying Living Expenses: Not on file   Food Insecurity:     Worried About Running Out of Food in the Last Year: Not on file    Aileen of Food in the Last Year: Not on file   Transportation Needs:     Lack of Transportation (Medical): Not on file    Lack of Transportation (Non-Medical):  Not on file   Physical Activity:     Days of Exercise per Week: Not on file    Minutes of Exercise per Session: Not on file   Stress:     Feeling of Stress : Not on file   Social Connections:     Frequency of Communication with Friends and Family: Not on file    Frequency of Social Gatherings with Friends and Family: Not on file    Attends Holiness Services: Not on file    Active Member of Clubs or Organizations: Not on file    Attends Club or Organization Meetings: Not on file    Marital Status: Not on file   Intimate Partner Violence:     Fear of Current or Ex-Partner: Not on file    Emotionally Abused: Not on file    Physically Abused: Not on file    Sexually Abused: Not on file   Housing Stability:     Unable to Pay for Housing in the Last Year: Not on file    Number of [place] in the Last Year: Not on file    Unstable Housing in the Last Year: Not on file       Allergies:  No Known Allergies    Physical Exam:  /69   Pulse 92   Temp 97.7 °F (36.5 °C)   Ht 5' 2\" (1.575 m)   Wt 162 lb (73.5 kg)   LMP 02/10/2022   SpO2 100%   BMI 29.63 kg/m²   GENERAL: Alert, oriented x 3, not in acute distress. HEENT: PERRLA; EOMI. Oropharynx clear. NECK: Supple. No palpable cervical or supraclavicular lymphadenopathy. LUNGS: Good air entry bilaterally. No wheezing, crackles or rhonchi. CARDIOVASCULAR: Regular rate. No murmurs, rubs or gallops. ABDOMEN: Uterus, soft. Non-tender, non-distended. Positive bowel sounds. EXTREMITIES: Without clubbing, cyanosis, or edema. NEUROLOGIC: No focal deficits. ECOG PS 0    Impression/Plan:     Ms. Mallory Eagle is a very pleasant 20-year-old lady, G3, P1, who was referred to the hematology office evaluation of decreased protein S, and stroke during pregnancy. The patient reports having symptoms of confusion, aphasia and headaches in November 2021, lasted for about 4 days, she did not seek medical attention for those symptoms. Had presented to the ED on 4/17/2022 with severe headaches, 2 days prior she had slurred speech, and aphasia, which lasted for about 20 minutes, she then developed paresthesias in her right upper extremity.   CT was done, revealing hypodensity within the left insular cortex with abnormal white matter hypodensity extending into the adjacent subinsular white matter and left corona radiator, findings may be suggestive of subacute infarction, subsequently she had brain MRI, MRV and MR a done, revealing acute infarct noted in the left insula/left temporal lobe/left periventricular white matter with associated thrombosis of the superior M2 branch of the left middle cerebral artery, and infarcts noted in the right corona radiata and left centrum semiovale, the time the patient was 8 weeks pregnant. She was started on baby aspirin, she had been sent for inherited thrombophilia, she does not have prothrombin gene mutation, PC resistance 3.54, protein C was 1 1 1%, protein S 25%, Antithrombin III 90%, repeat protein S was 27%, patient anticoagulant was negative, antibeta-2 glycoprotein antibodies were negative, initial testing for anticardiolipin antibodies was negative, repeat testing had revealed an IgM of 17. [place] was negative. Urine drug screen was positive for marijuana. The patient's brother has sickle cell trait, her mother had a PE when she was in her 45s, it had occurred after hysterectomy. Patient does not have sickle cell. The patient had a stroke while pregnant, reports having logical symptoms in November 2021, protein S was reduced to 25-27%, recent pregnancy and in the setting of stroke, HIV screen was negative, repeat anticardiolipin IgM antibodies titer was slightly increased, at 16, could be secondary to inflammation, her mother had VTE at a young age but was provoked. The patient was on prophylactic dose of Lovenox. She was admitted to the hospital on 5/31/2022 with a stroke, brain MRI had revealed tiny foci of acute or early subacute infarction in the right frontal lobe, with single tiny foci each within the right lateral basal ganglia, external capsule and insular cortex. An MRV was without evidence of dural venous thrombosis, mild stenosis in the left lateral transverse sinus. Which may be due to an arachnoid granulation. MRA was unremarkable.   The patient was seen by neurology, we agreed on therapeutic dose of Lovenox, she is doing well since discharge from the hospital.  Protein S activity is low, will repeat after the delivery. Cardiolipin IgM antibodies had normalized. Plan is for the patient to have her delivery in [facility]. RT in 3 months. Thank you for allowing us to participate in the care of Ms. Judy Marsh.     Ella Bustamante MD   HEMATOLOGY/MEDICAL [address] MEDICAL ONCOLOGY  [address] [place] [id number]  Dept: [phone] Rikki Nate: [phone]

## 2022-06-23 LAB
AFP INTERPRETATION: NORMAL
AFP MOM: 1.04
AFP SPECIMEN: NORMAL
DATING: NORMAL
ESTIMATED DUE DATE: NORMAL
FETUS COUNT: NORMAL
GESTATIONAL AGE CALC AT COLLECT: NORMAL
HISTORY/NEURAL TUBE DEFECTS: NO
INSULIN DEP. DIABETIC: NO
MATERNAL AGE AT EDD: 31.8 YR
MATERNAL WEIGHT: NORMAL
PT AFP: 50 NG/ML
RACE: NORMAL
SMOKING: NO

## 2022-06-27 DIAGNOSIS — E53.8 LOW FOLATE: Primary | ICD-10-CM

## 2022-06-27 DIAGNOSIS — R80.9 PROTEINURIA, UNSPECIFIED TYPE: ICD-10-CM

## 2022-06-27 RX ORDER — FOLIC ACID 1 MG/1
1 TABLET ORAL DAILY
Qty: 30 TABLET | Refills: 5 | Status: SHIPPED | OUTPATIENT
Start: 2022-06-27

## 2022-06-27 NOTE — PROGRESS NOTES
Pt called and informed of new orders from Dr Olvin Shell. Informed of folic acid and 53ED urine collection. She was instructed on collection process and verbalized understanding.

## 2022-06-28 ENCOUNTER — TELEPHONE (OUTPATIENT)
Dept: OBGYN | Age: 32
End: 2022-06-28

## 2022-07-13 ENCOUNTER — TELEPHONE (OUTPATIENT)
Dept: OBGYN | Age: 32
End: 2022-07-13

## 2022-07-20 ENCOUNTER — ANCILLARY PROCEDURE (OUTPATIENT)
Dept: OBGYN CLINIC | Age: 32
End: 2022-07-20
Payer: MEDICAID

## 2022-07-20 ENCOUNTER — ROUTINE PRENATAL (OUTPATIENT)
Dept: OBGYN CLINIC | Age: 32
End: 2022-07-20
Payer: MEDICAID

## 2022-07-20 ENCOUNTER — TELEPHONE (OUTPATIENT)
Dept: OBGYN | Age: 32
End: 2022-07-20

## 2022-07-20 VITALS
BODY MASS INDEX: 29.63 KG/M2 | HEIGHT: 62 IN | SYSTOLIC BLOOD PRESSURE: 106 MMHG | WEIGHT: 161 LBS | DIASTOLIC BLOOD PRESSURE: 71 MMHG | HEART RATE: 101 BPM

## 2022-07-20 DIAGNOSIS — E55.9 VITAMIN D DEFICIENCY: ICD-10-CM

## 2022-07-20 DIAGNOSIS — R80.9 PROTEINURIA, UNSPECIFIED TYPE: ICD-10-CM

## 2022-07-20 DIAGNOSIS — E53.8 LOW FOLATE: ICD-10-CM

## 2022-07-20 DIAGNOSIS — Z86.718 HISTORY OF THROMBOSIS: ICD-10-CM

## 2022-07-20 DIAGNOSIS — O09.891 MATERNAL EXPOSURE TO ALCOHOL IN FIRST TRIMESTER: ICD-10-CM

## 2022-07-20 DIAGNOSIS — Z3A.22 22 WEEKS GESTATION OF PREGNANCY: Primary | ICD-10-CM

## 2022-07-20 LAB
GLUCOSE URINE, POC: NEGATIVE
PROTEIN UA: POSITIVE

## 2022-07-20 PROCEDURE — 81002 URINALYSIS NONAUTO W/O SCOPE: CPT | Performed by: OBSTETRICS & GYNECOLOGY

## 2022-07-20 PROCEDURE — 76811 OB US DETAILED SNGL FETUS: CPT | Performed by: OBSTETRICS & GYNECOLOGY

## 2022-07-20 PROCEDURE — 99214 OFFICE O/P EST MOD 30 MIN: CPT | Performed by: OBSTETRICS & GYNECOLOGY

## 2022-07-20 PROCEDURE — G8419 CALC BMI OUT NRM PARAM NOF/U: HCPCS | Performed by: OBSTETRICS & GYNECOLOGY

## 2022-07-20 PROCEDURE — 99213 OFFICE O/P EST LOW 20 MIN: CPT | Performed by: OBSTETRICS & GYNECOLOGY

## 2022-07-20 PROCEDURE — G8427 DOCREV CUR MEDS BY ELIG CLIN: HCPCS | Performed by: OBSTETRICS & GYNECOLOGY

## 2022-07-20 PROCEDURE — 4004F PT TOBACCO SCREEN RCVD TLK: CPT | Performed by: OBSTETRICS & GYNECOLOGY

## 2022-07-20 RX ORDER — ENOXAPARIN SODIUM 300 MG/3ML
70 INJECTION INTRAVENOUS; SUBCUTANEOUS 2 TIMES DAILY
COMMUNITY

## 2022-07-20 NOTE — PATIENT INSTRUCTIONS
Please arrive for your scheduled appointment at least 15 minutes early with your actual insurance card+ a photo ID. Also if you need any refills ordered or have questions, it may take up 48 hours to reply. Please allow ample time for your refills. Call me when you use last refill. Thank you for your cooperation. Call your primary obstetrician with bleeding, leaking of fluid, abdominal tenderness, headache, blurry vision, epigastric pain and increased urinary frequency. Any questions contact Ladonna Mayo at [phone]. If you are experiencing an emergency and need immediate help, call 911 or go to go emergency room or labor and delivery. if you are sick, not feeling well or have an infectious process going on please reschedule your appointment by calling [phone]. Also if any family members are not feeling well, please do not bring them to your appointment. We appreciate your cooperation. We are doing this in order to protect our pregnant mothers+ their babies.

## 2022-07-20 NOTE — LETTER
[facility] Maternal Fetal Medicine  [address] [place] [address]  Phone: [phone]  Fax: [phone]           Carlos Perrin MD      2022     Patient: Ban Red   MR Number: [id number]   YOB: 1990   Date of Visit: 2022       Dear Dr. Farrah Fong: Thank you for referring Rosy Gamino to me for evaluation/treatment. Below are the relevant portions of my assessment and plan of care. If you have questions, please do not hesitate to call me. I look forward to following Hayden Damon along with you. Sincerely,        Carlos Perrin MD    CC providers:  MD GUILLAUME Rivera  [id number]  [facility]       2022      Torrie Vergara, [address]  [facility]  [address] [place] [address]     RE:  Jason ARAUJO  : 1990   AGE: 33 y.o. This report has been created using voice recognition software. It may contain errors which are inherent in voice recognition technology. Dear Dr. Farrah Fong:      I had the pleasure of meeting with Ms. Rl Jackman for a return consultation. As you know, Ms. Rl Jackman  is a 33 y.o.  at 22w6d (LMP = 9 wk US) who is being followed by our office for a history of a maternal stroke. The patient had to leave immediately following her ultrasound. The patient's consultation was completed via telephone. Today, Ms. Rl Jackman reports that she feels well. She notes good fetal movement and denies any symptoms of leaking of fluid, vaginal bleeding, and/or contractions. She had a fetal ultrasound that was notable for the following. There is a single intrauterine gestation in a cephalic presentation with a heart rate of 158 beats per minute. The placenta is posterior. The amniotic fluid index is normal.  The composite gestational age is 22w3d. The estimated fetal weight is at the 42nd percentile. Subchronic hemorrhage is seen measuring 2.4 x 0.8 x 2 cm.   Small matter hypodensity extending into the adjacent subinsular white matter and left corona radiata. Findings may be suggestive of subacute infarction. For further evaluation brain MRI is recommended. \"     An MRI, MRV, and MRA of the head with and completed. Per the MRI report from 4/18/2022, acute infarct noted in left insula/left temporal lobe/left periventricular white matter with associated thrombosis of a superior M2 branch of the left middle cerebral artery. Chronic infarcts noted in the right corona radiata and left centrum semiovale. The MRV was unremarkable. No dural sinus thrombosis was noted. The patient was diagnosed with a subacute CVA and admitted to the hospital.  She was also noted to have bacteriuria. She was cared for by her primary care physician and she had a neurology consultation. A thrombophilia evaluation was completed. She was started on a low-dose aspirin and received Lovenox for DVT prophylaxis. A maternal echocardiogram was completed on 4/19/2022. Per the report, the study was normal.     The patient remained stable and was discharged home on 4/19/2022. She was discharged home on Keflex and a low-dose aspirin. She was to follow-up with obstetrics and her primary care physician. The patient's thrombophilia evaluation was completed on 4/18/2022, her results included: Homocystine 5, protein C functional 111%, protein S antigen free 25% (), Antithrombin activity 99%, APC resistance 3.54 (normal), lupus anticoagulant negative, beta-2 glycoprotein antibody negative, cardiolipin antibody negative, prothrombin 2 gene mutation negative. A screening PARIS was completed and negative. A protein S antigen free was repeated on 4/19/2022 and again low at 27 (). Homocystine was repeated on 4/19/2022 and again normal at 7.8. Factor VIII activity was normal at 134% and factor VII activity was normal at 133%.   Anticardiolipin antibody was repeated on 4/19/2022 and IgM was indeterminate at 17. Repeat screening for lupus anticoagulant was negative on 4/19/2022. A D-dimer was checked on 4/19/2022 and normal.  Patient's urine drug screen was positive for marijuana. The patient's urinalysis was abnormal.  The urine culture showed mixed jair including E. coli staph and Corynebacterium. No sensitivities were provided. The patient was seen for her initial consultation with [facility] on 5/9/2022. Counseling was provided and the recommendation was made for prophylactic anticoagulation with Lovenox, 40 mg daily. Both hematology and neurology were contacted regarding the patient and plan of care. The patient returned to the [facility] office on 5/31/2022 for a follow-up consultation. Prior to her visit, she reported to the nurse that she had recurrent strokelike symptoms including left-sided numbness and a headache. She was immediately taken to the emergency department for evaluation. She was readmitted to the hospital on 5/31/2022 with an acute CVA. A CT of the head was repeated on 5/31/2022. Per the impression, there was left insular encephalomalacia at the site of prior infarct. It was an unremarkable CTA of the head and neck. Specifically, there was no evidence of residual thrombus in the left middle cerebral artery. The patient then had an MRI/MRV/MRA of the head without contrast.  Per the impression on the report, tiny foci of acute or early subacute infarction were seen in the right frontal lobe, with single tiny foci each within the right lateral basal ganglia, external capsule, and insular cortex. Mild stenosis was noted in the left lateral transverse sinus, which may be due to an arachnoid granulation. There is no evidence of dural venous thrombosis. She was evaluated by neurology on 6/2/2022. The recommendation was made for therapeutic anticoagulation with Lovenox. She was also evaluated by hematology. She was discharged home on 6/3/2022.      The patient returned to the [facility] office at 16 weeks and 6 days for follow-up. She reported that she felt well. She still had intermittent headache symptoms that she rated as a 6 out of 10. She reported that she was tolerating the therapeutic Lovenox well. During her visit at 16 weeks 6 days, the patient requested a referral for additional evaluation at the [facility]. The patient was counseled that this would be reasonable given her complicated pregnancy. Additionally, it may be in the patient's best interest to plan for delivery at a center in which neurology and/or neurosurgery would be available in the event the patient had any complications during delivery or postpartum. The patient was agreeable with the plan. She requested a referral to the [facility] for additional evaluation and management. A referral was provided. The patient's neurologist, Dr. Mariah Tyson, was also contacted regarding the patient. He agreed with the plan outlined above. The patient is following with maternal-fetal medicine, Dr. Cheyanne George, at the [facility]. She is also following with Dr. Sukhjinder Bullard from vascular surgery. She was also seen by a vascular neurologist, Dr. Suman Villalpando, at the [facility]. The patient returns the office today for follow-up. She notes fetal movement. She denies having any symptoms of leaking of fluid, vaginal bleeding, cramping, and/or contractions. She denies having any subsequent strokelike symptoms. Reported her migraine headache symptoms have significantly improved. Counseling was previously provided to the patient. Counseling was reviewed. The patient did not have any additional questions or concerns. Again, pregnancy and the puerperium (postpartum period) are well-established risk factors for thromboembolism. Normal pregnancy is accompanied by an increase in clotting factors.   The resulting hypercoagulable state likely evolved to protect women from hemorrhage in the event of a miscarriage and during childbirth. Thromboembolic disease during pregnancy and the puerperium is a significant cause of maternal morbidity and mortality. During pregnancy, the risk of venous thromboembolism increases 4-5 fold and the risk of arterial thromboembolism, myocardial infarction, and stroke increases 3-4 fold compared to the nonpregnant state. Postpartum, the risk of venous thromboembolism is 20 fold higher and the risk of arterial thromboembolism is similarly elevated compared to nonpregnant individuals. Venous events are more common during pregnancy accounting for 4 out of 5 thromboembolic events. However, the risk of death is higher following an arterial thrombosis. Approximately 80% of venous thromboembolic events are deep venous thromboses and approximately 20% of pulmonary emboli. The most important risk factor for thrombosis during pregnancy is a history of thrombosis. The risk for a thrombotic event is further increased in women with an underlying acquired or inherited thrombophilia. Most studies have reported and equal distribution of thrombosis risk across all trimesters of pregnancy however, 2 large conflicting studies have reported a first trimester (50% prior to 15 weeks) and third trimester (60%) predominance. Additional risk factors for thrombosis during pregnancy include multifetal gestation, varicose veins, inflammatory bowel disease, urinary tract infection, diabetes, hospitalization, obesity, and maternal age greater than 28 years. Compared to the antepartum period, the thrombosis risk is 2-5 times higher during the postpartum period. This risk is highest during the first 6 weeks postpartum and declines to prepregnancy rates by 13-18 weeks postpartum.  Risk factors for postpartum thrombosis include  section, medical co morbidities, obesity,  delivery, hemorrhage, fetal demise, advanced maternal age, hypertensive disorders of pregnancy, tobacco use, weeks gestation. The Patient should monitor fetal kick counts daily starting at 28 weeks gestation. She should be scheduled for twice-weekly fetal testing starting at 32 weeks gestation. Delivery is recommended at/by 39 weeks gestation. Counseling was previously provided regarding the radiation exposure from the CT scan. Counseling was reviewed. The patient did not have any additional questions or concerns. Increased risks associated with radiation exposure during pregnancy include that of early pregnancy loss, fetal malformations, poor fetal growth, and an increase for childhood cancers (increased from 1/2800 to 1/2000). Generally, these risks are not significantly increased unless the radiation exposure exceeds 50 mGy (5 rads). The estimated radiation exposure associated with a head CT is 1-10 mGy (0.1-1 rads). The International Commission of Radiologic Protection suggests that the radiation doses delivered in utero by imaging tests (such as those performed in the diagnosis of PE) present no measurable increased risk of fetal death or developmental abnormalities over the background incidence of these entities. The [employer] of Radiation Protection and Measurements considers the risk of radiation-associated abnormalities to be negligible, at less than 50 mGy when compared with other risks of pregnancy. Per available data, diagnostic imaging studies that expose the fetus to less than 0.05 Gray (50 mGy, 5 rads), do not appear to increase the risk for fetal anomalies, intellectual disability, growth restriction, or pregnancy loss. With exposure to more than 0.05 Gy (50 mGy, 5 rads), a definitive threshold at which the risk for, locations increases has not been determined. Evidence suggests the risks begin to increase at doses above 0.1 Gy (100 mGy, 10 rads) and especially above 0.15-0.2 Gy (150-200 mGy, 15-20 rads).       3.  Genetic counseling -- The patient was previously counseled regarding her options for genetic screening and/or diagnostic testing. She opted for screening with NIPT. Screening was completed on 2022. Her results were low risk for aneuploidy. The reported fetal fraction was 28% the reported fetal sex was female. The results were previously reviewed with the patient. The patient also opted for a GeoGRAFI 14 panel. Screening was completed on 2022. The results were reviewed with the patient. She is a silent carrier for alpha thalassemia. Additional counseling was provided, please see below. Screening was otherwise negative for cystic fibrosis, spinal muscular atrophy, and Fragile X. The patient was also counseled regarding the recommendation for maternal serum AFP to screen for open neural tube defects. Risks and benefits of screening were reviewed. Screening is recommended at 15 to 22 weeks gestation. Testing was ordered today. 4.  History of  X1 -- The patient's first pregnancy was delivered via  secondary to arrest of dilation and nonreassuring fetal heart tones. The placenta is posterior. She plans to have a repeat  for delivery. Again, her delivery will be scheduled at the [facility] clinic in order to facilitate additional maternal care. 5.  Subchronic hemorrhage, stable -- The ultrasound images were reviewed with patient. Again, a subchorionic hemorrhage was seen measuring 2.4 x 0.8 x 2 cm. The size is somewhat decreased compared to prior imaging. At 16 weeks 6 days, a subchronic hemorrhage was seen measuring 5.9 x 0.8 x 2.3 cm. The size is somewhat increased compared to prior imaging. Previously, at 12 weeks 4 days, the subchorionic hemorrhage measured 2.6 x 1.4 x 0.9 cm. No active bleeding was noted. The patient denied having any vaginal bleeding or cramping. She is noted to be Rh+. Counseling was previously provided to the patient. Counseling was reviewed.   The patient did not have any additional questions or concerns. A subchorionic hemorrhage can be associated with an increased risk for bleeding, infection, early pregnancy loss, poor fetal growth,  premature rupture of membranes,  labor and delivery, and stillbirth. Because of the increased risk for complications, close follow-up is recommended. Activity restrictions were again reviewed. The patient was counseled to avoid heavy lifting, prolonged standing, strenuous exercise, and sexual intercourse. The patient was scheduled to return in 3-4 weeks for a follow-up visit. Bleeding precautions were reviewed. 6.  Uterine fibroids -- The ultrasound images were reviewed with the patient. Multiple uterine fibroids were again noted. The largest fibroid seen was in the posterior wall of the uterus and it measured 3.8 x 2.3 x 2.1 cm. It was posterior to the placenta. The size and appearance are stable compared to previous imaging. An anterior fibroid was also noted today measuring 1.9 x 0.7 x 2.4 cm. The patient again denied any symptoms of fevers, chills, and/or abdominal pain. Fetal growth was appropriate for the gestational age. Counseling was previously provided to the patient. Counseling was reviewed. The patient did not have any additional questions or concerns. Fibroids are common in women of reproductive age. Most studies that monitored the growth of fibroids during pregnancy have noted that the majority of uterine fibroids have less than 10% change in volume across gestation. Most of the growth usually occurs in the first trimester. Fibroids larger than 5 cms are more likely to grow, where as smaller fibroids most likely remain stable in size. As you know, there is a higher chance of antepartum bleeding with retroplacental fibroids and a small increase in  birth. Specifically, if placentation occurs adjacent to or overlying a fibroid.   Submucosal and retroplacental fibroids with volumes more than 200 ml (corresponding to 7 cm in diameter) have the highest risk of abruption. Pain is another common complication of fibroid in pregnancy. It is especially high in fibroids greater than 5 cm in diameter. Patients with degenerating fibroids tend to have localized pain, mild leukocytosis, pyrexia and nausea. This may result from decreased perfusion in the setting of rapid growth leading to ischemia. Some studies noted increased incidence of malpresentation if the uterus has multiple fibroids. Large retroplacental fibroids that distort the uterine cavity may be associated with adverse pregnancy event including  fetal growth restriction and  labor. Overall, the risks associated with uterine fibroids in pregnancy include malpresentation, lower uterine segment obstruction, degeneration, poor fetal growth, ,  birth, and antepartum/postpartum bleeding. The size and appearance of the fibroid(s) should be reevaluated on follow-up ultrasound. A screening cervical length  was completed at 16 weeks 5 days and normal at 4.2 cm without funneling. A cervical length was not repeated today. A cervical length should be repeated at her follow-up visit. Fetal growth should be monitored serially, every 3 to 4 weeks beginning at 24 to 26 weeks gestation. Precautions were reviewed with the patient. 7.  Tobacco use in pregnancy, quit -- The patient reports she has stopped smoking Black and milds. She was congratulated and encouraged to remain tobacco free. She was again counseled regarding the increased risks of smoking in pregnancy including poor fetal growth, placental abruption, PPROM/ birth, and fetal loss. Additional  risks were again reviewed including asthma, allergies, infection, and an association with SIDS. Various techniques for cutting back and quitting were again reviewed.        She was again counseled that smoking cessation is especially important in her case given her recurrent thrombotic events. She expressed verbal understanding of this counseling. 8. THC Use --  The patient previously reported that she was smoking marijuana daily. She reports that she was using marijuana for decreased appetite. The patient again reports that she has stopped smoking marijuana. She was congratulated and encouraged not to use marijuana during pregnancy. Counseling was previously provided to the patient. Counseling was reviewed today. The patient did not have any additional questions or concerns. She was again counseled regarding risk of neurodevelopmental issues in children exposed to [facility] during pregnancy. Additionally, marijuana use may pose risks similar to that of cigarette use including increased risk for poor fetal growth, placental bleeding, PPROM/ delivery, and stillbirth. She was again counseled not to use THC while pregnant. Random urine drug screens should be monitored during pregnancy. 9.  First/second trimester alcohol exposure -- The patient previously reported that she drinks wine occasionally. She reports only taking sips of wine. Today, the patient again reports that she has stopped drinking alcohol. Counseling was previously provided to the patient. Counseling was reviewed. The patient did not have any additional questions or concerns. The patient was again counseled that a safe level of alcohol consumption during pregnancy has not been determined. Determination of the impact of alcohol use during pregnancy on fetal development can be challenging because of variation in maternal alcohol clearance rates, fetal sensitivity, genetic susceptibility, maternal drinking patterns (binge drinking versus daily consumption), and confounders such a substance abuse. The patient was counseled that alcohol is a teratogen that can impact fetal growth and development at all stages during pregnancy.   Currently, national guidelines and multiple medical societies recommend abstinence during pregnancy. In one large epidemiologic study, an increased rate of stillbirth was noted across all categories of alcohol intake, even after adjusting for confounders (eg, smoking, pre-pregnancy weight). There is also an increased risk for structural anomalies (craniofacial, cardiac), poor fetal growth, decreased IQ, and neurodevelopmental issues in childhood. Factors such as older age, increased parity, and  and  ethnicities are associated with an increased risk for FAS. Secondary to the increased risk for poor growth, fetal growth should be monitored serially, every 3 to 4 weeks starting at 24 to 26 weeks gestation. Fetal growth was appropriate for the gestational age today at 25 weeks 6 days. Secondary to the increased risk for congenital heart anomalies, a fetal echocardiogram is recommended at 22 to 24 weeks gestation. A referral to pediatric cardiology was provided. 10.  Right ovarian mass/cyst -- The ultrasound images were reviewed with the patient. The right ovarian mass previously described was again seen today. The hyperechoic mass measured 1.3 x 1.1 x 1.3 cm. The size and appearance are stable compared to prior imaging. At 12 weeks 4 days, A hyperechoic mass was seen in the right ovary measuring 1.2 x 1 x 0.9 cm. No fluid was seen surrounding the area. The patient was again counseled that this may represent a hemorrhagic cyst versus teratoma versus a calcification, although no shadowing was seen. The size and appearance of the ovarian mass will be reevaluated on follow-up ultrasound. The adnexa will be reevaluated on follow-up exam.     11.   Nausea and vomiting of pregnancy, improved -- The patients previously reported having daily symptoms of nausea and decreased appetite. She was having occasional emesis.   The patient reports that her symptoms of nausea and vomiting have significantly improved. She has gained 4 pounds since her last visit to our office. She has not gained any weight since her visit at 12 weeks. Today, the patient reports that her symptoms are stable. She denied having any signs or symptoms of dehydration. The patient was again encouraged to eat small amounts of food every 2-3 hours during the day. She was also encouraged to stay well-hydrated. A baseline nutrition panel (CBC, CMP, magnesium, ferritin, folate, vitamin B12, vitamin D 25 OH) was recommended. Baseline testing was completed on 5/13/2022, her results included: A CBC was ordered but not done by the lab. A CBC was completed on 5/31/22, H/H 11.9/35.8, MCV 88.6, ,000. The remainder of the test results are from 5/13/2022 and included: Potassium 3.7, creatinine 0.7, calcium 9, ALT 7, AST 13, magnesium 2, uric acid 3, , ferritin 67, folate 19.8, vitamin B12 423, vitamin D 9, TSH 0.876, free T4 1.16, free T3 3, TPO negative, antithyroglobulin antibody negative, urinalysis negative, urine culture mixed, urine protein creatinine ratio 0.1, beta-2 lipoprotein antibody negative, anticardiolipin antibody negative, protein S antigen free, 28 (), protein S antigen total 64 (%). --Additional recommendations are below. The patient was counseled to continue taking vitamin B6, 25 mg every 8 hours. She was counseled to call and/or return with worsening symptoms. Again, with persistent/worsening symptoms, I would recommend the addition of Pepcid, 20 mg twice daily and or Zofran. 12.  History of bacteruria -- The patient's hospital admission in April was reviewed. Her urinalysis was positive for Nitrites, leukocyte esterase, and bacteria. She was treated with Keflex. Her urine culture showed E. Coli and mixed gram-positive organisms including staph and Corynebacterium. She had a repeat urine culture on 5/5/2022 that was negative.   She denied any signs or symptoms of recurrent infection. Precautions were reviewed. A repeat urine culture was completed on 5/13/2022 and showed less than 10,000 CFU per mL of mixed gram-positive organisms. A urine culture was repeated on 6/17/2022 and noted to be mixed. A repeat urine culture was ordered today. 13.  Anticardiolipin antibody IgM, indeterminate-- Antiphospholipid antibody screening was done secondary to the patient's CVA diagnosed in April 2022. Initial screening completed on 4/18/2022 as part of the thrombophilia panel was negative. Screening was repeated on 4/19/2022. Her anticardiolipin IgM antibody was indeterminate at 17 on 4/19/2022. Screening for lupus anticoagulant and beta-2 glycoprotein antibody was negative. The patient was counseled that this may be a false elevation, generally, the titers have to be greater than 20 for a true positive. The recommendation was made for the patient to continue taking a low dose aspirin, 81 mg daily. She should continue this for the remainder of pregnancy and 6 to 8 weeks postpartum. Repeat testing was completed on 5/13/2022 and negative. The results were reviewed with the patient. Testing was repeated on 6/17/2022. Testing for LAC, beta-2 glycoprotein, and anticardiolipin antibody was again negative. The patient was counseled to continue to follow with hematology for long-term monitoring and management. 14.  Protein S deficiency -- The patient's labs were reviewed. The patient initially had a thrombophilia panel on 4/18/2022. Her protein S, antigen, free was 25% (). Testing was repeated on 4/19/2022. Her protein S, antigen, free at that time was 27% (). The patient would have been 9 weeks at the time of the testing. She was being evaluated for a recent thrombotic stroke. Testing is not considered reliable during acute thrombosis or pregnancy. Protein S decreases during pregnancy.   However, protein S is generally considered deficient if <30% in the second trimester and/or <24% in the third trimester. Again, the patient had testing in the first trimester and her protein S levels were significantly decreased. It is unclear at this time if the patient truly has a protein S deficiency. This will not be able to be determined until the patient is postpartum. The patient was counseled that a protein S deficiency is rare, and seen in 0.03-0.13% of the population. It is considered a lower risk thrombophilia. If someone does have a deficiency, then the risk for a thrombosis during pregnancy is estimated to be 0.1% (w/negative personal history). It is 0-22% with a personal history of thrombosis     Given the patient's recurrent, the recommendation was for therapeutic anticoagulation with Lovenox. Please see above. Testing was repeated on 6/17/2022. The protein S antigen, free was 25% (%), and protein S activity was 35% (%). Her protein S levels are again low, even for pregnancy. A hematology consultation was previously recommended. The patient is following with Dr. Elian Pruett. Repeat testing is recommended at 6 to 8 weeks postpartum. She should not be on an oral contraceptive pill at the time of repeat testing. The patient should avoid birth control containing estrogen. The patient was counseled to continue taking a daily low-dose aspirin. She should continue a daily low-dose aspirin for the remainder of pregnancy and 6 to 8 weeks postpartum. Given her history of a thrombotic stroke, the recommendation was also made for anticoagulation with prophylactic Lovenox. The plan was reviewed with neurology and hematology, see above. A prescription was provided following patient's consultation. She was scheduled to return for injection teaching. Increased fetal surveillance is recommended.   Fetal growth should be monitored serially, every 3 to 4 weeks starting at 24 to 26 weeks gestation. The patient should monitor fetal kick counts daily starting at 28 weeks gestation. She should be scheduled for twice-weekly fetal testing starting at 32 weeks gestation. Delivery is recommended at/by 39 weeks gestation. I would defer delivery timing to the providers at the [facility]. 13. Vaccination in pregnancy -- The patient was  previously counseled regarding the recommendations for vaccination in pregnancy. Counseling was reviewed. The patient did not have any additional questions or concerns. The patient was counseled regarding the recommendations for the Tdap vaccination in pregnancy. Risks and benefits were discussed. The vaccination is typically administered between 27 and 36 weeks gestation and recommended to confer protection against whooping cough to the . The patient plans to discuss this with her primary provider at her next visit. The patient was also counseled that her partner in any individuals who will be in close contact with the  should also be vaccinated for whooping cough. The patient was counseled regarding recommendation for the flu vaccination in pregnancy for both maternal and infant safety. Risks and benefits were reviewed. Counseling was provided regarding the recommendation for the Covid vaccination in pregnancy. I advised the patient that the [employer] of Obstetrics and Gynecology and The Society for Maternal Fetal Medicine recommend that all pregnant women be vaccinated for COVID-19. \"Data have shown that COVID-19 infection puts pregnant people at increased risk of severe complications and even death; yet only about 22% of pregnant individuals have received 1 more doses of COVID-19 vaccine according to the [employer] for Disease Control and Prevention. \"     \" Recent data have shown that more than 95% of those were hospitalized and/or dying from COVID-19 are those who have remained unvaccinated. Pregnant individuals who have decided to wait until after delivery to be vaccinated may be inadvertently exposing themselves to an increased risk of severe illness or death. \"     \" COVID-19 vaccination is the best testing to reduce maternal and fetal complications of TFGCG-02 infection among pregnant people,\" said Oralia Mackenzie MD, president of the Society for Maternal Fetal Medicine, sub-specialists. The patient was counseled that in the event she opts not to have the Covid vaccination, she should alert her provider immediately if she test positive for Covid. Pregnant women are on the priority list for treatment with monoclonal antibody. This intervention has been shown to decrease the risk for hospitalization and complications related to Covid in pregnancy. The patient expressed verbal understanding of this counseling. 16.  Vitamin D deficiency -- The patient's vitamin D was deficient at 9  --Recommend vitamin D3 2000 IU daily  --Monitor nutrition panel q4-6 weeks (CBC, CMP, magnesium, ferritin, folate, vitamin B12, vitamin D25OH)    --Repeat testing was completed on 6/17/2022, her results included: H/H 11.2/33.7, MCV 87.1, platelet count 705,309, potassium 3.6, creatinine 0.7 calcium 7.2, ALT 15, AST 17, magnesium 1.6, ferritin 70, folate 11.2, vitamin B12 428, vitamin D 12, , uric acid 3.7, TSH 0.507, free T4 0.97, free T3 2.7, TPO negative, anticardiolipin antibody negative, beta-2 glycoprotein antibody negative, protein S antigen free 25%, hemoglobin A1c 4.9%, homocystine 6.6, protein S activity 35%, rheumatoid factor negative, C3 110, C4 23, [place] negative, LAC negative, maternal serum AFP 1.04 MOM, urinalysis negative, urine culture mixed, urine protein creatinine ratio 0.3, urinalysis negative for protein. -- The patient's vitamin D is still deficient but improving. She was counseled to continue her vitamin D supplement.   --Repeat testing ordered  --Follow up with PCP for long term monitoring and management     17. 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 She was noted to be heterozygote for the MTHFR c.665C>T variant (previously designated as C677T). Counseling was previously provided  regarding the implications and management of this gene mutation in pregnancy. Counseling was reviewed. The patient did not have any additional questions or concerns. She was counseled that this gene mutation affects folic acid metabolism. It is fairly common and found in 20-30% of the population. It is generally only a problem if homocysteine levels are elevated. In the past, this gene mutation was thought to be associated with increased obstetric risks including thrombosis, early recurrent pregnancy loss, placental abruption, hypertensive disorders of pregnancy, poor fetal growth, and fetal loss. More recent studies did not report strong associations with these risks. Because this genetic mutation affects folic acid metabolism, there is an increased risk for folic acid deficiency and other nutritional deficiencies in women with this condition. There is also an increased risk for having a child with an open neural tube defect in women with this mutation, especially if they're homozygous for the C677T mutation. Presently, this condition is not thought to be clinically significant. Generally, additional vitamin supplementation is recommended with folic acid or methyl folate, vitamin B6, and vitamin B12. The patient was counseled to take a low-dose aspirin. Fetal growth should be followed serially. She was counseled that there is a 50% chance that she will pass this mutation off to her child(sandra). She should relay this information to the pediatrician who cares for her child(sandra). She was also encouraged to review this diagnosis with her PCP.      Because of this history, a baseline nutrition panel and homocysteine level were recommended. She was provided with orders for testing. 25.  Silent carrier for alpha thalassemia -- The patient's records were previously reviewed. She is noted to be a silent carrier for alpha thalassemia. Counseling was previously provided to the patient. Counseling was reviewed. The patient did not have any additional questions or concerns. --Alpha thalassemia minima is another term for this condition. This condition results from the loss of a single alpha-chain gene (ie a-/aa). Blood tests are usually normal, though the red cells may be small, leading to a suspicion that the patient is a silent carrier. The only way to confirm a silent carrier is by DNA studies. This is usually a benign carrier state. Affected individuals are not typically anemic and their hemoglobin analysis is normal. These conditions may remain undiagnosed, or they may be detected incidentally on a routine complete blood count during evaluation of an unrelated condition or in the setting of preconception testing and counseling. The patient's partner has not had testing for this condition. Carrier screening is recommended. The patient again plans to discuss this with her partner. If the patient's partner is not a carrier for alpha thalassemia, then there is a 50% chance that her child/children will also be a carrier for alpha thalassemia minima. If her partner is also a carrier of alpha thalassemia, there is an increased risk for having an affected child. Without having her partner tested, definitive counseling cannot be provided. This information should be relayed to pediatrics. I would defer any additional  evaluation to pediatrics. 19.   Migraine headaches -- The patient previously reported having continued headache symptoms after discharge from the hospital in . The headaches are a 6 out of 10 at the worst.  She reported having daily headaches.   She has been using tylenol with some relief. She reports having associated symptoms of photosensitivity and phono sensitivity. Today, the patient reports that her headache symptoms have significantly improved. The patient was again counseled that migraine headaches can improve during pregnancy. However, in some patients symptoms are exacerbated. She was counseled regarding the use of magnesium oxide 400 mg twice daily and riboflavin 100 mg daily in reducing the frequency and intensity of migraine headaches. Prescriptions were provided. Precautions were reviewed, and she was counseled that if she develops the worst headache of her life or a headache with neurological deficits, to present immediately for evaluation. She expressed verbal understanding of this counseling. Because of the patient's headache symptoms, a baseline nutrition panel and thyroid function testing was recommended. Baseline testing was completed on 5/13/2022. The results are summarized above. 20.  Marginal umbilical cord insertion into the placenta --  Today's ultrasound results were reviewed with the patient. In some views, the umbilical cord appears to insert into the edge of the placenta. Counseling was provided to the patient. A marginal cord insertion is diagnosed when the umbilical cord inserts into the placenta within 2 cm of the edge of the placenta. A marginal cord insertion is seen in approximately 6% of pregnancies. There is typically a normal amount of Calvert's jelly in the cord. This is usually a benign finding, however, there can be an increased risk for poor fetal growth. Fetal growth should be monitored serially. Fetal growth was appropriate for the gestational age today. Fetal growth should be reevaluated in 3 to 4 weeks. 21.  Abnormal urine protein creatinine ratio -- The patient's labs from 6704 were reviewed. Her urine protein creatinine ratio was abnormally elevated at 0.3. Her urine culture was mixed.   Her urinalysis was negative for protein. Secondary to the conflicting results, a 44-FDDS urine collection was recommended. Nursing had previously contacted the patient regarding the instructions for testing. The patient's urine dip was positive for trace per day. Her blood pressure was normal at 106/71. --I requested the patient return for a follow-up assessment in 3-4 weeks unless there is a clinical reason for her to return prior to that time. She is to call if she has any problems or questions prior to her next visit. Further evaluation and management will be dependent on her clinical presentation and the results of her testing. --The patient was advised to call if she has any increased vaginal discharge, vaginal bleeding, contractions, abdominal pain, back pain or any new significant symptomatology prior to her next visit. I advised her that these are signs and symptoms of cervical change and require follow-up assessment when they occur. Preeclampsia precautions were also reviewed with the patient. --The patient was also counseled to call and/or return with any concerns for decreased fetal activity. --The patient is to continue to follow with you in your office for ongoing obstetric care. --The total time spent on today's visit was 30 minutes. This included preparation for the visit (i.e. reviewing prior external notes and test results), performance of a medically appropriate history and examination, counseling, orders for medications, tests or other procedures, and coordination of care. Greater than 50% of the time was spent face-to-face with the patient. This time is exclusive of procedures performed. I answered all of  the patient's questions to her satisfaction. I asked her to call if she had any additional questions prior to her next visit. --At the conclusion of the visit, the patient appeared to have a good understanding of the issues discussed.   I answered all of her questions to her satisfaction. I asked her to call if she had any additional questions prior to her next visit. --Thank you for allowing me to participate in the care of this pleasant patient. Please don't hesitate to call me if you have any questions. Sincerely,      Maria Del Carmen Rios MD, [address] [facility]  [phone]      *All or parts of this note may have been generated using a voice recognition program. There may be typo, grammar, or Word substitution errors that have escaped my review of this note.

## 2022-07-20 NOTE — TELEPHONE ENCOUNTER
Left voicemail updating patient on appointment time change and left office contact information should patient have any questions.

## 2022-07-22 NOTE — PROGRESS NOTES
2022      Sarkis Burton, [address]  [place],   [facility]     RE:  Stephen ARAUJO  : 1990   AGE: 33 y.o. This report has been created using voice recognition software. It may contain errors which are inherent in voice recognition technology. Dear Dr. True Sadler:      I had the pleasure of meeting with Ms. Viridiana Erickson for a return consultation. As you know, Ms. Viridiana Erickson  is a 33 y.o.  at 22w6d (LMP = 9 wk US) who is being followed by our office for a history of a maternal stroke. The patient had to leave immediately following her ultrasound. The patient's consultation was completed via telephone. Today, Ms. Viridiana Erickson reports that she feels well. She notes good fetal movement and denies any symptoms of leaking of fluid, vaginal bleeding, and/or contractions. She had a fetal ultrasound that was notable for the following. There is a single intrauterine gestation in a cephalic presentation with a heart rate of 158 beats per minute. The placenta is posterior. The amniotic fluid index is normal.  The composite gestational age is 22w3d. The estimated fetal weight is at the 42nd percentile. Subchronic hemorrhage is seen measuring 2.4 x 0.8 x 2 cm. Small hyperechoic mass is seen in the right ovary measuring 1.3 x 1.1 x 1.3 cm. The posterior fibroid is seen measuring 3.8 x 2.3 x 2.1 cm. An anterior fibroid is seen measuring 1.9 x 0.7 x 2.4 cm. PERTINENT PHYSICAL EXAMINATION:   /71 (Position: Sitting)   Pulse (!) 101   Ht 5' 2\" (1.575 m)   Wt 161 lb (73 kg)   LMP 02/10/2022   BMI 29.45 kg/m²     Urine dipstick:   Negative for Glucose    Trace for Albumin      A fetal ultrasound assessment was performed today. A report is enclosed for your review. Assessment & Plan:  33 y.o.  at 23w1d (LMP = [address]) with:    1. Pregnancy dating -- The patient's pregnancy dating was previously reviewed. The patient reports that was having monthly periods.   She discontinued Depo-Provera in November 2021. She reports a sure last menstrual period. Her reported LMP was 2/10/2022, GUERO 11/17/2022. The patient's first ultrasound was 1/4/1822. The crown-rump length was 9 weeks 4 days, GUERO 11/17/2022. Given the patient had a sure LMP that agreed with the 9-week ultrasound, the recommendation was made to use an GUERO of 11/17/2022 based on her LMP. Fetal growth was appropriate for the gestational age again today. 2.  History of stroke during pregnancy  -- The patient previously reported that she initially experienced symptoms of headache and aphasia in November 2021. She did not seek medical care at that time. The patient presented to the emergency department on April 17, 2022 with complaints of a severe headache that woke her up from sleep. She had a headache 4 days prior to her evaluation in the emergency department. Two days prior to her evaluation, she had slurred speech and aphasia which lasted for approximately 20 minutes and then resolved. The patient then developed paresthesias on her right arm. A CT of the head was initially completed. The report stated, \" hypodensity within the left insular cortex with abnormal white matter hypodensity extending into the adjacent subinsular white matter and left corona radiata. Findings may be suggestive of subacute infarction. For further evaluation brain MRI is recommended. \"     An MRI, MRV, and MRA of the head with and completed. Per the MRI report from 4/18/2022, acute infarct noted in left insula/left temporal lobe/left periventricular white matter with associated thrombosis of a superior M2 branch of the left middle cerebral artery. Chronic infarcts noted in the right corona radiata and left centrum semiovale. The MRV was unremarkable. No dural sinus thrombosis was noted. The patient was diagnosed with a subacute CVA and admitted to the hospital.  She was also noted to have bacteriuria. She was cared for by her primary care physician and she had a neurology consultation. A thrombophilia evaluation was completed. She was started on a low-dose aspirin and received Lovenox for DVT prophylaxis. A maternal echocardiogram was completed on 4/19/2022. Per the report, the study was normal.     The patient remained stable and was discharged home on 4/19/2022. She was discharged home on Keflex and a low-dose aspirin. She was to follow-up with obstetrics and her primary care physician. The patient's thrombophilia evaluation was completed on 4/18/2022, her results included: Homocystine 5, protein C functional 111%, protein S antigen free 25% (), Antithrombin activity 99%, APC resistance 3.54 (normal), lupus anticoagulant negative, beta-2 glycoprotein antibody negative, cardiolipin antibody negative, prothrombin 2 gene mutation negative. A screening PARIS was completed and negative. A protein S antigen free was repeated on 4/19/2022 and again low at 27 (). Homocystine was repeated on 4/19/2022 and again normal at 7.8. Factor VIII activity was normal at 134% and factor VII activity was normal at 133%. Anticardiolipin antibody was repeated on 4/19/2022 and IgM was indeterminate at 17. Repeat screening for lupus anticoagulant was negative on 4/19/2022. A D-dimer was checked on 4/19/2022 and normal.  Patient's urine drug screen was positive for marijuana. The patient's urinalysis was abnormal.  The urine culture showed mixed jair including E. coli staph and Corynebacterium. No sensitivities were provided. The patient was seen for her initial consultation with [facility] on 5/9/2022. Counseling was provided and the recommendation was made for prophylactic anticoagulation with Lovenox, 40 mg daily. Both hematology and neurology were contacted regarding the patient and plan of care. The patient returned to the [facility] office on 5/31/2022 for a follow-up consultation.   Prior to her visit, she reported to the nurse that she had recurrent strokelike symptoms including left-sided numbness and a headache. She was immediately taken to the emergency department for evaluation. She was readmitted to the hospital on 5/31/2022 with an acute CVA. A CT of the head was repeated on 5/31/2022. Per the impression, there was left insular encephalomalacia at the site of prior infarct. It was an unremarkable CTA of the head and neck. Specifically, there was no evidence of residual thrombus in the left middle cerebral artery. The patient then had an MRI/MRV/MRA of the head without contrast.  Per the impression on the report, tiny foci of acute or early subacute infarction were seen in the right frontal lobe, with single tiny foci each within the right lateral basal ganglia, external capsule, and insular cortex. Mild stenosis was noted in the left lateral transverse sinus, which may be due to an arachnoid granulation. There is no evidence of dural venous thrombosis. She was evaluated by neurology on 6/2/2022. The recommendation was made for therapeutic anticoagulation with Lovenox. She was also evaluated by hematology. She was discharged home on 6/3/2022. The patient returned to the [facility] office at 16 weeks and 6 days for follow-up. She reported that she felt well. She still had intermittent headache symptoms that she rated as a 6 out of 10. She reported that she was tolerating the therapeutic Lovenox well. During her visit at 16 weeks 6 days, the patient requested a referral for additional evaluation at the [facility] clinic. The patient was counseled that this would be reasonable given her complicated pregnancy. Additionally, it may be in the patient's best interest to plan for delivery at a center in which neurology and/or neurosurgery would be available in the event the patient had any complications during delivery or postpartum. The patient was agreeable with the plan. She requested a referral to the [facility] for additional evaluation and management. A referral was provided. The patient's neurologist, Dr. Lucrecia Medrano, was also contacted regarding the patient. He agreed with the plan outlined above. The patient is following with maternal-fetal medicine, Dr. Sirena Dodge, at the [facility]. She is also following with Dr. Jose Allan from vascular surgery. She was also seen by a vascular neurologist, Dr. Earlene Wagner, at the [facility] clinic. The patient returns the office today for follow-up. She notes fetal movement. She denies having any symptoms of leaking of fluid, vaginal bleeding, cramping, and/or contractions. She denies having any subsequent strokelike symptoms. Reported her migraine headache symptoms have significantly improved. Counseling was previously provided to the patient. Counseling was reviewed. The patient did not have any additional questions or concerns. Again, pregnancy and the puerperium (postpartum period) are well-established risk factors for thromboembolism. Normal pregnancy is accompanied by an increase in clotting factors. The resulting hypercoagulable state likely evolved to protect women from hemorrhage in the event of a miscarriage and during childbirth. Thromboembolic disease during pregnancy and the puerperium is a significant cause of maternal morbidity and mortality. During pregnancy, the risk of venous thromboembolism increases 4-5 fold and the risk of arterial thromboembolism, myocardial infarction, and stroke increases 3-4 fold compared to the nonpregnant state. Postpartum, the risk of venous thromboembolism is 20 fold higher and the risk of arterial thromboembolism is similarly elevated compared to nonpregnant individuals. Venous events are more common during pregnancy accounting for 4 out of 5 thromboembolic events. However, the risk of death is higher following an arterial thrombosis.   Approximately 80% of venous thromboembolic events are deep venous thromboses and approximately 20% of pulmonary emboli. The most important risk factor for thrombosis during pregnancy is a history of thrombosis. The risk for a thrombotic event is further increased in women with an underlying acquired or inherited thrombophilia. Most studies have reported and equal distribution of thrombosis risk across all trimesters of pregnancy however, 2 large conflicting studies have reported a first trimester (50% prior to 15 weeks) and third trimester (60%) predominance. Additional risk factors for thrombosis during pregnancy include multifetal gestation, varicose veins, inflammatory bowel disease, urinary tract infection, diabetes, hospitalization, obesity, and maternal age greater than 28 years. Compared to the antepartum period, the thrombosis risk is 2-5 times higher during the postpartum period. This risk is highest during the first 6 weeks postpartum and declines to prepregnancy rates by 13-18 weeks postpartum. Risk factors for postpartum thrombosis include  section, medical co morbidities, obesity,  delivery, hemorrhage, fetal demise, advanced maternal age, hypertensive disorders of pregnancy, tobacco use, and infection. The patient was counseled to continue treatment with therapeutic Lovenox and a daily low-dose aspirin. She was counseled to continue to follow with the specialist at the [facility] for long-term monitoring and management. Per the consultation note with maternal-fetal medicine, the plan is for delivery at the [facility]. Per the consultation note with maternal-fetal medicine, the patient will follow-up at the [facility] at 32 weeks and 36 weeks with a growth ultrasound at that time and plan to transfer care to [facility] for delivery at 36 weeks. It is unclear if the plan is for delivery at 36 weeks versus transfer of care at 36 weeks until delivery.   The patient was counseled to clarify this with her provider in [facility]. Therapeutic anticoagulation should be continued throughout the pregnancy and for at least 6-8 weeks postpartum. Lovenox can be initiated 12 hours following a vaginal delivery and 24 hours following a  section (if there is no ongoing concern for bleeding). The risks and benefits of therapeutic anticoagulation in pregnancy were reviewed including the development of heparin-induced thrombocytopenia (HIT), osteopenia, bleeding, and poor fetal growth. An anesthesia consultation is also recommended in the third trimester given she is on anticoagulation. A hematology consult was recommended. The patient is following with Dr. Zhanna Paul neurology consult was recommended. A referral was provided. The patient is following with a vascular neurologist at the [facility] clinic, Dr. Helen Escobar  The patient was referred to the [facility] for additional maternal and fetal evaluation. She is following with Dr. Carmen Matos. The patient should avoid estrogen containing birth control postpartum. Increased fetal surveillance is also recommended. Fetal growth should be monitored serially, every 3 to 4 weeks starting at 24 to 26 weeks gestation. The Patient should monitor fetal kick counts daily starting at 28 weeks gestation. She should be scheduled for twice-weekly fetal testing starting at 32 weeks gestation. Delivery is recommended at/by 39 weeks gestation. Counseling was previously provided regarding the radiation exposure from the CT scan. Counseling was reviewed. The patient did not have any additional questions or concerns. Increased risks associated with radiation exposure during pregnancy include that of early pregnancy loss, fetal malformations, poor fetal growth, and an increase for childhood cancers (increased from 2800 to 2000).  Generally, these risks are not significantly increased unless the radiation exposure exceeds 50 mGy (5 rads). The estimated radiation exposure associated with a head CT is 1-10 mGy (0.1-1 rads). The International Commission of Radiologic Protection suggests that the radiation doses delivered in utero by imaging tests (such as those performed in the diagnosis of PE) present no measurable increased risk of fetal death or developmental abnormalities over the background incidence of these entities. The [facility] [employer] of Radiation Protection and Measurements considers the risk of radiation-associated abnormalities to be negligible, at less than 50 mGy when compared with other risks of pregnancy. Per available data, diagnostic imaging studies that expose the fetus to less than 0.05 Gray (50 mGy, 5 rads), do not appear to increase the risk for fetal anomalies, intellectual disability, growth restriction, or pregnancy loss. With exposure to more than 0.05 Gy (50 mGy, 5 rads), a definitive threshold at which the risk for, locations increases has not been determined. Evidence suggests the risks begin to increase at doses above 0.1 Gy (100 mGy, 10 rads) and especially above 0.15-0.2 Gy (150-200 mGy, 15-20 rads). 3.  Genetic counseling -- The patient was previously counseled regarding her options for genetic screening and/or diagnostic testing. She opted for screening with NIPT. Screening was completed on 5/9/2022. Her results were low risk for aneuploidy. The reported fetal fraction was 28% the reported fetal sex was female. The results were previously reviewed with the patient. The patient also opted for a Horizon 14 panel. Screening was completed on 5/9/2022. The results were reviewed with the patient. She is a silent carrier for alpha thalassemia. Additional counseling was provided, please see below. Screening was otherwise negative for cystic fibrosis, spinal muscular atrophy, and Fragile X.      The patient was also counseled regarding the recommendation for maternal serum AFP to screen for open neural tube defects. Risks and benefits of screening were reviewed. Screening is recommended at 15 to 22 weeks gestation. Testing was ordered today. 4.  History of  X1 -- The patient's first pregnancy was delivered via  secondary to arrest of dilation and nonreassuring fetal heart tones. The placenta is posterior. She plans to have a repeat  for delivery. Again, her delivery will be scheduled at the [facility] in order to facilitate additional maternal care. 5.  Subchronic hemorrhage, stable -- The ultrasound images were reviewed with patient. Again, a subchorionic hemorrhage was seen measuring 2.4 x 0.8 x 2 cm. The size is somewhat decreased compared to prior imaging. At 16 weeks 6 days, a subchronic hemorrhage was seen measuring 5.9 x 0.8 x 2.3 cm. The size is somewhat increased compared to prior imaging. Previously, at 12 weeks 4 days, the subchorionic hemorrhage measured 2.6 x 1.4 x 0.9 cm. No active bleeding was noted. The patient denied having any vaginal bleeding or cramping. She is noted to be Rh+. Counseling was previously provided to the patient. Counseling was reviewed. The patient did not have any additional questions or concerns. A subchorionic hemorrhage can be associated with an increased risk for bleeding, infection, early pregnancy loss, poor fetal growth,  premature rupture of membranes,  labor and delivery, and stillbirth. Because of the increased risk for complications, close follow-up is recommended. Activity restrictions were again reviewed. The patient was counseled to avoid heavy lifting, prolonged standing, strenuous exercise, and sexual intercourse. The patient was scheduled to return in 3-4 weeks for a follow-up visit. Bleeding precautions were reviewed. 6.  Uterine fibroids -- The ultrasound images were reviewed with the patient. Multiple uterine fibroids were again noted.     The largest fibroid seen was in the posterior wall of the uterus and it measured 3.8 x 2.3 x 2.1 cm. It was posterior to the placenta. The size and appearance are stable compared to previous imaging. An anterior fibroid was also noted today measuring 1.9 x 0.7 x 2.4 cm. The patient again denied any symptoms of fevers, chills, and/or abdominal pain. Fetal growth was appropriate for the gestational age. Counseling was previously provided to the patient. Counseling was reviewed. The patient did not have any additional questions or concerns. Fibroids are common in women of reproductive age. Most studies that monitored the growth of fibroids during pregnancy have noted that the majority of uterine fibroids have less than 10% change in volume across gestation. Most of the growth usually occurs in the first trimester. Fibroids larger than 5 cms are more likely to grow, where as smaller fibroids most likely remain stable in size. As you know, there is a higher chance of antepartum bleeding with retroplacental fibroids and a small increase in  birth. Specifically, if placentation occurs adjacent to or overlying a fibroid. Submucosal and retroplacental fibroids with volumes more than 200 ml (corresponding to 7 cm in diameter) have the highest risk of abruption. Pain is another common complication of fibroid in pregnancy. It is especially high in fibroids greater than 5 cm in diameter. Patients with degenerating fibroids tend to have localized pain, mild leukocytosis, pyrexia and nausea. This may result from decreased perfusion in the setting of rapid growth leading to ischemia. Some studies noted increased incidence of malpresentation if the uterus has multiple fibroids. Large retroplacental fibroids that distort the uterine cavity may be associated with adverse pregnancy event including  fetal growth restriction and  labor.        Overall, the risks associated with uterine fibroids in pregnancy include malpresentation, lower uterine segment obstruction, degeneration, poor fetal growth, ,  birth, and antepartum/postpartum bleeding. The size and appearance of the fibroid(s) should be reevaluated on follow-up ultrasound. A screening cervical length  was completed at 16 weeks 5 days and normal at 4.2 cm without funneling. A cervical length was not repeated today. A cervical length should be repeated at her follow-up visit. Fetal growth should be monitored serially, every 3 to 4 weeks beginning at 24 to 26 weeks gestation. Precautions were reviewed with the patient. 7.  Tobacco use in pregnancy, quit -- The patient reports she has stopped smoking Black and milds. She was congratulated and encouraged to remain tobacco free. She was again counseled regarding the increased risks of smoking in pregnancy including poor fetal growth, placental abruption, PPROM/ birth, and fetal loss. Additional  risks were again reviewed including asthma, allergies, infection, and an association with SIDS. Various techniques for cutting back and quitting were again reviewed. She was again counseled that smoking cessation is especially important in her case given her recurrent thrombotic events. She expressed verbal understanding of this counseling. 8. THC Use --  The patient previously reported that she was smoking marijuana daily. She reports that she was using marijuana for decreased appetite. The patient again reports that she has stopped smoking marijuana. She was congratulated and encouraged not to use marijuana during pregnancy. Counseling was previously provided to the patient. Counseling was reviewed today. The patient did not have any additional questions or concerns. She was again counseled regarding risk of neurodevelopmental issues in children exposed to [facility] during pregnancy.   Additionally, marijuana use may pose risks similar to that of cigarette use including increased risk for poor fetal growth, placental bleeding, PPROM/ delivery, and stillbirth. She was again counseled not to use THC while pregnant. Random urine drug screens should be monitored during pregnancy. 9.  First/second trimester alcohol exposure -- The patient previously reported that she drinks wine occasionally. She reports only taking sips of wine. Today, the patient again reports that she has stopped drinking alcohol. Counseling was previously provided to the patient. Counseling was reviewed. The patient did not have any additional questions or concerns. The patient was again counseled that a safe level of alcohol consumption during pregnancy has not been determined. Determination of the impact of alcohol use during pregnancy on fetal development can be challenging because of variation in maternal alcohol clearance rates, fetal sensitivity, genetic susceptibility, maternal drinking patterns (binge drinking versus daily consumption), and confounders such a substance abuse. The patient was counseled that alcohol is a teratogen that can impact fetal growth and development at all stages during pregnancy. Currently, national guidelines and multiple medical societies recommend abstinence during pregnancy. In one large epidemiologic study, an increased rate of stillbirth was noted across all categories of alcohol intake, even after adjusting for confounders (eg, smoking, pre-pregnancy weight). There is also an increased risk for structural anomalies (craniofacial, cardiac), poor fetal growth, decreased IQ, and neurodevelopmental issues in childhood. Factors such as older age, increased parity, and  and  ethnicities are associated with an increased risk for FAS. Secondary to the increased risk for poor growth, fetal growth should be monitored serially, every 3 to 4 weeks starting at 24 to 26 weeks gestation. Fetal growth was appropriate for the gestational age today at 25 weeks 6 days. Secondary to the increased risk for congenital heart anomalies, a fetal echocardiogram is recommended at 22 to 24 weeks gestation. A referral to pediatric cardiology was provided. 10.  Right ovarian mass/cyst -- The ultrasound images were reviewed with the patient. The right ovarian mass previously described was again seen today. The hyperechoic mass measured 1.3 x 1.1 x 1.3 cm. The size and appearance are stable compared to prior imaging. At 12 weeks 4 days, A hyperechoic mass was seen in the right ovary measuring 1.2 x 1 x 0.9 cm. No fluid was seen surrounding the area. The patient was again counseled that this may represent a hemorrhagic cyst versus teratoma versus a calcification, although no shadowing was seen. The size and appearance of the ovarian mass will be reevaluated on follow-up ultrasound. The adnexa will be reevaluated on follow-up exam.     11.   Nausea and vomiting of pregnancy, improved -- The patients previously reported having daily symptoms of nausea and decreased appetite. She was having occasional emesis. The patient reports that her symptoms of nausea and vomiting have significantly improved. She has gained 4 pounds since her last visit to our office. She has not gained any weight since her visit at 12 weeks. Today, the patient reports that her symptoms are stable. She denied having any signs or symptoms of dehydration. The patient was again encouraged to eat small amounts of food every 2-3 hours during the day. She was also encouraged to stay well-hydrated. A baseline nutrition panel (CBC, CMP, magnesium, ferritin, folate, vitamin B12, vitamin D 25 [place]) was recommended. Baseline testing was completed on 5/13/2022, her results included: A CBC was ordered but not done by the lab. A CBC was completed on 5/31/22, H/H 11.9/35.8, MCV 88.6, ,000.   The remainder of the The patient was counseled that this may be a false elevation, generally, the titers have to be greater than 20 for a true positive. The recommendation was made for the patient to continue taking a low dose aspirin, 81 mg daily. She should continue this for the remainder of pregnancy and 6 to 8 weeks postpartum. Repeat testing was completed on 5/13/2022 and negative. The results were reviewed with the patient. Testing was repeated on 6/17/2022. Testing for LAC, beta-2 glycoprotein, and anticardiolipin antibody was again negative. The patient was counseled to continue to follow with hematology for long-term monitoring and management. 14.  Protein S deficiency -- The patient's labs were reviewed. The patient initially had a thrombophilia panel on 4/18/2022. Her protein S, antigen, free was 25% (). Testing was repeated on 4/19/2022. Her protein S, antigen, free at that time was 27% (). The patient would have been 9 weeks at the time of the testing. She was being evaluated for a recent thrombotic stroke. Testing is not considered reliable during acute thrombosis or pregnancy. Protein S decreases during pregnancy. However, protein S is generally considered deficient if <30% in the second trimester and/or <24% in the third trimester. Again, the patient had testing in the first trimester and her protein S levels were significantly decreased. It is unclear at this time if the patient truly has a protein S deficiency. This will not be able to be determined until the patient is postpartum. The patient was counseled that a protein S deficiency is rare, and seen in 0.03-0.13% of the population. It is considered a lower risk thrombophilia. If someone does have a deficiency, then the risk for a thrombosis during pregnancy is estimated to be 0.1% (w/negative personal history).   It is 0-22% with a personal history of thrombosis     Given the patient's recurrent, the recommendation was for therapeutic anticoagulation with Lovenox. Please see above. Testing was repeated on 6/17/2022. The protein S antigen, free was 25% (%), and protein S activity was 35% (%). Her protein S levels are again low, even for pregnancy. A hematology consultation was previously recommended. The patient is following with Dr. Marzena Marr. Repeat testing is recommended at 6 to 8 weeks postpartum. She should not be on an oral contraceptive pill at the time of repeat testing. The patient should avoid birth control containing estrogen. The patient was counseled to continue taking a daily low-dose aspirin. She should continue a daily low-dose aspirin for the remainder of pregnancy and 6 to 8 weeks postpartum. Given her history of a thrombotic stroke, the recommendation was also made for anticoagulation with prophylactic Lovenox. The plan was reviewed with neurology and hematology, see above. A prescription was provided following patient's consultation. She was scheduled to return for injection teaching. Increased fetal surveillance is recommended. Fetal growth should be monitored serially, every 3 to 4 weeks starting at 24 to 26 weeks gestation. The patient should monitor fetal kick counts daily starting at 28 weeks gestation. She should be scheduled for twice-weekly fetal testing starting at 32 weeks gestation. Delivery is recommended at/by 39 weeks gestation. I would defer delivery timing to the providers at the [facility]. 13. Vaccination in pregnancy -- The patient was  previously counseled regarding the recommendations for vaccination in pregnancy. Counseling was reviewed. The patient did not have any additional questions or concerns. The patient was counseled regarding the recommendations for the Tdap vaccination in pregnancy. Risks and benefits were discussed.  The vaccination is typically administered between 27 and 36 weeks gestation and recommended to confer protection against whooping cough to the . The patient plans to discuss this with her primary provider at her next visit. The patient was also counseled that her partner in any individuals who will be in close contact with the  should also be vaccinated for whooping cough. The patient was counseled regarding recommendation for the flu vaccination in pregnancy for both maternal and infant safety. Risks and benefits were reviewed. Counseling was provided regarding the recommendation for the Covid vaccination in pregnancy. I advised the patient that the [facility]ne[facility] and The Society for Maternal Fetal Medicine recommend that all pregnant women be vaccinated for COVID-19. \"Data have shown that COVID-19 infection puts pregnant people at increased risk of severe complications and even death; yet only about 22% of pregnant individuals have received 1 more doses of COVID-19 vaccine according to the "[employer] for Disease Control and Prevention. \"     \" Recent data have shown that more than 95% of those were hospitalized and/or dying from COVID-19 are those who have remained unvaccinated. Pregnant individuals who have decided to wait until after delivery to be vaccinated may be inadvertently exposing themselves to an increased risk of severe illness or death. \"     \" COVID-19 vaccination is the best testing to reduce maternal and fetal complications of TSPTE-19 infection among pregnant people,\" said Raymundo Guevara MD, president of the Society for Maternal Fetal Medicine, sub-specialists. The patient was counseled that in the event she opts not to have the Covid vaccination, she should alert her provider immediately if she test positive for Covid. Pregnant women are on the priority list for treatment with monoclonal antibody.   This intervention has been shown to decrease the risk for hospitalization and complications related to Covid in pregnancy. The patient expressed verbal understanding of this counseling. 16.  Vitamin D deficiency -- The patient's vitamin D was deficient at 9  --Recommend vitamin D3 2000 IU daily  --Monitor nutrition panel q4-6 weeks (CBC, CMP, magnesium, ferritin, folate, vitamin B12, vitamin D25OH)    --Repeat testing was completed on 6/17/2022, her results included: H/H 11.2/33.7, MCV 87.1, platelet count 648,372, potassium 3.6, creatinine 0.7 calcium 7.2, ALT 15, AST 17, magnesium 1.6, ferritin 70, folate 11.2, vitamin B12 428, vitamin D 12, , uric acid 3.7, TSH 0.507, free T4 0.97, free T3 2.7, TPO negative, anticardiolipin antibody negative, beta-2 glycoprotein antibody negative, protein S antigen free 25%, hemoglobin A1c 4.9%, homocystine 6.6, protein S activity 35%, rheumatoid factor negative, C3 110, C4 23, [place] negative, LAC negative, maternal serum AFP 1.04 MOM, urinalysis negative, urine culture mixed, urine protein creatinine ratio 0.3, urinalysis negative for protein. -- The patient's vitamin D is still deficient but improving. She was counseled to continue her vitamin D supplement. --Repeat testing ordered  --Follow up with PCP for long term monitoring and management     17. 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 She was noted to be heterozygote for the MTHFR c.665C>T variant (previously designated as C677T). Counseling was previously provided  regarding the implications and management of this gene mutation in pregnancy. Counseling was reviewed. The patient did not have any additional questions or concerns. She was counseled that this gene mutation affects folic acid metabolism. It is fairly common and found in 20-30% of the population. It is generally only a problem if homocysteine levels are elevated.  In the past, this gene mutation was thought to be associated with increased obstetric risks including thrombosis, early recurrent pregnancy loss, placental abruption, hypertensive disorders of pregnancy, poor fetal growth, and fetal loss. More recent studies did not report strong associations with these risks. Because this genetic mutation affects folic acid metabolism, there is an increased risk for folic acid deficiency and other nutritional deficiencies in women with this condition. There is also an increased risk for having a child with an open neural tube defect in women with this mutation, especially if they're homozygous for the C677T mutation. Presently, this condition is not thought to be clinically significant. Generally, additional vitamin supplementation is recommended with folic acid or methyl folate, vitamin B6, and vitamin B12. The patient was counseled to take a low-dose aspirin. Fetal growth should be followed serially. She was counseled that there is a 50% chance that she will pass this mutation off to her child(sandra). She should relay this information to the pediatrician who cares for her child(sandra). She was also encouraged to review this diagnosis with her PCP. Because of this history, a baseline nutrition panel and homocysteine level were recommended. She was provided with orders for testing. 25.  Silent carrier for alpha thalassemia -- The patient's records were previously reviewed. She is noted to be a silent carrier for alpha thalassemia. Counseling was previously provided to the patient. Counseling was reviewed. The patient did not have any additional questions or concerns. --Alpha thalassemia minima is another term for this condition. This condition results from the loss of a single alpha-chain gene (ie a-/aa). Blood tests are usually normal, though the red cells may be small, leading to a suspicion that the patient is a silent carrier. The only way to confirm a silent carrier is by DNA studies. This is usually a benign carrier state.   Affected individuals are not typically anemic and their hemoglobin analysis is normal. These conditions may remain undiagnosed, or they may be detected incidentally on a routine complete blood count during evaluation of an unrelated condition or in the setting of preconception testing and counseling. The patient's partner has not had testing for this condition. Carrier screening is recommended. The patient again plans to discuss this with her partner. If the patient's partner is not a carrier for alpha thalassemia, then there is a 50% chance that her child/children will also be a carrier for alpha thalassemia minima. If her partner is also a carrier of alpha thalassemia, there is an increased risk for having an affected child. Without having her partner tested, definitive counseling cannot be provided. This information should be relayed to pediatrics. I would defer any additional  evaluation to pediatrics. 19.   Migraine headaches -- The patient previously reported having continued headache symptoms after discharge from the hospital in . The headaches are a 6 out of 10 at the worst.  She reported having daily headaches. She has been using tylenol with some relief. She reports having associated symptoms of photosensitivity and phono sensitivity. Today, the patient reports that her headache symptoms have significantly improved. The patient was again counseled that migraine headaches can improve during pregnancy. However, in some patients symptoms are exacerbated. She was counseled regarding the use of magnesium oxide 400 mg twice daily and riboflavin 100 mg daily in reducing the frequency and intensity of migraine headaches. Prescriptions were provided. Precautions were reviewed, and she was counseled that if she develops the worst headache of her life or a headache with neurological deficits, to present immediately for evaluation. She expressed verbal understanding of this counseling. Because of the patient's headache symptoms, a baseline nutrition panel and thyroid function testing was recommended. Baseline testing was completed on 5/13/2022. The results are summarized above. 20.  Marginal umbilical cord insertion into the placenta --  Today's ultrasound results were reviewed with the patient. In some views, the umbilical cord appears to insert into the edge of the placenta. Counseling was provided to the patient. A marginal cord insertion is diagnosed when the umbilical cord inserts into the placenta within 2 cm of the edge of the placenta. A marginal cord insertion is seen in approximately 6% of pregnancies. There is typically a normal amount of Regla's jelly in the cord. This is usually a benign finding, however, there can be an increased risk for poor fetal growth. Fetal growth should be monitored serially. Fetal growth was appropriate for the gestational age today. Fetal growth should be reevaluated in 3 to 4 weeks. 21.  Abnormal urine protein creatinine ratio -- The patient's labs from 3660 were reviewed. Her urine protein creatinine ratio was abnormally elevated at 0.3. Her urine culture was mixed. Her urinalysis was negative for protein. Secondary to the conflicting results, a 80-FTJY urine collection was recommended. Nursing had previously contacted the patient regarding the instructions for testing. The patient's urine dip was positive for trace per day. Her blood pressure was normal at 106/71. --I requested the patient return for a follow-up assessment in 3-4 weeks unless there is a clinical reason for her to return prior to that time. She is to call if she has any problems or questions prior to her next visit. Further evaluation and management will be dependent on her clinical presentation and the results of her testing.      --The patient was advised to call if she has any increased vaginal discharge, vaginal bleeding, contractions, abdominal pain, back pain or any new significant symptomatology prior to her next visit. I advised her that these are signs and symptoms of cervical change and require follow-up assessment when they occur. Preeclampsia precautions were also reviewed with the patient. --The patient was also counseled to call and/or return with any concerns for decreased fetal activity. --The patient is to continue to follow with you in your office for ongoing obstetric care. --The total time spent on today's visit was 30 minutes. This included preparation for the visit (i.e. reviewing prior external notes and test results), performance of a medically appropriate history and examination, counseling, orders for medications, tests or other procedures, and coordination of care. Greater than 50% of the time was spent face-to-face with the patient. This time is exclusive of procedures performed. I answered all of  the patient's questions to her satisfaction. I asked her to call if she had any additional questions prior to her next visit. --At the conclusion of the visit, the patient appeared to have a good understanding of the issues discussed. I answered all of her questions to her satisfaction. I asked her to call if she had any additional questions prior to her next visit. --Thank you for allowing me to participate in the care of this pleasant patient. Please don't hesitate to call me if you have any questions. Sincerely,      Telma Jang MD, [place] [address]  [phone]      *All or parts of this note may have been generated using a voice recognition program. There may be typo, grammar, or Word substitution errors that have escaped my review of this note.

## 2022-07-28 ENCOUNTER — TELEPHONE (OUTPATIENT)
Dept: OBGYN | Age: 32
End: 2022-07-28

## 2022-07-31 PROBLEM — O09.891 MATERNAL EXPOSURE TO ALCOHOL IN FIRST TRIMESTER: Status: ACTIVE | Noted: 2022-07-31

## 2022-07-31 PROBLEM — E53.8 LOW FOLATE: Status: ACTIVE | Noted: 2022-07-31

## 2022-07-31 PROBLEM — R80.9 PROTEINURIA: Status: ACTIVE | Noted: 2022-07-31

## 2022-08-11 ENCOUNTER — TELEPHONE (OUTPATIENT)
Dept: OBGYN | Age: 32
End: 2022-08-11

## 2022-08-12 PROBLEM — N83.201 RIGHT OVARIAN CYST: Status: ACTIVE | Noted: 2022-08-12

## 2022-08-12 PROBLEM — O43.199 MARGINAL INSERTION OF UMBILICAL CORD AFFECTING MANAGEMENT OF MOTHER: Status: ACTIVE | Noted: 2022-08-12

## 2022-08-12 PROBLEM — D56.3 ALPHA THALASSEMIA SILENT CARRIER: Status: ACTIVE | Noted: 2022-08-12

## 2022-08-25 ENCOUNTER — TELEPHONE (OUTPATIENT)
Dept: OBGYN | Age: 32
End: 2022-08-25

## 2022-08-25 NOTE — TELEPHONE ENCOUNTER
Appointment Reminder Call: Left voicemail ## Patient hasnt been to any Centering Sessions and if patient doesn't show for session tomorrow 8/26/22 we will gracefully remove patient from group prenatal care.

## 2022-08-26 PROBLEM — D25.9 UTERINE FIBROID COMPLICATING ANTENATAL CARE, BABY NOT YET DELIVERED: Status: ACTIVE | Noted: 2022-08-26

## 2022-08-26 PROBLEM — O34.10 UTERINE FIBROID COMPLICATING ANTENATAL CARE, BABY NOT YET DELIVERED: Status: ACTIVE | Noted: 2022-08-26

## 2022-09-08 ENCOUNTER — TELEPHONE (OUTPATIENT)
Dept: OBGYN | Age: 32
End: 2022-09-08

## 2022-09-08 NOTE — TELEPHONE ENCOUNTER
Telephone Call     Patient was scheduled for OB visit in this office today. Chart reviewed, patient to deliver in [place]. Spoke to [facility] office in [place], patient has an appointment 22 with Dr. Ruby Way. Per office, patient will receive prenatal care at that office and  will be scheduled with them.      Ayad Rosario MD

## 2022-09-20 ENCOUNTER — TELEPHONE (OUTPATIENT)
Dept: OBGYN CLINIC | Age: 32
End: 2022-09-20

## 2022-09-21 ENCOUNTER — ANCILLARY PROCEDURE (OUTPATIENT)
Dept: OBGYN CLINIC | Age: 32
End: 2022-09-21
Payer: MEDICAID

## 2022-09-21 ENCOUNTER — ROUTINE PRENATAL (OUTPATIENT)
Dept: OBGYN CLINIC | Age: 32
End: 2022-09-21
Payer: MEDICAID

## 2022-09-21 VITALS
HEIGHT: 62 IN | SYSTOLIC BLOOD PRESSURE: 100 MMHG | HEART RATE: 94 BPM | BODY MASS INDEX: 30.18 KG/M2 | DIASTOLIC BLOOD PRESSURE: 60 MMHG | WEIGHT: 164 LBS

## 2022-09-21 DIAGNOSIS — E53.8 LOW FOLATE: ICD-10-CM

## 2022-09-21 DIAGNOSIS — R80.9 PROTEINURIA, UNSPECIFIED TYPE: ICD-10-CM

## 2022-09-21 DIAGNOSIS — E55.9 VITAMIN D DEFICIENCY: ICD-10-CM

## 2022-09-21 DIAGNOSIS — R79.89 LOW VITAMIN D LEVEL: ICD-10-CM

## 2022-09-21 DIAGNOSIS — Z3A.31 31 WEEKS GESTATION OF PREGNANCY: Primary | ICD-10-CM

## 2022-09-21 LAB
GLUCOSE URINE, POC: NORMAL
PROTEIN UA: POSITIVE

## 2022-09-21 PROCEDURE — 99213 OFFICE O/P EST LOW 20 MIN: CPT | Performed by: OBSTETRICS & GYNECOLOGY

## 2022-09-21 PROCEDURE — 99214 OFFICE O/P EST MOD 30 MIN: CPT | Performed by: OBSTETRICS & GYNECOLOGY

## 2022-09-21 PROCEDURE — 81002 URINALYSIS NONAUTO W/O SCOPE: CPT | Performed by: OBSTETRICS & GYNECOLOGY

## 2022-09-21 PROCEDURE — 76818 FETAL BIOPHYS PROFILE W/NST: CPT | Performed by: OBSTETRICS & GYNECOLOGY

## 2022-09-21 PROCEDURE — 76820 UMBILICAL ARTERY ECHO: CPT | Performed by: OBSTETRICS & GYNECOLOGY

## 2022-09-21 PROCEDURE — 76817 TRANSVAGINAL US OBSTETRIC: CPT | Performed by: OBSTETRICS & GYNECOLOGY

## 2022-09-21 PROCEDURE — 76821 MIDDLE CEREBRAL ARTERY ECHO: CPT | Performed by: OBSTETRICS & GYNECOLOGY

## 2022-09-21 PROCEDURE — 76816 OB US FOLLOW-UP PER FETUS: CPT | Performed by: OBSTETRICS & GYNECOLOGY

## 2022-09-21 PROCEDURE — G8427 DOCREV CUR MEDS BY ELIG CLIN: HCPCS | Performed by: OBSTETRICS & GYNECOLOGY

## 2022-09-21 PROCEDURE — G8419 CALC BMI OUT NRM PARAM NOF/U: HCPCS | Performed by: OBSTETRICS & GYNECOLOGY

## 2022-09-21 PROCEDURE — 4004F PT TOBACCO SCREEN RCVD TLK: CPT | Performed by: OBSTETRICS & GYNECOLOGY

## 2022-09-21 NOTE — PATIENT INSTRUCTIONS

## 2022-09-21 NOTE — PROGRESS NOTES
2022      [address], [address]  [facility],   [facility]     RE:  Nai ARAUJO  : 1990   AGE: 33 y.o. This report has been created using voice recognition software. It may contain errors which are inherent in voice recognition technology. Dear Dr. Bard Edwards:      I had the pleasure of meeting with Ms. Ryan Lerner for a return consultation. As you know, Ms. Ryan Lerner  is a 33 y.o.  at [address] (LMP = [address]) who is being followed by our office for a history of a maternal stroke. Today, Ms. Ryan Lerner reports that she feels well. She notes good fetal movement and denies any symptoms of leaking of fluid, vaginal bleeding, and/or contractions. She had a fetal ultrasound that was notable for the following. There is a single intrauterine gestation in a cephalic presentation with a heart rate of 149 beats per minute. The placenta is posterior. The amniotic fluid index is 13.3 cm. The composite gestational age is 35w11d. The estimated fetal weight is at the 52nd percentile. BPP 8/8. Umbilical artery PI normal.  MCA PSV normal.  MCA PI normal.  Transvaginal cervical length 5.6 cm without funneling. Right ovarian hyperechoic mass again seen measuring 1.3 x 1 x 1.5 cm. Subchronic hemorrhage again seen measuring 2.4 x 0.7 x 2 cm      PERTINENT PHYSICAL EXAMINATION:   /60 (Position: Sitting)   Pulse 94   Ht 5' 2\" (1.575 m)   Wt 164 lb (74.4 kg)   LMP 02/10/2022   BMI 30.00 kg/m²     Urine dipstick:   Negative for Glucose    Trace for Albumin      A fetal ultrasound assessment was performed today. A report is enclosed for your review. Assessment & Plan:  33 y.o.  at [address] (LMP = [address]) with:    1. Pregnancy dating -- The patient's pregnancy dating was previously reviewed. The patient reports that was having monthly periods. She discontinued Depo-Provera in 2021. She reports a sure last menstrual period.   Her reported LMP was 2/10/2022, GUERO 11/17/2022. The patient's first ultrasound was 1/4/1822. The crown-rump length was 9 weeks 4 days, GUERO 11/17/2022. Given the patient had a sure LMP that agreed with the 9-week ultrasound, the recommendation was made to use an GUERO of 11/17/2022 based on her LMP. Fetal growth was appropriate for the gestational age again today. 2.  History of stroke during pregnancy  -- The patient previously reported that she initially experienced symptoms of headache and aphasia in November 2021. She did not seek medical care at that time. The patient presented to the emergency department on April 17, 2022 with complaints of a severe headache that woke her up from sleep. She had a headache 4 days prior to her evaluation in the emergency department. Two days prior to her evaluation, she had slurred speech and aphasia which lasted for approximately 20 minutes and then resolved. The patient then developed paresthesias on her right arm. A CT of the head was initially completed. The report stated, \" hypodensity within the left insular cortex with abnormal white matter hypodensity extending into the adjacent subinsular white matter and left corona radiata. Findings may be suggestive of subacute infarction. For further evaluation brain MRI is recommended. \"     An MRI, MRV, and MRA of the head with and completed. Per the MRI report from 4/18/2022, acute infarct noted in left insula/left temporal lobe/left periventricular white matter with associated thrombosis of a superior M2 branch of the left middle cerebral artery. Chronic infarcts noted in the right corona radiata and left centrum semiovale. The MRV was unremarkable. No dural sinus thrombosis was noted. The patient was diagnosed with a subacute CVA and admitted to the hospital.  She was also noted to have bacteriuria. She was cared for by her primary care physician and she had a neurology consultation.   A thrombophilia evaluation was completed. She was started on a low-dose aspirin and received Lovenox for DVT prophylaxis. A maternal echocardiogram was completed on 4/19/2022. Per the report, the study was normal.  The patient reports having a repeat echocardiogram at the [address] [place] [address] and she has a small PFO or ASD. The patient remained stable and was discharged home on 4/19/2022. She was discharged home on Keflex and a low-dose aspirin. She was to follow-up with obstetrics and her primary care physician. The patient's thrombophilia evaluation was completed on 4/18/2022, her results included: Homocystine 5, protein C functional 111%, protein S antigen free 25% (), Antithrombin activity 99%, APC resistance 3.54 (normal), lupus anticoagulant negative, beta-2 glycoprotein antibody negative, cardiolipin antibody negative, prothrombin 2 gene mutation negative. A screening PARIS was completed and negative. A protein S antigen free was repeated on 4/19/2022 and again low at 27 (). Homocystine was repeated on 4/19/2022 and again normal at 7.8. Factor VIII activity was normal at 134% and factor VII activity was normal at 133%. Anticardiolipin antibody was repeated on 4/19/2022 and IgM was indeterminate at 17. Repeat screening for lupus anticoagulant was negative on 4/19/2022. A D-dimer was checked on 4/19/2022 and normal.  Patient's urine drug screen was positive for marijuana. The patient's urinalysis was abnormal.  The urine culture showed mixed jair including E. coli staph and Corynebacterium. No sensitivities were provided. The patient was seen for her initial consultation with [facility] on 5/9/2022. Counseling was provided and the recommendation was made for prophylactic anticoagulation with Lovenox, 40 mg daily. Both hematology and neurology were contacted regarding the patient and plan of care. The patient returned to the [facility] office on 5/31/2022 for a follow-up consultation.   Prior to her visit, she reported to the nurse that she had recurrent strokelike symptoms including left-sided numbness and a headache. She was immediately taken to the emergency department for evaluation. She was readmitted to the hospital on 5/31/2022 with an acute CVA. A CT of the head was repeated on 5/31/2022. Per the impression, there was left insular encephalomalacia at the site of prior infarct. It was an unremarkable CTA of the head and neck. Specifically, there was no evidence of residual thrombus in the left middle cerebral artery. The patient then had an MRI/MRV/MRA of the head without contrast.  Per the impression on the report, tiny foci of acute or early subacute infarction were seen in the right frontal lobe, with single tiny foci each within the right lateral basal ganglia, external capsule, and insular cortex. Mild stenosis was noted in the left lateral transverse sinus, which may be due to an arachnoid granulation. There is no evidence of dural venous thrombosis. She was evaluated by neurology on 6/2/2022. The recommendation was made for therapeutic anticoagulation with Lovenox. She was also evaluated by hematology. She was discharged home on 6/3/2022. The patient returned to the [facility] office at 16 weeks and 6 days for follow-up. She reported that she felt well. She still had intermittent headache symptoms that she rated as a 6 out of 10. She reported that she was tolerating the therapeutic Lovenox well. During her visit at 16 weeks 6 days, the patient requested a referral for additional evaluation at the [facility] clinic. The patient was counseled that this would be reasonable given her complicated pregnancy. Additionally, it may be in the patient's best interest to plan for delivery at a center in which neurology and/or neurosurgery would be available in the event the patient had any complications during delivery or postpartum. The patient was agreeable with the plan. She requested a referral to the [facility] for additional evaluation and management. A referral was provided. The patient's neurologist, Dr. Leidy Rivera, was also contacted regarding the patient. He agreed with the plan outlined above. The patient is following with maternal-fetal medicine, Dr. Luis Antonio Tyson, at the [facility]. She is also following with Dr. Otilia Farr from vascular surgery. She was also seen by a vascular neurologist, Dr. Seferino Paz, at the [facility] clinic. The patient returns the office today for follow-up and fetal testing secondary to the finding of poor fetal growth at the [facility]. She notes fetal movement. She denies having any symptoms of leaking of fluid, vaginal bleeding, cramping, and/or contractions. She denies having any subsequent strokelike symptoms. She reports that her migraine headache symptoms have significantly improved. Counseling was previously provided to the patient. Counseling was reviewed. The patient did not have any additional questions or concerns. Again, pregnancy and the puerperium (postpartum period) are well-established risk factors for thromboembolism. Normal pregnancy is accompanied by an increase in clotting factors. The resulting hypercoagulable state likely evolved to protect women from hemorrhage in the event of a miscarriage and during childbirth. Thromboembolic disease during pregnancy and the puerperium is a significant cause of maternal morbidity and mortality. During pregnancy, the risk of venous thromboembolism increases 4-5 fold and the risk of arterial thromboembolism, myocardial infarction, and stroke increases 3-4 fold compared to the nonpregnant state. Postpartum, the risk of venous thromboembolism is 20 fold higher and the risk of arterial thromboembolism is similarly elevated compared to nonpregnant individuals. Venous events are more common during pregnancy accounting for 4 out of 5 thromboembolic events. However, the risk of death is higher following an arterial thrombosis. Approximately 80% of venous thromboembolic events are deep venous thromboses and approximately 20% of pulmonary emboli. The most important risk factor for thrombosis during pregnancy is a history of thrombosis. The risk for a thrombotic event is further increased in women with an underlying acquired or inherited thrombophilia. Most studies have reported and equal distribution of thrombosis risk across all trimesters of pregnancy however, 2 large conflicting studies have reported a first trimester (50% prior to 15 weeks) and third trimester (60%) predominance. Additional risk factors for thrombosis during pregnancy include multifetal gestation, varicose veins, inflammatory bowel disease, urinary tract infection, diabetes, hospitalization, obesity, and maternal age greater than 28 years. Compared to the antepartum period, the thrombosis risk is 2-5 times higher during the postpartum period. This risk is highest during the first 6 weeks postpartum and declines to prepregnancy rates by 13-18 weeks postpartum. Risk factors for postpartum thrombosis include  section, medical co morbidities, obesity,  delivery, hemorrhage, fetal demise, advanced maternal age, hypertensive disorders of pregnancy, tobacco use, and infection. The patient was counseled to continue treatment with therapeutic Lovenox and a daily low-dose aspirin. She was counseled to continue to follow with the [facility] for long-term monitoring and management. Per the consultation note with maternal-fetal medicine, the plan is for delivery at the [facility]. Per the consultation note with maternal-fetal medicine, the patient will follow-up [facility] at 32 weeks and 36 weeks with a growth ultrasound at that time and plan to transfer care to [facility] for delivery at 36 weeks.   It is unclear if the plan is for delivery at 36 weeks versus transfer of care at 36 weeks until delivery. The patient was counseled to clarify this with [facility]. Therapeutic anticoagulation should be continued throughout the pregnancy and for at least 6-8 weeks postpartum. Lovenox can be initiated 12 hours following a vaginal delivery and 24 hours following a  section (if there is no ongoing concern for bleeding). The risks and benefits of therapeutic anticoagulation in pregnancy were reviewed including the development of heparin-induced thrombocytopenia (HIT), osteopenia, bleeding, and poor fetal growth. An anesthesia consultation is also recommended in the third trimester given she is on anticoagulation. A hematology consult was recommended. The patient is following with Dr. Surjit Perdomo neurology consult was recommended. A referral was provided. The patient is following with a vascular neurolog[facility] clinic, Dr. Mari Ferreira  [facility] for additional maternal and fetal evaluation. She is following with Dr. Abraham Glynn. The patient should avoid estrogen containing birth control postpartum. Increased fetal surveillance is also recommended. Fetal growth should be monitored serially, every 3 to 4 weeks starting at 24 to 26 weeks gestation. The Patient should monitor fetal kick counts daily starting at 28 weeks gestation. She should be scheduled for twice-weekly fetal testing starting at 32 weeks gestation. Delivery is recommended at/by 39 weeks gestation. Counseling was previously provided regarding the radiation exposure from the CT scan. Counseling was reviewed. The patient did not have any additional questions or concerns. Increased risks associated with radiation exposure during pregnancy include that of early pregnancy loss, fetal malformations, poor fetal growth, and an increase for childhood cancers (increased from 2800 to 2000). Generally, these risks are not significantly increased unless the radiation exposure exceeds 50 mGy (5 rads). The estimated radiation exposure associated with a head CT is 1-10 mGy (0.1-1 rads). The International Commission of Radiologic Protection suggests that the radiation doses delivered in utero by imaging tests (such as those performed in the diagnosis of PE) present no measurable increased risk of fetal death or developmental abnormalities over the background incidence of these entities. The [facility] of Radiation Protection and Measurements considers the risk of radiation-associated abnormalities to be negligible, at less than 50 mGy when compared with other risks of pregnancy. Per available data, diagnostic imaging studies that expose the fetus to less than 0.05 Gray (50 mGy, 5 rads), do not appear to increase the risk for fetal anomalies, intellectual disability, growth restriction, or pregnancy loss. With exposure to more than 0.05 Gy (50 mGy, 5 rads), a definitive threshold at which the risk for, locations increases has not been determined. Evidence suggests the risks begin to increase at doses above 0.1 Gy (100 mGy, 10 rads) and especially above 0.15-0.2 Gy (150-200 mGy, 15-20 rads). 3.  Poor fetal growth -- The date fetal growth assessment at 31 weeks 1 day at the [facility] [employer] [facility] clinic. There was a 1 week lag. The composite gestational age was 31 weeks 1 day. The [facility] was measuring at the 6 percentile. Fetal growth was reevaluated today and appropriate for the gestational age. Surveillance was recommended by the patient's high risk provider in [facility] [employer]. We will facilitate twice-weekly fetal testing for the patient until she returns to [facility] [employer] [facility]. Counseling was previously provided to the patient. Counseling was reviewed. She did not have any additional questions or concerns.     4.  Genetic counseling -- The patient was previously counseled regarding her options for genetic screening and/or diagnostic testing. She opted for screening with NIPT. Screening was completed on 2022. Her results were low risk for aneuploidy. The reported fetal fraction was 28% the reported fetal sex was female. The results were previously reviewed with the patient. The patient also opted for a [employer] IP 14 panel. Screening was completed on 2022. The results were reviewed with the patient. She is a silent carrier for alpha thalassemia. Additional counseling was provided, please see below. Screening was otherwise negative for cystic fibrosis, spinal muscular atrophy, and Fragile X. The patient was also counseled regarding the recommendation for maternal serum AFP to screen for open neural tube defects. Risks and benefits of screening were reviewed. Screening is recommended at 15 to 22 weeks gestation. Testing was ordered today. 5.  History of  X1 -- The patient's first pregnancy was delivered via  secondary to arrest of dilation and nonreassuring fetal heart tones. The placenta is posterior. She plans to have a repeat  for delivery. Again, her delivery will be scheduled at the [facility] in order to facilitate additional maternal care. 6.  Subchronic hemorrhage, stable -- The ultrasound images were reviewed with patient. Again, a subchorionic hemorrhage was seen measuring 2.4 x 0.7 x 2 cm. The size is stable compared to prior imaging. At 22 weeks and 6 days, the subchronic hemorrhage measured 2.4 x 0.8 x 2 cm. The size is somewhat decreased compared to prior imaging. At 16 weeks 6 days, a subchronic hemorrhage was seen measuring 5.9 x 0.8 x 2.3 cm. The size is somewhat increased compared to prior imaging. Previously, at 12 weeks 4 days, the subchorionic hemorrhage measured 2.6 x 1.4 x 0.9 cm. No active bleeding was noted. The patient denied having any vaginal bleeding or cramping. She is noted to be Rh+.      Counseling was previously provided to the patient. Counseling was reviewed. The patient did not have any additional questions or concerns. A subchorionic hemorrhage can be associated with an increased risk for bleeding, infection, early pregnancy loss, poor fetal growth,  premature rupture of membranes,  labor and delivery, and stillbirth. Because of the increased risk for complications, close follow-up is recommended. Activity restrictions were again reviewed. The patient was counseled to avoid heavy lifting, prolonged standing, strenuous exercise, and sexual intercourse. The patient is scheduled for twice-weekly fetal testing. Bleeding precautions were reviewed. 7.  Uterine fibroids -- The ultrasound images were reviewed with the patient. Multiple uterine fibroids were again noted. The size and appearance are stable compared to previous imaging. The patient again denied any symptoms of fevers, chills, and/or abdominal pain. There is a lag in fetal growth per imaging done at the [facility]. Counseling was previously provided to the patient. Counseling was reviewed. The patient did not have any additional questions or concerns. Fibroids are common in women of reproductive age. Most studies that monitored the growth of fibroids during pregnancy have noted that the majority of uterine fibroids have less than 10% change in volume across gestation. Most of the growth usually occurs in the first trimester. Fibroids larger than 5 cms are more likely to grow, where as smaller fibroids most likely remain stable in size. As you know, there is a higher chance of antepartum bleeding with retroplacental fibroids and a small increase in  birth. Specifically, if placentation occurs adjacent to or overlying a fibroid. Submucosal and retroplacental fibroids with volumes more than 200 ml (corresponding to 7 cm in diameter) have the highest risk of abruption.   Pain is another common complication of fibroid in pregnancy. It is especially high in fibroids greater than 5 cm in diameter. Patients with degenerating fibroids tend to have localized pain, mild leukocytosis, pyrexia and nausea. This may result from decreased perfusion in the setting of rapid growth leading to ischemia. Some studies noted increased incidence of malpresentation if the uterus has multiple fibroids. Large retroplacental fibroids that distort the uterine cavity may be associated with adverse pregnancy event including  fetal growth restriction and  labor. Overall, the risks associated with uterine fibroids in pregnancy include malpresentation, lower uterine segment obstruction, degeneration, poor fetal growth, ,  birth, and antepartum/postpartum bleeding. The size and appearance of the fibroid(s) should be reevaluated on follow-up ultrasound. A screening cervical length  was completed at 16 weeks 5 days and normal at 4.2 cm without funneling. A cervical length was repeated today and again normal at 5.6 cm. Fetal growth should be monitored serially, every 3 to 4 weeks beginning at 24 to 26 weeks gestation. Precautions were reviewed with the patient. 8.  Tobacco use in pregnancy, quit -- The patient again reports she has stopped smoking Black and milds. She was again congratulated and encouraged to remain tobacco free. She was again counseled regarding the increased risks of smoking in pregnancy including poor fetal growth, placental abruption, PPROM/ birth, and fetal loss. Additional  risks were again reviewed including asthma, allergies, infection, and an association with SIDS. Various techniques for cutting back and quitting were again reviewed. She was again counseled that smoking cessation is especially important in her case given her recurrent thrombotic events. She expressed verbal understanding of this counseling.      9. THC Use --  The patient previously reported that she was smoking marijuana daily. She reported that she was using marijuana for decreased appetite. The patient again reports that she has stopped smoking marijuana. She was congratulated and encouraged not to use marijuana during pregnancy. Counseling was previously provided to the patient. Counseling was reviewed today. The patient did not have any additional questions or concerns. She was again counseled regarding risk of neurodevelopmental issues in children exposed to [facility] during pregnancy. Additionally, marijuana use may pose risks similar to that of cigarette use including increased risk for poor fetal growth, placental bleeding, PPROM/ delivery, and stillbirth. She was again counseled not to use THC while pregnant. Random urine drug screens should be monitored during pregnancy. 10.  First/second trimester alcohol exposure -- The patient previously reported that she drinks wine occasionally. She reports only taking sips of wine. Today, the patient again reports that she has stopped drinking alcohol. Counseling was previously provided to the patient. Counseling was reviewed. The patient did not have any additional questions or concerns. The patient was again counseled that a safe level of alcohol consumption during pregnancy has not been determined. Determination of the impact of alcohol use during pregnancy on fetal development can be challenging because of variation in maternal alcohol clearance rates, fetal sensitivity, genetic susceptibility, maternal drinking patterns (binge drinking versus daily consumption), and confounders such a substance abuse. The patient was counseled that alcohol is a teratogen that can impact fetal growth and development at all stages during pregnancy. Currently, national guidelines and multiple medical societies recommend abstinence during pregnancy.      In one large epidemiologic study, an increased rate of stillbirth was noted across all categories of alcohol intake, even after adjusting for confounders (eg, smoking, pre-pregnancy weight). There is also an increased risk for structural anomalies (craniofacial, cardiac), poor fetal growth, decreased IQ, and neurodevelopmental issues in childhood. Factors such as older age, increased parity, and  and  ethnicities are associated with an increased risk for FAS. Secondary to the increased risk for poor growth, fetal growth should be monitored serially, every 3 to 4 weeks starting at 24 to 26 weeks gestation. A fetal growth lag was noted at 31 weeks gestation. Secondary to the increased risk for congenital heart anomalies, a fetal echocardiogram is recommended at 22 to 24 weeks gestation. A fetal echocardiogram was completed on 9/9/2022 and reported as normal.  Limitations of fetal echocardiography were reviewed. 11.  Right ovarian mass/cyst -- The ultrasound images were reviewed with the patient. The right ovarian mass previously described was again seen today. The hyperechoic mass measured 1.3 x 1 x 1.5 cm. The size and appearance are stable compared to prior imaging. At 12 weeks 4 days, A hyperechoic mass was seen in the right ovary measuring 1.2 x 1 x 0.9 cm. No fluid was seen surrounding the area. The patient was again counseled that this may represent a hemorrhagic cyst versus teratoma versus a calcification, although no shadowing was seen. The size and appearance of the ovarian mass will be reevaluated on follow-up ultrasound. The adnexa will be reevaluated on follow-up exam.  Postpartum follow-up is recommended. 12.   Nausea and vomiting of pregnancy, improved -- The patients previously reported having daily symptoms of nausea and decreased appetite. She was having occasional emesis. The patient reports that her symptoms of nausea and vomiting have significantly improved. She has gained 4 pounds since her last visit to our office. She has not gained any weight since her visit at 12 weeks. Today, the patient again reports that her symptoms are stable. She denied having any signs or symptoms of dehydration. The patient was again encouraged to eat small amounts of food every 2-3 hours during the day. She was also encouraged to stay well-hydrated. A baseline nutrition panel (CBC, CMP, magnesium, ferritin, folate, vitamin B12, vitamin D 25 [place]) was recommended. Baseline testing was completed on 5/13/2022, her results included: A CBC was ordered but not done by the lab. A CBC was completed on 5/31/22, H/H 11.9/35.8, MCV 88.6, ,000. The remainder of the test results are from 5/13/2022 and included: Potassium 3.7, creatinine 0.7, calcium 9, ALT 7, AST 13, magnesium 2, uric acid 3, , ferritin 67, folate 19.8, vitamin B12 423, vitamin D 9, TSH 0.876, free T4 1.16, free T3 3, TPO negative, antithyroglobulin antibody negative, urinalysis negative, urine culture mixed, urine protein creatinine ratio 0.1, beta-2 lipoprotein antibody negative, anticardiolipin antibody negative, protein S antigen free, 28 (), protein S antigen total 64 (%). --Additional recommendations are below. The patient was counseled to continue taking vitamin B6, 25 mg every 8 hours. She was counseled to call and/or return with worsening symptoms. Again, with persistent/worsening symptoms, I would recommend the addition of Pepcid, 20 mg twice daily and or Zofran. 13.  History of bacteruria -- The patient's hospital admission in April was reviewed. Her urinalysis was positive for Nitrites, leukocyte esterase, and bacteria. She was treated with Keflex. Her urine culture showed E. Coli and mixed gram-positive organisms including staph and Corynebacterium. She had a repeat urine culture on 5/5/2022 that was negative.   She denied any signs or symptoms of recurrent infection. Precautions were reviewed. A repeat urine culture was completed on 5/13/2022 and showed less than 10,000 CFU per mL of mixed gram-positive organisms. A urine culture was repeated on 6/17/2022 and noted to be mixed. A repeat urine culture was ordered today. 14.  Anticardiolipin antibody IgM, indeterminate -- Antiphospholipid antibody screening was done secondary to the patient's CVA diagnosed in April 2022. Initial screening completed on 4/18/2022 as part of the thrombophilia panel was negative. Screening was repeated on 4/19/2022. Her anticardiolipin IgM antibody was indeterminate at 17 on 4/19/2022. Screening for lupus anticoagulant and beta-2 glycoprotein antibody was negative. The patient was counseled that this may be a false elevation, generally, the titers have to be greater than 20 for a true positive. The recommendation was made for the patient to continue taking a low dose aspirin, 81 mg daily. She should continue this for the remainder of pregnancy and 6 to 8 weeks postpartum. Repeat testing was completed on 5/13/2022 and negative. The results were reviewed with the patient. Testing was repeated on 6/17/2022. Testing for LAC, beta-2 glycoprotein, and anticardiolipin antibody was again negative. The patient was again counseled to continue to follow with hematology for long-term monitoring and management. 15.  Protein S deficiency -- The patient's labs were reviewed. The patient initially had a thrombophilia panel on 4/18/2022. Her protein S, antigen, free was 25% (). Testing was repeated on 4/19/2022. Her protein S, antigen, free at that time was 27% (). The patient would have been 9 weeks at the time of the testing. She was being evaluated for a recent thrombotic stroke. Testing is not considered reliable during acute thrombosis or pregnancy. Protein S decreases during pregnancy.   However, protein S is generally considered deficient if <30% in the second trimester and/or <24% in the third trimester. Again, the patient had testing in the first trimester and her protein S levels were significantly decreased. It is unclear at this time if the patient truly has a protein S deficiency. This will not be able to be determined until the patient is postpartum. The patient was counseled that a protein S deficiency is rare, and seen in 0.03-0.13% of the population. It is considered a lower risk thrombophilia. If someone does have a deficiency, then the risk for a thrombosis during pregnancy is estimated to be 0.1% (w/negative personal history). It is 0-22% with a personal history of thrombosis     Given the patient's recurrent, the recommendation was for therapeutic anticoagulation with Lovenox. Please see above. Testing was repeated on 6/17/2022. The protein S antigen, free was 25% (%), and protein S activity was 35% (%). Her protein S levels are again low, even for pregnancy. A hematology consultation was previously recommended. The patient is following with Dr. Ning Pierce. Repeat testing is recommended at 6 to 8 weeks postpartum. She should not be on an oral contraceptive pill at the time of repeat testing. The patient should avoid birth control containing estrogen. The patient was counseled to continue taking a daily low-dose aspirin. She should continue a daily low-dose aspirin for the remainder of pregnancy and 6 to 8 weeks postpartum. Given her history of a thrombotic stroke, the recommendation was also made for anticoagulation with prophylactic Lovenox. The plan was reviewed with neurology and hematology, see above. A prescription was provided following patient's consultation. She was scheduled to return for injection teaching. Increased fetal surveillance is recommended. Fetal growth should be monitored serially, every 3 to 4 weeks starting at 24 to 26 weeks gestation. The patient should monitor fetal kick counts daily starting at 28 weeks gestation. She should be scheduled for twice-weekly fetal testing starting at 32 weeks gestation. Delivery is recommended at/by 39 weeks gestation. I would defer delivery timing to the providers at the [facility]. 12. Vaccination in pregnancy -- The patient was  previously counseled regarding the recommendations for vaccination in pregnancy. Counseling was reviewed. The patient did not have any additional questions or concerns. The patient was counseled regarding the recommendations for the Tdap vaccination in pregnancy. Risks and benefits were discussed. The vaccination is typically administered between 27 and 36 weeks gestation and recommended to confer protection against whooping cough to the . The patient plans to discuss this with her primary provider at her next visit. The patient was also counseled that her partner in any individuals who will be in close contact with the  should also be vaccinated for whooping cough. The patient was counseled regarding recommendation for the flu vaccination in pregnancy for both maternal and infant safety. Risks and benefits were reviewed. Counseling was provided regarding the recommendation for the Covid vaccination in pregnancy. I advised the patient that the [employer] of Obstetrics and Gynecology and The Society for Maternal Fetal Medicine recommend that all pregnant women be vaccinated for COVID-19. \"Data have shown that COVID-19 infection puts pregnant people at increased risk of severe complications and even death; yet only about 22% of pregnant individuals have received 1 more doses of COVID-19 vaccine according to the [employer] for Disease Control and Prevention. \"     \" Recent data have shown that more than 95% of those were hospitalized and/or dying from COVID-19 are those who have remained unvaccinated.   Pregnant individuals who have decided to wait until after delivery to be vaccinated may be inadvertently exposing themselves to an increased risk of severe illness or death. \"     \" COVID-19 vaccination is the best testing to reduce maternal and fetal complications of [id number] infection among pregnant people,\" said Cary Maher MD, president of the Society for Maternal Fetal Medicine, sub-specialists. The patient was counseled that in the event she opts not to have the Covid vaccination, she should alert her provider immediately if she test positive for Covid. Pregnant women are on the priority list for treatment with monoclonal antibody. This intervention has been shown to decrease the risk for hospitalization and complications related to Covid in pregnancy. The patient expressed verbal understanding of this counseling. 17.  Vitamin D deficiency -- The patient's vitamin D was deficient at 9  --Recommend vitamin D3 2000 IU daily  --Monitor nutrition panel q4-6 weeks (CBC, CMP, magnesium, ferritin, folate, vitamin B12, vitamin D25OH)     --Repeat testing was completed on 6/17/2022, her results included: H/H 11.2/33.7, MCV 87.1, platelet count 377,397, potassium 3.6, creatinine 0.7 calcium 7.2, ALT 15, AST 17, magnesium 1.6, ferritin 70, folate 11.2, vitamin B12 428, vitamin D 12, , uric acid 3.7, TSH 0.507, free T4 0.97, free T3 2.7, TPO negative, anticardiolipin antibody negative, beta-2 glycoprotein antibody negative, protein S antigen free 25%, hemoglobin A1c 4.9%, homocystine 6.6, protein S activity 35%, rheumatoid factor negative, C3 110, C4 23, [place] negative, LAC negative, maternal serum AFP 1.04 MOM, urinalysis negative, urine culture mixed, urine protein creatinine ratio 0.3, urinalysis negative for protein. -- The patient's vitamin D is still deficient but improving. She was counseled to continue her vitamin D supplement.   --Repeat testing ordered  --Follow up with PCP for long term monitoring and management     18. 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 She was noted to be heterozygote for the MTHFR c.665C>T variant (previously designated as C677T). Counseling was previously provided  regarding the implications and management of this gene mutation in pregnancy. Counseling was reviewed. The patient did not have any additional questions or concerns. She was counseled that this gene mutation affects folic acid metabolism. It is fairly common and found in 20-30% of the population. It is generally only a problem if homocysteine levels are elevated. In the past, this gene mutation was thought to be associated with increased obstetric risks including thrombosis, early recurrent pregnancy loss, placental abruption, hypertensive disorders of pregnancy, poor fetal growth, and fetal loss. More recent studies did not report strong associations with these risks. Because this genetic mutation affects folic acid metabolism, there is an increased risk for folic acid deficiency and other nutritional deficiencies in women with this condition. There is also an increased risk for having a child with an open neural tube defect in women with this mutation, especially if they're homozygous for the C677T mutation. Presently, this condition is not thought to be clinically significant. Generally, additional vitamin supplementation is recommended with folic acid or methyl folate, vitamin B6, and vitamin B12. The patient was counseled to take a low-dose aspirin. Fetal growth should be followed serially. She was counseled that there is a 50% chance that she will pass this mutation off to her child(sandra). She should relay this information to the pediatrician who cares for her child(sandra). She was also encouraged to review this diagnosis with her PCP. Because of this history, a baseline nutrition panel and homocysteine level were recommended. having associated symptoms of photosensitivity and phono sensitivity. Today, the patient reports that her headache symptoms have significantly improved. The patient was again counseled that migraine headaches can improve during pregnancy. However, in some patients symptoms are exacerbated. She was counseled regarding the use of magnesium oxide 400 mg twice daily and riboflavin 100 mg daily in reducing the frequency and intensity of migraine headaches. Prescriptions were provided. Precautions were reviewed, and she was counseled that if she develops the worst headache of her life or a headache with neurological deficits, to present immediately for evaluation. She expressed verbal understanding of this counseling. Because of the patient's headache symptoms, a baseline nutrition panel and thyroid function testing was recommended. Baseline testing was completed on 5/13/2022. The results are summarized above. 21.  Eccentric umbilical cord insertion into the placenta --  Today's ultrasound results were reviewed with the patient. The umbilical cord inserts into the placenta 2 to 3 cm from the edge. Counseling was provided to the patient. An eccentric cord insertion is diagnosed when the umbilical cord inserts into the placenta within 2-3 cm of the edge of the placenta. This is usually a benign finding, however, there can be an increased risk for poor fetal growth. Fetal growth should be monitored serially. Fetal growth was appropriate for the gestational age today. 22.  Abnormal urine protein creatinine ratio -- The patient's labs from 6/17/2022 were reviewed. Her urine protein creatinine ratio was abnormally elevated at 0.3. Her urine culture was mixed. Her urinalysis was negative for protein. Secondary to the conflicting results, a 51-ZBFG urine collection was recommended. Nursing had previously contacted the patient regarding the instructions for testing.        The patient's urine dip was positive for trace per day. Her blood pressure was normal at 100/60. Repeat testing ordered. 21.  Maternal ASD or PFO -- The patient reports that a small hole was found on the top part of her heart on follow-up imaging. She is unsure of her exact diagnosis. She is [facility] at the [facility]. She had a fetal echocardiogram that was normal on 2022. The limitations of fetal echocardiography were reviewed. The patient was counseled to relay this history to the pediatrician who cares for her child/children. 24.  Poor maternal weight gain -- The patient reports that she is 5'2\" tall and weighed 161 pounds at 12 weeks gestation. She weighed 167 pounds today. She has gained 6 pounds. There is concern for a fetal growth lag. The patient was counseled that poor maternal weight gain or low maternal weight can be associated with an increased risk for complications such as poor fetal growth,  delivery, and nutritional deficiencies. A baseline nutrition panel was ordered. Fetal growth will be reassessed in 3-4 weeks. 25.  Pelvic pressure -- The patient had complaints of increased pelvic pressure. She denied having any associated leaking of fluid, vaginal bleeding, cramping, and/or dysuria. A transvaginal ultrasound was completed. The patient's cervix was normal and measured 5.6 cm without funneling.  labor and PPROM precautions were reviewed. --I requested the patient return for a follow-up assessment in 1 week unless there is a clinical reason for her to return prior to that time. She is to call if she has any problems or questions prior to her next visit. Further evaluation and management will be dependent on her clinical presentation and the results of her testing.      --The patient was advised to call if she has any increased vaginal discharge, vaginal bleeding, contractions, abdominal pain, back pain or any new significant symptomatology prior to her next visit. I advised her that these are signs and symptoms of cervical change and require follow-up assessment when they occur. Preeclampsia precautions were also reviewed with the patient. --The patient was also counseled to call and/or return with any concerns for decreased fetal activity. --The patient is to continue to follow with you in your office for ongoing obstetric care. --The total time spent on today's visit was 30 minutes. This included preparation for the visit (i.e. reviewing prior external notes and test results), performance of a medically appropriate history and examination, counseling, orders for medications, tests or other procedures, and coordination of care. Greater than 50% of the time was spent face-to-face with the patient. This time is exclusive of procedures performed. I answered all of  the patient's questions to her satisfaction. I asked her to call if she had any additional questions prior to her next visit. --At the conclusion of the visit, the patient appeared to have a good understanding of the issues discussed. I answered all of her questions to her satisfaction. I asked her to call if she had any additional questions prior to her next visit. --Thank you for allowing me to participate in the care of this pleasant patient. Please don't hesitate to call me if you have any questions. Sincerely,      Micaela Matias MD, [place] [address]  [phone]      *All or parts of this note may have been generated using a voice recognition program. There may be typo, grammar, or Word substitution errors that have escaped my review of this note.

## 2022-09-21 NOTE — LETTER
[facility] Maternal Fetal Medicine  [address] [place] [id number]  Phone: [phone]  Fax: [phone]           Nallely Jeff MD      2022    Patient: Ziyad Shah   MR Number: [id number]   YOB: 1990   Date of Visit: 2022       Dear Dr. Cyndy Quevedo: Thank you for referring Kendra Boss to me for evaluation/treatment. Below are the relevant portions of my assessment and plan of care. If you have questions, please do not hesitate to call me. I look forward to following Chris Ortega along with you. Sincerely,        Nallely Jeff MD    CC providers:  MD GUILLAUME Dawn [phone]  [facility]       2022      James Cantu, [place] [address] [place] [address]     RE:  Neela ARAUJO  : 1990   AGE: 33 y.o. This report has been created using voice recognition software. It may contain errors which are inherent in voice recognition technology. Dear Dr. Cyndy Quevedo:      I had the pleasure of meeting with Ms. Devaughn Bradford for a return consultation. As you know, Ms. Devaughn Bradford  is a 33 y.o.  at [address] (LMP = [address]) who is being followed by our office for a history of a maternal stroke. Today, Ms. Devaughn Bradford reports that she feels well. She notes good fetal movement and denies any symptoms of leaking of fluid, vaginal bleeding, and/or contractions. She had a fetal ultrasound that was notable for the following. There is a single intrauterine gestation in a cephalic presentation with a heart rate of 149 beats per minute. The placenta is posterior. The amniotic fluid index is 13.3 cm. The composite gestational age is 35w11d. The estimated fetal weight is at the 52nd percentile. BPP 8/8. Umbilical artery PI normal.  MCA PSV normal.  MCA PI normal.  Transvaginal cervical length 5.6 cm without funneling.   Right ovarian hyperechoic mass again seen measuring 1.3 x 1 x 1.5 cm. Subchronic hemorrhage again seen measuring 2.4 x 0.7 x 2 cm      PERTINENT PHYSICAL EXAMINATION:   /60 (Position: Sitting)   Pulse 94   Ht 5' 2\" (1.575 m)   Wt 164 lb (74.4 kg)   LMP 02/10/2022   BMI 30.00 kg/m²     Urine dipstick:   Negative for Glucose    Trace for Albumin      A fetal ultrasound assessment was performed today. A report is enclosed for your review. Assessment & Plan:  33 y.o.  at [address] (LMP = [address]) with:    1. Pregnancy dating -- The patient's pregnancy dating was previously reviewed. The patient reports that was having monthly periods. She discontinued Depo-Provera in 2021. She reports a sure last menstrual period. Her reported LMP was 2/10/2022, GUERO 2022. The patient's first ultrasound was 1822. The crown-rump length was 9 weeks 4 days, GUERO 2022. Given the patient had a sure LMP that agreed with the 9-week ultrasound, the recommendation was made to use an GUERO of 2022 based on her LMP. Fetal growth was appropriate for the gestational age again today. 2.  History of stroke during pregnancy  -- The patient previously reported that she initially experienced symptoms of headache and aphasia in 2021. She did not seek medical care at that time. The patient presented to the emergency department on 2022 with complaints of a severe headache that woke her up from sleep. She had a headache 4 days prior to her evaluation in the emergency department. Two days prior to her evaluation, she had slurred speech and aphasia which lasted for approximately 20 minutes and then resolved. The patient then developed paresthesias on her right arm. A CT of the head was initially completed. The report stated, \" hypodensity within the left insular cortex with abnormal white matter hypodensity extending into the adjacent subinsular white matter and left corona radiata.   Findings may be suggestive of subacute infarction. For further evaluation brain MRI is recommended. \"     An MRI, MRV, and MRA of the head with and completed. Per the MRI report from 4/18/2022, acute infarct noted in left insula/left temporal lobe/left periventricular white matter with associated thrombosis of a superior M2 branch of the left middle cerebral artery. Chronic infarcts noted in the right corona radiata and left centrum semiovale. The MRV was unremarkable. No dural sinus thrombosis was noted. The patient was diagnosed with a subacute CVA and admitted to the hospital.  She was also noted to have bacteriuria. She was cared for by her primary care physician and she had a neurology consultation. A thrombophilia evaluation was completed. She was started on a low-dose aspirin and received Lovenox for DVT prophylaxis. A maternal echocardiogram was completed on 4/19/2022. Per the report, the study was normal.  The patient reports having a repeat echocardiogram at the [facility] and she has a small PFO or ASD. The patient remained stable and was discharged home on 4/19/2022. She was discharged home on Keflex and a low-dose aspirin. She was to follow-up with obstetrics and her primary care physician. The patient's thrombophilia evaluation was completed on 4/18/2022, her results included: Homocystine 5, protein C functional 111%, protein S antigen free 25% (), Antithrombin activity 99%, APC resistance 3.54 (normal), lupus anticoagulant negative, beta-2 glycoprotein antibody negative, cardiolipin antibody negative, prothrombin 2 gene mutation negative. A screening PARIS was completed and negative. A protein S antigen free was repeated on 4/19/2022 and again low at 27 (). Homocystine was repeated on 4/19/2022 and again normal at 7.8. Factor VIII activity was normal at 134% and factor VII activity was normal at 133%.   Anticardiolipin antibody was repeated on 4/19/2022 and IgM was indeterminate at 16. Repeat screening for lupus anticoagulant was negative on 4/19/2022. A D-dimer was checked on 4/19/2022 and normal.  Patient's urine drug screen was positive for marijuana. The patient's urinalysis was abnormal.  The urine culture showed mixed jair including E. coli staph and Corynebacterium. No sensitivities were provided. The patient was seen for her initial consultation with [facility] on 5/9/2022. Counseling was provided and the recommendation was made for prophylactic anticoagulation with Lovenox, 40 mg daily. Both hematology and neurology were contacted regarding the patient and plan of care. The patient returned to the [facility] office on 5/31/2022 for a follow-up consultation. Prior to her visit, she reported to the nurse that she had recurrent strokelike symptoms including left-sided numbness and a headache. She was immediately taken to the emergency department for evaluation. She was readmitted to the hospital on 5/31/2022 with an acute CVA. A CT of the head was repeated on 5/31/2022. Per the impression, there was left insular encephalomalacia at the site of prior infarct. It was an unremarkable CTA of the head and neck. Specifically, there was no evidence of residual thrombus in the left middle cerebral artery. The patient then had an MRI/MRV/MRA of the head without contrast.  Per the impression on the report, tiny foci of acute or early subacute infarction were seen in the right frontal lobe, with single tiny foci each within the right lateral basal ganglia, external capsule, and insular cortex. Mild stenosis was noted in the left lateral transverse sinus, which may be due to an arachnoid granulation. There is no evidence of dural venous thrombosis. She was evaluated by neurology on 6/2/2022. The recommendation was made for therapeutic anticoagulation with Lovenox. She was also evaluated by hematology. She was discharged home on 6/3/2022.      The patient returned to the [facility] office at 16 weeks and 6 days for follow-up. She reported that she felt well. She still had intermittent headache symptoms that she rated as a 6 out of 10. She reported that she was tolerating the therapeutic Lovenox well. During her visit at 16 weeks 6 days, the patient requested a referral for additional evaluation at the [facility]. The patient was counseled that this would be reasonable given her complicated pregnancy. Additionally, it may be in the patient's best interest to plan for delivery at a center in which neurology and/or neurosurgery would be available in the event the patient had any complications during delivery or postpartum. The patient was agreeable with the plan. She requested a referral to the [facility] for additional evaluation and management. A referral was provided. The patient's neurologist, Dr. Kaylin Blanco, was also contacted regarding the patient. He agreed with the plan outlined above. The patient is following with maternal-fetal medicine, Dr. Eva Burch, at the [facility] clinic. She is also following with Dr. Aubree De Jesus from vascular surgery. She was also seen by a vascular neurologist, Dr. Ivanna Negro, at the [facility]. The patient returns the office today for follow-up and fetal testing secondary to the finding of poor fetal growth at the [facility]. She notes fetal movement. She denies having any symptoms of leaking of fluid, vaginal bleeding, cramping, and/or contractions. She denies having any subsequent strokelike symptoms. She reports that her migraine headache symptoms have significantly improved. Counseling was previously provided to the patient. Counseling was reviewed. The patient did not have any additional questions or concerns. Again, pregnancy and the puerperium (postpartum period) are well-established risk factors for thromboembolism. Normal pregnancy is accompanied by an increase in clotting factors. The resulting hypercoagulable state likely evolved to protect women from hemorrhage in the event of a miscarriage and during childbirth. Thromboembolic disease during pregnancy and the puerperium is a significant cause of maternal morbidity and mortality. During pregnancy, the risk of venous thromboembolism increases 4-5 fold and the risk of arterial thromboembolism, myocardial infarction, and stroke increases 3-4 fold compared to the nonpregnant state. Postpartum, the risk of venous thromboembolism is 20 fold higher and the risk of arterial thromboembolism is similarly elevated compared to nonpregnant individuals. Venous events are more common during pregnancy accounting for 4 out of 5 thromboembolic events. However, the risk of death is higher following an arterial thrombosis. Approximately 80% of venous thromboembolic events are deep venous thromboses and approximately 20% of pulmonary emboli. The most important risk factor for thrombosis during pregnancy is a history of thrombosis. The risk for a thrombotic event is further increased in women with an underlying acquired or inherited thrombophilia. Most studies have reported and equal distribution of thrombosis risk across all trimesters of pregnancy however, 2 large conflicting studies have reported a first trimester (50% prior to 15 weeks) and third trimester (60%) predominance. Additional risk factors for thrombosis during pregnancy include multifetal gestation, varicose veins, inflammatory bowel disease, urinary tract infection, diabetes, hospitalization, obesity, and maternal age greater than 28 years. Compared to the antepartum period, the thrombosis risk is 2-5 times higher during the postpartum period. This risk is highest during the first 6 weeks postpartum and declines to prepregnancy rates by 13-18 weeks postpartum.  Risk factors for postpartum thrombosis include  section, medical co morbidities, obesity,  delivery, hemorrhage, fetal demise, advanced maternal age, hypertensive disorders of pregnancy, tobacco use, and infection. The patient was counseled to continue treatment with therapeutic Lovenox and a daily low-dose aspirin. She was counseled to continue to follow with the specialist at the [facility] for long-term monitoring and management. Per the consultation note with maternal-fetal medicine, the plan is for delivery at the [facility]. Per the consultation note with maternal-fetal medicine, the patient will follow-up at the [facility] at 32 weeks and 36 weeks with a growth ultrasound at that time and plan to transfer care to [facility] for delivery at 36 weeks. It is unclear if the plan is for delivery at 36 weeks versus transfer of care at 36 weeks until delivery. The patient was counseled to clarify this with her provider in [place]. Therapeutic anticoagulation should be continued throughout the pregnancy and for at least 6-8 weeks postpartum. Lovenox can be initiated 12 hours following a vaginal delivery and 24 hours following a  section (if there is no ongoing concern for bleeding). The risks and benefits of therapeutic anticoagulation in pregnancy were reviewed including the development of heparin-induced thrombocytopenia (HIT), osteopenia, bleeding, and poor fetal growth.  An anesthesia consultation is also recommended in the third trimester given she is on anticoagulation.  A hematology consult was recommended. The patient is following with Dr. Emigdio Lepe neurology consult was recommended. A referral was provided. The patient is following with a vascular neurologist at the [facility], Dr. Shelia Jara The patient was referred to the [facility] for additional maternal and fetal evaluation. She is following with Dr. Abraham Glynn.  The patient should avoid estrogen containing birth control postpartum.      Increased fetal surveillance is also (150-200 mGy, 15-20 rads). 3.  Poor fetal growth -- The date fetal growth assessment at 31 weeks 1 day at the [facility]. There was a 1 week lag. The composite gestational age was 31 weeks 1 day. The [facility] was measuring at the 6 percentile. Fetal growth was reevaluated today and appropriate for the gestational age. Surveillance was recommended by the patient's high risk provider in [place]. We will facilitate twice-weekly fetal testing for the patient until she returns to [place]. Counseling was previously provided to the patient. Counseling was reviewed. She did not have any additional questions or concerns. 4.  Genetic counseling -- The patient was previously counseled regarding her options for genetic screening and/or diagnostic testing. She opted for screening with NIPT. Screening was completed on 2022. Her results were low risk for aneuploidy. The reported fetal fraction was 28% the reported fetal sex was female. The results were previously reviewed with the patient. The patient also opted for a Horizon 14 panel. Screening was completed on 2022. The results were reviewed with the patient. She is a silent carrier for alpha thalassemia. Additional counseling was provided, please see below. Screening was otherwise negative for cystic fibrosis, spinal muscular atrophy, and Fragile X. The patient was also counseled regarding the recommendation for maternal serum AFP to screen for open neural tube defects. Risks and benefits of screening were reviewed. Screening is recommended at 15 to 22 weeks gestation. Testing was ordered today. 5.  History of  X1 -- The patient's first pregnancy was delivered via  secondary to arrest of dilation and nonreassuring fetal heart tones. The placenta is posterior. She plans to have a repeat  for delivery.   Again, her delivery will be scheduled at the [facility] in order to facilitate additional maternal care. 6.  Subchronic hemorrhage, stable -- The ultrasound images were reviewed with patient. Again, a subchorionic hemorrhage was seen measuring 2.4 x 0.7 x 2 cm. The size is stable compared to prior imaging. At 22 weeks and 6 days, the subchronic hemorrhage measured 2.4 x 0.8 x 2 cm. The size is somewhat decreased compared to prior imaging. At 16 weeks 6 days, a subchronic hemorrhage was seen measuring 5.9 x 0.8 x 2.3 cm. The size is somewhat increased compared to prior imaging. Previously, at 12 weeks 4 days, the subchorionic hemorrhage measured 2.6 x 1.4 x 0.9 cm. No active bleeding was noted. The patient denied having any vaginal bleeding or cramping. She is noted to be Rh+. Counseling was previously provided to the patient. Counseling was reviewed. The patient did not have any additional questions or concerns. A subchorionic hemorrhage can be associated with an increased risk for bleeding, infection, early pregnancy loss, poor fetal growth,  premature rupture of membranes,  labor and delivery, and stillbirth. Because of the increased risk for complications, close follow-up is recommended. Activity restrictions were again reviewed. The patient was counseled to avoid heavy lifting, prolonged standing, strenuous exercise, and sexual intercourse. The patient is scheduled for twice-weekly fetal testing. Bleeding precautions were reviewed. 7.  Uterine fibroids -- The ultrasound images were reviewed with the patient. Multiple uterine fibroids were again noted. The size and appearance are stable compared to previous imaging. The patient again denied any symptoms of fevers, chills, and/or abdominal pain. There is a lag in fetal growth per imaging done at the [facility] clinic. Counseling was previously provided to the patient. Counseling was reviewed. The patient did not have any additional questions or concerns. Fibroids are common in women of reproductive age. Most studies that monitored the growth of fibroids during pregnancy have noted that the majority of uterine fibroids have less than 10% change in volume across gestation. Most of the growth usually occurs in the first trimester. Fibroids larger than 5 cms are more likely to grow, where as smaller fibroids most likely remain stable in size. As you know, there is a higher chance of antepartum bleeding with retroplacental fibroids and a small increase in  birth. Specifically, if placentation occurs adjacent to or overlying a fibroid. Submucosal and retroplacental fibroids with volumes more than 200 ml (corresponding to 7 cm in diameter) have the highest risk of abruption. Pain is another common complication of fibroid in pregnancy. It is especially high in fibroids greater than 5 cm in diameter. Patients with degenerating fibroids tend to have localized pain, mild leukocytosis, pyrexia and nausea. This may result from decreased perfusion in the setting of rapid growth leading to ischemia. Some studies noted increased incidence of malpresentation if the uterus has multiple fibroids. Large retroplacental fibroids that distort the uterine cavity may be associated with adverse pregnancy event including  fetal growth restriction and  labor. Overall, the risks associated with uterine fibroids in pregnancy include malpresentation, lower uterine segment obstruction, degeneration, poor fetal growth, ,  birth, and antepartum/postpartum bleeding. The size and appearance of the fibroid(s) should be reevaluated on follow-up ultrasound. A screening cervical length  was completed at 16 weeks 5 days and normal at 4.2 cm without funneling. A cervical length was repeated today and again normal at 5.6 cm. Fetal growth should be monitored serially, every 3 to 4 weeks beginning at 24 to 26 weeks gestation.   Precautions were reviewed with the patient. 8.  Tobacco use in pregnancy, quit -- The patient again reports she has stopped smoking Black and milds. She was again congratulated and encouraged to remain tobacco free. She was again counseled regarding the increased risks of smoking in pregnancy including poor fetal growth, placental abruption, PPROM/ birth, and fetal loss. Additional  risks were again reviewed including asthma, allergies, infection, and an association with SIDS. Various techniques for cutting back and quitting were again reviewed. She was again counseled that smoking cessation is especially important in her case given her recurrent thrombotic events. She expressed verbal understanding of this counseling. 9. THC Use --  The patient previously reported that she was smoking marijuana daily. She reported that she was using marijuana for decreased appetite. The patient again reports that she has stopped smoking marijuana. She was congratulated and encouraged not to use marijuana during pregnancy. Counseling was previously provided to the patient. Counseling was reviewed today. The patient did not have any additional questions or concerns. She was again counseled regarding risk of neurodevelopmental issues in children exposed to [facility] during pregnancy. Additionally, marijuana use may pose risks similar to that of cigarette use including increased risk for poor fetal growth, placental bleeding, PPROM/ delivery, and stillbirth. She was again counseled not to use THC while pregnant. Random urine drug screens should be monitored during pregnancy. 10.  First/second trimester alcohol exposure -- The patient previously reported that she drinks wine occasionally. She reports only taking sips of wine. Today, the patient again reports that she has stopped drinking alcohol. Counseling was previously provided to the patient. Counseling was reviewed.   The patient did not have any additional questions or concerns. The patient was again counseled that a safe level of alcohol consumption during pregnancy has not been determined. Determination of the impact of alcohol use during pregnancy on fetal development can be challenging because of variation in maternal alcohol clearance rates, fetal sensitivity, genetic susceptibility, maternal drinking patterns (binge drinking versus daily consumption), and confounders such a substance abuse. The patient was counseled that alcohol is a teratogen that can impact fetal growth and development at all stages during pregnancy. Currently, national guidelines and multiple medical societies recommend abstinence during pregnancy. In one large epidemiologic study, an increased rate of stillbirth was noted across all categories of alcohol intake, even after adjusting for confounders (eg, smoking, pre-pregnancy weight). There is also an increased risk for structural anomalies (craniofacial, cardiac), poor fetal growth, decreased IQ, and neurodevelopmental issues in childhood. Factors such as older age, increased parity, and  and  ethnicities are associated with an increased risk for FAS. Secondary to the increased risk for poor growth, fetal growth should be monitored serially, every 3 to 4 weeks starting at 24 to 26 weeks gestation. A fetal growth lag was noted at 31 weeks gestation. Secondary to the increased risk for congenital heart anomalies, a fetal echocardiogram is recommended at 22 to 24 weeks gestation. A fetal echocardiogram was completed on 9/9/2022 and reported as normal.  Limitations of fetal echocardiography were reviewed. 11.  Right ovarian mass/cyst -- The ultrasound images were reviewed with the patient. The right ovarian mass previously described was again seen today. The hyperechoic mass measured 1.3 x 1 x 1.5 cm.   The size and appearance are stable compared to prior imaging. At 12 weeks 4 days, A hyperechoic mass was seen in the right ovary measuring 1.2 x 1 x 0.9 cm. No fluid was seen surrounding the area. The patient was again counseled that this may represent a hemorrhagic cyst versus teratoma versus a calcification, although no shadowing was seen. The size and appearance of the ovarian mass will be reevaluated on follow-up ultrasound. The adnexa will be reevaluated on follow-up exam.  Postpartum follow-up is recommended. 12.   Nausea and vomiting of pregnancy, improved -- The patients previously reported having daily symptoms of nausea and decreased appetite. She was having occasional emesis. The patient reports that her symptoms of nausea and vomiting have significantly improved. She has gained 4 pounds since her last visit to our office. She has not gained any weight since her visit at 12 weeks. Today, the patient again reports that her symptoms are stable. She denied having any signs or symptoms of dehydration. The patient was again encouraged to eat small amounts of food every 2-3 hours during the day. She was also encouraged to stay well-hydrated. A baseline nutrition panel (CBC, CMP, magnesium, ferritin, folate, vitamin B12, vitamin D 25 [place]) was recommended. Baseline testing was completed on 5/13/2022, her results included: A CBC was ordered but not done by the lab. A CBC was completed on 5/31/22, H/H 11.9/35.8, MCV 88.6, ,000.   The remainder of the test results are from 5/13/2022 and included: Potassium 3.7, creatinine 0.7, calcium 9, ALT 7, AST 13, magnesium 2, uric acid 3, , ferritin 67, folate 19.8, vitamin B12 423, vitamin D 9, TSH 0.876, free T4 1.16, free T3 3, TPO negative, antithyroglobulin antibody negative, urinalysis negative, urine culture mixed, urine protein creatinine ratio 0.1, beta-2 lipoprotein antibody negative, anticardiolipin antibody negative, protein S antigen free, 28 (), protein S antigen total 64 (%). --Additional recommendations are below. The patient was counseled to continue taking vitamin B6, 25 mg every 8 hours. She was counseled to call and/or return with worsening symptoms. Again, with persistent/worsening symptoms, I would recommend the addition of Pepcid, 20 mg twice daily and or Zofran. 13.  History of bacteruria -- The patient's hospital admission in April was reviewed. Her urinalysis was positive for Nitrites, leukocyte esterase, and bacteria. She was treated with Keflex. Her urine culture showed E. Coli and mixed gram-positive organisms including staph and Corynebacterium. She had a repeat urine culture on 5/5/2022 that was negative. She denied any signs or symptoms of recurrent infection. Precautions were reviewed. A repeat urine culture was completed on 5/13/2022 and showed less than 10,000 CFU per mL of mixed gram-positive organisms. A urine culture was repeated on 6/17/2022 and noted to be mixed. A repeat urine culture was ordered today. 14.  Anticardiolipin antibody IgM, indeterminate -- Antiphospholipid antibody screening was done secondary to the patient's CVA diagnosed in April 2022. Initial screening completed on 4/18/2022 as part of the thrombophilia panel was negative. Screening was repeated on 4/19/2022. Her anticardiolipin IgM antibody was indeterminate at 17 on 4/19/2022. Screening for lupus anticoagulant and beta-2 glycoprotein antibody was negative. The patient was counseled that this may be a false elevation, generally, the titers have to be greater than 20 for a true positive. The recommendation was made for the patient to continue taking a low dose aspirin, 81 mg daily. She should continue this for the remainder of pregnancy and 6 to 8 weeks postpartum. Repeat testing was completed on 5/13/2022 and negative. The results were reviewed with the patient. Testing was repeated on 6/17/2022.   Testing for LAC, beta-2 glycoprotein, and anticardiolipin antibody was again negative. The patient was again counseled to continue to follow with hematology for long-term monitoring and management. 15.  Protein S deficiency -- The patient's labs were reviewed. The patient initially had a thrombophilia panel on 4/18/2022. Her protein S, antigen, free was 25% (). Testing was repeated on 4/19/2022. Her protein S, antigen, free at that time was 27% (). The patient would have been 9 weeks at the time of the testing. She was being evaluated for a recent thrombotic stroke. Testing is not considered reliable during acute thrombosis or pregnancy. Protein S decreases during pregnancy. However, protein S is generally considered deficient if <30% in the second trimester and/or <24% in the third trimester. Again, the patient had testing in the first trimester and her protein S levels were significantly decreased. It is unclear at this time if the patient truly has a protein S deficiency. This will not be able to be determined until the patient is postpartum. The patient was counseled that a protein S deficiency is rare, and seen in 0.03-0.13% of the population. It is considered a lower risk thrombophilia. If someone does have a deficiency, then the risk for a thrombosis during pregnancy is estimated to be 0.1% (w/negative personal history). It is 0-22% with a personal history of thrombosis     Given the patient's recurrent, the recommendation was for therapeutic anticoagulation with Lovenox. Please see above. Testing was repeated on 6/17/2022. The protein S antigen, free was 25% (%), and protein S activity was 35% (%). Her protein S levels are again low, even for pregnancy. A hematology consultation was previously recommended. The patient is following with Dr. Mar Mullins. Repeat testing is recommended at 6 to 8 weeks postpartum.   She should not be on an oral contraceptive pill at the time of repeat testing. The patient should avoid birth control containing estrogen. The patient was counseled to continue taking a daily low-dose aspirin. She should continue a daily low-dose aspirin for the remainder of pregnancy and 6 to 8 weeks postpartum. Given her history of a thrombotic stroke, the recommendation was also made for anticoagulation with prophylactic Lovenox. The plan was reviewed with neurology and hematology, see above. A prescription was provided following patient's consultation. She was scheduled to return for injection teaching. Increased fetal surveillance is recommended. Fetal growth should be monitored serially, every 3 to 4 weeks starting at 24 to 26 weeks gestation. The patient should monitor fetal kick counts daily starting at 28 weeks gestation. She should be scheduled for twice-weekly fetal testing starting at 32 weeks gestation. Delivery is recommended at/by 39 weeks gestation. I would defer delivery timing to the providers at the [facility]. 12. Vaccination in pregnancy -- The patient was  previously counseled regarding the recommendations for vaccination in pregnancy. Counseling was reviewed. The patient did not have any additional questions or concerns. The patient was counseled regarding the recommendations for the Tdap vaccination in pregnancy. Risks and benefits were discussed. The vaccination is typically administered between 27 and 36 weeks gestation and recommended to confer protection against whooping cough to the . The patient plans to discuss this with her primary provider at her next visit. The patient was also counseled that her partner in any individuals who will be in close contact with the  should also be vaccinated for whooping cough. The patient was counseled regarding recommendation for the flu vaccination in pregnancy for both maternal and infant safety.   Risks and benefits were reviewed. Counseling was provided regarding the recommendation for the Covid vaccination in pregnancy. I advised the patient that the [employer] of Obstetrics and Gynecology and The Society for Maternal Fetal Medicine recommend that all pregnant women be vaccinated for COVID-19. \"Data have shown that COVID-19 infection puts pregnant people at increased risk of severe complications and even death; yet only about 22% of pregnant individuals have received 1 more doses of COVID-19 vaccine according to the [employer] for Disease Control and Prevention. \"     \" Recent data have shown that more than 95% of those were hospitalized and/or dying from COVID-19 are those who have remained unvaccinated. Pregnant individuals who have decided to wait until after delivery to be vaccinated may be inadvertently exposing themselves to an increased risk of severe illness or death. \"     \" COVID-19 vaccination is the best testing to reduce maternal and fetal complications of [id number] infection among pregnant people,\" said Isreal Rogers MD, president of the Society for Maternal Fetal Medicine, sub-specialists. The patient was counseled that in the event she opts not to have the Covid vaccination, she should alert her provider immediately if she test positive for Covid. Pregnant women are on the priority list for treatment with monoclonal antibody. This intervention has been shown to decrease the risk for hospitalization and complications related to Covid in pregnancy. The patient expressed verbal understanding of this counseling.      17.  Vitamin D deficiency -- The patient's vitamin D was deficient at 9  --Recommend vitamin D3 2000 IU daily  --Monitor nutrition panel q4-6 weeks (CBC, CMP, magnesium, ferritin, folate, vitamin B12, vitamin D25OH)     --Repeat testing was completed on 6/17/2022, her results included: H/H 11.2/33.7, MCV 87.1, platelet count 178,987, potassium 3.6, creatinine 0.7 calcium 7.2, ALT 15, AST 17, magnesium 1.6, ferritin 70, folate 11.2, vitamin B12 428, vitamin D 12, , uric acid 3.7, TSH 0.507, free T4 0.97, free T3 2.7, TPO negative, anticardiolipin antibody negative, beta-2 glycoprotein antibody negative, protein S antigen free 25%, hemoglobin A1c 4.9%, homocystine 6.6, protein S activity 35%, rheumatoid factor negative, C3 110, C4 23, [place] negative, LAC negative, maternal serum AFP 1.04 MOM, urinalysis negative, urine culture mixed, urine protein creatinine ratio 0.3, urinalysis negative for protein. -- The patient's vitamin D is still deficient but improving. She was counseled to continue her vitamin D supplement. --Repeat testing ordered  --Follow up with PCP for long term monitoring and management     18. 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 She was noted to be heterozygote for the MTHFR c.665C>T variant (previously designated as C677T). Counseling was previously provided  regarding the implications and management of this gene mutation in pregnancy. Counseling was reviewed. The patient did not have any additional questions or concerns. She was counseled that this gene mutation affects folic acid metabolism. It is fairly common and found in 20-30% of the population. It is generally only a problem if homocysteine levels are elevated. In the past, this gene mutation was thought to be associated with increased obstetric risks including thrombosis, early recurrent pregnancy loss, placental abruption, hypertensive disorders of pregnancy, poor fetal growth, and fetal loss. More recent studies did not report strong associations with these risks. Because this genetic mutation affects folic acid metabolism, there is an increased risk for folic acid deficiency and other nutritional deficiencies in women with this condition.  There is also an increased risk for having a child with an open neural tube defect in women with this mutation, especially if they're homozygous for the C677T mutation. Presently, this condition is not thought to be clinically significant. Generally, additional vitamin supplementation is recommended with folic acid or methyl folate, vitamin B6, and vitamin B12. The patient was counseled to take a low-dose aspirin. Fetal growth should be followed serially. She was counseled that there is a 50% chance that she will pass this mutation off to her child(sandra). She should relay this information to the pediatrician who cares for her child(sandra). She was also encouraged to review this diagnosis with her PCP. Because of this history, a baseline nutrition panel and homocysteine level were recommended. She was provided with orders for testing. 23.  Silent carrier for alpha thalassemia -- The patient's records were previously reviewed. She is noted to be a silent carrier for alpha thalassemia. Counseling was previously provided to the patient. Counseling was reviewed. The patient did not have any additional questions or concerns. --Alpha thalassemia minima is another term for this condition. This condition results from the loss of a single alpha-chain gene (ie a-/aa). Blood tests are usually normal, though the red cells may be small, leading to a suspicion that the patient is a silent carrier. The only way to confirm a silent carrier is by DNA studies. This is usually a benign carrier state. Affected individuals are not typically anemic and their hemoglobin analysis is normal. These conditions may remain undiagnosed, or they may be detected incidentally on a routine complete blood count during evaluation of an unrelated condition or in the setting of preconception testing and counseling. The patient's partner has not had testing for this condition. Carrier screening is recommended. The patient again plans to discuss this with her partner.      If the patient's partner is not a carrier for alpha thalassemia, then there is a 50% chance that her child/children will also be a carrier for alpha thalassemia minima. If her partner is also a carrier of alpha thalassemia, there is an increased risk for having an affected child. Without having her partner tested, definitive counseling cannot be provided. This information should be relayed to pediatrics. I would defer any additional  evaluation to pediatrics. 20.   Migraine headaches -- The patient previously reported having continued headache symptoms after discharge from the hospital in . The headaches are a 6 out of 10 at the worst.  She reported having daily headaches. She has been using tylenol with some relief. She reports having associated symptoms of photosensitivity and phono sensitivity. Today, the patient reports that her headache symptoms have significantly improved. The patient was again counseled that migraine headaches can improve during pregnancy. However, in some patients symptoms are exacerbated. She was counseled regarding the use of magnesium oxide 400 mg twice daily and riboflavin 100 mg daily in reducing the frequency and intensity of migraine headaches. Prescriptions were provided. Precautions were reviewed, and she was counseled that if she develops the worst headache of her life or a headache with neurological deficits, to present immediately for evaluation. She expressed verbal understanding of this counseling. Because of the patient's headache symptoms, a baseline nutrition panel and thyroid function testing was recommended. Baseline testing was completed on 2022. The results are summarized above. 21.  Eccentric umbilical cord insertion into the placenta --  Today's ultrasound results were reviewed with the patient. The umbilical cord inserts into the placenta 2 to 3 cm from the edge. Counseling was provided to the patient.   An eccentric cord insertion is diagnosed when the umbilical cord inserts into the placenta within 2-3 cm of the edge of the placenta. This is usually a benign finding, however, there can be an increased risk for poor fetal growth. Fetal growth should be monitored serially. Fetal growth was appropriate for the gestational age today. 22.  Abnormal urine protein creatinine ratio -- The patient's labs from 2022 were reviewed. Her urine protein creatinine ratio was abnormally elevated at 0.3. Her urine culture was mixed. Her urinalysis was negative for protein. Secondary to the conflicting results, a 45-UGQE urine collection was recommended. Nursing had previously contacted the patient regarding the instructions for testing. The patient's urine dip was positive for trace per day. Her blood pressure was normal at 100/60. Repeat testing ordered. 21.  Maternal ASD or PFO -- The patient reports that a small hole was found on the top part of her heart on follow-up imaging. She is unsure of her exact diagnosis. She is following at the [facility]. She had a fetal echocardiogram that was normal on 2022. The limitations of fetal echocardiography were reviewed. The patient was counseled to relay this history to the pediatrician who cares for her child/children. 24.  Poor maternal weight gain -- The patient reports that she is 5'2\" tall and weighed 161 pounds at 12 weeks gestation. She weighed 167 pounds today. She has gained 6 pounds. There is concern for a fetal growth lag. The patient was counseled that poor maternal weight gain or low maternal weight can be associated with an increased risk for complications such as poor fetal growth,  delivery, and nutritional deficiencies. A baseline nutrition panel was ordered. Fetal growth will be reassessed in 3-4 weeks. 25.  Pelvic pressure -- The patient had complaints of increased pelvic pressure.   She denied having any associated leaking of fluid, vaginal bleeding, cramping, and/or dysuria. A transvaginal ultrasound was completed. The patient's cervix was normal and measured 5.6 cm without funneling.  labor and PPROM precautions were reviewed. --I requested the patient return for a follow-up assessment in 1 week unless there is a clinical reason for her to return prior to that time. She is to call if she has any problems or questions prior to her next visit. Further evaluation and management will be dependent on her clinical presentation and the results of her testing. --The patient was advised to call if she has any increased vaginal discharge, vaginal bleeding, contractions, abdominal pain, back pain or any new significant symptomatology prior to her next visit. I advised her that these are signs and symptoms of cervical change and require follow-up assessment when they occur. Preeclampsia precautions were also reviewed with the patient. --The patient was also counseled to call and/or return with any concerns for decreased fetal activity. --The patient is to continue to follow with you in your office for ongoing obstetric care. --The total time spent on today's visit was 30 minutes. This included preparation for the visit (i.e. reviewing prior external notes and test results), performance of a medically appropriate history and examination, counseling, orders for medications, tests or other procedures, and coordination of care. Greater than 50% of the time was spent face-to-face with the patient. This time is exclusive of procedures performed. I answered all of  the patient's questions to her satisfaction. I asked her to call if she had any additional questions prior to her next visit. --At the conclusion of the visit, the patient appeared to have a good understanding of the issues discussed. I answered all of her questions to her satisfaction.  I asked her to call if she had any additional questions prior to her next visit. --Thank you for allowing me to participate in the care of this pleasant patient. Please don't hesitate to call me if you have any questions. Sincerely,      Delbert Arnett MD, [address] [facility]  [phone]      *All or parts of this note may have been generated using a voice recognition program. There may be typo, grammar, or Word substitution errors that have escaped my review of this note.

## 2022-09-21 NOTE — PROGRESS NOTES
Patient here for prenatal ultrasound and NST  Complaining of pelvic pressue  +FM noted   Unable to void at this time

## 2022-09-26 ENCOUNTER — ROUTINE PRENATAL (OUTPATIENT)
Dept: OBGYN CLINIC | Age: 32
End: 2022-09-26
Payer: MEDICAID

## 2022-09-26 VITALS
BODY MASS INDEX: 30.73 KG/M2 | HEART RATE: 94 BPM | WEIGHT: 168 LBS | DIASTOLIC BLOOD PRESSURE: 73 MMHG | SYSTOLIC BLOOD PRESSURE: 109 MMHG

## 2022-09-26 DIAGNOSIS — O43.199 MARGINAL INSERTION OF UMBILICAL CORD AFFECTING MANAGEMENT OF MOTHER: ICD-10-CM

## 2022-09-26 DIAGNOSIS — Z3A.32 32 WEEKS GESTATION OF PREGNANCY: ICD-10-CM

## 2022-09-26 DIAGNOSIS — O36.5930 POOR FETAL GROWTH AFFECTING MANAGEMENT OF MOTHER IN THIRD TRIMESTER, SINGLE OR UNSPECIFIED FETUS: ICD-10-CM

## 2022-09-26 DIAGNOSIS — O99.413 MATERNAL CONGENITAL CARDIAC ANOMALY AFFECTING PREGNANCY, ANTEPARTUM, THIRD TRIMESTER: ICD-10-CM

## 2022-09-26 DIAGNOSIS — N83.201 RIGHT OVARIAN CYST: ICD-10-CM

## 2022-09-26 DIAGNOSIS — I63.9 CEREBROVASCULAR ACCIDENT (CVA), UNSPECIFIED MECHANISM (HCC): ICD-10-CM

## 2022-09-26 DIAGNOSIS — D25.9 UTERINE FIBROID COMPLICATING ANTENATAL CARE, BABY NOT YET DELIVERED: ICD-10-CM

## 2022-09-26 DIAGNOSIS — D56.3 ALPHA THALASSEMIA SILENT CARRIER: Primary | ICD-10-CM

## 2022-09-26 DIAGNOSIS — O34.10 UTERINE FIBROID COMPLICATING ANTENATAL CARE, BABY NOT YET DELIVERED: ICD-10-CM

## 2022-09-26 DIAGNOSIS — Q24.9 MATERNAL CONGENITAL CARDIAC ANOMALY AFFECTING PREGNANCY, ANTEPARTUM, THIRD TRIMESTER: ICD-10-CM

## 2022-09-26 PROCEDURE — G8419 CALC BMI OUT NRM PARAM NOF/U: HCPCS | Performed by: OBSTETRICS & GYNECOLOGY

## 2022-09-26 PROCEDURE — 99212 OFFICE O/P EST SF 10 MIN: CPT | Performed by: OBSTETRICS & GYNECOLOGY

## 2022-09-26 PROCEDURE — 59025 FETAL NON-STRESS TEST: CPT | Performed by: OBSTETRICS & GYNECOLOGY

## 2022-09-26 PROCEDURE — G8427 DOCREV CUR MEDS BY ELIG CLIN: HCPCS | Performed by: OBSTETRICS & GYNECOLOGY

## 2022-09-26 PROCEDURE — 99213 OFFICE O/P EST LOW 20 MIN: CPT | Performed by: OBSTETRICS & GYNECOLOGY

## 2022-09-26 PROCEDURE — 4004F PT TOBACCO SCREEN RCVD TLK: CPT | Performed by: OBSTETRICS & GYNECOLOGY

## 2022-09-26 NOTE — PROGRESS NOTES
NON STRESS TEST INTERPRETATION    22    RE:  Suleiman Mullen   : 1990   AGE: 33 y.o.     GESTATIONAL AGE:  32w4d    DIAGNOSIS:   History of maternal stroke on therapeutic anticoagulation, poor fetal growth, maternal congenital heart anomaly    INDICATION:  Same as above    TIME ON:  1347      TIME OFF:  1450      RESULT:   REACTIVE      FHR Baseline Rate:   130 bpm    PERIODIC CHANGES:    Accelerations present, variability moderate, no decelerations noted    COMMENTS:      She is to continue having NST's every 3-4 days, and BPP with umbilical artery doppler studies once per week        Regine Jefferson MD, Jose Ayers

## 2022-09-26 NOTE — LETTER
[facility]  [address] [place] [id number]  Phone: [phone]  Fax: [phone]           Barbara Lenz MD      2022     Patient: Natividad Real   MR Number: [id number]   YOB: 1990   Date of Visit: 2022       Dear Dr. Mouna Husain: Thank you for referring Ginette Burroughs to me for evaluation/treatment. Below are the relevant portions of my assessment and plan of care. If you have questions, please do not hesitate to call me. I look forward to following Reynaldo Perea along with you. Sincerely,        Barbara Lenz MD    CC providers:  [facility] [address]  [address] [facility]       2022      Shefali Dixon, [address]     RE:  Priscila ARAUJO  : 1990   AGE: 33 y.o. This report has been created using voice recognition software. It may contain errors which are inherent in voice recognition technology. Dear Dr. Mouna Husain:      I had the pleasure of meeting with Ms. Mike Ordoñez for fetal testing. As you know, Ms. Mike Ordoñez  is a 33 y.o.  at 32w4d (LMP = [address]) who is being followed by our office for a history of a maternal stroke and poor fetal growth. Today, Ms. Mike Ordoñez reports that she feels well. She notes good fetal movement and denies any symptoms of leaking of fluid, vaginal bleeding, and/or contractions. She presented to the office today for fetal testing. A nonstress test was completed and noted to be reactive. PERTINENT PHYSICAL EXAMINATION:   /73   Pulse 94   Wt 168 lb (76.2 kg)   LMP 02/10/2022   BMI 30.73 kg/m²     Urine dipstick:   Urine sample was not provided      A fetal ultrasound assessment was performed today. A report is enclosed for your review. Assessment & Plan:  33 y.o.  at 32w4d (LMP = [address]) with:    1.   Fetal testing -- The patient presented to the office today for fetal testing secondary to a history of a maternal stroke and poor fetal growth noted on ultrasound done at the [facility] clinic. The patient did not have any concerns today. Fetal testing was reassuring and the nonstress test was reactive. She was scheduled to return in 3 days for a follow-up consultation and fetal testing. --I requested the patient return for a follow-up assessment in 3 days unless there is a clinical reason for her to return prior to that time. She is to call if she has any problems or questions prior to her next visit. Further evaluation and management will be dependent on her clinical presentation and the results of her testing. --The patient was advised to call if she has any increased vaginal discharge, vaginal bleeding, contractions, abdominal pain, back pain or any new significant symptomatology prior to her next visit. I advised her that these are signs and symptoms of cervical change and require follow-up assessment when they occur. Preeclampsia precautions were also reviewed with the patient. --The patient is to continue to follow with you in your office for ongoing obstetric care. --The total time spent on today's visit was 10 minutes. This included preparation for the visit (i.e. reviewing prior external notes and test results), performance of a medically appropriate history and examination, counseling, orders for medications, tests or other procedures, and coordination of care. Greater than 50% of the time was spent face-to-face with the patient. This time is exclusive of procedures performed. I answered all of  the patient's questions to her satisfaction. I asked her to call if she had any additional questions prior to her next visit. --At the conclusion of the visit, the patient appeared to have a good understanding of the issues discussed. I answered all of her questions to her satisfaction.  I asked her to call if she had any additional questions prior to her next visit. --Thank you for allowing me to participate in the care of this pleasant patient. Please don't hesitate to call me if you have any questions. Sincerely,      Barbara Lenz MD, [facility] [address]  [phone]      *All or parts of this note may have been generated using a voice recognition program. There may be typo, grammar, or Word substitution errors that have escaped my review of this note.

## 2022-09-26 NOTE — PROGRESS NOTES
2022      Levi Frost, [address]  [facility],  [address] [place] [address]     RE:  May ARAUJO  : 1990   AGE: 33 y.o. This report has been created using voice recognition software. It may contain errors which are inherent in voice recognition technology. Dear Dr. Isaak Soliman:      I had the pleasure of meeting with Ms. Hemanth Wood for fetal testing. As you know, Ms. Hemanth Wood  is a 33 y.o.  at 32w4d (LMP = 5 [address]) who is being followed by our office for a history of a maternal stroke and poor fetal growth. Today, Ms. Hemanth Wood reports that she feels well. She notes good fetal movement and denies any symptoms of leaking of fluid, vaginal bleeding, and/or contractions. She presented to the office today for fetal testing. A nonstress test was completed and noted to be reactive. PERTINENT PHYSICAL EXAMINATION:   /73   Pulse 94   Wt 168 lb (76.2 kg)   LMP 02/10/2022   BMI 30.73 kg/m²     Urine dipstick:   Urine sample was not provided      A fetal ultrasound assessment was performed today. A report is enclosed for your review. Assessment & Plan:  33 y.o.  at 32w4d (LMP = 9 [address]) with:    1. Fetal testing -- The patient presented to the office today for fetal testing secondary to a history of a maternal stroke and poor fetal growth noted on ultrasound done at the [facility]. The patient did not have any concerns today. Fetal testing was reassuring and the nonstress test was reactive. She was scheduled to return in 3 days for a follow-up consultation and fetal testing. --I requested the patient return for a follow-up assessment in 3 days unless there is a clinical reason for her to return prior to that time. She is to call if she has any problems or questions prior to her next visit. Further evaluation and management will be dependent on her clinical presentation and the results of her testing.      --The patient was advised to call if she has any increased vaginal discharge, vaginal bleeding, contractions, abdominal pain, back pain or any new significant symptomatology prior to her next visit. I advised her that these are signs and symptoms of cervical change and require follow-up assessment when they occur. Preeclampsia precautions were also reviewed with the patient. --The patient is to continue to follow with you in your office for ongoing obstetric care. --The total time spent on today's visit was 10 minutes. This included preparation for the visit (i.e. reviewing prior external notes and test results), performance of a medically appropriate history and examination, counseling, orders for medications, tests or other procedures, and coordination of care. Greater than 50% of the time was spent face-to-face with the patient. This time is exclusive of procedures performed. I answered all of  the patient's questions to her satisfaction. I asked her to call if she had any additional questions prior to her next visit. --At the conclusion of the visit, the patient appeared to have a good understanding of the issues discussed. I answered all of her questions to her satisfaction. I asked her to call if she had any additional questions prior to her next visit. --Thank you for allowing me to participate in the care of this pleasant patient. Please don't hesitate to call me if you have any questions. Sincerely,      Cindy Cleary MD, [facility] [place] [address]  [phone]      *All or parts of this note may have been generated using a voice recognition program. There may be typo, grammar, or Word substitution errors that have escaped my review of this note.

## 2022-09-26 NOTE — PATIENT INSTRUCTIONS
Please arrive for your scheduled appointment at least 15 minutes early with your actual insurance card+ a photo ID. Also if you need any refills ordered or have questions, it may take up 48 hours to reply. Please allow ample time for your refills. Call me when you use last refill. Thank you for your cooperation. You might be having an NST at your next appt. Please eat a large snack or breakfast before coming to office. Thank you Call your primary obstetrician with bleeding, leaking of fluid, abdominal tenderness, headache, blurry vision, epigastric pain and increased urinary frequency. If you are experiencing an emergency and need immediate help, call 911 or go to go emergency room or labor and delivery. if you are sick, not feeling well or have an infectious process going on please reschedule your appointment by calling [phone]. Also if any family members are not feeling well, please do not bring them to your appointment. We appreciate your cooperation. We are doing this in order to protect our pregnant mothers+ their babies. if you are sick, not feeling well or have an infectious process going on please reschedule your appointment by calling [phone]. Also if any family members are not feeling well, please do not bring them to your appointment. We appreciate your cooperation. We are doing this in order to protect our pregnant mothers+ their babies.

## 2022-09-30 ENCOUNTER — ROUTINE PRENATAL (OUTPATIENT)
Dept: OBGYN CLINIC | Age: 32
End: 2022-09-30
Payer: MEDICAID

## 2022-09-30 ENCOUNTER — ANCILLARY PROCEDURE (OUTPATIENT)
Dept: OBGYN CLINIC | Age: 32
End: 2022-09-30
Payer: MEDICAID

## 2022-09-30 VITALS
DIASTOLIC BLOOD PRESSURE: 76 MMHG | WEIGHT: 167 LBS | BODY MASS INDEX: 30.54 KG/M2 | HEART RATE: 97 BPM | SYSTOLIC BLOOD PRESSURE: 114 MMHG

## 2022-09-30 DIAGNOSIS — O36.5930 POOR FETAL GROWTH AFFECTING MANAGEMENT OF MOTHER IN THIRD TRIMESTER, SINGLE OR UNSPECIFIED FETUS: ICD-10-CM

## 2022-09-30 DIAGNOSIS — E53.8 LOW FOLATE: ICD-10-CM

## 2022-09-30 DIAGNOSIS — Z15.89 HETEROZYGOUS MTHFR MUTATION C677T: ICD-10-CM

## 2022-09-30 DIAGNOSIS — D56.3 ALPHA THALASSEMIA SILENT CARRIER: Primary | ICD-10-CM

## 2022-09-30 DIAGNOSIS — O99.119 PROTEIN S DEFICIENCY AFFECTING PREGNANCY (HCC): ICD-10-CM

## 2022-09-30 DIAGNOSIS — D68.59 PROTEIN S DEFICIENCY AFFECTING PREGNANCY (HCC): ICD-10-CM

## 2022-09-30 DIAGNOSIS — O43.199 MARGINAL INSERTION OF UMBILICAL CORD AFFECTING MANAGEMENT OF MOTHER: ICD-10-CM

## 2022-09-30 DIAGNOSIS — Z86.718 HISTORY OF THROMBOSIS: ICD-10-CM

## 2022-09-30 DIAGNOSIS — Z3A.33 33 WEEKS GESTATION OF PREGNANCY: ICD-10-CM

## 2022-09-30 DIAGNOSIS — O34.10 UTERINE FIBROID COMPLICATING ANTENATAL CARE, BABY NOT YET DELIVERED: ICD-10-CM

## 2022-09-30 DIAGNOSIS — D25.9 UTERINE FIBROID COMPLICATING ANTENATAL CARE, BABY NOT YET DELIVERED: ICD-10-CM

## 2022-09-30 DIAGNOSIS — R80.9 PROTEINURIA, UNSPECIFIED TYPE: ICD-10-CM

## 2022-09-30 DIAGNOSIS — E55.9 VITAMIN D DEFICIENCY: ICD-10-CM

## 2022-09-30 DIAGNOSIS — N83.201 RIGHT OVARIAN CYST: ICD-10-CM

## 2022-09-30 LAB
GLUCOSE URINE, POC: NORMAL
PROTEIN UA: NEGATIVE

## 2022-09-30 PROCEDURE — 76821 MIDDLE CEREBRAL ARTERY ECHO: CPT | Performed by: OBSTETRICS & GYNECOLOGY

## 2022-09-30 PROCEDURE — 99214 OFFICE O/P EST MOD 30 MIN: CPT | Performed by: OBSTETRICS & GYNECOLOGY

## 2022-09-30 PROCEDURE — 76820 UMBILICAL ARTERY ECHO: CPT | Performed by: OBSTETRICS & GYNECOLOGY

## 2022-09-30 PROCEDURE — 76816 OB US FOLLOW-UP PER FETUS: CPT | Performed by: OBSTETRICS & GYNECOLOGY

## 2022-09-30 PROCEDURE — G8419 CALC BMI OUT NRM PARAM NOF/U: HCPCS | Performed by: OBSTETRICS & GYNECOLOGY

## 2022-09-30 PROCEDURE — 76818 FETAL BIOPHYS PROFILE W/NST: CPT | Performed by: OBSTETRICS & GYNECOLOGY

## 2022-09-30 PROCEDURE — 4004F PT TOBACCO SCREEN RCVD TLK: CPT | Performed by: OBSTETRICS & GYNECOLOGY

## 2022-09-30 PROCEDURE — G8427 DOCREV CUR MEDS BY ELIG CLIN: HCPCS | Performed by: OBSTETRICS & GYNECOLOGY

## 2022-09-30 PROCEDURE — 81002 URINALYSIS NONAUTO W/O SCOPE: CPT | Performed by: OBSTETRICS & GYNECOLOGY

## 2022-09-30 PROCEDURE — 99213 OFFICE O/P EST LOW 20 MIN: CPT | Performed by: OBSTETRICS & GYNECOLOGY

## 2022-09-30 PROCEDURE — 76815 OB US LIMITED FETUS(S): CPT | Performed by: OBSTETRICS & GYNECOLOGY

## 2022-09-30 NOTE — PROGRESS NOTES
2022      Abril Rosado, [address]  [facility],   [facility]     RE:  James ARAUJO  : 1990   AGE: 33 y.o. This report has been created using voice recognition software. It may contain errors which are inherent in voice recognition technology. Dear Dr. Fidel Boggs:      I had the pleasure of meeting with Ms. Zuleima Cortez for a return consultation. As you know, Ms. Zuleima Cortez  is a 33 y.o.  at 33w1d (LMP = 9 [address]) who is being followed by our office for multiple medical issues. Today, Ms. Zuleima Cortez reports that she feels well. She notes good fetal movement and denies any symptoms of leaking of fluid, vaginal bleeding, and/or contractions. She had a fetal ultrasound that was notable for the following. There is a single intrauterine gestation in a cephalic presentation with a heart rate of 136 beats per minute. The placenta is posterior. The amniotic fluid index is 11.7 cm. BPP 10/10. Umbilical artery PI normal.  MCA PSV normal.  MCA PI normal.  CPR PI normal.  Right ovarian hyperechoic mass seen measuring 1.1 x 0.8 x 1.1 cm. PERTINENT PHYSICAL EXAMINATION:   /76   Pulse 97   Wt 167 lb (75.8 kg)   LMP 02/10/2022   BMI 30.54 kg/m²     Urine dipstick:   Negative for Glucose    Negative for Albumin      A fetal ultrasound assessment was performed today. A report is enclosed for your review. Assessment & Plan:  33 y.o.  at 33w1d (LMP = 9 [address]) with:    1. Pregnancy dating -- The patient's pregnancy dating was previously reviewed. The patient reports that was having monthly periods. She discontinued Depo-Provera in 2021. She reports a sure last menstrual period. Her reported LMP was 2/10/2022, UGERO 2022. The patient's first ultrasound was 1822. The crown-rump length was 9 weeks 4 days, GUERO 2022.      Given the patient had a sure LMP that agreed with the 9-week ultrasound, the recommendation was made to use an GUERO of 11/17/2022 based on her LMP. Fetal growth was appropriate for the gestational age again today. 2.  History of stroke during pregnancy  -- The patient previously reported that she initially experienced symptoms of headache and aphasia in November 2021. She did not seek medical care at that time. The patient presented to the emergency department on April 17, 2022 with complaints of a severe headache that woke her up from sleep. She had a headache 4 days prior to her evaluation in the emergency department. Two days prior to her evaluation, she had slurred speech and aphasia which lasted for approximately 20 minutes and then resolved. The patient then developed paresthesias on her right arm. A CT of the head was initially completed. The report stated, \" hypodensity within the left insular cortex with abnormal white matter hypodensity extending into the adjacent subinsular white matter and left corona radiata. Findings may be suggestive of subacute infarction. For further evaluation brain MRI is recommended. \"     An MRI, MRV, and MRA of the head with and completed. Per the MRI report from 4/18/2022, acute infarct noted in left insula/left temporal lobe/left periventricular white matter with associated thrombosis of a superior M2 branch of the left middle cerebral artery. Chronic infarcts noted in the right corona radiata and left centrum semiovale. The MRV was unremarkable. No dural sinus thrombosis was noted. The patient was diagnosed with a subacute CVA and admitted to the hospital.  She was also noted to have bacteriuria. She was cared for by her primary care physician and she had a neurology consultation. A thrombophilia evaluation was completed. She was started on a low-dose aspirin and received Lovenox for DVT prophylaxis. A maternal echocardiogram was completed on 4/19/2022.   Per the report, the study was normal.  The patient reports having a repeat echocardiogram at the [facility] and she has a small PFO or ASD. The patient remained stable and was discharged home on 4/19/2022. She was discharged home on Keflex and a low-dose aspirin. She was to follow-up with obstetrics and her primary care physician. The patient's thrombophilia evaluation was completed on 4/18/2022, her results included: Homocystine 5, protein C functional 111%, protein S antigen free 25% (), Antithrombin activity 99%, APC resistance 3.54 (normal), lupus anticoagulant negative, beta-2 glycoprotein antibody negative, cardiolipin antibody negative, prothrombin 2 gene mutation negative. A screening PARIS was completed and negative. A protein S antigen free was repeated on 4/19/2022 and again low at 27 (). Homocystine was repeated on 4/19/2022 and again normal at 7.8. Factor VIII activity was normal at 134% and factor VII activity was normal at 133%. Anticardiolipin antibody was repeated on 4/19/2022 and IgM was indeterminate at 17. Repeat screening for lupus anticoagulant was negative on 4/19/2022. A D-dimer was checked on 4/19/2022 and normal.  Patient's urine drug screen was positive for marijuana. The patient's urinalysis was abnormal.  The urine culture showed mixed jair including E. coli staph and Corynebacterium. No sensitivities were provided. The patient was seen for her initial consultation with [facility] on 5/9/2022. Counseling was provided and the recommendation was made for prophylactic anticoagulation with Lovenox, 40 mg daily. Both hematology and neurology were contacted regarding the patient and plan of care. The patient returned to the [facility] office on 5/31/2022 for a follow-up consultation. Prior to her visit, she reported to the nurse that she had recurrent strokelike symptoms including left-sided numbness and a headache. She was immediately taken to the emergency department for evaluation.   She was readmitted to the hospital on 5/31/2022 with an acute CVA. A CT of the head was repeated on 5/31/2022. Per the impression, there was left insular encephalomalacia at the site of prior infarct. It was an unremarkable CTA of the head and neck. Specifically, there was no evidence of residual thrombus in the left middle cerebral artery. The patient then had an MRI/MRV/MRA of the head without contrast.  Per the impression on the report, tiny foci of acute or early subacute infarction were seen in the right frontal lobe, with single tiny foci each within the right lateral basal ganglia, external capsule, and insular cortex. Mild stenosis was noted in the left lateral transverse sinus, which may be due to an arachnoid granulation. There is no evidence of dural venous thrombosis. She was evaluated by neurology on 6/2/2022. The recommendation was made for therapeutic anticoagulation with Lovenox. She was also evaluated by hematology. She was discharged home on 6/3/2022. The patient returned to the [facility] office at 16 weeks and 6 days for follow-up. She reported that she felt well. She still had intermittent headache symptoms that she rated as a 6 out of 10. She reported that she was tolerating the therapeutic Lovenox well. During her visit at 16 weeks 6 days, the patient requested a referral for additional evaluation at the [facility]. The patient was counseled that this would be reasonable given her complicated pregnancy. Additionally, it may be in the patient's best interest to plan for delivery at a center in which neurology and/or neurosurgery would be available in the event the patient had any complications during delivery or postpartum. The patient was agreeable with the plan. She requested a referral to the [facility] clinic for additional evaluation and management. A referral was provided. The patient's neurologist, Dr. Alejandro Rand, was also contacted regarding the patient. He agreed with the plan outlined above.      The patient is following with maternal-fetal medicine, Dr. Leilani Conrad, at the [facility] clinic. She is also following with Dr. Martha Keller from vascular surgery. She was also seen by a vascular neurologist, Dr. Sanjeev Moore, at the [facility] clinic. The patient returns the office today for follow-up and fetal testing secondary to the finding of poor fetal growth at the [facility]. She notes fetal movement. She again denies having any symptoms of leaking of fluid, vaginal bleeding, cramping, and/or contractions. She denies having any subsequent strokelike symptoms. She reports that her migraine headache symptoms have significantly improved. Counseling was previously provided to the patient. Counseling was reviewed. The patient did not have any additional questions or concerns. Again, pregnancy and the puerperium (postpartum period) are well-established risk factors for thromboembolism. Normal pregnancy is accompanied by an increase in clotting factors. The resulting hypercoagulable state likely evolved to protect women from hemorrhage in the event of a miscarriage and during childbirth. Thromboembolic disease during pregnancy and the puerperium is a significant cause of maternal morbidity and mortality. During pregnancy, the risk of venous thromboembolism increases 4-5 fold and the risk of arterial thromboembolism, myocardial infarction, and stroke increases 3-4 fold compared to the nonpregnant state. Postpartum, the risk of venous thromboembolism is 20 fold higher and the risk of arterial thromboembolism is similarly elevated compared to nonpregnant individuals. Venous events are more common during pregnancy accounting for 4 out of 5 thromboembolic events. However, the risk of death is higher following an arterial thrombosis. Approximately 80% of venous thromboembolic events are deep venous thromboses and approximately 20% of pulmonary emboli.   The most important risk factor for thrombosis during pregnancy is a history of thrombosis. The risk for a thrombotic event is further increased in women with an underlying acquired or inherited thrombophilia. Most studies have reported and equal distribution of thrombosis risk across all trimesters of pregnancy however, 2 large conflicting studies have reported a first trimester (50% prior to 15 weeks) and third trimester (60%) predominance. Additional risk factors for thrombosis during pregnancy include multifetal gestation, varicose veins, inflammatory bowel disease, urinary tract infection, diabetes, hospitalization, obesity, and maternal age greater than 28 years. Compared to the antepartum period, the thrombosis risk is 2-5 times higher during the postpartum period. This risk is highest during the first 6 weeks postpartum and declines to prepregnancy rates by 13-18 weeks postpartum. Risk factors for postpartum thrombosis include  section, medical co morbidities, obesity,  delivery, hemorrhage, fetal demise, advanced maternal age, hypertensive disorders of pregnancy, tobacco use, and infection. The patient was counseled to continue treatment with therapeutic Lovenox and a daily low-dose aspirin. She was counseled to continue to follow with the specialist at the [facility] for long-term monitoring and management. Per the consultation note with maternal-fetal medicine, the plan is for delivery at the [facility]. Per the consultation note with maternal-fetal medicine, the patient will follow-up at the [facility] at 32 weeks and 36 weeks with a growth ultrasound at that time and plan to transfer care to [facility] for delivery at 36 weeks. It is unclear if the plan is for delivery at 36 weeks versus transfer of care at 36 weeks until delivery. The patient was counseled to clarify this with her provider in [place].      Therapeutic anticoagulation should be continued throughout the pregnancy and for at least 6-8 weeks postpartum. Lovenox can be initiated 12 hours following a vaginal delivery and 24 hours following a  section (if there is no ongoing concern for bleeding). The risks and benefits of therapeutic anticoagulation in pregnancy were reviewed including the development of heparin-induced thrombocytopenia (HIT), osteopenia, bleeding, and poor fetal growth. An anesthesia consultation is also recommended in the third trimester given she is on anticoagulation. A hematology consult was recommended. The patient is following with Dr. Karyn Ogden neurology consult was recommended. A referral was provided. The patient is following with a vascular neurologist at [facility], Dr. Hang Ring  The patient was referred to the [facility] for additional maternal and fetal evaluation. She is following with Dr. Celio Wilson. The patient should avoid estrogen containing birth control postpartum. Increased fetal surveillance is also recommended. Fetal growth should be monitored serially, every 3 to 4 weeks starting at 24 to 26 weeks gestation. The Patient should monitor fetal kick counts daily starting at 28 weeks gestation. She should be scheduled for twice-weekly fetal testing starting at 32 weeks gestation. Delivery is recommended at/by 39 weeks gestation. Counseling was previously provided regarding the radiation exposure from the CT scan. Counseling was reviewed. The patient did not have any additional questions or concerns. Increased risks associated with radiation exposure during pregnancy include that of early pregnancy loss, fetal malformations, poor fetal growth, and an increase for childhood cancers (increased from 2800 to 2000). Generally, these risks are not significantly increased unless the radiation exposure exceeds 50 mGy (5 rads). The estimated radiation exposure associated with a head CT is 1-10 mGy (0.1-1 rads).      The International Commission of Radiologic Protection suggests that the radiation doses delivered in utero by imaging tests (such as those performed in the diagnosis of PE) present no measurable increased risk of fetal death or developmental abnormalities over the background incidence of these entities. The [facility] [employer] of Radiation Protection and Measurements considers the risk of radiation-associated abnormalities to be negligible, at less than 50 mGy when compared with other risks of pregnancy. Per available data, diagnostic imaging studies that expose the fetus to less than 0.05 Gray (50 mGy, 5 rads), do not appear to increase the risk for fetal anomalies, intellectual disability, growth restriction, or pregnancy loss. With exposure to more than 0.05 Gy (50 mGy, 5 rads), a definitive threshold at which the risk for, locations increases has not been determined. Evidence suggests the risks begin to increase at doses above 0.1 Gy (100 mGy, 10 rads) and especially above 0.15-0.2 Gy (150-200 mGy, 15-20 rads). 3.  Poor fetal growth -- The date fetal growth assessment at 31 weeks 1 day at the [facility] clinic. There was a 1 week lag. The composite gestational age was 31 weeks 1 day. The [facility] was measuring at the 6 percentile. Fetal growth was reevaluated at 31 weeks 6 days and appropriate for the gestational age. Surveillance was recommended by the patient's high risk provider in [facility]. We will facilitate twice-weekly fetal testing for the patient until she returns to [facility]. Counseling was previously provided to the patient. Counseling was reviewed. She did not have any additional questions or concerns. 4.  Genetic counseling -- The patient was previously counseled regarding her options for genetic screening and/or diagnostic testing. She opted for screening with NIPT. Screening was completed on 5/9/2022. Her results were low risk for aneuploidy. The reported fetal fraction was 28% the reported fetal sex was female.   The results were previously reviewed with the patient. The patient also opted for a [employer] 14 panel. Screening was completed on 2022. The results were reviewed with the patient. She is a silent carrier for alpha thalassemia. Additional counseling was provided, please see below. Screening was otherwise negative for cystic fibrosis, spinal muscular atrophy, and Fragile X. The patient was also counseled regarding the recommendation for maternal serum AFP to screen for open neural tube defects. Risks and benefits of screening were reviewed. Screening is recommended at 15 to 22 weeks gestation. Testing was ordered today. 5.  History of  X1 -- The patient's first pregnancy was delivered via  secondary to arrest of dilation and nonreassuring fetal heart tones. The placenta is posterior. She plans to have a repeat  for delivery. Again, her delivery will be scheduled at the [facility] clinic in order to facilitate additional maternal care. 6.  Subchronic hemorrhage, stable -- The ultrasound images were reviewed with patient. The subchorionic hemorrhage previously described was not seen today. At 31 weeks 6 days, a subchorionic hemorrhage was seen measuring 2.4 x 0.7 x 2 cm. The size is stable compared to prior imaging. At 22 weeks and 6 days, the subchronic hemorrhage measured 2.4 x 0.8 x 2 cm. The size is somewhat decreased compared to prior imaging. At 16 weeks 6 days, a subchronic hemorrhage was seen measuring 5.9 x 0.8 x 2.3 cm. The size is somewhat increased compared to prior imaging. Previously, at 12 weeks 4 days, the subchorionic hemorrhage measured 2.6 x 1.4 x 0.9 cm. No active bleeding was noted. The patient denied having any vaginal bleeding or cramping. She is noted to be Rh+. Counseling was previously provided to the patient. Counseling was reviewed. The patient did not have any additional questions or concerns.   A subchorionic hemorrhage can be associated with an increased risk for bleeding, infection, early pregnancy loss, poor fetal growth,  premature rupture of membranes,  labor and delivery, and stillbirth. Because of the increased risk for complications, close follow-up is recommended. Activity restrictions were again reviewed. The patient was counseled to avoid heavy lifting, prolonged standing, strenuous exercise, and sexual intercourse. The patient is scheduled for twice-weekly fetal testing. Bleeding precautions were reviewed. 7.  Uterine fibroids -- The ultrasound images were reviewed with the patient. Multiple uterine fibroids were again noted. The size and appearance are stable compared to previous imaging. The patient again denied any symptoms of fevers, chills, and/or abdominal pain. There is a lag in fetal growth per imaging done at the [facility] clinic. Counseling was previously provided to the patient. Counseling was reviewed. The patient did not have any additional questions or concerns. Fibroids are common in women of reproductive age. Most studies that monitored the growth of fibroids during pregnancy have noted that the majority of uterine fibroids have less than 10% change in volume across gestation. Most of the growth usually occurs in the first trimester. Fibroids larger than 5 cms are more likely to grow, where as smaller fibroids most likely remain stable in size. As you know, there is a higher chance of antepartum bleeding with retroplacental fibroids and a small increase in  birth. Specifically, if placentation occurs adjacent to or overlying a fibroid. Submucosal and retroplacental fibroids with volumes more than 200 ml (corresponding to 7 cm in diameter) have the highest risk of abruption. Pain is another common complication of fibroid in pregnancy. It is especially high in fibroids greater than 5 cm in diameter.   Patients with degenerating fibroids tend to have localized pain, mild leukocytosis, pyrexia and nausea. This may result from decreased perfusion in the setting of rapid growth leading to ischemia. Some studies noted increased incidence of malpresentation if the uterus has multiple fibroids. Large retroplacental fibroids that distort the uterine cavity may be associated with adverse pregnancy event including  fetal growth restriction and  labor. Overall, the risks associated with uterine fibroids in pregnancy include malpresentation, lower uterine segment obstruction, degeneration, poor fetal growth, ,  birth, and antepartum/postpartum bleeding. The size and appearance of the fibroid(s) should be reevaluated on follow-up ultrasound. A screening cervical length  was completed at 16 weeks 5 days and normal at 4.2 cm without funneling. A cervical length was repeated at 31 weeks 6 days and again normal at 5.6 cm. Fetal growth should be monitored serially, every 3 to 4 weeks beginning at 24 to 26 weeks gestation. Precautions were reviewed with the patient. 8.  Tobacco use in pregnancy, quit -- The patient previously reported she has stopped smoking Black and milds. Today, the patient reports that she is smoking occasionally. She was again stopped smoking. She was again counseled regarding the increased risks of smoking in pregnancy including poor fetal growth, placental abruption, PPROM/ birth, and fetal loss. Additional  risks were again reviewed including asthma, allergies, infection, and an association with SIDS. Various techniques for cutting back and quitting were again reviewed. She was again counseled that smoking cessation is especially important in her case given her recurrent thrombotic events. She expressed verbal understanding of this counseling. 9. THC Use --  The patient previously reported that she was smoking marijuana daily.   She reported that she was using marijuana for decreased appetite. The patient again reports that she has stopped smoking marijuana. She was congratulated and encouraged not to use marijuana during pregnancy. Counseling was previously provided to the patient. Counseling was reviewed today. The patient did not have any additional questions or concerns. She was again counseled regarding risk of neurodevelopmental issues in children exposed to [facility] during pregnancy. Additionally, marijuana use may pose risks similar to that of cigarette use including increased risk for poor fetal growth, placental bleeding, PPROM/ delivery, and stillbirth. She was again counseled not to use THC while pregnant. Random urine drug screens should be monitored during pregnancy. 10.  First/second trimester alcohol exposure -- The patient previously reported that she drinks wine occasionally. She reports only taking sips of wine. Today, the patient again reports that she has stopped drinking alcohol. Counseling was previously provided to the patient. Counseling was reviewed. The patient did not have any additional questions or concerns. The patient was again counseled that a safe level of alcohol consumption during pregnancy has not been determined. Determination of the impact of alcohol use during pregnancy on fetal development can be challenging because of variation in maternal alcohol clearance rates, fetal sensitivity, genetic susceptibility, maternal drinking patterns (binge drinking versus daily consumption), and confounders such a substance abuse. The patient was counseled that alcohol is a teratogen that can impact fetal growth and development at all stages during pregnancy. Currently, national guidelines and multiple medical societies recommend abstinence during pregnancy.      In one large epidemiologic study, an increased rate of stillbirth was noted across all categories of alcohol intake, even after adjusting for confounders (eg, smoking, pre-pregnancy weight). There is also an increased risk for structural anomalies (craniofacial, cardiac), poor fetal growth, decreased IQ, and neurodevelopmental issues in childhood. Factors such as older age, increased parity, and  and  ethnicities are associated with an increased risk for FAS. Secondary to the increased risk for poor growth, fetal growth should be monitored serially, every 3 to 4 weeks starting at 24 to 26 weeks gestation. A fetal growth lag was noted at 31 weeks gestation by the [facility] [facility] clinic. Secondary to the increased risk for congenital heart anomalies, a fetal echocardiogram is recommended at 22 to 24 weeks gestation. A fetal echocardiogram was completed on 2022 and reported as normal.  Limitations of fetal echocardiography were reviewed. 11.  Right ovarian mass/cyst -- The ultrasound images were reviewed with the patient. The right ovarian mass previously described was again seen today. Today, at 33 weeks 1 day, the hyperechoic area measured 1.1 x 0.8 x 1.1 cm. The size and appearance are stable. At 31 weeks 6 days, the hyperechoic mass measured 1.3 x 1 x 1.5 cm. The size and appearance are stable compared to prior imaging. At 12 weeks 4 days, A hyperechoic mass was seen in the right ovary measuring 1.2 x 1 x 0.9 cm. No fluid was seen surrounding the area. The patient was again counseled that this may represent a hemorrhagic cyst versus teratoma versus a calcification, although no shadowing was seen. The size and appearance of the ovarian mass will be reevaluated on follow-up ultrasound. The adnexa will be reevaluated on follow-up exam.  The ovaries should be evaluated during the patient's . Otherwise, postpartum follow-up is recommended.      12.   Nausea and vomiting of pregnancy, improved -- The patients previously reported having daily symptoms of nausea and decreased appetite. She was having occasional emesis. The patient reports that her symptoms of nausea and vomiting have significantly improved. She has gained 4 pounds since her last visit to our office. She has not gained any weight since her visit at 12 weeks. Today, the patient again reports that her symptoms are stable. She denied having any signs or symptoms of dehydration. The patient was again encouraged to eat small amounts of food every 2-3 hours during the day. She was also encouraged to stay well-hydrated. A baseline nutrition panel (CBC, CMP, magnesium, ferritin, folate, vitamin B12, vitamin D 25 [place]) was recommended. Baseline testing was completed on 5/13/2022, her results included: A CBC was ordered but not done by the lab. A CBC was completed on 5/31/22, H/H 11.9/35.8, MCV 88.6, ,000. The remainder of the test results are from 5/13/2022 and included: Potassium 3.7, creatinine 0.7, calcium 9, ALT 7, AST 13, magnesium 2, uric acid 3, , ferritin 67, folate 19.8, vitamin B12 423, vitamin D 9, TSH 0.876, free T4 1.16, free T3 3, TPO negative, antithyroglobulin antibody negative, urinalysis negative, urine culture mixed, urine protein creatinine ratio 0.1, beta-2 lipoprotein antibody negative, anticardiolipin antibody negative, protein S antigen free, 28 (), protein S antigen total 64 (%). --Additional recommendations are below. The patient was counseled to continue taking vitamin B6, 25 mg every 8 hours. She was counseled to call and/or return with worsening symptoms. Again, with persistent/worsening symptoms, I would recommend the addition of Pepcid, 20 mg twice daily and or Zofran. 13.  History of bacteruria -- The patient's hospital admission in April was reviewed. Her urinalysis was positive for Nitrites, leukocyte esterase, and bacteria. She was treated with Keflex. Her urine culture showed E.  Coli and mixed gram-positive organisms including staph and Corynebacterium. She had a repeat urine culture on 5/5/2022 that was negative. She denied any signs or symptoms of recurrent infection. Precautions were reviewed. A repeat urine culture was completed on 5/13/2022 and showed less than 10,000 CFU per mL of mixed gram-positive organisms. A urine culture was repeated on 6/17/2022 and noted to be mixed. A repeat urine culture was ordered on 9/21/2022. 14.  Anticardiolipin antibody IgM, indeterminate -- Antiphospholipid antibody screening was done secondary to the patient's CVA diagnosed in April 2022. Initial screening completed on 4/18/2022 as part of the thrombophilia panel was negative. Screening was repeated on 4/19/2022. Her anticardiolipin IgM antibody was indeterminate at 17 on 4/19/2022. Screening for lupus anticoagulant and beta-2 glycoprotein antibody was negative. The patient was counseled that this may be a false elevation, generally, the titers have to be greater than 20 for a true positive. The recommendation was made for the patient to continue taking a low dose aspirin, 81 mg daily. She should continue this for the remainder of pregnancy and 6 to 8 weeks postpartum. Repeat testing was completed on 5/13/2022 and negative. The results were reviewed with the patient. Testing was repeated on 6/17/2022. Testing for LAC, beta-2 glycoprotein, and anticardiolipin antibody was again negative. The patient was again counseled to continue to follow with hematology for long-term monitoring and management. 15.  Protein S deficiency -- The patient's labs were reviewed. The patient initially had a thrombophilia panel on 4/18/2022. Her protein S, antigen, free was 25% (). Testing was repeated on 4/19/2022. Her protein S, antigen, free at that time was 27% (). The patient would have been 9 weeks at the time of the testing. She was being evaluated for a recent thrombotic stroke. Testing is not considered reliable during acute thrombosis or pregnancy. Protein S decreases during pregnancy. However, protein S is generally considered deficient if <30% in the second trimester and/or <24% in the third trimester. Again, the patient had testing in the first trimester and her protein S levels were significantly decreased. It is unclear at this time if the patient truly has a protein S deficiency. This will not be able to be determined until the patient is postpartum. The patient was counseled that a protein S deficiency is rare, and seen in 0.03-0.13% of the population. It is considered a lower risk thrombophilia. If someone does have a deficiency, then the risk for a thrombosis during pregnancy is estimated to be 0.1% (w/negative personal history). It is 0-22% with a personal history of thrombosis     Given the patient's recurrent, the recommendation was for therapeutic anticoagulation with Lovenox. Please see above. Testing was repeated on 6/17/2022. The protein S antigen, free was 25% (%), and protein S activity was 35% (%). Her protein S levels are again low, even for pregnancy. A hematology consultation was previously recommended. The patient is following with Dr. Flaquito Zendejas. Repeat testing is recommended at 6 to 8 weeks postpartum. She should not be on an oral contraceptive pill at the time of repeat testing. The patient should avoid birth control containing estrogen. The patient was counseled to continue taking a daily low-dose aspirin. She should continue a daily low-dose aspirin for the remainder of pregnancy and 6 to 8 weeks postpartum. Given her history of a thrombotic stroke, the recommendation was also made for anticoagulation with prophylactic Lovenox. The plan was reviewed with neurology and hematology, see above. A prescription was provided following patient's consultation.   She was scheduled to return for injection teaching. Increased fetal surveillance is recommended. Fetal growth should be monitored serially, every 3 to 4 weeks starting at 24 to 26 weeks gestation. The patient should monitor fetal kick counts daily starting at 28 weeks gestation. She should be scheduled for twice-weekly fetal testing starting at 32 weeks gestation. Delivery is recommended at/by 39 weeks gestation. I would defer delivery timing to the providers at the [facility]. 12. Vaccination in pregnancy -- The patient was  previously counseled regarding the recommendations for vaccination in pregnancy. Counseling was reviewed. The patient did not have any additional questions or concerns. The patient was counseled regarding the recommendations for the Tdap vaccination in pregnancy. Risks and benefits were discussed. The vaccination is typically administered between 27 and 36 weeks gestation and recommended to confer protection against whooping cough to the . The patient plans to discuss this with her primary provider at her next visit. The patient was also counseled that her partner in any individuals who will be in close contact with the  should also be vaccinated for whooping cough. The patient was counseled regarding recommendation for the flu vaccination in pregnancy for both maternal and infant safety. Risks and benefits were reviewed. Counseling was provided regarding the recommendation for the Covid vaccination in pregnancy. I advised the patient that the [employer] of Obstetrics and Gynecology and The Society for Maternal Fetal Medicine recommend that all pregnant women be vaccinated for COVID-19. \"Data have shown that COVID-19 infection puts pregnant people at increased risk of severe complications and even death; yet only about 22% of pregnant individuals have received 1 more doses of COVID-19 vaccine according to the [employer] for Disease Control and Prevention. \"     \" Recent data have shown that more than 95% of those were hospitalized and/or dying from COVID-19 are those who have remained unvaccinated. Pregnant individuals who have decided to wait until after delivery to be vaccinated may be inadvertently exposing themselves to an increased risk of severe illness or death. \"     \" COVID-19 vaccination is the best testing to reduce maternal and fetal complications of [id number] infection among pregnant people,\" said Neel Chu MD, president of the Society for Maternal Fetal Medicine, sub-specialists. The patient was counseled that in the event she opts not to have the Covid vaccination, she should alert her provider immediately if she test positive for Covid. Pregnant women are on the priority list for treatment with monoclonal antibody. This intervention has been shown to decrease the risk for hospitalization and complications related to Covid in pregnancy. The patient expressed verbal understanding of this counseling. 17.  Vitamin D deficiency -- The patient's vitamin D was deficient at 9  --Recommend vitamin D3 2000 IU daily  --Monitor nutrition panel q4-6 weeks (CBC, CMP, magnesium, ferritin, folate, vitamin B12, vitamin D25OH)     --Repeat testing was completed on 6/17/2022, her results included: H/H 11.2/33.7, MCV 87.1, platelet count 443,963, potassium 3.6, creatinine 0.7 calcium 7.2, ALT 15, AST 17, magnesium 1.6, ferritin 70, folate 11.2, vitamin B12 428, vitamin D 12, , uric acid 3.7, TSH 0.507, free T4 0.97, free T3 2.7, TPO negative, anticardiolipin antibody negative, beta-2 glycoprotein antibody negative, protein S antigen free 25%, hemoglobin A1c 4.9%, homocystine 6.6, protein S activity 35%, rheumatoid factor negative, C3 110, C4 23, [place] negative, LAC negative, maternal serum AFP 1.04 MOM, urinalysis negative, urine culture mixed, urine protein creatinine ratio 0.3, urinalysis negative for protein.      -- The patient's vitamin D is still deficient but improving. She was counseled to continue her vitamin D supplement. --Repeat testing ordered on 9/21/2022  --Follow up with PCP for long term monitoring and management     18. 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 She was noted to be heterozygote for the MTHFR c.665C>T variant (previously designated as C677T). Counseling was previously provided  regarding the implications and management of this gene mutation in pregnancy. Counseling was reviewed. The patient did not have any additional questions or concerns. She was counseled that this gene mutation affects folic acid metabolism. It is fairly common and found in 20-30% of the population. It is generally only a problem if homocysteine levels are elevated. In the past, this gene mutation was thought to be associated with increased obstetric risks including thrombosis, early recurrent pregnancy loss, placental abruption, hypertensive disorders of pregnancy, poor fetal growth, and fetal loss. More recent studies did not report strong associations with these risks. Because this genetic mutation affects folic acid metabolism, there is an increased risk for folic acid deficiency and other nutritional deficiencies in women with this condition. There is also an increased risk for having a child with an open neural tube defect in women with this mutation, especially if they're homozygous for the C677T mutation. Presently, this condition is not thought to be clinically significant. Generally, additional vitamin supplementation is recommended with folic acid or methyl folate, vitamin B6, and vitamin B12. The patient was counseled to take a low-dose aspirin. Fetal growth should be followed serially. She was counseled that there is a 50% chance that she will pass this mutation off to her child(sandra).   She should relay this information to the pediatrician who cares for her child(sandra). She was also encouraged to review this diagnosis with her PCP. Because of this history, a baseline nutrition panel and homocysteine level were recommended. She was provided with orders for testing. 23.  Silent carrier for alpha thalassemia -- The patient's records were previously reviewed. She is noted to be a silent carrier for alpha thalassemia. Counseling was previously provided to the patient. Counseling was reviewed. The patient did not have any additional questions or concerns. --Alpha thalassemia minima is another term for this condition. This condition results from the loss of a single alpha-chain gene (ie a-/aa). Blood tests are usually normal, though the red cells may be small, leading to a suspicion that the patient is a silent carrier. The only way to confirm a silent carrier is by DNA studies. This is usually a benign carrier state. Affected individuals are not typically anemic and their hemoglobin analysis is normal. These conditions may remain undiagnosed, or they may be detected incidentally on a routine complete blood count during evaluation of an unrelated condition or in the setting of preconception testing and counseling. The patient's partner has not had testing for this condition. Carrier screening is recommended. The patient again plans to discuss this with her partner. If the patient's partner is not a carrier for alpha thalassemia, then there is a 50% chance that her child/children will also be a carrier for alpha thalassemia minima. If her partner is also a carrier of alpha thalassemia, there is an increased risk for having an affected child. Without having her partner tested, definitive counseling cannot be provided. This information should be relayed to pediatrics. I would defer any additional  evaluation to pediatrics.      20.   Migraine headaches -- The patient previously reported having continued headache symptoms after discharge from the hospital in June. The headaches are a 6 out of 10 at the worst.  She reported having daily headaches. She has been using tylenol with some relief. She reports having associated symptoms of photosensitivity and phono sensitivity. Today, the patient again reports that her headache symptoms have significantly improved. The patient was again counseled that migraine headaches can improve during pregnancy. However, in some patients symptoms are exacerbated. She was counseled regarding the use of magnesium oxide 400 mg twice daily and riboflavin 100 mg daily in reducing the frequency and intensity of migraine headaches. Prescriptions were provided. Precautions were reviewed, and she was counseled that if she develops the worst headache of her life or a headache with neurological deficits, to present immediately for evaluation. She expressed verbal understanding of this counseling. Because of the patient's headache symptoms, a baseline nutrition panel and thyroid function testing was recommended. Baseline testing was completed on 5/13/2022. The results are summarized above. 21.  Eccentric umbilical cord insertion into the placenta --  Today's ultrasound results were reviewed with the patient. The umbilical cord inserts into the placenta 2 to 3 cm from the edge. Counseling was provided to the patient. An eccentric cord insertion is diagnosed when the umbilical cord inserts into the placenta within 2-3 cm of the edge of the placenta. This is usually a benign finding, however, there can be an increased risk for poor fetal growth. Fetal growth should be monitored serially. 22.  Abnormal urine protein creatinine ratio -- The patient's labs from 6/17/2022 were reviewed. Her urine protein creatinine ratio was abnormally elevated at 0.3. Her urine culture was mixed. Her urinalysis was negative for protein.   Secondary to the conflicting results, a 80-BYNY urine collection was recommended. Nursing had previously contacted the patient regarding the instructions for testing. The patient's urine dip was negative for protein today. Her blood pressure was normal at 114     Repeat testing ordered on 2022. 21.  Maternal ASD or PFO -- The patient reported that a small hole was found on the top part of her heart on follow-up imaging. She is unsure of her exact diagnosis. She is following at the [facility]. She had a fetal echocardiogram that was normal on 2022. The limitations of fetal echocardiography were reviewed. The patient was counseled to relay this history to the pediatrician who cares for her child/children. 24.  Poor maternal weight gain -- The patient previously reported that she is 5'2\" tall and weighed 161 pounds at 12 weeks gestation. She weighed 167 pounds today. She has gained 6 pounds. There is concern for a fetal growth lag. The patient was counseled that poor maternal weight gain or low maternal weight can be associated with an increased risk for complications such as poor fetal growth,  delivery, and nutritional deficiencies. A baseline nutrition panel was ordered on 2022. The patient was encouraged to complete testing at her earliest convenience. .  Fetal growth will be reassessed in 2-3 weeks. 25.  Pelvic pressure, stable -- The patient previously had complaints of increased pelvic pressure. She denied having any associated leaking of fluid, vaginal bleeding, cramping, and/or dysuria. A transvaginal ultrasound was completed. The patient's cervix was normal and measured 5.6 cm without funneling at 31 weeks 6 days. Today, the patient reports her symptoms are stable.  labor and PPROM precautions were reviewed. --The patient will continue to follow with maternal-fetal medicine for twice-weekly fetal testing.   --The patient has a follow-up appointment at the [facility] clinic on [id number]  [phone]      *All or parts of this note may have been generated using a voice recognition program. There may be typo, grammar, or Word substitution errors that have escaped my review of this note.

## 2022-09-30 NOTE — LETTER
[facility]  [address] [place] [id number]  Phone: [phone]  Fax: [phone]           Carmencita Almonte MD      2022    Patient: Lev Warren   MR Number: [id number]   YOB: 1990   Date of Visit: 2022       Dear Dr. Fidel Boggs: Thank you for referring Anthony Nieves to me for evaluation/treatment. Below are the relevant portions of my assessment and plan of care. If you have questions, please do not hesitate to call me. I look forward to following Mal Every along with you. Sincerely,        Carmencita Almonte MD    CC providers:  MD GUILLAUME [facility] [address] [id number]  [employer]       2022      Abril [facility] [id number]  [facility]   [facility]     RE:  James ARAUJO  : 1990   AGE: 33 y.o. This report has been created using voice recognition software. It may contain errors which are inherent in voice recognition technology. Dear Dr. Fidel Boggs:      I had the pleasure of meeting with Ms. Zuleima Cortez for a return consultation. As you know, Ms. Zuleima Cortez  is a 33 y.o.  at 33w1d (LMP = 9 [address]) who is being followed by our office for multiple medical issues. Today, Ms. Zuleima Cortez reports that she feels well. She notes good fetal movement and denies any symptoms of leaking of fluid, vaginal bleeding, and/or contractions. She had a fetal ultrasound that was notable for the following. There is a single intrauterine gestation in a cephalic presentation with a heart rate of 136 beats per minute. The placenta is posterior. The amniotic fluid index is 11.7 cm. BPP 10/10. Umbilical artery PI normal.  MCA PSV normal.  MCA PI normal.  CPR PI normal.  Right ovarian hyperechoic mass seen measuring 1.1 x 0.8 x 1.1 cm.       PERTINENT PHYSICAL EXAMINATION:   /76   Pulse 97   Wt 167 lb (75.8 kg)   LMP 02/10/2022   BMI 30.54 kg/m²     Urine dipstick:   Negative for Glucose    Negative for Albumin      A fetal ultrasound assessment was performed today. A report is enclosed for your review. Assessment & Plan:  33 y.o.  at 33w1d (LMP = 9 [address]) with:    1. Pregnancy dating -- The patient's pregnancy dating was previously reviewed. The patient reports that was having monthly periods. She discontinued Depo-Provera in 2021. She reports a sure last menstrual period. Her reported LMP was 2/10/2022, GUERO 2022. The patient's first ultrasound was 1822. The crown-rump length was 9 weeks 4 days, GUERO 2022. Given the patient had a sure LMP that agreed with the 9-week ultrasound, the recommendation was made to use an GUERO of 2022 based on her LMP. Fetal growth was appropriate for the gestational age again today. 2.  History of stroke during pregnancy  -- The patient previously reported that she initially experienced symptoms of headache and aphasia in 2021. She did not seek medical care at that time. The patient presented to the emergency department on 2022 with complaints of a severe headache that woke her up from sleep. She had a headache 4 days prior to her evaluation in the emergency department. Two days prior to her evaluation, she had slurred speech and aphasia which lasted for approximately 20 minutes and then resolved. The patient then developed paresthesias on her right arm. A CT of the head was initially completed. The report stated, \" hypodensity within the left insular cortex with abnormal white matter hypodensity extending into the adjacent subinsular white matter and left corona radiata. Findings may be suggestive of subacute infarction. For further evaluation brain MRI is recommended. \"     An MRI, MRV, and MRA of the head with and completed.   Per the MRI report from 2022, acute infarct noted in left insula/left temporal lobe/left periventricular white matter with associated thrombosis of a superior M2 branch of the left middle cerebral artery. Chronic infarcts noted in the right corona radiata and left centrum semiovale. The MRV was unremarkable. No dural sinus thrombosis was noted. The patient was diagnosed with a subacute CVA and admitted to the hospital.  She was also noted to have bacteriuria. She was cared for by her primary care physician and she had a neurology consultation. A thrombophilia evaluation was completed. She was started on a low-dose aspirin and received Lovenox for DVT prophylaxis. A maternal echocardiogram was completed on 4/19/2022. Per the report, the study was normal.  The patient reports having a repeat echocardiogram at the [facility] and she has a small PFO or ASD. The patient remained stable and was discharged home on 4/19/2022. She was discharged home on Keflex and a low-dose aspirin. She was to follow-up with obstetrics and her primary care physician. The patient's thrombophilia evaluation was completed on 4/18/2022, her results included: Homocystine 5, protein C functional 111%, protein S antigen free 25% (), Antithrombin activity 99%, APC resistance 3.54 (normal), lupus anticoagulant negative, beta-2 glycoprotein antibody negative, cardiolipin antibody negative, prothrombin 2 gene mutation negative. A screening PARIS was completed and negative. A protein S antigen free was repeated on 4/19/2022 and again low at 27 (). Homocystine was repeated on 4/19/2022 and again normal at 7.8. Factor VIII activity was normal at 134% and factor VII activity was normal at 133%. Anticardiolipin antibody was repeated on 4/19/2022 and IgM was indeterminate at 17. Repeat screening for lupus anticoagulant was negative on 4/19/2022. A D-dimer was checked on 4/19/2022 and normal.  Patient's urine drug screen was positive for marijuana.   The patient's urinalysis was abnormal.  The urine culture showed mixed jair including E. coli staph and Corynebacterium. No sensitivities were provided. The patient was seen for her initial consultation with [facility] on 5/9/2022. Counseling was provided and the recommendation was made for prophylactic anticoagulation with Lovenox, 40 mg daily. Both hematology and neurology were contacted regarding the patient and plan of care. The patient returned to the [facility] office on 5/31/2022 for a follow-up consultation. Prior to her visit, she reported to the nurse that she had recurrent strokelike symptoms including left-sided numbness and a headache. She was immediately taken to the emergency department for evaluation. She was readmitted to the hospital on 5/31/2022 with an acute CVA. A CT of the head was repeated on 5/31/2022. Per the impression, there was left insular encephalomalacia at the site of prior infarct. It was an unremarkable CTA of the head and neck. Specifically, there was no evidence of residual thrombus in the left middle cerebral artery. The patient then had an MRI/MRV/MRA of the head without contrast.  Per the impression on the report, tiny foci of acute or early subacute infarction were seen in the right frontal lobe, with single tiny foci each within the right lateral basal ganglia, external capsule, and insular cortex. Mild stenosis was noted in the left lateral transverse sinus, which may be due to an arachnoid granulation. There is no evidence of dural venous thrombosis. She was evaluated by neurology on 6/2/2022. The recommendation was made for therapeutic anticoagulation with Lovenox. She was also evaluated by hematology. She was discharged home on 6/3/2022. The patient returned to the [facility] office at 16 weeks and 6 days for follow-up. She reported that she felt well. She still had intermittent headache symptoms that she rated as a 6 out of 10. She reported that she was tolerating the therapeutic Lovenox well. During her visit at 16 weeks 6 days, the patient requested a referral for additional evaluation at the [facility]. The patient was counseled that this would be reasonable given her complicated pregnancy. Additionally, it may be in the patient's best interest to plan for delivery at a center in which neurology and/or neurosurgery would be available in the event the patient had any complications during delivery or postpartum. The patient was agreeable with the plan. She requested a referral to the [facility] for additional evaluation and management. A referral was provided. The patient's neurologist, Dr. Dann Quiñones, was also contacted regarding the patient. He agreed with the plan outlined above. The patient is following with maternal-fetal medicine, Dr. Anne Marie Zazueta, at the [facility]. She is also following with Dr. Ciro Nance from vascular surgery. She was also seen by a vascular neurologist, Dr. Rosalina Hillman, at the [facility]. The patient returns the office today for follow-up and fetal testing secondary to the finding of poor fetal growth at the [facility]. She notes fetal movement. She again denies having any symptoms of leaking of fluid, vaginal bleeding, cramping, and/or contractions. She denies having any subsequent strokelike symptoms. She reports that her migraine headache symptoms have significantly improved. Counseling was previously provided to the patient. Counseling was reviewed. The patient did not have any additional questions or concerns. Again, pregnancy and the puerperium (postpartum period) are well-established risk factors for thromboembolism. Normal pregnancy is accompanied by an increase in clotting factors. The resulting hypercoagulable state likely evolved to protect women from hemorrhage in the event of a miscarriage and during childbirth.   Thromboembolic disease during pregnancy and the puerperium is a significant cause of maternal morbidity and mortality. During pregnancy, the risk of venous thromboembolism increases 4-5 fold and the risk of arterial thromboembolism, myocardial infarction, and stroke increases 3-4 fold compared to the nonpregnant state. Postpartum, the risk of venous thromboembolism is 20 fold higher and the risk of arterial thromboembolism is similarly elevated compared to nonpregnant individuals. Venous events are more common during pregnancy accounting for 4 out of 5 thromboembolic events. However, the risk of death is higher following an arterial thrombosis. Approximately 80% of venous thromboembolic events are deep venous thromboses and approximately 20% of pulmonary emboli. The most important risk factor for thrombosis during pregnancy is a history of thrombosis. The risk for a thrombotic event is further increased in women with an underlying acquired or inherited thrombophilia. Most studies have reported and equal distribution of thrombosis risk across all trimesters of pregnancy however, 2 large conflicting studies have reported a first trimester (50% prior to 15 weeks) and third trimester (60%) predominance. Additional risk factors for thrombosis during pregnancy include multifetal gestation, varicose veins, inflammatory bowel disease, urinary tract infection, diabetes, hospitalization, obesity, and maternal age greater than 28 years. Compared to the antepartum period, the thrombosis risk is 2-5 times higher during the postpartum period. This risk is highest during the first 6 weeks postpartum and declines to prepregnancy rates by 13-18 weeks postpartum. Risk factors for postpartum thrombosis include  section, medical co morbidities, obesity,  delivery, hemorrhage, fetal demise, advanced maternal age, hypertensive disorders of pregnancy, tobacco use, and infection. The patient was counseled to continue treatment with therapeutic Lovenox and a daily low-dose aspirin.   She was counseled to continue to follow with the specialist at the [facility] for long-term monitoring and management. Per the consultation note with maternal-fetal medicine, the plan is for delivery at the [facility]. Per the consultation note with maternal-fetal medicine, the patient will follow-up at the [facility] at 32 weeks and 36 weeks with a growth ultrasound at that time and plan to transfer care to [facility] for delivery at 36 weeks. It is unclear if the plan is for delivery at 36 weeks versus transfer of care at 36 weeks until delivery. The patient was counseled to clarify this with her provider in [place]. Therapeutic anticoagulation should be continued throughout the pregnancy and for at least 6-8 weeks postpartum. Lovenox can be initiated 12 hours following a vaginal delivery and 24 hours following a  section (if there is no ongoing concern for bleeding). The risks and benefits of therapeutic anticoagulation in pregnancy were reviewed including the development of heparin-induced thrombocytopenia (HIT), osteopenia, bleeding, and poor fetal growth.  An anesthesia consultation is also recommended in the third trimester given she is on anticoagulation.  A hematology consult was recommended. The patient is following with Dr. Carlos Beauchamp neurology consult was recommended. A referral was provided. The patient is following with a vascular neurologist at the [facility], Dr. Ilda Gonzalez The patient was referred to the [facility] for additional maternal and fetal evaluation. She is following with Dr. Evelyne Baumgarten.  The patient should avoid estrogen containing birth control postpartum. Increased fetal surveillance is also recommended. Fetal growth should be monitored serially, every 3 to 4 weeks starting at 24 to 26 weeks gestation. The Patient should monitor fetal kick counts daily starting at 28 weeks gestation.   She should be scheduled for twice-weekly fetal testing starting at 32 weeks gestation. Delivery is recommended at/by 39 weeks gestation. Counseling was previously provided regarding the radiation exposure from the CT scan. Counseling was reviewed. The patient did not have any additional questions or concerns. Increased risks associated with radiation exposure during pregnancy include that of early pregnancy loss, fetal malformations, poor fetal growth, and an increase for childhood cancers (increased from 1/2800 to 1/2000). Generally, these risks are not significantly increased unless the radiation exposure exceeds 50 mGy (5 rads). The estimated radiation exposure associated with a head CT is 1-10 mGy (0.1-1 rads). The International Commission of Radiologic Protection suggests that the radiation doses delivered in utero by imaging tests (such as those performed in the diagnosis of PE) present no measurable increased risk of fetal death or developmental abnormalities over the background incidence of these entities. The [facility] [employer] of Radiation Protection and Measurements considers the risk of radiation-associated abnormalities to be negligible, at less than 50 mGy when compared with other risks of pregnancy. Per available data, diagnostic imaging studies that expose the fetus to less than 0.05 Gray (50 mGy, 5 rads), do not appear to increase the risk for fetal anomalies, intellectual disability, growth restriction, or pregnancy loss. With exposure to more than 0.05 Gy (50 mGy, 5 rads), a definitive threshold at which the risk for, locations increases has not been determined. Evidence suggests the risks begin to increase at doses above 0.1 Gy (100 mGy, 10 rads) and especially above 0.15-0.2 Gy (150-200 mGy, 15-20 rads). 3.  Poor fetal growth -- The date fetal growth assessment at 31 weeks 1 day at the [facility]. There was a 1 week lag. The composite gestational age was 31 weeks 1 day.   The [facility] was measuring at the 6 percentile. Fetal growth was reevaluated at 31 weeks 6 days and appropriate for the gestational age. Surveillance was recommended by the patient's high [facility]. We will facilitate twice-weekly fetal testing for the patient until she [facility]. Counseling was previously provided to the patient. Counseling was reviewed. She did not have any additional questions or concerns. 4.  Genetic counseling -- The patient was previously counseled regarding her options for genetic screening and/or diagnostic testing. She opted for screening with NIPT. Screening was completed on 2022. Her results were low risk for aneuploidy. The reported fetal fraction was 28% the reported fetal sex was female. The results were previously reviewed with the patient. The patient also opted for a Horizon 14 panel. Screening was completed on 2022. The results were reviewed with the patient. She is a silent carrier for alpha thalassemia. Additional counseling was provided, please see below. Screening was otherwise negative for cystic fibrosis, spinal muscular atrophy, and Fragile X. The patient was also counseled regarding the recommendation for maternal serum AFP to screen for open neural tube defects. Risks and benefits of screening were reviewed. Screening is recommended at 15 to 22 weeks gestation. Testing was ordered today. 5.  History of  X1 -- The patient's first pregnancy was delivered via  secondary to arrest of dilation and nonreassuring fetal heart tones. The placenta is posterior. She plans to have a repeat  for delivery. Again, her delivery will be scheduled [facility] in order to facilitate additional maternal care. 6.  Subchronic hemorrhage, stable -- The ultrasound images were reviewed with patient. The subchorionic hemorrhage previously described was not seen today.     At 31 weeks 6 days, a subchorionic hemorrhage was seen measuring 2.4 x 0.7 x 2 cm. The size is stable compared to prior imaging. At 22 weeks and 6 days, the subchronic hemorrhage measured 2.4 x 0.8 x 2 cm. The size is somewhat decreased compared to prior imaging. At 16 weeks 6 days, a subchronic hemorrhage was seen measuring 5.9 x 0.8 x 2.3 cm. The size is somewhat increased compared to prior imaging. Previously, at 12 weeks 4 days, the subchorionic hemorrhage measured 2.6 x 1.4 x 0.9 cm. No active bleeding was noted. The patient denied having any vaginal bleeding or cramping. She is noted to be Rh+. Counseling was previously provided to the patient. Counseling was reviewed. The patient did not have any additional questions or concerns. A subchorionic hemorrhage can be associated with an increased risk for bleeding, infection, early pregnancy loss, poor fetal growth,  premature rupture of membranes,  labor and delivery, and stillbirth. Because of the increased risk for complications, close follow-up is recommended. Activity restrictions were again reviewed. The patient was counseled to avoid heavy lifting, prolonged standing, strenuous exercise, and sexual intercourse. The patient is scheduled for twice-weekly fetal testing. Bleeding precautions were reviewed. 7.  Uterine fibroids -- The ultrasound images were reviewed with the patient. Multiple uterine fibroids were again noted. The size and appearance are stable compared to previous imaging. The patient again denied any symptoms of fevers, chills, and/or abdominal pain. There is a lag in fetal growth per imaging done at the [facility]. Counseling was previously provided to the patient. Counseling was reviewed. The patient did not have any additional questions or concerns. Fibroids are common in women of reproductive age.   Most studies that monitored the growth of fibroids during pregnancy have noted that the majority of uterine fibroids have less than 10% change in volume across gestation. Most of the growth usually occurs in the first trimester. Fibroids larger than 5 cms are more likely to grow, where as smaller fibroids most likely remain stable in size. As you know, there is a higher chance of antepartum bleeding with retroplacental fibroids and a small increase in  birth. Specifically, if placentation occurs adjacent to or overlying a fibroid. Submucosal and retroplacental fibroids with volumes more than 200 ml (corresponding to 7 cm in diameter) have the highest risk of abruption. Pain is another common complication of fibroid in pregnancy. It is especially high in fibroids greater than 5 cm in diameter. Patients with degenerating fibroids tend to have localized pain, mild leukocytosis, pyrexia and nausea. This may result from decreased perfusion in the setting of rapid growth leading to ischemia. Some studies noted increased incidence of malpresentation if the uterus has multiple fibroids. Large retroplacental fibroids that distort the uterine cavity may be associated with adverse pregnancy event including  fetal growth restriction and  labor. Overall, the risks associated with uterine fibroids in pregnancy include malpresentation, lower uterine segment obstruction, degeneration, poor fetal growth, ,  birth, and antepartum/postpartum bleeding. The size and appearance of the fibroid(s) should be reevaluated on follow-up ultrasound. A screening cervical length  was completed at 16 weeks 5 days and normal at 4.2 cm without funneling. A cervical length was repeated at 31 weeks 6 days and again normal at 5.6 cm. Fetal growth should be monitored serially, every 3 to 4 weeks beginning at 24 to 26 weeks gestation. Precautions were reviewed with the patient.      8.  Tobacco use in pregnancy, quit -- The patient previously reported she has stopped smoking Black and milds. Today, the patient reports that she is smoking occasionally. She was again stopped smoking. She was again counseled regarding the increased risks of smoking in pregnancy including poor fetal growth, placental abruption, PPROM/ birth, and fetal loss. Additional  risks were again reviewed including asthma, allergies, infection, and an association with SIDS. Various techniques for cutting back and quitting were again reviewed. She was again counseled that smoking cessation is especially important in her case given her recurrent thrombotic events. She expressed verbal understanding of this counseling. 9. THC Use --  The patient previously reported that she was smoking marijuana daily. She reported that she was using marijuana for decreased appetite. The patient again reports that she has stopped smoking marijuana. She was congratulated and encouraged not to use marijuana during pregnancy. Counseling was previously provided to the patient. Counseling was reviewed today. The patient did not have any additional questions or concerns. She was again counseled regarding risk of neurodevelopmental issues in children exposed to [facility] during pregnancy. Additionally, marijuana use may pose risks similar to that of cigarette use including increased risk for poor fetal growth, placental bleeding, PPROM/ delivery, and stillbirth. She was again counseled not to use THC while pregnant. Random urine drug screens should be monitored during pregnancy. 10.  First/second trimester alcohol exposure -- The patient previously reported that she drinks wine occasionally. She reports only taking sips of wine. Today, the patient again reports that she has stopped drinking alcohol. Counseling was previously provided to the patient. Counseling was reviewed. The patient did not have any additional questions or concerns.      The patient was again counseled that a safe level of alcohol consumption during pregnancy has not been determined. Determination of the impact of alcohol use during pregnancy on fetal development can be challenging because of variation in maternal alcohol clearance rates, fetal sensitivity, genetic susceptibility, maternal drinking patterns (binge drinking versus daily consumption), and confounders such a substance abuse. The patient was counseled that alcohol is a teratogen that can impact fetal growth and development at all stages during pregnancy. Currently, national guidelines and multiple medical societies recommend abstinence during pregnancy. In one large epidemiologic study, an increased rate of stillbirth was noted across all categories of alcohol intake, even after adjusting for confounders (eg, smoking, pre-pregnancy weight). There is also an increased risk for structural anomalies (craniofacial, cardiac), poor fetal growth, decreased IQ, and neurodevelopmental issues in childhood. Factors such as older age, increased parity, and  and  ethnicities are associated with an increased risk for FAS. Secondary to the increased risk for poor growth, fetal growth should be monitored serially, every 3 to 4 weeks starting at 24 to 26 weeks gestation. A fetal growth lag was noted at 31 weeks gestation by the [facility] clinic. Secondary to the increased risk for congenital heart anomalies, a fetal echocardiogram is recommended at 22 to 24 weeks gestation. A fetal echocardiogram was completed on 9/9/2022 and reported as normal.  Limitations of fetal echocardiography were reviewed. 11.  Right ovarian mass/cyst -- The ultrasound images were reviewed with the patient. The right ovarian mass previously described was again seen today. Today, at 33 weeks 1 day, the hyperechoic area measured 1.1 x 0.8 x 1.1 cm. The size and appearance are stable.     At 31 weeks 6 days, the hyperechoic mass measured 1.3 x 1 x 1.5 cm. The size and appearance are stable compared to prior imaging. At 12 weeks 4 days, A hyperechoic mass was seen in the right ovary measuring 1.2 x 1 x 0.9 cm. No fluid was seen surrounding the area. The patient was again counseled that this may represent a hemorrhagic cyst versus teratoma versus a calcification, although no shadowing was seen. The size and appearance of the ovarian mass will be reevaluated on follow-up ultrasound. The adnexa will be reevaluated on follow-up exam.  The ovaries should be evaluated during the patient's . Otherwise, postpartum follow-up is recommended. 12.   Nausea and vomiting of pregnancy, improved -- The patients previously reported having daily symptoms of nausea and decreased appetite. She was having occasional emesis. The patient reports that her symptoms of nausea and vomiting have significantly improved. She has gained 4 pounds since her last visit to our office. She has not gained any weight since her visit at 12 weeks. Today, the patient again reports that her symptoms are stable. She denied having any signs or symptoms of dehydration. The patient was again encouraged to eat small amounts of food every 2-3 hours during the day. She was also encouraged to stay well-hydrated. A baseline nutrition panel (CBC, CMP, magnesium, ferritin, folate, vitamin B12, vitamin D 25 [place]) was recommended. Baseline testing was completed on 2022, her results included: A CBC was ordered but not done by the lab. A CBC was completed on 22, H/H 11.9/35.8, MCV 88.6, ,000.   The remainder of the test results are from 2022 and included: Potassium 3.7, creatinine 0.7, calcium 9, ALT 7, AST 13, magnesium 2, uric acid 3, , ferritin 67, folate 19.8, vitamin B12 423, vitamin D 9, TSH 0.876, free T4 1.16, free T3 3, TPO negative, antithyroglobulin antibody negative, urinalysis negative, urine culture mixed, urine protein creatinine ratio 0.1, beta-2 lipoprotein antibody negative, anticardiolipin antibody negative, protein S antigen free, 28 (), protein S antigen total 64 (%). --Additional recommendations are below. The patient was counseled to continue taking vitamin B6, 25 mg every 8 hours. She was counseled to call and/or return with worsening symptoms. Again, with persistent/worsening symptoms, I would recommend the addition of Pepcid, 20 mg twice daily and or Zofran. 13.  History of bacteruria -- The patient's hospital admission in April was reviewed. Her urinalysis was positive for Nitrites, leukocyte esterase, and bacteria. She was treated with Keflex. Her urine culture showed E. Coli and mixed gram-positive organisms including staph and Corynebacterium. She had a repeat urine culture on 5/5/2022 that was negative. She denied any signs or symptoms of recurrent infection. Precautions were reviewed. A repeat urine culture was completed on 5/13/2022 and showed less than 10,000 CFU per mL of mixed gram-positive organisms. A urine culture was repeated on 6/17/2022 and noted to be mixed. A repeat urine culture was ordered on 9/21/2022. 14.  Anticardiolipin antibody IgM, indeterminate -- Antiphospholipid antibody screening was done secondary to the patient's CVA diagnosed in April 2022. Initial screening completed on 4/18/2022 as part of the thrombophilia panel was negative. Screening was repeated on 4/19/2022. Her anticardiolipin IgM antibody was indeterminate at 17 on 4/19/2022. Screening for lupus anticoagulant and beta-2 glycoprotein antibody was negative. The patient was counseled that this may be a false elevation, generally, the titers have to be greater than 20 for a true positive. The recommendation was made for the patient to continue taking a low dose aspirin, 81 mg daily. She should continue this for the remainder of pregnancy and 6 to 8 weeks postpartum. Repeat testing was completed on 5/13/2022 and negative. The results were reviewed with the patient. Testing was repeated on 6/17/2022. Testing for LAC, beta-2 glycoprotein, and anticardiolipin antibody was again negative. The patient was again counseled to continue to follow with hematology for long-term monitoring and management. 15.  Protein S deficiency -- The patient's labs were reviewed. The patient initially had a thrombophilia panel on 4/18/2022. Her protein S, antigen, free was 25% (). Testing was repeated on 4/19/2022. Her protein S, antigen, free at that time was 27% (). The patient would have been 9 weeks at the time of the testing. She was being evaluated for a recent thrombotic stroke. Testing is not considered reliable during acute thrombosis or pregnancy. Protein S decreases during pregnancy. However, protein S is generally considered deficient if <30% in the second trimester and/or <24% in the third trimester. Again, the patient had testing in the first trimester and her protein S levels were significantly decreased. It is unclear at this time if the patient truly has a protein S deficiency. This will not be able to be determined until the patient is postpartum. The patient was counseled that a protein S deficiency is rare, and seen in 0.03-0.13% of the population. It is considered a lower risk thrombophilia. If someone does have a deficiency, then the risk for a thrombosis during pregnancy is estimated to be 0.1% (w/negative personal history). It is 0-22% with a personal history of thrombosis     Given the patient's recurrent, the recommendation was for therapeutic anticoagulation with Lovenox. Please see above. Testing was repeated on 6/17/2022. The protein S antigen, free was 25% (%), and protein S activity was 35% (%). Her protein S levels are again low, even for pregnancy.      A hematology consultation was previously recommended. The patient is following with Dr. Sherwin Sandoval. Repeat testing is recommended at 6 to 8 weeks postpartum. She should not be on an oral contraceptive pill at the time of repeat testing. The patient should avoid birth control containing estrogen. The patient was counseled to continue taking a daily low-dose aspirin. She should continue a daily low-dose aspirin for the remainder of pregnancy and 6 to 8 weeks postpartum. Given her history of a thrombotic stroke, the recommendation was also made for anticoagulation with prophylactic Lovenox. The plan was reviewed with neurology and hematology, see above. A prescription was provided following patient's consultation. She was scheduled to return for injection teaching. Increased fetal surveillance is recommended. Fetal growth should be monitored serially, every 3 to 4 weeks starting at 24 to 26 weeks gestation. The patient should monitor fetal kick counts daily starting at 28 weeks gestation. She should be scheduled for twice-weekly fetal testing starting at 32 weeks gestation. Delivery is recommended at/by 39 weeks gestation. I would defer delivery timing to the providers at the [facility]. 12. Vaccination in pregnancy -- The patient was  previously counseled regarding the recommendations for vaccination in pregnancy. Counseling was reviewed. The patient did not have any additional questions or concerns. The patient was counseled regarding the recommendations for the Tdap vaccination in pregnancy. Risks and benefits were discussed. The vaccination is typically administered between 27 and 36 weeks gestation and recommended to confer protection against whooping cough to the . The patient plans to discuss this with her primary provider at her next visit. The patient was also counseled that her partner in any individuals who will be in close contact with the  should also be vaccinated for whooping cough. The patient was counseled regarding recommendation for the flu vaccination in pregnancy for both maternal and infant safety. Risks and benefits were reviewed. Counseling was provided regarding the recommendation for the Covid vaccination in pregnancy. I advised the patient that the [employer] of [employer]stetrics and Gynecology and The Society for Maternal Fetal Medicine recommend that all pregnant women be vaccinated for COVID-19. \"Data have shown that COVID-19 infection puts pregnant people at increased risk of severe complications and even death; yet only about 22% of pregnant individuals have received 1 more doses of COVID-19 vaccine according to the [employer] for Disease Control and Prevention. \"     \" Recent data have shown that more than 95% of those were hospitalized and/or dying from COVID-19 are those who have remained unvaccinated. Pregnant individuals who have decided to wait until after delivery to be vaccinated may be inadvertently exposing themselves to an increased risk of severe illness or death. \"     \" COVID-19 vaccination is the best testing to reduce maternal and fetal complications of OEAKP-85 infection among pregnant people,\" said Araceli Biggs MD, president of the Society for Maternal Fetal Medicine, sub-specialists. The patient was counseled that in the event she opts not to have the Covid vaccination, she should alert her provider immediately if she test positive for Covid. Pregnant women are on the priority list for treatment with monoclonal antibody. This intervention has been shown to decrease the risk for hospitalization and complications related to Covid in pregnancy. The patient expressed verbal understanding of this counseling.      17.  Vitamin D deficiency -- The patient's vitamin D was deficient at 9  --Recommend vitamin D3 2000 IU daily  --Monitor nutrition panel q4-6 weeks (CBC, CMP, magnesium, ferritin, folate, vitamin B12, vitamin D25OH) --Repeat testing was completed on 6/17/2022, her results included: H/H 11.2/33.7, MCV 87.1, platelet count 061,590, potassium 3.6, creatinine 0.7 calcium 7.2, ALT 15, AST 17, magnesium 1.6, ferritin 70, folate 11.2, vitamin B12 428, vitamin D 12, , uric acid 3.7, TSH 0.507, free T4 0.97, free T3 2.7, TPO negative, anticardiolipin antibody negative, beta-2 glycoprotein antibody negative, protein S antigen free 25%, hemoglobin A1c 4.9%, homocystine 6.6, protein S activity 35%, rheumatoid factor negative, C3 110, C4 23, [place] negative, LAC negative, maternal serum AFP 1.04 MOM, urinalysis negative, urine culture mixed, urine protein creatinine ratio 0.3, urinalysis negative for protein. -- The patient's vitamin D is still deficient but improving. She was counseled to continue her vitamin D supplement. --Repeat testing ordered on 9/21/2022  --Follow up with PCP for long term monitoring and management     18. 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 She was noted to be heterozygote for the MTHFR c.665C>T variant (previously designated as C677T). Counseling was previously provided  regarding the implications and management of this gene mutation in pregnancy. Counseling was reviewed. The patient did not have any additional questions or concerns. She was counseled that this gene mutation affects folic acid metabolism. It is fairly common and found in 20-30% of the population. It is generally only a problem if homocysteine levels are elevated. In the past, this gene mutation was thought to be associated with increased obstetric risks including thrombosis, early recurrent pregnancy loss, placental abruption, hypertensive disorders of pregnancy, poor fetal growth, and fetal loss. More recent studies did not report strong associations with these risks.  Because this genetic mutation affects folic acid metabolism, there is an increased risk for folic acid deficiency and other nutritional deficiencies in women with this condition. There is also an increased risk for having a child with an open neural tube defect in women with this mutation, especially if they're homozygous for the C677T mutation. Presently, this condition is not thought to be clinically significant. Generally, additional vitamin supplementation is recommended with folic acid or methyl folate, vitamin B6, and vitamin B12. The patient was counseled to take a low-dose aspirin. Fetal growth should be followed serially. She was counseled that there is a 50% chance that she will pass this mutation off to her child(sandra). She should relay this information to the pediatrician who cares for her child(sandra). She was also encouraged to review this diagnosis with her PCP. Because of this history, a baseline nutrition panel and homocysteine level were recommended. She was provided with orders for testing. 23.  Silent carrier for alpha thalassemia -- The patient's records were previously reviewed. She is noted to be a silent carrier for alpha thalassemia. Counseling was previously provided to the patient. Counseling was reviewed. The patient did not have any additional questions or concerns. --Alpha thalassemia minima is another term for this condition. This condition results from the loss of a single alpha-chain gene (ie a-/aa). Blood tests are usually normal, though the red cells may be small, leading to a suspicion that the patient is a silent carrier. The only way to confirm a silent carrier is by DNA studies. This is usually a benign carrier state. Affected individuals are not typically anemic and their hemoglobin analysis is normal. These conditions may remain undiagnosed, or they may be detected incidentally on a routine complete blood count during evaluation of an unrelated condition or in the setting of preconception testing and counseling.      The patient's partner has not had testing for this condition. Carrier screening is recommended. The patient again plans to discuss this with her partner. If the patient's partner is not a carrier for alpha thalassemia, then there is a 50% chance that her child/children will also be a carrier for alpha thalassemia minima. If her partner is also a carrier of alpha thalassemia, there is an increased risk for having an affected child. Without having her partner tested, definitive counseling cannot be provided. This information should be relayed to pediatrics. I would defer any additional  evaluation to pediatrics. 20.   Migraine headaches -- The patient previously reported having continued headache symptoms after discharge from the hospital in . The headaches are a 6 out of 10 at the worst.  She reported having daily headaches. She has been using tylenol with some relief. She reports having associated symptoms of photosensitivity and phono sensitivity. Today, the patient again reports that her headache symptoms have significantly improved. The patient was again counseled that migraine headaches can improve during pregnancy. However, in some patients symptoms are exacerbated. She was counseled regarding the use of magnesium oxide 400 mg twice daily and riboflavin 100 mg daily in reducing the frequency and intensity of migraine headaches. Prescriptions were provided. Precautions were reviewed, and she was counseled that if she develops the worst headache of her life or a headache with neurological deficits, to present immediately for evaluation. She expressed verbal understanding of this counseling. Because of the patient's headache symptoms, a baseline nutrition panel and thyroid function testing was recommended. Baseline testing was completed on 2022. The results are summarized above.      21.  Eccentric umbilical cord insertion into the placenta --  Today's ultrasound results were reviewed with the patient. The umbilical cord inserts into the placenta 2 to 3 cm from the edge. Counseling was provided to the patient. An eccentric cord insertion is diagnosed when the umbilical cord inserts into the placenta within 2-3 cm of the edge of the placenta. This is usually a benign finding, however, there can be an increased risk for poor fetal growth. Fetal growth should be monitored serially. 22.  Abnormal urine protein creatinine ratio -- The patient's labs from 2022 were reviewed. Her urine protein creatinine ratio was abnormally elevated at 0.3. Her urine culture was mixed. Her urinalysis was negative for protein. Secondary to the conflicting results, a 99-THNQ urine collection was recommended. Nursing had previously contacted the patient regarding the instructions for testing. The patient's urine dip was negative for protein today. Her blood pressure was normal at 114     Repeat testing ordered on 2022. 21.  Maternal ASD or PFO -- The patient reported that a small hole was found on the top part of her heart on follow-up imaging. She is unsure of her exact diagnosis. She is following at the [facility] clinic. She had a fetal echocardiogram that was normal on 2022. The limitations of fetal echocardiography were reviewed. The patient was counseled to relay this history to the pediatrician who cares for her child/children. 24.  Poor maternal weight gain -- The patient previously reported that she is 5'2\" tall and weighed 161 pounds at 12 weeks gestation. She weighed 167 pounds today. She has gained 6 pounds. There is concern for a fetal growth lag. The patient was counseled that poor maternal weight gain or low maternal weight can be associated with an increased risk for complications such as poor fetal growth,  delivery, and nutritional deficiencies. A baseline nutrition panel was ordered on 2022. procedures, and coordination of care. Greater than 50% of the time was spent face-to-face with the patient. This time is exclusive of procedures performed. I answered all of  the patient's questions to her satisfaction. I asked her to call if she had any additional questions prior to her next visit. --At the conclusion of the visit, the patient appeared to have a good understanding of the issues discussed. I answered all of her questions to her satisfaction. I asked her to call if she had any additional questions prior to her next visit. --Thank you for allowing me to participate in the care of this pleasant patient. Please don't hesitate to call me if you have any questions. Sincerely,      Cris Shipley MD, [address]  [phone]      *All or parts of this note may have been generated using a voice recognition program. There may be typo, grammar, or Word substitution errors that have escaped my review of this note.

## 2022-10-04 ENCOUNTER — ROUTINE PRENATAL (OUTPATIENT)
Dept: OBGYN CLINIC | Age: 32
End: 2022-10-04
Payer: MEDICAID

## 2022-10-04 VITALS
HEART RATE: 95 BPM | WEIGHT: 168.13 LBS | DIASTOLIC BLOOD PRESSURE: 75 MMHG | SYSTOLIC BLOOD PRESSURE: 121 MMHG | BODY MASS INDEX: 30.75 KG/M2

## 2022-10-04 DIAGNOSIS — D25.9 UTERINE FIBROID COMPLICATING ANTENATAL CARE, BABY NOT YET DELIVERED: ICD-10-CM

## 2022-10-04 DIAGNOSIS — N83.201 RIGHT OVARIAN CYST: ICD-10-CM

## 2022-10-04 DIAGNOSIS — I63.9 ACUTE CEREBROVASCULAR ACCIDENT (CVA) (HCC): ICD-10-CM

## 2022-10-04 DIAGNOSIS — O99.413 MATERNAL CONGENITAL CARDIAC ANOMALY AFFECTING PREGNANCY, ANTEPARTUM, THIRD TRIMESTER: ICD-10-CM

## 2022-10-04 DIAGNOSIS — O34.10 UTERINE FIBROID COMPLICATING ANTENATAL CARE, BABY NOT YET DELIVERED: ICD-10-CM

## 2022-10-04 DIAGNOSIS — Z15.89 HETEROZYGOUS MTHFR MUTATION C677T: ICD-10-CM

## 2022-10-04 DIAGNOSIS — Z3A.33 33 WEEKS GESTATION OF PREGNANCY: ICD-10-CM

## 2022-10-04 DIAGNOSIS — O43.199 MARGINAL INSERTION OF UMBILICAL CORD AFFECTING MANAGEMENT OF MOTHER: ICD-10-CM

## 2022-10-04 DIAGNOSIS — Q24.9 MATERNAL CONGENITAL CARDIAC ANOMALY AFFECTING PREGNANCY, ANTEPARTUM, THIRD TRIMESTER: ICD-10-CM

## 2022-10-04 DIAGNOSIS — D56.3 ALPHA THALASSEMIA SILENT CARRIER: Primary | ICD-10-CM

## 2022-10-04 LAB
GLUCOSE URINE, POC: NEGATIVE
PROTEIN UA: ABNORMAL

## 2022-10-04 PROCEDURE — 81002 URINALYSIS NONAUTO W/O SCOPE: CPT | Performed by: OBSTETRICS & GYNECOLOGY

## 2022-10-04 PROCEDURE — 99999 PR OFFICE/OUTPT VISIT,PROCEDURE ONLY: CPT | Performed by: OBSTETRICS & GYNECOLOGY

## 2022-10-04 PROCEDURE — 59025 FETAL NON-STRESS TEST: CPT | Performed by: OBSTETRICS & GYNECOLOGY

## 2022-10-04 PROCEDURE — 99213 OFFICE O/P EST LOW 20 MIN: CPT | Performed by: OBSTETRICS & GYNECOLOGY

## 2022-10-04 NOTE — PATIENT INSTRUCTIONS

## 2022-10-06 NOTE — PROGRESS NOTES
NON STRESS TEST INTERPRETATION    10/4/22    RE:  Linton Saint   : 1990   AGE: 33 y.o.     GESTATIONAL AGE:  34w0d    DIAGNOSIS:   Evidence of stroke on therapeutic anticoagulation, poor fetal growth, maternal congenital heart anomaly    INDICATION:  Same as above    TIME ON:  1145      TIME OFF:  1219      RESULT:   REACTIVE      FHR Baseline Rate:   130 bpm    PERIODIC CHANGES:    Accelerations present, variability moderate, no decelerations noted    COMMENTS:      She is to continue having NST's every 3-4 days, and BPP with umbilical artery doppler studies once per week        Martha Garcia MD, [facility]

## 2022-10-07 ENCOUNTER — ANCILLARY PROCEDURE (OUTPATIENT)
Dept: OBGYN CLINIC | Age: 32
End: 2022-10-07
Payer: MEDICAID

## 2022-10-07 ENCOUNTER — ROUTINE PRENATAL (OUTPATIENT)
Dept: OBGYN CLINIC | Age: 32
End: 2022-10-07
Payer: MEDICAID

## 2022-10-07 VITALS
DIASTOLIC BLOOD PRESSURE: 73 MMHG | BODY MASS INDEX: 30.73 KG/M2 | WEIGHT: 168 LBS | HEART RATE: 92 BPM | SYSTOLIC BLOOD PRESSURE: 111 MMHG

## 2022-10-07 DIAGNOSIS — D25.9 UTERINE FIBROID COMPLICATING ANTENATAL CARE, BABY NOT YET DELIVERED: ICD-10-CM

## 2022-10-07 DIAGNOSIS — Z3A.34 34 WEEKS GESTATION OF PREGNANCY: ICD-10-CM

## 2022-10-07 DIAGNOSIS — N83.201 RIGHT OVARIAN CYST: ICD-10-CM

## 2022-10-07 DIAGNOSIS — O34.10 UTERINE FIBROID COMPLICATING ANTENATAL CARE, BABY NOT YET DELIVERED: ICD-10-CM

## 2022-10-07 DIAGNOSIS — Z34.90 THIRD PREGNANCY: ICD-10-CM

## 2022-10-07 DIAGNOSIS — O43.199 MARGINAL INSERTION OF UMBILICAL CORD AFFECTING MANAGEMENT OF MOTHER: ICD-10-CM

## 2022-10-07 DIAGNOSIS — D56.3 ALPHA THALASSEMIA SILENT CARRIER: Primary | ICD-10-CM

## 2022-10-07 PROCEDURE — 76818 FETAL BIOPHYS PROFILE W/NST: CPT | Performed by: OBSTETRICS & GYNECOLOGY

## 2022-10-07 PROCEDURE — 99999 PR OFFICE/OUTPT VISIT,PROCEDURE ONLY: CPT | Performed by: OBSTETRICS & GYNECOLOGY

## 2022-10-07 PROCEDURE — 99213 OFFICE O/P EST LOW 20 MIN: CPT | Performed by: OBSTETRICS & GYNECOLOGY

## 2022-10-07 NOTE — PROGRESS NOTES
Patient is here today for bpp/nst. Denies any vaginal bleeding, cramping, or leakage of fluids. Patient reports good fetal movement. Currently unable to urinate.

## 2022-10-07 NOTE — PROGRESS NOTES
[address] [facility]  [address]  [address] [id number] [phone]   [address] [facility]  [address] [facility] [address]  Dept: [phone]  Loc: [phone]     10/7/2022    RE:  Tk Wagner     : 1990   AGE: 33 y.o. Dear Dr. Aixa Agrawal,    Thank you for allowing me to see Tk Wagner. As I'm sure you will recall, Tk Wagner is a 33 y.o. [id number]'s last menstrual period was 02/10/2022.  Estimated Date of Delivery: 22 at 34w1d seen in our office today for the following:    REASON FOR VISIT: BPP and NST    Patient Active Problem List    Diagnosis Date Noted    34 weeks gestation of pregnancy 10/07/2022    Third pregnancy 10/07/2022    BMI 30.0-30.9,adult 10/03/2022    Poor fetal growth affecting management of mother in third trimester 2022    Maternal congenital cardiac anomaly affecting pregnancy, antepartum, third trimester 2022    Uterine fibroid complicating  care, baby not yet delivered 2022    Alpha thalassemia silent carrier 2022    Right ovarian cyst 2022    Marginal insertion of umbilical cord affecting management of mother 2022    Proteinuria 2022    Low folate 2022    Maternal exposure to alcohol in first trimester 2022    Vitamin D deficiency 2022    Low vitamin D level 2022    Heterozygous MTHFR mutation [id number] 2022    Nausea/vomiting in pregnancy 2022    History of UTI 2022    History of thrombosis 2022    Protein S deficiency affecting pregnancy ([address] 2022    Acute cerebrovascular accident (CVA) ([facility] [address] 2022    Cerebrovascular accident (CVA) ([facility] [address] 2022    Sprain of shoulder, left 2020    Cervicalgia 2020    Cervical spondylolysis 2020    Cervical strain 2020    Sprain of ligaments of cervical spine 2020        PAST HISTORY:  OB History    Para Term  AB Living   3 1 1   1 1   SAB IAB Ectopic Molar Multiple Live Births     1       1      # Outcome Date GA Lbr Epi/2nd Weight Sex Delivery Anes PTL Lv   3 Current            2 Term 02/10/09 40w0d  6 lb 11 oz (3.033 kg) M CS-LTranv Spinal  EMPERATRIZ      Complications: Failure to Progress in First Stage   1 IAB                   MEDICAL:  Past Medical History:   Diagnosis Date    Back pain     CVA (cerebral vascular accident) ([facility] [address] 11/15/2021    Stroke 2021, 21, and 22 during pregnancy    Heterozygous for MTHFR gene mutation 2022    Neck pain     Protein S deficiency affecting pregnancy ([facility] [address] 2022        SURGICAL:  Past Surgical History:   Procedure Laterality Date     SECTION      DILATION AND CURETTAGE      NERVE BLOCK Left 2020    LEFT CERVICAL MEDIAL BRANCH NERVE  BLOCK UNDER FLUOROSCOPIC GUIDANCE C3, C4, C5 AND C6 performed by Bibiana Huggins MD at [address]:    No Known Allergies      MEDICATIONS:    Current Outpatient Medications   Medication Sig Dispense Refill    enoxaparin (LOVENOX) 300 MG/3ML injection Inject 70 mg into the skin in the morning and 70 mg before bedtime. folic acid (FOLVITE) 1 MG tablet Take 1 tablet by mouth daily 30 tablet 5    Magnesium Oxide 200 MG TABS Take 200 mg by mouth daily 30 tablet 4    vitamin B-6 (PYRIDOXINE) 100 MG tablet Take 100 mg by mouth 3 times daily as needed (FOR NAUSEA)      vitamin D3 (CHOLECALCIFEROL) 25 MCG (1000 UT) TABS tablet Take 2 tablets by mouth daily 90 tablet 2    aspirin 81 MG chewable tablet Take 1 tablet by mouth daily 90 tablet 3    Prenatal MV-Min-Fe Fum-FA-DHA (PRENATAL 1 PO) Take 1 tablet by mouth daily        No current facility-administered medications for this visit.         Social History     Socioeconomic History    Marital status: Single   Tobacco Use    Smoking status: Every Day     Packs/day: 0.50     Types: Cigarettes, Cigars    Smokeless tobacco: Never    Tobacco comments:     black and mild   Vaping Use    Vaping Use: Never used   Substance and Sexual Activity    Alcohol use: No    Drug use: Yes     Types: Marijuana [place])    Sexual activity: Yes     Partners: Male          FAMILY MEDICAL HISTORY:   Family History   Problem Relation Age of Onset    Pulmonary Embolism Mother 48        After Hysterectomy    Asthma Father     Asthma Brother     Asthma Brother           PHYSICAL EXAMINATION:  /73   Pulse 92   Wt 168 lb (76.2 kg)   LMP 02/10/2022   Body mass index is 30.73 kg/m². Urine dipstick:  No results found for this visit on 10/07/22. A/an BPP and NST ultrasound was done in our office today. Please refer to the enclosed copy of the ultrasound report for further information. Discussion:  There is a givens fetus in the cephalic presentation. Fetal cardiac motion, fetal motion, fetal tone was observed and appeared to be grossly normal.  Biophysical profile was 10 out of 10. The placenta is posterior. Amniotic fluid volume is normal.    IMPRESSION:  1. Single intrauterine gestation at 34+ weeks with a reassuring biophysical profile. 2. The fetal anatomy appears normal and the fetal lie is cephalic. 3. The amniotic fluid volume is normal and the placenta is posterior. RECOMMENDATIONS:  Each of the recommendations were discussed with the patient:  1. Continue to antepartum testing as previously ordered. 2.  Delivery between 38 and 39 weeks gestation. 3.  Growth ultrasounds as previously ordered. 4.  Follow-up would be otherwise as clinically indicated. The patient is to continue to follow with you in your office for ongoing obstetric care. PLAN:    As noted above or sooner prn.     Sincerely,        Liberty Barthel, MD

## 2022-10-11 ENCOUNTER — ROUTINE PRENATAL (OUTPATIENT)
Dept: OBGYN CLINIC | Age: 32
End: 2022-10-11
Payer: MEDICAID

## 2022-10-11 VITALS
HEART RATE: 99 BPM | DIASTOLIC BLOOD PRESSURE: 74 MMHG | BODY MASS INDEX: 30.36 KG/M2 | SYSTOLIC BLOOD PRESSURE: 111 MMHG | WEIGHT: 166 LBS

## 2022-10-11 DIAGNOSIS — N83.201 RIGHT OVARIAN CYST: ICD-10-CM

## 2022-10-11 DIAGNOSIS — O34.10 UTERINE FIBROID COMPLICATING ANTENATAL CARE, BABY NOT YET DELIVERED: ICD-10-CM

## 2022-10-11 DIAGNOSIS — O43.199 MARGINAL INSERTION OF UMBILICAL CORD AFFECTING MANAGEMENT OF MOTHER: ICD-10-CM

## 2022-10-11 DIAGNOSIS — D56.3 ALPHA THALASSEMIA SILENT CARRIER: Primary | ICD-10-CM

## 2022-10-11 DIAGNOSIS — D25.9 UTERINE FIBROID COMPLICATING ANTENATAL CARE, BABY NOT YET DELIVERED: ICD-10-CM

## 2022-10-11 PROCEDURE — 99999 PR OFFICE/OUTPT VISIT,PROCEDURE ONLY: CPT | Performed by: OBSTETRICS & GYNECOLOGY

## 2022-10-11 PROCEDURE — 59025 FETAL NON-STRESS TEST: CPT | Performed by: OBSTETRICS & GYNECOLOGY

## 2022-10-11 PROCEDURE — 99213 OFFICE O/P EST LOW 20 MIN: CPT | Performed by: OBSTETRICS & GYNECOLOGY

## 2022-10-11 NOTE — PROGRESS NOTES
Patient is here today for nst. Denies any vaginal bleeding, cramping, or leakage of fluids. Patient reports good fetal movement. Currently unable to urinate.

## 2022-10-14 ENCOUNTER — ANCILLARY PROCEDURE (OUTPATIENT)
Dept: OBGYN CLINIC | Age: 32
End: 2022-10-14
Payer: MEDICAID

## 2022-10-14 ENCOUNTER — ROUTINE PRENATAL (OUTPATIENT)
Dept: OBGYN CLINIC | Age: 32
End: 2022-10-14
Payer: MEDICAID

## 2022-10-14 VITALS
WEIGHT: 166 LBS | HEART RATE: 96 BPM | BODY MASS INDEX: 30.36 KG/M2 | DIASTOLIC BLOOD PRESSURE: 72 MMHG | SYSTOLIC BLOOD PRESSURE: 106 MMHG

## 2022-10-14 DIAGNOSIS — D68.59 PROTEIN S DEFICIENCY AFFECTING PREGNANCY (HCC): ICD-10-CM

## 2022-10-14 DIAGNOSIS — O34.10 UTERINE FIBROID COMPLICATING ANTENATAL CARE, BABY NOT YET DELIVERED: ICD-10-CM

## 2022-10-14 DIAGNOSIS — O36.5930 POOR FETAL GROWTH AFFECTING MANAGEMENT OF MOTHER IN THIRD TRIMESTER, SINGLE OR UNSPECIFIED FETUS: ICD-10-CM

## 2022-10-14 DIAGNOSIS — R80.9 PROTEINURIA, UNSPECIFIED TYPE: ICD-10-CM

## 2022-10-14 DIAGNOSIS — R79.89 LOW VITAMIN D LEVEL: ICD-10-CM

## 2022-10-14 DIAGNOSIS — O99.119 PROTEIN S DEFICIENCY AFFECTING PREGNANCY (HCC): ICD-10-CM

## 2022-10-14 DIAGNOSIS — Z86.718 HISTORY OF THROMBOSIS: ICD-10-CM

## 2022-10-14 DIAGNOSIS — O43.199 MARGINAL INSERTION OF UMBILICAL CORD AFFECTING MANAGEMENT OF MOTHER: ICD-10-CM

## 2022-10-14 DIAGNOSIS — D25.9 UTERINE FIBROID COMPLICATING ANTENATAL CARE, BABY NOT YET DELIVERED: ICD-10-CM

## 2022-10-14 DIAGNOSIS — O09.891 MATERNAL EXPOSURE TO ALCOHOL IN FIRST TRIMESTER: ICD-10-CM

## 2022-10-14 DIAGNOSIS — E53.8 LOW FOLATE: ICD-10-CM

## 2022-10-14 DIAGNOSIS — E55.9 VITAMIN D DEFICIENCY: ICD-10-CM

## 2022-10-14 DIAGNOSIS — N83.201 RIGHT OVARIAN CYST: ICD-10-CM

## 2022-10-14 DIAGNOSIS — Z3A.35 35 WEEKS GESTATION OF PREGNANCY: Primary | ICD-10-CM

## 2022-10-14 PROCEDURE — 4004F PT TOBACCO SCREEN RCVD TLK: CPT | Performed by: OBSTETRICS & GYNECOLOGY

## 2022-10-14 PROCEDURE — 76818 FETAL BIOPHYS PROFILE W/NST: CPT | Performed by: OBSTETRICS & GYNECOLOGY

## 2022-10-14 PROCEDURE — 76816 OB US FOLLOW-UP PER FETUS: CPT | Performed by: OBSTETRICS & GYNECOLOGY

## 2022-10-14 PROCEDURE — G8427 DOCREV CUR MEDS BY ELIG CLIN: HCPCS | Performed by: OBSTETRICS & GYNECOLOGY

## 2022-10-14 PROCEDURE — 76820 UMBILICAL ARTERY ECHO: CPT | Performed by: OBSTETRICS & GYNECOLOGY

## 2022-10-14 PROCEDURE — 99214 OFFICE O/P EST MOD 30 MIN: CPT | Performed by: OBSTETRICS & GYNECOLOGY

## 2022-10-14 PROCEDURE — G8419 CALC BMI OUT NRM PARAM NOF/U: HCPCS | Performed by: OBSTETRICS & GYNECOLOGY

## 2022-10-14 PROCEDURE — 99213 OFFICE O/P EST LOW 20 MIN: CPT | Performed by: OBSTETRICS & GYNECOLOGY

## 2022-10-14 PROCEDURE — 76821 MIDDLE CEREBRAL ARTERY ECHO: CPT | Performed by: OBSTETRICS & GYNECOLOGY

## 2022-10-14 PROCEDURE — G8484 FLU IMMUNIZE NO ADMIN: HCPCS | Performed by: OBSTETRICS & GYNECOLOGY

## 2022-10-14 RX ORDER — BREAST PUMP
EACH MISCELLANEOUS
Qty: 1 EACH | Refills: 0 | Status: SHIPPED | OUTPATIENT
Start: 2022-10-14

## 2022-10-14 NOTE — PROGRESS NOTES
2022      Alisa Mealing, [address]  [facility],  [address] [place] [address]     RE:  Usha ARAUJO  : 1990   AGE: 33 y.o. This report has been created using voice recognition software. It may contain errors which are inherent in voice recognition technology. Dear Dr. Kristi Aggarwal:      I had the pleasure of meeting with Ms. Mata Martin for a return consultation. As you know, Ms. Mata Martin  is a 33 y.o.  at 35w1d (LMP = [address]) who is being followed by our office for multiple medical issues. Today, Ms. Mata Martin reports that she feels well. She notes good fetal movement and denies any symptoms of leaking of fluid, vaginal bleeding, and/or contractions. She had a fetal ultrasound that was notable for the following. There is a single intrauterine gestation in a cephalic presentation with a heart rate of 140 beats per minute. The placenta is posterior. The amniotic fluid index is 11.5 cm. The composite gestational age is 33w4d. The estimated fetal weight is at the 30th percentile. AC 8th percentile. BPP 10/10. Umbilical artery PI normal.  MCA PSV normal.  MCA PI normal.  CPR PI normal.  Subchronic hemorrhage seen measuring 2.6 x 1.8 x 0.9 cm. PERTINENT PHYSICAL EXAMINATION:   /72   Pulse 96   Wt 166 lb (75.3 kg)   LMP 02/10/2022   BMI 30.36 kg/m²     Urine dipstick:   No urine sample      A fetal ultrasound assessment was performed today. A report is enclosed for your review. Assessment & Plan:  33 y.o.  at 35w1d (LMP = 9 [address]) with:    1. Pregnancy dating -- The patient's pregnancy dating was previously reviewed. The patient reports that was having monthly periods. She discontinued Depo-Provera in 2021. She reports a sure last menstrual period. Her reported LMP was 2/10/2022, GUERO 2022. The patient's first ultrasound was 1822. The crown-rump length was 9 weeks 4 days, GUERO 2022.      Given the patient had a sure LMP that agreed with the 9-week ultrasound, the recommendation was made to use an GUERO of 11/17/2022 based on her LMP. Fetal growth was appropriate for the gestational age again today. 2.  History of stroke during pregnancy  -- The patient previously reported that she initially experienced symptoms of headache and aphasia in November 2021. She did not seek medical care at that time. The patient presented to the emergency department on April 17, 2022 with complaints of a severe headache that woke her up from sleep. She had a headache 4 days prior to her evaluation in the emergency department. Two days prior to her evaluation, she had slurred speech and aphasia which lasted for approximately 20 minutes and then resolved. The patient then developed paresthesias on her right arm. A CT of the head was initially completed. The report stated, \" hypodensity within the left insular cortex with abnormal white matter hypodensity extending into the adjacent subinsular white matter and left corona radiata. Findings may be suggestive of subacute infarction. For further evaluation brain MRI is recommended. \"     An MRI, MRV, and MRA of the head with and completed. Per the MRI report from 4/18/2022, acute infarct noted in left insula/left temporal lobe/left periventricular white matter with associated thrombosis of a superior M2 branch of the left middle cerebral artery. Chronic infarcts noted in the right corona radiata and left centrum semiovale. The MRV was unremarkable. No dural sinus thrombosis was noted. The patient was diagnosed with a subacute CVA and admitted to the hospital.  She was also noted to have bacteriuria. She was cared for by her primary care physician and she had a neurology consultation. A thrombophilia evaluation was completed. She was started on a low-dose aspirin and received Lovenox for DVT prophylaxis. A maternal echocardiogram was completed on 4/19/2022.   Per the report, the study was normal.  The patient reports having a repeat echocardiogram at the [facility] and she has a small PFO or ASD. The patient remained stable and was discharged home on 4/19/2022. She was discharged home on Keflex and a low-dose aspirin. She was to follow-up with obstetrics and her primary care physician. The patient's thrombophilia evaluation was completed on 4/18/2022, her results included: Homocystine 5, protein C functional 111%, protein S antigen free 25% (), Antithrombin activity 99%, APC resistance 3.54 (normal), lupus anticoagulant negative, beta-2 glycoprotein antibody negative, cardiolipin antibody negative, prothrombin 2 gene mutation negative. A screening PARIS was completed and negative. A protein S antigen free was repeated on 4/19/2022 and again low at 27 (). Homocystine was repeated on 4/19/2022 and again normal at 7.8. Factor VIII activity was normal at 134% and factor VII activity was normal at 133%. Anticardiolipin antibody was repeated on 4/19/2022 and IgM was indeterminate at 17. Repeat screening for lupus anticoagulant was negative on 4/19/2022. A D-dimer was checked on 4/19/2022 and normal.  Patient's urine drug screen was positive for marijuana. The patient's urinalysis was abnormal.  The urine culture showed mixed jair including E. coli staph and Corynebacterium. No sensitivities were provided. The patient was seen for her initial consultation with [facility] on 5/9/2022. Counseling was provided and the recommendation was made for prophylactic anticoagulation with Lovenox, 40 mg daily. Both hematology and neurology were contacted regarding the patient and plan of care. The patient returned to the [facility] office on 5/31/2022 for a follow-up consultation. Prior to her visit, she reported to the nurse that she had recurrent strokelike symptoms including left-sided numbness and a headache.   She was immediately taken to the emergency department for evaluation. She was readmitted to the hospital on 5/31/2022 with an acute CVA. A CT of the head was repeated on 5/31/2022. Per the impression, there was left insular encephalomalacia at the site of prior infarct. It was an unremarkable CTA of the head and neck. Specifically, there was no evidence of residual thrombus in the left middle cerebral artery. The patient then had an MRI/MRV/MRA of the head without contrast.  Per the impression on the report, tiny foci of acute or early subacute infarction were seen in the right frontal lobe, with single tiny foci each within the right lateral basal ganglia, external capsule, and insular cortex. Mild stenosis was noted in the left lateral transverse sinus, which may be due to an arachnoid granulation. There is no evidence of dural venous thrombosis. She was evaluated by neurology on 6/2/2022. The recommendation was made for therapeutic anticoagulation with Lovenox. She was also evaluated by hematology. She was discharged home on 6/3/2022. The patient returned to the [facility] office at 16 weeks and 6 days for follow-up. She reported that she felt well. She still had intermittent headache symptoms that she rated as a 6 out of 10. She reported that she was tolerating the therapeutic Lovenox well. During her visit at 16 weeks 6 days, the patient requested a referral for additional evaluation at the [facility] clinic. The patient was counseled that this would be reasonable given her complicated pregnancy. Additionally, it may be in the patient's best interest to plan for delivery at a center in which neurology and/or neurosurgery would be available in the event the patient had any complications during delivery or postpartum. The patient was agreeable with the plan. She requested a referral to the [facility] clinic for additional evaluation and management. A referral was provided.      The patient's neurologist, Dr. Lena Hays, was also contacted regarding the patient. He agreed with the plan outlined above. The patient is following with maternal-fetal medicine, Dr. Lenard Donnelly, at the [facility]. She is also following with Dr. Del Gonsalez from vascular surgery. She was also seen by a vascular neurologist, Dr. Johanne Buchanan, at the [facility] clinic. The patient returns the office today for follow-up and fetal testing secondary to the finding of poor fetal growth at the [facility]. She notes fetal movement. She again denies having any symptoms of leaking of fluid, vaginal bleeding, cramping, and/or contractions. Fetal growth was reevaluated today at 35 weeks 1 day. There is a lag in the abdominal circumference. Fetal testing was otherwise reassuring. She denies having any subsequent strokelike symptoms. She again reports that her migraine headache symptoms have significantly improved. Counseling was previously provided to the patient. Counseling was reviewed. The patient did not have any additional questions or concerns. Again, pregnancy and the puerperium (postpartum period) are well-established risk factors for thromboembolism. Normal pregnancy is accompanied by an increase in clotting factors. The resulting hypercoagulable state likely evolved to protect women from hemorrhage in the event of a miscarriage and during childbirth. Thromboembolic disease during pregnancy and the puerperium is a significant cause of maternal morbidity and mortality. During pregnancy, the risk of venous thromboembolism increases 4-5 fold and the risk of arterial thromboembolism, myocardial infarction, and stroke increases 3-4 fold compared to the nonpregnant state. Postpartum, the risk of venous thromboembolism is 20 fold higher and the risk of arterial thromboembolism is similarly elevated compared to nonpregnant individuals. Venous events are more common during pregnancy accounting for 4 out of 5 thromboembolic events.   However, the risk of death is higher following an arterial thrombosis. Approximately 80% of venous thromboembolic events are deep venous thromboses and approximately 20% of pulmonary emboli. The most important risk factor for thrombosis during pregnancy is a history of thrombosis. The risk for a thrombotic event is further increased in women with an underlying acquired or inherited thrombophilia. Most studies have reported and equal distribution of thrombosis risk across all trimesters of pregnancy however, 2 large conflicting studies have reported a first trimester (50% prior to 15 weeks) and third trimester (60%) predominance. Additional risk factors for thrombosis during pregnancy include multifetal gestation, varicose veins, inflammatory bowel disease, urinary tract infection, diabetes, hospitalization, obesity, and maternal age greater than 28 years. Compared to the antepartum period, the thrombosis risk is 2-5 times higher during the postpartum period. This risk is highest during the first 6 weeks postpartum and declines to prepregnancy rates by 13-18 weeks postpartum. Risk factors for postpartum thrombosis include  section, medical co morbidities, obesity,  delivery, hemorrhage, fetal demise, advanced maternal age, hypertensive disorders of pregnancy, tobacco use, and infection. The patient was counseled to continue treatment with therapeutic Lovenox and a daily low-dose aspirin. She was counseled to continue to follow with the specialist at the [facility] clinic for long-term monitoring and management. Per the consultation note with maternal-fetal medicine, the plan is for delivery at the [facility]. Per the consultation note with maternal-fetal medicine, the patient will follow-up at the [facility] clinic at  36 weeks with a growth ultrasound at that time and plan to transfer care to [facility] clinic for delivery at 36 weeks.   It is unclear if the plan is for delivery at 36 weeks versus transfer of care at 36 weeks until delivery. The patient was again counseled to clarify this with her provider in [facility]. Therapeutic anticoagulation should be continued throughout the pregnancy and for at least 6-8 weeks postpartum. Lovenox can be initiated 12 hours following a vaginal delivery and 24 hours following a  section (if there is no ongoing concern for bleeding). The risks and benefits of therapeutic anticoagulation in pregnancy were reviewed including the development of heparin-induced thrombocytopenia (HIT), osteopenia, bleeding, and poor fetal growth. An anesthesia consultation is also recommended in the third trimester given she is on anticoagulation. A hematology consult was recommended. The patient is following with Dr. Samreen Cortez neurology consult was recommended. A referral was provided. The patient is following with a vascular neurologist at the [facility], Dr. Rossana Laura  The patient was referred to the [facility] clinic for additional maternal and fetal evaluation. She is following with Dr. Ginette Perez. The patient should avoid estrogen containing birth control postpartum. Increased fetal surveillance is also recommended. Fetal growth should be monitored serially, every 3 to 4 weeks starting at 24 to 26 weeks gestation. The Patient should monitor fetal kick counts daily starting at 28 weeks gestation. She should be scheduled for twice-weekly fetal testing starting at 32 weeks gestation. Delivery is recommended at/by 39 weeks gestation. Counseling was previously provided regarding the radiation exposure from the CT scan. Counseling was reviewed. The patient did not have any additional questions or concerns. Increased risks associated with radiation exposure during pregnancy include that of early pregnancy loss, fetal malformations, poor fetal growth, and an increase for childhood cancers (increased from 2800 to 2000).  Generally, these risks are not significantly increased unless the radiation exposure exceeds 50 mGy (5 rads). The estimated radiation exposure associated with a head CT is 1-10 mGy (0.1-1 rads). The International Commission of Radiologic Protection suggests that the radiation doses delivered in utero by imaging tests (such as those performed in the diagnosis of PE) present no measurable increased risk of fetal death or developmental abnormalities over the background incidence of these entities. The [facility] of Radiation Protection and Measurements considers the risk of radiation-associated abnormalities to be negligible, at less than 50 mGy when compared with other risks of pregnancy. Per available data, diagnostic imaging studies that expose the fetus to less than 0.05 Gray (50 mGy, 5 rads), do not appear to increase the risk for fetal anomalies, intellectual disability, growth restriction, or pregnancy loss. With exposure to more than 0.05 Gy (50 mGy, 5 rads), a definitive threshold at which the risk for, locations increases has not been determined. Evidence suggests the risks begin to increase at doses above 0.1 Gy (100 mGy, 10 rads) and especially above 0.15-0.2 Gy (150-200 mGy, 15-20 rads). 3.  Poor fetal growth -- The date fetal growth assessment at 31 weeks 1 day at the [facility]. There was a 1 week lag. The composite gestational age was 31 weeks 1 day. The [facility] [place] was measuring at the 6 percentile. Fetal growth was reevaluated at 31 weeks 6 days and appropriate for the gestational age. Fetal growth was reevaluated today at 35 weeks 1 day. The composite gestational age was 26 weeks 4 days. The estimated fetal weight was 4 pounds 15 ounces, 30th percentile. There is a 2-week lag in the abdominal circumference. The [facility] [place] was measuring at the 8th percentile. Surveillance was recommended by the patient's high risk provider in [place].   We will facilitate twice-weekly fetal testing for the patient until she returns to [facility]. Counseling was previously provided to the patient. Counseling was reviewed. She did not have any additional questions or concerns. Again, the overall estimated fetal weight is greater than the 10th percentile, however the abdominal circumference measures less than the 10th percentile. Reassuring findings included a normal amniotic fluid index, a biophysical profile score of 8/8, and normal Doppler studies. The patient was counseled that typically fetal growth restriction is formally diagnosed when the overall estimated fetal weight is less than the 10th percentile. However, a decline in the overall estimated fetal weight of greater than 20% and/or an abdominal circumference that measures less then the 10th percentile are findings that are also concerning for and/or consistent with fetal growth restriction. In over 70% of cases, small fetal size is constitutional. In approximately 30% of cases, there is a secondary cause related to placental insufficiency, a fetal issue, and/or an underlying maternal condition. Risk factors were reviewed with the patient including malnutrition, maternal chronic diseases (ie SLE, renal disease, antiphospholipid antibodies, anemia, diabetes), placental disease (chorioangioma, mosaicism), infections, fetal aneuploidy, and teratogen exposure. She was counseled regarding general management plans and that mild IUGR can generally be expectantly managed until 37-39 weeks' gestation. If there is severe growth restriction, delivery timing is based on the point at which the risk of fetal death exceeds that of  death. There is an increased risk for fetal loss in the setting of growth restriction, thus, increased surveillance is indicated. The best predictors of outcome are fetal size and gestational age attained. Overall, the long term outcome depends on the etiology of the poor growth.       At this time, I recommend continued increased fetal surveillance as outlined above. Given the concern for poor fetal growth, additional maternal evaluation was recommended. The following labs were ordered: Preeclampsia screening, antiphospholipid antibodies, thyroid function studies/thyroid peroxidase antibodies, a nutrition panel, and infection studies. -- Testing ordered    Given the lag in fetal growth, a low dose aspirin was recommended. She was counseled to start 81 mg of aspirin daily. The patient was counseled to continue a daily low-dose aspirin as outlined above. 4.  Genetic counseling -- The patient was previously counseled regarding her options for genetic screening and/or diagnostic testing. She opted for screening with NIPT. Screening was completed on 2022. Her results were low risk for aneuploidy. The reported fetal fraction was 28% the reported fetal sex was female. The results were previously reviewed with the patient. The patient also opted for a Horizon 14 panel. Screening was completed on 2022. The results were reviewed with the patient. She is a silent carrier for alpha thalassemia. Additional counseling was provided, please see below. Screening was otherwise negative for cystic fibrosis, spinal muscular atrophy, and Fragile X. The patient was also counseled regarding the recommendation for maternal serum AFP to screen for open neural tube defects. Risks and benefits of screening were reviewed. Screening is recommended at 15 to 22 weeks gestation. Testing was ordered today. 5.  History of  X1 -- The patient's first pregnancy was delivered via  secondary to arrest of dilation and nonreassuring fetal heart tones. The placenta is posterior. She plans to have a repeat  for delivery. Again, her delivery will be scheduled at the [facility] in order to facilitate additional maternal care.      6.  Subchronic hemorrhage, stable -- The ultrasound images were reviewed with patient. The subchorionic hemorrhage previously described was seen today and measured 2.6 x 1.8 x 0.9 cm. The size and appearance are stable. At 31 weeks 6 days, a subchorionic hemorrhage was seen measuring 2.4 x 0.7 x 2 cm. The size is stable compared to prior imaging. At 22 weeks and 6 days, the subchronic hemorrhage measured 2.4 x 0.8 x 2 cm. The size is somewhat decreased compared to prior imaging. At 16 weeks 6 days, a subchronic hemorrhage was seen measuring 5.9 x 0.8 x 2.3 cm. The size is somewhat increased compared to prior imaging. Previously, at 12 weeks 4 days, the subchorionic hemorrhage measured 2.6 x 1.4 x 0.9 cm. No active bleeding was noted. The patient denied having any vaginal bleeding or cramping. She is noted to be Rh+. Counseling was previously provided to the patient. Counseling was reviewed. The patient did not have any additional questions or concerns. A subchorionic hemorrhage can be associated with an increased risk for bleeding, infection, early pregnancy loss, poor fetal growth,  premature rupture of membranes,  labor and delivery, and stillbirth. Because of the increased risk for complications, close follow-up is recommended. Activity restrictions were again reviewed. The patient was counseled to avoid heavy lifting, prolonged standing, strenuous exercise, and sexual intercourse. The patient is scheduled for twice-weekly fetal testing. Bleeding precautions were reviewed. 7.  Uterine fibroids -- The ultrasound images were reviewed with the patient. Multiple uterine fibroids were again noted. The size and appearance are stable compared to previous imaging. The patient again denied any symptoms of fevers, chills, and/or abdominal pain. There is a lag in fetal growth as outlined above. Counseling was previously provided to the patient. Counseling was reviewed.   The patient did not have any additional questions or concerns. Fibroids are common in women of reproductive age. Most studies that monitored the growth of fibroids during pregnancy have noted that the majority of uterine fibroids have less than 10% change in volume across gestation. Most of the growth usually occurs in the first trimester. Fibroids larger than 5 cms are more likely to grow, where as smaller fibroids most likely remain stable in size. As you know, there is a higher chance of antepartum bleeding with retroplacental fibroids and a small increase in  birth. Specifically, if placentation occurs adjacent to or overlying a fibroid. Submucosal and retroplacental fibroids with volumes more than 200 ml (corresponding to 7 cm in diameter) have the highest risk of abruption. Pain is another common complication of fibroid in pregnancy. It is especially high in fibroids greater than 5 cm in diameter. Patients with degenerating fibroids tend to have localized pain, mild leukocytosis, pyrexia and nausea. This may result from decreased perfusion in the setting of rapid growth leading to ischemia. Some studies noted increased incidence of malpresentation if the uterus has multiple fibroids. Large retroplacental fibroids that distort the uterine cavity may be associated with adverse pregnancy event including  fetal growth restriction and  labor. Overall, the risks associated with uterine fibroids in pregnancy include malpresentation, lower uterine segment obstruction, degeneration, poor fetal growth, ,  birth, and antepartum/postpartum bleeding. The size and appearance of the fibroid(s) should be reevaluated on follow-up ultrasound. A screening cervical length  was completed at 16 weeks 5 days and normal at 4.2 cm without funneling. A cervical length was repeated at 31 weeks 6 days and again normal at 5.6 cm.      Fetal growth should be monitored serially, every 3 to 4 weeks beginning at 24 to 26 weeks gestation. Precautions were reviewed with the patient. 8.  Tobacco use in pregnancy, quit -- The patient previously reported she has stopped smoking Black and milds. At 33 weeks 1 day, the patient reported that she was smoking occasionally. Today, at 35 weeks 1 day, the patient again reports that she has stopped smoking. She was again counseled regarding the increased risks of smoking in pregnancy including poor fetal growth, placental abruption, PPROM/ birth, and fetal loss. Additional  risks were again reviewed including asthma, allergies, infection, and an association with SIDS. Various techniques for cutting back and quitting were again reviewed. She was again counseled that smoking cessation is especially important in her case given her recurrent thrombotic events. She expressed verbal understanding of this counseling. 9. THC Use --  The patient previously reported that she was smoking marijuana daily. She reported that she was using marijuana for decreased appetite. The patient again reports that she has stopped smoking marijuana. She was congratulated and encouraged not to use marijuana during pregnancy. Counseling was previously provided to the patient. Counseling was reviewed. The patient did not have any additional questions or concerns. She was again counseled regarding risk of neurodevelopmental issues in children exposed to [facility] during pregnancy. Additionally, marijuana use may pose risks similar to that of cigarette use including increased risk for poor fetal growth, placental bleeding, PPROM/ delivery, and stillbirth. She was again counseled not to use THC while pregnant. Random urine drug screens should be monitored during pregnancy. 10.  First/second trimester alcohol exposure -- The patient previously reported that she drinks wine occasionally. She reports only taking sips of wine.   Today, the patient again reports that she has stopped drinking alcohol. Counseling was previously provided to the patient. Counseling was reviewed. The patient did not have any additional questions or concerns. The patient was again counseled that a safe level of alcohol consumption during pregnancy has not been determined. Determination of the impact of alcohol use during pregnancy on fetal development can be challenging because of variation in maternal alcohol clearance rates, fetal sensitivity, genetic susceptibility, maternal drinking patterns (binge drinking versus daily consumption), and confounders such a substance abuse. The patient was counseled that alcohol is a teratogen that can impact fetal growth and development at all stages during pregnancy. Currently, national guidelines and multiple medical societies recommend abstinence during pregnancy. In one large epidemiologic study, an increased rate of stillbirth was noted across all categories of alcohol intake, even after adjusting for confounders (eg, smoking, pre-pregnancy weight). There is also an increased risk for structural anomalies (craniofacial, cardiac), poor fetal growth, decreased IQ, and neurodevelopmental issues in childhood. Factors such as older age, increased parity, and  and  ethnicities are associated with an increased risk for FAS. Secondary to the increased risk for poor growth, fetal growth should be monitored serially, every 3 to 4 weeks starting at 24 to 26 weeks gestation. A fetal growth lag was noted at 31 weeks gestation by the [facility]. Secondary to the increased risk for congenital heart anomalies, a fetal echocardiogram is recommended at 22 to 24 weeks gestation. A fetal echocardiogram was completed on 9/9/2022 and reported as normal.  The results were previously reviewed with the patient. Limitations of fetal echocardiography were reviewed.      11.  Right ovarian mass/cyst -- The ultrasound images were reviewed with the patient. The right ovarian mass previously described was again seen today. Today, at 33 weeks 1 day, the hyperechoic area measured 1.1 x 0.8 x 1.1 cm. The size and appearance are stable. At 31 weeks 6 days, the hyperechoic mass measured 1.3 x 1 x 1.5 cm. The size and appearance are stable compared to prior imaging. At 12 weeks 4 days, A hyperechoic mass was seen in the right ovary measuring 1.2 x 1 x 0.9 cm. No fluid was seen surrounding the area. The patient was again counseled that this may represent a hemorrhagic cyst versus teratoma versus a calcification, although no shadowing was seen. The size and appearance of the ovarian mass will be reevaluated on follow-up ultrasound. The adnexa will be reevaluated on follow-up exam.  The ovaries should be evaluated during the patient's . Otherwise, postpartum follow-up is recommended. The patient was encouraged to discuss this issue with her provider at the [facility]. 12.   Nausea and vomiting of pregnancy, improved -- The patients previously reported having daily symptoms of nausea and decreased appetite. She was having occasional emesis. The patient reports that her symptoms of nausea and vomiting have significantly improved. She has gained 4 pounds since her last visit to our office. She has not gained any weight since her visit at 12 weeks. Today, the patient again reports that her symptoms are stable. She denied having any signs or symptoms of dehydration. The patient was again encouraged to eat small amounts of food every 2-3 hours during the day. She was also encouraged to stay well-hydrated. A baseline nutrition panel (CBC, CMP, magnesium, ferritin, folate, vitamin B12, vitamin D 25 [place]) was recommended. Baseline testing was completed on 2022, her results included: A CBC was ordered but not done by the lab.   A CBC was completed on 22, H/H 11. 9/35.8, MCV 88.6, ,000. The remainder of the test results are from 5/13/2022 and included: Potassium 3.7, creatinine 0.7, calcium 9, ALT 7, AST 13, magnesium 2, uric acid 3, , ferritin 67, folate 19.8, vitamin B12 423, vitamin D 9, TSH 0.876, free T4 1.16, free T3 3, TPO negative, antithyroglobulin antibody negative, urinalysis negative, urine culture mixed, urine protein creatinine ratio 0.1, beta-2 lipoprotein antibody negative, anticardiolipin antibody negative, protein S antigen free, 28 (), protein S antigen total 64 (%). --Additional recommendations are below. The patient was counseled to continue taking vitamin B6, 25 mg every 8 hours. She was counseled to call and/or return with worsening symptoms. Again, with persistent/worsening symptoms, I would recommend the addition of Pepcid, 20 mg twice daily and or Zofran. 13.  History of bacteruria -- The patient's hospital admission in April was reviewed. Her urinalysis was positive for Nitrites, leukocyte esterase, and bacteria. She was treated with Keflex. Her urine culture showed E. Coli and mixed gram-positive organisms including staph and Corynebacterium. She had a repeat urine culture on 5/5/2022 that was negative. She denied any signs or symptoms of recurrent infection. Precautions were reviewed. A repeat urine culture was completed on 5/13/2022 and showed less than 10,000 CFU per mL of mixed gram-positive organisms. A urine culture was repeated on 6/17/2022 and noted to be mixed. A repeat urine culture was ordered on 9/21/2022. 14.  Anticardiolipin antibody IgM, indeterminate -- Antiphospholipid antibody screening was done secondary to the patient's CVA diagnosed in April 2022. Initial screening completed on 4/18/2022 as part of the thrombophilia panel was negative. Screening was repeated on 4/19/2022. Her anticardiolipin IgM antibody was indeterminate at 17 on 4/19/2022.   Screening for lupus anticoagulant and beta-2 glycoprotein antibody was negative. The patient was counseled that this may be a false elevation, generally, the titers have to be greater than 20 for a true positive. The recommendation was made for the patient to continue taking a low dose aspirin, 81 mg daily. She should continue this for the remainder of pregnancy and 6 to 8 weeks postpartum. Repeat testing was completed on 5/13/2022 and negative. The results were reviewed with the patient. Testing was repeated on 6/17/2022. Testing for LAC, beta-2 glycoprotein, and anticardiolipin antibody was again negative. The patient was again counseled to continue to follow with hematology for long-term monitoring and management. 15.  Protein S deficiency -- The patient's labs were reviewed. The patient initially had a thrombophilia panel on 4/18/2022. Her protein S, antigen, free was 25% (). Testing was repeated on 4/19/2022. Her protein S, antigen, free at that time was 27% (). The patient would have been 9 weeks at the time of the testing. She was being evaluated for a recent thrombotic stroke. Testing is not considered reliable during acute thrombosis or pregnancy. Protein S decreases during pregnancy. However, protein S is generally considered deficient if <30% in the second trimester and/or <24% in the third trimester. Again, the patient had testing in the first trimester and her protein S levels were significantly decreased. It is unclear at this time if the patient truly has a protein S deficiency. This will not be able to be determined until the patient is postpartum. The patient was counseled that a protein S deficiency is rare, and seen in 0.03-0.13% of the population. It is considered a lower risk thrombophilia. If someone does have a deficiency, then the risk for a thrombosis during pregnancy is estimated to be 0.1% (w/negative personal history).   It is 0-22% with a personal history of thrombosis     Given the patient's recurrent, the recommendation was for therapeutic anticoagulation with Lovenox. Please see above. Testing was repeated on 6/17/2022. The protein S antigen, free was 25% (%), and protein S activity was 35% (%). Her protein S levels are again low, even for pregnancy. A hematology consultation was previously recommended. The patient is following with Dr. Ning Pierce. Repeat testing is recommended at 6 to 8 weeks postpartum. She should not be on an oral contraceptive pill at the time of repeat testing. The patient should avoid birth control containing estrogen. The patient was counseled to follow-up with hematology for long-term monitoring and management. The patient was counseled to continue taking a daily low-dose aspirin. She should continue a daily low-dose aspirin for the remainder of pregnancy and 6 to 8 weeks postpartum. Given her history of a thrombotic stroke, the recommendation was also made for anticoagulation with prophylactic Lovenox. The plan was reviewed with neurology and hematology, see above. A prescription was provided following patient's consultation. She was scheduled to return for injection teaching. Increased fetal surveillance is recommended. Fetal growth should be monitored serially, every 3 to 4 weeks starting at 24 to 26 weeks gestation. The patient should monitor fetal kick counts daily starting at 28 weeks gestation. She should be scheduled for twice-weekly fetal testing starting at 32 weeks gestation. Delivery is recommended at/by 39 weeks gestation. I would defer delivery timing to the providers at the [facility]3]. 12. Vaccination in pregnancy -- The patient was  previously counseled regarding the recommendations for vaccination in pregnancy. Counseling was reviewed. The patient did not have any additional questions or concerns.      The patient was counseled regarding the recommendations for the Tdap vaccination in pregnancy. Risks and benefits were discussed. The vaccination is typically administered between 27 and 36 weeks gestation and recommended to confer protection against whooping cough to the . The patient plans to discuss this with her primary provider at her next visit. The patient was also counseled that her partner in any individuals who will be in close contact with the  should also be vaccinated for whooping cough. The patient was counseled regarding recommendation for the flu vaccination in pregnancy for both maternal and infant safety. Risks and benefits were reviewed. Counseling was provided regarding the recommendation for the Covid vaccination in pregnancy. I advised the patient that the [employer] of Obstetrics and Gynecology and The Society for Maternal Fetal Medicine recommend that all pregnant women be vaccinated for COVID-19. \"Data have shown that COVID-19 infection puts pregnant people at increased risk of severe complications and even death; yet only about 22% of pregnant individuals have received 1 more doses of COVID-19 vaccine according to the [employer] for Disease Control and Prevention. \"     \" Recent data have shown that more than 95% of those were hospitalized and/or dying from COVID-19 are those who have remained unvaccinated. Pregnant individuals who have decided to wait until after delivery to be vaccinated may be inadvertently exposing themselves to an increased risk of severe illness or death. \"     \" COVID-19 vaccination is the best testing to reduce maternal and fetal complications of XXEXP-20 infection among pregnant people,\" said Isreal Rogers MD, president of the Society for Maternal Fetal Medicine, sub-specialists. The patient was counseled that in the event she opts not to have the Covid vaccination, she should alert her provider immediately if she test positive for Covid. Pregnant women are on the priority list for treatment with monoclonal antibody. This intervention has been shown to decrease the risk for hospitalization and complications related to Covid in pregnancy. The patient expressed verbal understanding of this counseling. 17.  Vitamin D deficiency -- The patient's vitamin D was deficient at 9  --Recommend vitamin D3 2000 IU daily  --Monitor nutrition panel q4-6 weeks (CBC, CMP, magnesium, ferritin, folate, vitamin B12, vitamin D25OH)     --Repeat testing was completed on 6/17/2022, her results included: H/H 11.2/33.7, MCV 87.1, platelet count 951,887, potassium 3.6, creatinine 0.7 calcium 7.2, ALT 15, AST 17, magnesium 1.6, ferritin 70, folate 11.2, vitamin B12 428, vitamin D 12, , uric acid 3.7, TSH 0.507, free T4 0.97, free T3 2.7, TPO negative, anticardiolipin antibody negative, beta-2 glycoprotein antibody negative, protein S antigen free 25%, hemoglobin A1c 4.9%, homocystine 6.6, protein S activity 35%, rheumatoid factor negative, C3 110, C4 23, [place] negative, LAC negative, maternal serum AFP 1.04 MOM, urinalysis negative, urine culture mixed, urine protein creatinine ratio 0.3, urinalysis negative for protein. -- The patient's vitamin D is still deficient but improving. She was counseled to continue her vitamin D supplement. --Repeat testing ordered on 9/21/2022  --Follow up with PCP for long term monitoring and management     18. 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 She was noted to be heterozygote for the MTHFR c.665C>T variant (previously designated as C677T). Counseling was previously provided  regarding the implications and management of this gene mutation in pregnancy. Counseling was reviewed. The patient did not have any additional questions or concerns. She was counseled that this gene mutation affects folic acid metabolism.  It is fairly common and found in 20-30% of the population. It is generally only a problem if homocysteine levels are elevated. In the past, this gene mutation was thought to be associated with increased obstetric risks including thrombosis, early recurrent pregnancy loss, placental abruption, hypertensive disorders of pregnancy, poor fetal growth, and fetal loss. More recent studies did not report strong associations with these risks. Because this genetic mutation affects folic acid metabolism, there is an increased risk for folic acid deficiency and other nutritional deficiencies in women with this condition. There is also an increased risk for having a child with an open neural tube defect in women with this mutation, especially if they're homozygous for the C677T mutation. Presently, this condition is not thought to be clinically significant. Generally, additional vitamin supplementation is recommended with folic acid or methyl folate, vitamin B6, and vitamin B12. The patient was counseled to take a low-dose aspirin. Fetal growth should be followed serially. She was counseled that there is a 50% chance that she will pass this mutation off to her child(sandra). She should relay this information to the pediatrician who cares for her child(sandra). She was also encouraged to review this diagnosis with her PCP. Because of this history, a baseline nutrition panel and homocysteine level were recommended. She was previously provided with orders for testing. Her homocystine level was normal at 6.6 on 6/17/2022. 23.  Silent carrier for alpha thalassemia -- The patient's records were previously reviewed. She is noted to be a silent carrier for alpha thalassemia. Counseling was previously provided to the patient. Counseling was reviewed. The patient did not have any additional questions or concerns. --Alpha thalassemia minima is another term for this condition.  This condition results from the loss of a single alpha-chain gene (ie a-/aa). Blood tests are usually normal, though the red cells may be small, leading to a suspicion that the patient is a silent carrier. The only way to confirm a silent carrier is by DNA studies. This is usually a benign carrier state. Affected individuals are not typically anemic and their hemoglobin analysis is normal. These conditions may remain undiagnosed, or they may be detected incidentally on a routine complete blood count during evaluation of an unrelated condition or in the setting of preconception testing and counseling. The patient's partner has not had testing for this condition. Carrier screening is recommended. The patient again plans to discuss this with her partner. If the patient's partner is not a carrier for alpha thalassemia, then there is a 50% chance that her child/children will also be a carrier for alpha thalassemia minima. If her partner is also a carrier of alpha thalassemia, there is an increased risk for having an affected child. Without having her partner tested, definitive counseling cannot be provided. This information should be relayed to pediatrics. I would defer any additional  evaluation to pediatrics. 20.   Migraine headaches -- The patient previously reported having continued headache symptoms after discharge from the hospital in . The headaches are a 6 out of 10 at the worst.  She reported having daily headaches. She has been using tylenol with some relief. She reports having associated symptoms of photosensitivity and phono sensitivity. Today, the patient again reports that her headache symptoms have significantly improved. The patient was again counseled that migraine headaches can improve during pregnancy. However, in some patients symptoms are exacerbated. She was counseled regarding the use of magnesium oxide 400 mg twice daily and riboflavin 100 mg daily in reducing the frequency and intensity of migraine headaches. Prescriptions were provided. Precautions were reviewed, and she was counseled that if she develops the worst headache of her life or a headache with neurological deficits, to present immediately for evaluation. She expressed verbal understanding of this counseling. Because of the patient's headache symptoms, a baseline nutrition panel and thyroid function testing was recommended. Baseline testing was completed on 5/13/2022. The results are summarized above. 21.  Eccentric umbilical cord insertion into the placenta --  The ultrasound results were reviewed with the patient. The umbilical cord inserts into the placenta 2 to 3 cm from the edge. Counseling was previously provided to the patient. Counseling was reviewed. She denies any additional questions or concerns. An eccentric cord insertion is diagnosed when the umbilical cord inserts into the placenta within 2-3 cm of the edge of the placenta. This is usually a benign finding, however, there can be an increased risk for poor fetal growth. Fetal growth should be monitored serially. There is a lag in fetal growth, see above. 22.  Abnormal urine protein creatinine ratio -- The patient's labs from 6/17/2022 were reviewed. Her urine protein creatinine ratio was abnormally elevated at 0.3. Her urine culture was mixed. Her urinalysis was negative for protein. Secondary to the conflicting results, a 44-FLYA urine collection was recommended. Nursing had previously contacted the patient regarding the instructions for testing. The p patient did not provide a urine sample today. Her blood pressure was normal at 106/72. Repeat testing previously ordered on 9/21/2022. 21.  Maternal ASD or PFO -- The patient reported that a small hole was found on the top part of her heart on follow-up imaging. She is unsure of her exact diagnosis. She is following at the [facility] clinic.      She had a fetal echocardiogram that was normal on 2022. The limitations of fetal echocardiography were reviewed. The patient was counseled to relay this history to the pediatrician who cares for her child/children. 24.  Poor maternal weight gain -- The patient previously reported that she is 5'2\" tall and weighed 161 pounds at 12 weeks gestation. She weighed 166 pounds today. She has lost 1 pound since her last visit. Her BMI is 30.36. She has gained 5 pounds since 12 weeks gestation. There is concern for a fetal growth lag. The patient was again counseled that poor maternal weight gain or low maternal weight can be associated with an increased risk for complications such as poor fetal growth,  delivery, and nutritional deficiencies. A baseline nutrition panel was ordered on 2022. The patient was encouraged to complete testing at her earliest convenience. Fetal growth is being monitored serially, see above. 25.  Pelvic pressure, stable -- The patient previously had complaints of increased pelvic pressure. She denied having any associated leaking of fluid, vaginal bleeding, cramping, and/or dysuria. A transvaginal ultrasound was completed. The patient's cervix was normal and measured 5.6 cm without funneling at 31 weeks 6 days. Today, the patient reports her symptoms are stable.  labor and PPROM precautions were reviewed. 26.  Hip pain, right -- Today, the patient had complaints of right hip and thigh pain. She reports that increased activity makes the pain worse. She denies having any associated fevers, chills, nausea, vomiting, and or dysuria. She was counseled regarding the potential utility of a maternity belt in that it may take pressure off of her lower back and provide some relief of her hip pain. If her symptoms persist or worsen, she could be referred for physical therapy, chiropractic care, and/or massage therapy.   The patient was counseled that these interventions are generally acceptable as long as the provider has been trained/certified to care for pregnant women. Precautions were reviewed with the patient. --The patient will continue to follow with maternal-fetal medicine for twice-weekly fetal testing. --The patient has a follow-up appointment at the [facility] on 10/21/2022. --I requested the patient return for a follow-up assessment in 3 to 4 days unless there is a clinical reason for her to return prior to that time. She is to call if she has any problems or questions prior to her next visit. Further evaluation and management will be dependent on her clinical presentation and the results of her testing. --The patient was advised to call if she has any increased vaginal discharge, vaginal bleeding, contractions, abdominal pain, back pain or any new significant symptomatology prior to her next visit. I advised her that these are signs and symptoms of cervical change and require follow-up assessment when they occur. Preeclampsia precautions were also reviewed with the patient. --The patient was also counseled to call and/or return with any concerns for decreased fetal activity. --The patient is to continue to follow with you in your office for ongoing obstetric care. --The total time spent on today's visit was 30 minutes. This included preparation for the visit (i.e. reviewing prior external notes and test results), performance of a medically appropriate history and examination, counseling, orders for medications, tests or other procedures, and coordination of care. Greater than 50% of the time was spent face-to-face with the patient. This time is exclusive of procedures performed. I answered all of  the patient's questions to her satisfaction. I asked her to call if she had any additional questions prior to her next visit. --At the conclusion of the visit, the patient appeared to have a good understanding of the issues discussed.   I

## 2022-10-14 NOTE — LETTER
[facility] Maternal Fetal Medicine  [address] [place] [id number]  Phone: [phone]  Fax: [phone]           Marika Herron MD      2022     Patient: Devon Pickard   MR Number: [id number]   YOB: 1990   Date of Visit: 10/14/2022       Dear Dr. Pritesh Perez: Thank you for referring Yulissa Villa to me for evaluation/treatment. Below are the relevant portions of my assessment and plan of care. If you have questions, please do not hesitate to call me. I look forward to following [facility] along with you. Sincerely,        Marika Herron MD    CC providers:  Hoang Jeong MD  [address] [address]  [place]       2022      Zaynab Jett 25  [facility]  [address] [place] [address]     RE:  Renaldo ARAUJO  : 1990   AGE: 33 y.o. This report has been created using voice recognition software. It may contain errors which are inherent in voice recognition technology. Dear Dr. Pritesh Perez:      I had the pleasure of meeting with Ms. David Sharp for a return consultation. As you know, Ms. David Sharp  is a 33 y.o.  at 35w1d (LMP = [address]) who is being followed by our office for multiple medical issues. Today, Ms. David Sharp reports that she feels well. She notes good fetal movement and denies any symptoms of leaking of fluid, vaginal bleeding, and/or contractions. She had a fetal ultrasound that was notable for the following. There is a single intrauterine gestation in a cephalic presentation with a heart rate of 140 beats per minute. The placenta is posterior. The amniotic fluid index is 11.5 cm. The composite gestational age is 33w4d. The estimated fetal weight is at the 30th percentile. AC 8th percentile. BPP 10/10. Umbilical artery PI normal.  MCA PSV normal.  MCA PI normal.  CPR PI normal.  Subchronic hemorrhage seen measuring 2.6 x 1.8 x 0.9 cm.       PERTINENT PHYSICAL EXAMINATION:   /72   Pulse 96   Wt 166 lb (75.3 kg)   LMP 02/10/2022   BMI 30.36 kg/m²     Urine dipstick:   No urine sample      A fetal ultrasound assessment was performed today. A report is enclosed for your review. Assessment & Plan:  33 y.o.  at 35w1d (LMP = [address]) with:    1. Pregnancy dating -- The patient's pregnancy dating was previously reviewed. The patient reports that was having monthly periods. She discontinued Depo-Provera in 2021. She reports a sure last menstrual period. Her reported LMP was 2/10/2022, GUERO 2022. The patient's first ultrasound was 1822. The crown-rump length was 9 weeks 4 days, GUERO 2022. Given the patient had a sure LMP that agreed with the 9-week ultrasound, the recommendation was made to use an GUERO of 2022 based on her LMP. Fetal growth was appropriate for the gestational age again today. 2.  History of stroke during pregnancy  -- The patient previously reported that she initially experienced symptoms of headache and aphasia in 2021. She did not seek medical care at that time. The patient presented to the emergency department on 2022 with complaints of a severe headache that woke her up from sleep. She had a headache 4 days prior to her evaluation in the emergency department. Two days prior to her evaluation, she had slurred speech and aphasia which lasted for approximately 20 minutes and then resolved. The patient then developed paresthesias on her right arm. A CT of the head was initially completed. The report stated, \" hypodensity within the left insular cortex with abnormal white matter hypodensity extending into the adjacent subinsular white matter and left corona radiata. Findings may be suggestive of subacute infarction. For further evaluation brain MRI is recommended. \"     An MRI, MRV, and MRA of the head with and completed.   Per the MRI report from 2022, acute infarct noted in left insula/left temporal lobe/left periventricular white matter with associated thrombosis of a superior M2 branch of the left middle cerebral artery. Chronic infarcts noted in the right corona radiata and left centrum semiovale. The MRV was unremarkable. No dural sinus thrombosis was noted. The patient was diagnosed with a subacute CVA and admitted to the hospital.  She was also noted to have bacteriuria. She was cared for by her primary care physician and she had a neurology consultation. A thrombophilia evaluation was completed. She was started on a low-dose aspirin and received Lovenox for DVT prophylaxis. A maternal echocardiogram was completed on 4/19/2022. Per the report, the study was normal.  The patient reports having a repeat echocardiogram at the [facility] clinic and she has a small PFO or ASD. The patient remained stable and was discharged home on 4/19/2022. She was discharged home on Keflex and a low-dose aspirin. She was to follow-up with obstetrics and her primary care physician. The patient's thrombophilia evaluation was completed on 4/18/2022, her results included: Homocystine 5, protein C functional 111%, protein S antigen free 25% (), Antithrombin activity 99%, APC resistance 3.54 (normal), lupus anticoagulant negative, beta-2 glycoprotein antibody negative, cardiolipin antibody negative, prothrombin 2 gene mutation negative. A screening PARIS was completed and negative. A protein S antigen free was repeated on 4/19/2022 and again low at 27 (). Homocystine was repeated on 4/19/2022 and again normal at 7.8. Factor VIII activity was normal at 134% and factor VII activity was normal at 133%. Anticardiolipin antibody was repeated on 4/19/2022 and IgM was indeterminate at 17. Repeat screening for lupus anticoagulant was negative on 4/19/2022.   A D-dimer was checked on 4/19/2022 and normal.  Patient's urine drug screen was positive for marijuana. The patient's urinalysis was abnormal.  The urine culture showed mixed jair including E. coli staph and Corynebacterium. No sensitivities were provided. The patient was seen for her initial consultation with [facility] on 5/9/2022. Counseling was provided and the recommendation was made for prophylactic anticoagulation with Lovenox, 40 mg daily. Both hematology and neurology were contacted regarding the patient and plan of care. The patient returned to the [facility] office on 5/31/2022 for a follow-up consultation. Prior to her visit, she reported to the nurse that she had recurrent strokelike symptoms including left-sided numbness and a headache. She was immediately taken to the emergency department for evaluation. She was readmitted to the hospital on 5/31/2022 with an acute CVA. A CT of the head was repeated on 5/31/2022. Per the impression, there was left insular encephalomalacia at the site of prior infarct. It was an unremarkable CTA of the head and neck. Specifically, there was no evidence of residual thrombus in the left middle cerebral artery. The patient then had an MRI/MRV/MRA of the head without contrast.  Per the impression on the report, tiny foci of acute or early subacute infarction were seen in the right frontal lobe, with single tiny foci each within the right lateral basal ganglia, external capsule, and insular cortex. Mild stenosis was noted in the left lateral transverse sinus, which may be due to an arachnoid granulation. There is no evidence of dural venous thrombosis. She was evaluated by neurology on 6/2/2022. The recommendation was made for therapeutic anticoagulation with Lovenox. She was also evaluated by hematology. She was discharged home on 6/3/2022. The patient returned to the [facility] office at 16 weeks and 6 days for follow-up. She reported that she felt well.   She still had intermittent headache symptoms that she rated as a 6 out of 10.  She reported that she was tolerating the therapeutic Lovenox well. During her visit at 16 weeks 6 days, the patient requested a referral for additional evaluation at the [facility]. The patient was counseled that this would be reasonable given her complicated pregnancy. Additionally, it may be in the patient's best interest to plan for delivery at a center in which neurology and/or neurosurgery would be available in the event the patient had any complications during delivery or postpartum. The patient was agreeable with the plan. She requested a referral to the [facility] for additional evaluation and management. A referral was provided. The patient's neurologist, Dr. Venkatesh Holbrook, was also contacted regarding the patient. He agreed with the plan outlined above. The patient is following with maternal-fetal medicine, Dr. Leilani Conrad, at the [facility]. She is also following with Dr. Martha Keller from vascular surgery. She was also seen by a vascular neurologist, Dr. Sanjeev Moore, at the [facility]. The patient returns the office today for follow-up and fetal testing secondary to the finding of poor fetal growth at the [facility]. She notes fetal movement. She again denies having any symptoms of leaking of fluid, vaginal bleeding, cramping, and/or contractions. Fetal growth was reevaluated today at 35 weeks 1 day. There is a lag in the abdominal circumference. Fetal testing was otherwise reassuring. She denies having any subsequent strokelike symptoms. She again reports that her migraine headache symptoms have significantly improved. Counseling was previously provided to the patient. Counseling was reviewed. The patient did not have any additional questions or concerns. Again, pregnancy and the puerperium (postpartum period) are well-established risk factors for thromboembolism. Normal pregnancy is accompanied by an increase in clotting factors.   The resulting hypercoagulable state likely evolved to protect women from hemorrhage in the event of a miscarriage and during childbirth. Thromboembolic disease during pregnancy and the puerperium is a significant cause of maternal morbidity and mortality. During pregnancy, the risk of venous thromboembolism increases 4-5 fold and the risk of arterial thromboembolism, myocardial infarction, and stroke increases 3-4 fold compared to the nonpregnant state. Postpartum, the risk of venous thromboembolism is 20 fold higher and the risk of arterial thromboembolism is similarly elevated compared to nonpregnant individuals. Venous events are more common during pregnancy accounting for 4 out of 5 thromboembolic events. However, the risk of death is higher following an arterial thrombosis. Approximately 80% of venous thromboembolic events are deep venous thromboses and approximately 20% of pulmonary emboli. The most important risk factor for thrombosis during pregnancy is a history of thrombosis. The risk for a thrombotic event is further increased in women with an underlying acquired or inherited thrombophilia. Most studies have reported and equal distribution of thrombosis risk across all trimesters of pregnancy however, 2 large conflicting studies have reported a first trimester (50% prior to 15 weeks) and third trimester (60%) predominance. Additional risk factors for thrombosis during pregnancy include multifetal gestation, varicose veins, inflammatory bowel disease, urinary tract infection, diabetes, hospitalization, obesity, and maternal age greater than 28 years. Compared to the antepartum period, the thrombosis risk is 2-5 times higher during the postpartum period. This risk is highest during the first 6 weeks postpartum and declines to prepregnancy rates by 13-18 weeks postpartum.  Risk factors for postpartum thrombosis include  section, medical co morbidities, obesity,  delivery, hemorrhage, fetal demise, advanced maternal age, hypertensive disorders of pregnancy, tobacco use, and infection. The patient was counseled to continue treatment with therapeutic Lovenox and a daily low-dose aspirin. She was counseled to continue to follow with the specialist at the [facility] for long-term monitoring and management. Per the consultation note with maternal-fetal medicine, the plan is for delivery at the [facility]. Per the consultation note with maternal-fetal medicine, the patient will follow-up at the [facility] at  36 weeks with a growth ultrasound at that time and plan to transfer care to [facility] for delivery at 36 weeks. It is unclear if the plan is for delivery at 36 weeks versus transfer of care at 36 weeks until delivery. The patient was again counseled to clarify this with her provider in [facility]. Therapeutic anticoagulation should be continued throughout the pregnancy and for at least 6-8 weeks postpartum. Lovenox can be initiated 12 hours following a vaginal delivery and 24 hours following a  section (if there is no ongoing concern for bleeding). The risks and benefits of therapeutic anticoagulation in pregnancy were reviewed including the development of heparin-induced thrombocytopenia (HIT), osteopenia, bleeding, and poor fetal growth.  An anesthesia consultation is also recommended in the third trimester given she is on anticoagulation.  A hematology consult was recommended. The patient is following with Dr. Roberta Jefferson neurology consult was recommended. A referral was provided. The patient is following with a vascular neurologist at the [facility] clinic, Dr. Jessica Stallworth The patient was referred to the [facility] clinic for additional maternal and fetal evaluation. She is following with Dr. Chelita Reyes.  The patient should avoid estrogen containing birth control postpartum. Increased fetal surveillance is also recommended.   Fetal 3.  Poor fetal growth -- The date fetal growth assessment at 31 weeks 1 day at the [facility]. There was a 1 week lag. The composite gestational age was 31 weeks 1 day. The [facility] was measuring at the 6 percentile. Fetal growth was reevaluated at 31 weeks 6 days and appropriate for the gestational age. Fetal growth was reevaluated today at 35 weeks 1 day. The composite gestational age was 26 weeks 4 days. The estimated fetal weight was 4 pounds 15 ounces, 30th percentile. There is a 2-week lag in the abdominal circumference. The [facility] was measuring at the 8th percentile. Surveillance was recommended by the patient's high risk provider in [place]. We will facilitate twice-weekly fetal testing for the patient until she returns to [place]. Counseling was previously provided to the patient. Counseling was reviewed. She did not have any additional questions or concerns. Again, the overall estimated fetal weight is greater than the 10th percentile, however the abdominal circumference measures less than the 10th percentile. Reassuring findings included a normal amniotic fluid index, a biophysical profile score of 8/8, and normal Doppler studies. The patient was counseled that typically fetal growth restriction is formally diagnosed when the overall estimated fetal weight is less than the 10th percentile. However, a decline in the overall estimated fetal weight of greater than 20% and/or an abdominal circumference that measures less then the 10th percentile are findings that are also concerning for and/or consistent with fetal growth restriction. In over 70% of cases, small fetal size is constitutional. In approximately 30% of cases, there is a secondary cause related to placental insufficiency, a fetal issue, and/or an underlying maternal condition.  Risk factors were reviewed with the patient including malnutrition, maternal chronic diseases (ie SLE, renal disease, antiphospholipid antibodies, anemia, diabetes), placental disease (chorioangioma, mosaicism), infections, fetal aneuploidy, and teratogen exposure. She was counseled regarding general management plans and that mild IUGR can generally be expectantly managed until 37-39 weeks' gestation. If there is severe growth restriction, delivery timing is based on the point at which the risk of fetal death exceeds that of  death. There is an increased risk for fetal loss in the setting of growth restriction, thus, increased surveillance is indicated. The best predictors of outcome are fetal size and gestational age attained. Overall, the long term outcome depends on the etiology of the poor growth. At this time, I recommend continued increased fetal surveillance as outlined above. Given the concern for poor fetal growth, additional maternal evaluation was recommended. The following labs were ordered: Preeclampsia screening, antiphospholipid antibodies, thyroid function studies/thyroid peroxidase antibodies, a nutrition panel, and infection studies. -- Testing ordered    Given the lag in fetal growth, a low dose aspirin was recommended. She was counseled to start 81 mg of aspirin daily. The patient was counseled to continue a daily low-dose aspirin as outlined above. 4.  Genetic counseling -- The patient was previously counseled regarding her options for genetic screening and/or diagnostic testing. She opted for screening with NIPT. Screening was completed on 2022. Her results were low risk for aneuploidy. The reported fetal fraction was 28% the reported fetal sex was female. The results were previously reviewed with the patient. The patient also opted for a Dials 14 panel. Screening was completed on 2022. The results were reviewed with the patient. She is a silent carrier for alpha thalassemia. Additional counseling was provided, please see below.   Screening was otherwise negative for cystic fibrosis, spinal muscular atrophy, and Fragile X. The patient was also counseled regarding the recommendation for maternal serum AFP to screen for open neural tube defects. Risks and benefits of screening were reviewed. Screening is recommended at 15 to 22 weeks gestation. Testing was ordered today. 5.  History of  X1 -- The patient's first pregnancy was delivered via  secondary to arrest of dilation and nonreassuring fetal heart tones. The placenta is posterior. She plans to have a repeat  for delivery. Again, her delivery will be scheduled at the [facility] in order to facilitate additional maternal care. 6.  Subchronic hemorrhage, stable -- The ultrasound images were reviewed with patient. The subchorionic hemorrhage previously described was seen today and measured 2.6 x 1.8 x 0.9 cm. The size and appearance are stable. At 31 weeks 6 days, a subchorionic hemorrhage was seen measuring 2.4 x 0.7 x 2 cm. The size is stable compared to prior imaging. At 22 weeks and 6 days, the subchronic hemorrhage measured 2.4 x 0.8 x 2 cm. The size is somewhat decreased compared to prior imaging. At 16 weeks 6 days, a subchronic hemorrhage was seen measuring 5.9 x 0.8 x 2.3 cm. The size is somewhat increased compared to prior imaging. Previously, at 12 weeks 4 days, the subchorionic hemorrhage measured 2.6 x 1.4 x 0.9 cm. No active bleeding was noted. The patient denied having any vaginal bleeding or cramping. She is noted to be Rh+. Counseling was previously provided to the patient. Counseling was reviewed. The patient did not have any additional questions or concerns. A subchorionic hemorrhage can be associated with an increased risk for bleeding, infection, early pregnancy loss, poor fetal growth,  premature rupture of membranes,  labor and delivery, and stillbirth.   Because of the increased risk for complications, close follow-up is recommended. Activity restrictions were again reviewed. The patient was counseled to avoid heavy lifting, prolonged standing, strenuous exercise, and sexual intercourse. The patient is scheduled for twice-weekly fetal testing. Bleeding precautions were reviewed. 7.  Uterine fibroids -- The ultrasound images were reviewed with the patient. Multiple uterine fibroids were again noted. The size and appearance are stable compared to previous imaging. The patient again denied any symptoms of fevers, chills, and/or abdominal pain. There is a lag in fetal growth as outlined above. Counseling was previously provided to the patient. Counseling was reviewed. The patient did not have any additional questions or concerns. Fibroids are common in women of reproductive age. Most studies that monitored the growth of fibroids during pregnancy have noted that the majority of uterine fibroids have less than 10% change in volume across gestation. Most of the growth usually occurs in the first trimester. Fibroids larger than 5 cms are more likely to grow, where as smaller fibroids most likely remain stable in size. As you know, there is a higher chance of antepartum bleeding with retroplacental fibroids and a small increase in  birth. Specifically, if placentation occurs adjacent to or overlying a fibroid. Submucosal and retroplacental fibroids with volumes more than 200 ml (corresponding to 7 cm in diameter) have the highest risk of abruption. Pain is another common complication of fibroid in pregnancy. It is especially high in fibroids greater than 5 cm in diameter. Patients with degenerating fibroids tend to have localized pain, mild leukocytosis, pyrexia and nausea. This may result from decreased perfusion in the setting of rapid growth leading to ischemia. Some studies noted increased incidence of malpresentation if the uterus has multiple fibroids.    Large retroplacental fibroids that distort the uterine cavity may be associated with adverse pregnancy event including  fetal growth restriction and  labor. Overall, the risks associated with uterine fibroids in pregnancy include malpresentation, lower uterine segment obstruction, degeneration, poor fetal growth, ,  birth, and antepartum/postpartum bleeding. The size and appearance of the fibroid(s) should be reevaluated on follow-up ultrasound. A screening cervical length  was completed at 16 weeks 5 days and normal at 4.2 cm without funneling. A cervical length was repeated at 31 weeks 6 days and again normal at 5.6 cm. Fetal growth should be monitored serially, every 3 to 4 weeks beginning at 24 to 26 weeks gestation. Precautions were reviewed with the patient. 8.  Tobacco use in pregnancy, quit -- The patient previously reported she has stopped smoking Black and milds. At 33 weeks 1 day, the patient reported that she was smoking occasionally. Today, at 35 weeks 1 day, the patient again reports that she has stopped smoking. She was again counseled regarding the increased risks of smoking in pregnancy including poor fetal growth, placental abruption, PPROM/ birth, and fetal loss. Additional  risks were again reviewed including asthma, allergies, infection, and an association with SIDS. Various techniques for cutting back and quitting were again reviewed. She was again counseled that smoking cessation is especially important in her case given her recurrent thrombotic events. She expressed verbal understanding of this counseling. 9. THC Use --  The patient previously reported that she was smoking marijuana daily. She reported that she was using marijuana for decreased appetite. The patient again reports that she has stopped smoking marijuana. She was congratulated and encouraged not to use marijuana during pregnancy.      Counseling was previously provided to the patient. Counseling was reviewed. The patient did not have any additional questions or concerns. She was again counseled regarding risk of neurodevelopmental issues in children exposed to [facility] during pregnancy. Additionally, marijuana use may pose risks similar to that of cigarette use including increased risk for poor fetal growth, placental bleeding, PPROM/ delivery, and stillbirth. She was again counseled not to use THC while pregnant. Random urine drug screens should be monitored during pregnancy. 10.  First/second trimester alcohol exposure -- The patient previously reported that she drinks wine occasionally. She reports only taking sips of wine. Today, the patient again reports that she has stopped drinking alcohol. Counseling was previously provided to the patient. Counseling was reviewed. The patient did not have any additional questions or concerns. The patient was again counseled that a safe level of alcohol consumption during pregnancy has not been determined. Determination of the impact of alcohol use during pregnancy on fetal development can be challenging because of variation in maternal alcohol clearance rates, fetal sensitivity, genetic susceptibility, maternal drinking patterns (binge drinking versus daily consumption), and confounders such a substance abuse. The patient was counseled that alcohol is a teratogen that can impact fetal growth and development at all stages during pregnancy. Currently, national guidelines and multiple medical societies recommend abstinence during pregnancy. In one large epidemiologic study, an increased rate of stillbirth was noted across all categories of alcohol intake, even after adjusting for confounders (eg, smoking, pre-pregnancy weight). There is also an increased risk for structural anomalies (craniofacial, cardiac), poor fetal growth, decreased IQ, and neurodevelopmental issues in childhood.   Factors such as older age, increased parity, and  and  ethnicities are associated with an increased risk for FAS. Secondary to the increased risk for poor growth, fetal growth should be monitored serially, every 3 to 4 weeks starting at 24 to 26 weeks gestation. A fetal growth lag was noted at 31 weeks gestation by the [facility]. Secondary to the increased risk for congenital heart anomalies, a fetal echocardiogram is recommended at 22 to 24 weeks gestation. A fetal echocardiogram was completed on 2022 and reported as normal.  The results were previously reviewed with the patient. Limitations of fetal echocardiography were reviewed. 11.  Right ovarian mass/cyst -- The ultrasound images were reviewed with the patient. The right ovarian mass previously described was again seen today. Today, at 33 weeks 1 day, the hyperechoic area measured 1.1 x 0.8 x 1.1 cm. The size and appearance are stable. At 31 weeks 6 days, the hyperechoic mass measured 1.3 x 1 x 1.5 cm. The size and appearance are stable compared to prior imaging. At 12 weeks 4 days, A hyperechoic mass was seen in the right ovary measuring 1.2 x 1 x 0.9 cm. No fluid was seen surrounding the area. The patient was again counseled that this may represent a hemorrhagic cyst versus teratoma versus a calcification, although no shadowing was seen. The size and appearance of the ovarian mass will be reevaluated on follow-up ultrasound. The adnexa will be reevaluated on follow-up exam.  The ovaries should be evaluated during the patient's . Otherwise, postpartum follow-up is recommended. The patient was encouraged to discuss this issue with her provider at the [facility]. 12.   Nausea and vomiting of pregnancy, improved -- The patients previously reported having daily symptoms of nausea and decreased appetite. She was having occasional emesis.   The patient reports that her symptoms of nausea and vomiting have significantly improved. She has gained 4 pounds since her last visit to our office. She has not gained any weight since her visit at 12 weeks. Today, the patient again reports that her symptoms are stable. She denied having any signs or symptoms of dehydration. The patient was again encouraged to eat small amounts of food every 2-3 hours during the day. She was also encouraged to stay well-hydrated. A baseline nutrition panel (CBC, CMP, magnesium, ferritin, folate, vitamin B12, vitamin D 25 [place]) was recommended. Baseline testing was completed on 5/13/2022, her results included: A CBC was ordered but not done by the lab. A CBC was completed on 5/31/22, H/H 11.9/35.8, MCV 88.6, ,000. The remainder of the test results are from 5/13/2022 and included: Potassium 3.7, creatinine 0.7, calcium 9, ALT 7, AST 13, magnesium 2, uric acid 3, , ferritin 67, folate 19.8, vitamin B12 423, vitamin D 9, TSH 0.876, free T4 1.16, free T3 3, TPO negative, antithyroglobulin antibody negative, urinalysis negative, urine culture mixed, urine protein creatinine ratio 0.1, beta-2 lipoprotein antibody negative, anticardiolipin antibody negative, protein S antigen free, 28 (), protein S antigen total 64 (%). --Additional recommendations are below. The patient was counseled to continue taking vitamin B6, 25 mg every 8 hours. She was counseled to call and/or return with worsening symptoms. Again, with persistent/worsening symptoms, I would recommend the addition of Pepcid, 20 mg twice daily and or Zofran. 13.  History of bacteruria -- The patient's hospital admission in April was reviewed. Her urinalysis was positive for Nitrites, leukocyte esterase, and bacteria. She was treated with Keflex. Her urine culture showed E. Coli and mixed gram-positive organisms including staph and Corynebacterium.   She had a repeat urine culture on 5/5/2022 that was negative. She denied any signs or symptoms of recurrent infection. Precautions were reviewed. A repeat urine culture was completed on 5/13/2022 and showed less than 10,000 CFU per mL of mixed gram-positive organisms. A urine culture was repeated on 6/17/2022 and noted to be mixed. A repeat urine culture was ordered on 9/21/2022. 14.  Anticardiolipin antibody IgM, indeterminate -- Antiphospholipid antibody screening was done secondary to the patient's CVA diagnosed in April 2022. Initial screening completed on 4/18/2022 as part of the thrombophilia panel was negative. Screening was repeated on 4/19/2022. Her anticardiolipin IgM antibody was indeterminate at 17 on 4/19/2022. Screening for lupus anticoagulant and beta-2 glycoprotein antibody was negative. The patient was counseled that this may be a false elevation, generally, the titers have to be greater than 20 for a true positive. The recommendation was made for the patient to continue taking a low dose aspirin, 81 mg daily. She should continue this for the remainder of pregnancy and 6 to 8 weeks postpartum. Repeat testing was completed on 5/13/2022 and negative. The results were reviewed with the patient. Testing was repeated on 6/17/2022. Testing for LAC, beta-2 glycoprotein, and anticardiolipin antibody was again negative. The patient was again counseled to continue to follow with hematology for long-term monitoring and management. 15.  Protein S deficiency -- The patient's labs were reviewed. The patient initially had a thrombophilia panel on 4/18/2022. Her protein S, antigen, free was 25% (). Testing was repeated on 4/19/2022. Her protein S, antigen, free at that time was 27% (). The patient would have been 9 weeks at the time of the testing. She was being evaluated for a recent thrombotic stroke. Testing is not considered reliable during acute thrombosis or pregnancy.      Protein S decreases during pregnancy. However, protein S is generally considered deficient if <30% in the second trimester and/or <24% in the third trimester. Again, the patient had testing in the first trimester and her protein S levels were significantly decreased. It is unclear at this time if the patient truly has a protein S deficiency. This will not be able to be determined until the patient is postpartum. The patient was counseled that a protein S deficiency is rare, and seen in 0.03-0.13% of the population. It is considered a lower risk thrombophilia. If someone does have a deficiency, then the risk for a thrombosis during pregnancy is estimated to be 0.1% (w/negative personal history). It is 0-22% with a personal history of thrombosis     Given the patient's recurrent, the recommendation was for therapeutic anticoagulation with Lovenox. Please see above. Testing was repeated on 6/17/2022. The protein S antigen, free was 25% (%), and protein S activity was 35% (%). Her protein S levels are again low, even for pregnancy. A hematology consultation was previously recommended. The patient is following with Dr. Kaitlyn Porter. Repeat testing is recommended at 6 to 8 weeks postpartum. She should not be on an oral contraceptive pill at the time of repeat testing. The patient should avoid birth control containing estrogen. The patient was counseled to follow-up with hematology for long-term monitoring and management. The patient was counseled to continue taking a daily low-dose aspirin. She should continue a daily low-dose aspirin for the remainder of pregnancy and 6 to 8 weeks postpartum. Given her history of a thrombotic stroke, the recommendation was also made for anticoagulation with prophylactic Lovenox. The plan was reviewed with neurology and hematology, see above. A prescription was provided following patient's consultation.   She was scheduled to return for injection teaching. Increased fetal surveillance is recommended. Fetal growth should be monitored serially, every 3 to 4 weeks starting at 24 to 26 weeks gestation. The patient should monitor fetal kick counts daily starting at 28 weeks gestation. She should be scheduled for twice-weekly fetal testing starting at 32 weeks gestation. Delivery is recommended at/by 39 weeks gestation. I would defer delivery timing to the providers at the [facility]. 12. Vaccination in pregnancy -- The patient was  previously counseled regarding the recommendations for vaccination in pregnancy. Counseling was reviewed. The patient did not have any additional questions or concerns. The patient was counseled regarding the recommendations for the Tdap vaccination in pregnancy. Risks and benefits were discussed. The vaccination is typically administered between 27 and 36 weeks gestation and recommended to confer protection against whooping cough to the . The patient plans to discuss this with her primary provider at her next visit. The patient was also counseled that her partner in any individuals who will be in close contact with the  should also be vaccinated for whooping cough. The patient was counseled regarding recommendation for the flu vaccination in pregnancy for both maternal and infant safety. Risks and benefits were reviewed. Counseling was provided regarding the recommendation for the Covid vaccination in pregnancy. I advised the patient that the [address] [place] [address] St of Obstetrics and Gynecology and The Society for Maternal Fetal Medicine recommend that all pregnant women be vaccinated for COVID-19.   \"Data have shown that COVID-19 infection puts pregnant people at increased risk of severe complications and even death; yet only about 22% of pregnant individuals have received 1 more doses of COVID-19 vaccine according to the [employer] for Disease Control and Prevention. \"     \" Recent data have shown that more than 95% of those were hospitalized and/or dying from COVID-19 are those who have remained unvaccinated. Pregnant individuals who have decided to wait until after delivery to be vaccinated may be inadvertently exposing themselves to an increased risk of severe illness or death. \"     \" COVID-19 vaccination is the best testing to reduce maternal and fetal complications of RBEFC-48 infection among pregnant people,\" said Neel Chu MD, president of the Society for Maternal Fetal Medicine, sub-specialists. The patient was counseled that in the event she opts not to have the Covid vaccination, she should alert her provider immediately if she test positive for Covid. Pregnant women are on the priority list for treatment with monoclonal antibody. This intervention has been shown to decrease the risk for hospitalization and complications related to Covid in pregnancy. The patient expressed verbal understanding of this counseling. 17.  Vitamin D deficiency -- The patient's vitamin D was deficient at 9  --Recommend vitamin D3 2000 IU daily  --Monitor nutrition panel q4-6 weeks (CBC, CMP, magnesium, ferritin, folate, vitamin B12, vitamin D25OH)     --Repeat testing was completed on 6/17/2022, her results included: H/H 11.2/33.7, MCV 87.1, platelet count 304,003, potassium 3.6, creatinine 0.7 calcium 7.2, ALT 15, AST 17, magnesium 1.6, ferritin 70, folate 11.2, vitamin B12 428, vitamin D 12, , uric acid 3.7, TSH 0.507, free T4 0.97, free T3 2.7, TPO negative, anticardiolipin antibody negative, beta-2 glycoprotein antibody negative, protein S antigen free 25%, hemoglobin A1c 4.9%, homocystine 6.6, protein S activity 35%, rheumatoid factor negative, C3 110, C4 23, [place] negative, LAC negative, maternal serum AFP 1.04 MOM, urinalysis negative, urine culture mixed, urine protein creatinine ratio 0.3, urinalysis negative for protein.      -- The patient's vitamin D is still deficient but improving. She was counseled to continue her vitamin D supplement. --Repeat testing ordered on 9/21/2022  --Follow up with PCP for long term monitoring and management     18. 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 She was noted to be heterozygote for the MTHFR c.665C>T variant (previously designated as C677T). Counseling was previously provided  regarding the implications and management of this gene mutation in pregnancy. Counseling was reviewed. The patient did not have any additional questions or concerns. She was counseled that this gene mutation affects folic acid metabolism. It is fairly common and found in 20-30% of the population. It is generally only a problem if homocysteine levels are elevated. In the past, this gene mutation was thought to be associated with increased obstetric risks including thrombosis, early recurrent pregnancy loss, placental abruption, hypertensive disorders of pregnancy, poor fetal growth, and fetal loss. More recent studies did not report strong associations with these risks. Because this genetic mutation affects folic acid metabolism, there is an increased risk for folic acid deficiency and other nutritional deficiencies in women with this condition. There is also an increased risk for having a child with an open neural tube defect in women with this mutation, especially if they're homozygous for the C677T mutation. Presently, this condition is not thought to be clinically significant. Generally, additional vitamin supplementation is recommended with folic acid or methyl folate, vitamin B6, and vitamin B12. The patient was counseled to take a low-dose aspirin. Fetal growth should be followed serially. She was counseled that there is a 50% chance that she will pass this mutation off to her child(sandra).   She should relay this information to the pediatrician who cares for her child(sandra). She was also encouraged to review this diagnosis with her PCP. Because of this history, a baseline nutrition panel and homocysteine level were recommended. She was previously provided with orders for testing. Her homocystine level was normal at 6.6 on 2022. 23.  Silent carrier for alpha thalassemia -- The patient's records were previously reviewed. She is noted to be a silent carrier for alpha thalassemia. Counseling was previously provided to the patient. Counseling was reviewed. The patient did not have any additional questions or concerns. --Alpha thalassemia minima is another term for this condition. This condition results from the loss of a single alpha-chain gene (ie a-/aa). Blood tests are usually normal, though the red cells may be small, leading to a suspicion that the patient is a silent carrier. The only way to confirm a silent carrier is by DNA studies. This is usually a benign carrier state. Affected individuals are not typically anemic and their hemoglobin analysis is normal. These conditions may remain undiagnosed, or they may be detected incidentally on a routine complete blood count during evaluation of an unrelated condition or in the setting of preconception testing and counseling. The patient's partner has not had testing for this condition. Carrier screening is recommended. The patient again plans to discuss this with her partner. If the patient's partner is not a carrier for alpha thalassemia, then there is a 50% chance that her child/children will also be a carrier for alpha thalassemia minima. If her partner is also a carrier of alpha thalassemia, there is an increased risk for having an affected child. Without having her partner tested, definitive counseling cannot be provided. This information should be relayed to pediatrics. I would defer any additional  evaluation to pediatrics.      20.   Migraine headaches -- The patient previously reported having continued headache symptoms after discharge from the hospital in June. The headaches are a 6 out of 10 at the worst.  She reported having daily headaches. She has been using tylenol with some relief. She reports having associated symptoms of photosensitivity and phono sensitivity. Today, the patient again reports that her headache symptoms have significantly improved. The patient was again counseled that migraine headaches can improve during pregnancy. However, in some patients symptoms are exacerbated. She was counseled regarding the use of magnesium oxide 400 mg twice daily and riboflavin 100 mg daily in reducing the frequency and intensity of migraine headaches. Prescriptions were provided. Precautions were reviewed, and she was counseled that if she develops the worst headache of her life or a headache with neurological deficits, to present immediately for evaluation. She expressed verbal understanding of this counseling. Because of the patient's headache symptoms, a baseline nutrition panel and thyroid function testing was recommended. Baseline testing was completed on 5/13/2022. The results are summarized above. 21.  Eccentric umbilical cord insertion into the placenta --  The ultrasound results were reviewed with the patient. The umbilical cord inserts into the placenta 2 to 3 cm from the edge. Counseling was previously provided to the patient. Counseling was reviewed. She denies any additional questions or concerns. An eccentric cord insertion is diagnosed when the umbilical cord inserts into the placenta within 2-3 cm of the edge of the placenta. This is usually a benign finding, however, there can be an increased risk for poor fetal growth. Fetal growth should be monitored serially. There is a lag in fetal growth, see above.      22.  Abnormal urine protein creatinine ratio -- The patient's labs from 6/17/2022 were reviewed. Her urine protein creatinine ratio was abnormally elevated at 0.3. Her urine culture was mixed. Her urinalysis was negative for protein. Secondary to the conflicting results, a 98-DOPA urine collection was recommended. Nursing had previously contacted the patient regarding the instructions for testing. The p patient did not provide a urine sample today. Her blood pressure was normal at 106/72. Repeat testing previously ordered on 2022. 21.  Maternal ASD or PFO -- The patient reported that a small hole was found on the top part of her heart on follow-up imaging. She is unsure of her exact diagnosis. She is [facility]. She had a fetal echocardiogram that was normal on 2022. The limitations of fetal echocardiography were reviewed. The patient was counseled to relay this history to the pediatrician who cares for her child/children. 24.  Poor maternal weight gain -- The patient previously reported that she is 5'2\" tall and weighed 161 pounds at 12 weeks gestation. She weighed 166 pounds today. She has lost 1 pound since her last visit. Her BMI is 30.36. She has gained 5 pounds since 12 weeks gestation. There is concern for a fetal growth lag. The patient was again counseled that poor maternal weight gain or low maternal weight can be associated with an increased risk for complications such as poor fetal growth,  delivery, and nutritional deficiencies. A baseline nutrition panel was ordered on 2022. The patient was encouraged to complete testing at her earliest convenience. Fetal growth is being monitored serially, see above. 25.  Pelvic pressure, stable -- The patient previously had complaints of increased pelvic pressure. She denied having any associated leaking of fluid, vaginal bleeding, cramping, and/or dysuria. A transvaginal ultrasound was completed.   The patient's cervix was normal and measured 5.6 cm without funneling at 31 weeks 6 days. Today, the patient reports her symptoms are stable.  labor and PPROM precautions were reviewed. 26.  Hip pain, right -- Today, the patient had complaints of right hip and thigh pain. She reports that increased activity makes the pain worse. She denies having any associated fevers, chills, nausea, vomiting, and or dysuria. She was counseled regarding the potential utility of a maternity belt in that it may take pressure off of her lower back and provide some relief of her hip pain. If her symptoms persist or worsen, she could be referred for physical therapy, chiropractic care, and/or massage therapy. The patient was counseled that these interventions are generally acceptable as long as the provider has been trained/certified to care for pregnant women. Precautions were reviewed with the patient. --The patient will continue to follow with maternal-fetal medicine for twice-weekly fetal testing. --The patient has a follow-up appointment at the [facility] on 10/21/2022. --I requested the patient return for a follow-up assessment in 3 to 4 days unless there is a clinical reason for her to return prior to that time. She is to call if she has any problems or questions prior to her next visit. Further evaluation and management will be dependent on her clinical presentation and the results of her testing. --The patient was advised to call if she has any increased vaginal discharge, vaginal bleeding, contractions, abdominal pain, back pain or any new significant symptomatology prior to her next visit. I advised her that these are signs and symptoms of cervical change and require follow-up assessment when they occur. Preeclampsia precautions were also reviewed with the patient. --The patient was also counseled to call and/or return with any concerns for decreased fetal activity.     --The patient is to continue to follow with you in your office for ongoing obstetric care. --The total time spent on today's visit was 30 minutes. This included preparation for the visit (i.e. reviewing prior external notes and test results), performance of a medically appropriate history and examination, counseling, orders for medications, tests or other procedures, and coordination of care. Greater than 50% of the time was spent face-to-face with the patient. This time is exclusive of procedures performed. I answered all of  the patient's questions to her satisfaction. I asked her to call if she had any additional questions prior to her next visit. --At the conclusion of the visit, the patient appeared to have a good understanding of the issues discussed. I answered all of her questions to her satisfaction. I asked her to call if she had any additional questions prior to her next visit. --Thank you for allowing me to participate in the care of this pleasant patient. Please don't hesitate to call me if you have any questions. Sincerely,      Roberto Avina MD, [address]  [phone]      *All or parts of this note may have been generated using a voice recognition program. There may be typo, grammar, or Word substitution errors that have escaped my review of this note.

## 2022-10-14 NOTE — PROGRESS NOTES
Pt denies bleeding cramping or lof  Pt stated she has had pressure at the bottom of her stomach  Baby is active

## 2022-10-18 ENCOUNTER — HOSPITAL ENCOUNTER (OUTPATIENT)
Age: 32
Discharge: HOME | End: 2022-10-18
Payer: MEDICAID

## 2022-10-18 DIAGNOSIS — Z3A.35 35 WEEKS GESTATION OF PREGNANCY: ICD-10-CM

## 2022-10-18 DIAGNOSIS — O36.5930 POOR FETAL GROWTH AFFECTING MANAGEMENT OF MOTHER IN THIRD TRIMESTER, SINGLE OR UNSPECIFIED FETUS: ICD-10-CM

## 2022-10-18 DIAGNOSIS — E53.8 LOW FOLATE: ICD-10-CM

## 2022-10-18 DIAGNOSIS — Z3A.31 31 WEEKS GESTATION OF PREGNANCY: ICD-10-CM

## 2022-10-18 DIAGNOSIS — E55.9 VITAMIN D DEFICIENCY: ICD-10-CM

## 2022-10-18 DIAGNOSIS — R80.9 PROTEINURIA, UNSPECIFIED TYPE: ICD-10-CM

## 2022-10-18 LAB
BACTERIA: ABNORMAL /HPF
BILIRUBIN URINE: NEGATIVE
BLOOD, URINE: NEGATIVE
CLARITY: CLEAR
COLOR: YELLOW
CREATININE URINE: 167 MG/DL (ref 29–226)
EPITHELIAL CELLS, UA: ABNORMAL /HPF
FERRITIN: 11 NG/ML
FOLATE: 8.8 NG/ML (ref 4.8–24.2)
GLUCOSE TOLERANCE TEST 1 HOUR: 159 MG/DL
GLUCOSE TOLERANCE TEST 2 HOUR: 110 MG/DL
GLUCOSE TOLERANCE TEST FASTING: 82 MG/DL
GLUCOSE URINE: 500 MG/DL
HBA1C MFR BLD: 5 % (ref 4–5.6)
KETONES, URINE: ABNORMAL MG/DL
LACTATE DEHYDROGENASE: 163 U/L (ref 135–214)
LEUKOCYTE ESTERASE, URINE: ABNORMAL
MAGNESIUM: 1.7 MG/DL (ref 1.6–2.6)
NITRITE, URINE: NEGATIVE
PH UA: 6 (ref 5–9)
PROTEIN PROTEIN: 43 MG/DL (ref 0–12)
PROTEIN UA: ABNORMAL MG/DL
PROTEIN/CREAT RATIO: 0.3
PROTEIN/CREAT RATIO: 0.3 (ref 0–0.2)
RBC UA: ABNORMAL /HPF (ref 0–2)
SPECIFIC GRAVITY UA: >=1.03 (ref 1–1.03)
TSH SERPL DL<=0.05 MIU/L-ACNC: 0.83 UIU/ML (ref 0.27–4.2)
URIC ACID, SERUM: 3.7 MG/DL (ref 2.4–5.7)
UROBILINOGEN, URINE: 0.2 E.U./DL
VITAMIN B-12: 281 PG/ML (ref 211–946)
VITAMIN D 25-HYDROXY: 12 NG/ML (ref 30–100)
WBC UA: ABNORMAL /HPF (ref 0–5)

## 2022-10-18 PROCEDURE — 82728 ASSAY OF FERRITIN: CPT

## 2022-10-18 PROCEDURE — 86747 PARVOVIRUS ANTIBODY: CPT

## 2022-10-18 PROCEDURE — 86778 TOXOPLASMA ANTIBODY IGM: CPT

## 2022-10-18 PROCEDURE — 86777 TOXOPLASMA ANTIBODY: CPT

## 2022-10-18 PROCEDURE — 82746 ASSAY OF FOLIC ACID SERUM: CPT

## 2022-10-18 PROCEDURE — 86645 CMV ANTIBODY IGM: CPT

## 2022-10-18 PROCEDURE — 84156 ASSAY OF PROTEIN URINE: CPT

## 2022-10-18 PROCEDURE — 80053 COMPREHEN METABOLIC PANEL: CPT

## 2022-10-18 PROCEDURE — 81001 URINALYSIS AUTO W/SCOPE: CPT

## 2022-10-18 PROCEDURE — 84481 FREE ASSAY (FT-3): CPT

## 2022-10-18 PROCEDURE — 83615 LACTATE (LD) (LDH) ENZYME: CPT

## 2022-10-18 PROCEDURE — 84550 ASSAY OF BLOOD/URIC ACID: CPT

## 2022-10-18 PROCEDURE — 86901 BLOOD TYPING SEROLOGIC RH(D): CPT

## 2022-10-18 PROCEDURE — 83036 HEMOGLOBIN GLYCOSYLATED A1C: CPT

## 2022-10-18 PROCEDURE — 85025 COMPLETE CBC W/AUTO DIFF WBC: CPT

## 2022-10-18 PROCEDURE — 86850 RBC ANTIBODY SCREEN: CPT

## 2022-10-18 PROCEDURE — 86900 BLOOD TYPING SEROLOGIC ABO: CPT

## 2022-10-18 PROCEDURE — 83735 ASSAY OF MAGNESIUM: CPT

## 2022-10-18 PROCEDURE — 36415 COLL VENOUS BLD VENIPUNCTURE: CPT

## 2022-10-18 PROCEDURE — 86644 CMV ANTIBODY: CPT

## 2022-10-18 PROCEDURE — 87088 URINE BACTERIA CULTURE: CPT

## 2022-10-18 PROCEDURE — 84439 ASSAY OF FREE THYROXINE: CPT

## 2022-10-18 PROCEDURE — 86800 THYROGLOBULIN ANTIBODY: CPT

## 2022-10-18 PROCEDURE — 82306 VITAMIN D 25 HYDROXY: CPT

## 2022-10-18 PROCEDURE — 82570 ASSAY OF URINE CREATININE: CPT

## 2022-10-18 PROCEDURE — 84443 ASSAY THYROID STIM HORMONE: CPT

## 2022-10-18 PROCEDURE — 82951 GLUCOSE TOLERANCE TEST (GTT): CPT

## 2022-10-18 PROCEDURE — 82607 VITAMIN B-12: CPT

## 2022-10-18 PROCEDURE — 86376 MICROSOMAL ANTIBODY EACH: CPT

## 2022-10-19 ENCOUNTER — ANCILLARY PROCEDURE (OUTPATIENT)
Dept: OBGYN CLINIC | Age: 32
End: 2022-10-19
Payer: MEDICAID

## 2022-10-19 ENCOUNTER — ROUTINE PRENATAL (OUTPATIENT)
Dept: OBGYN CLINIC | Age: 32
End: 2022-10-19
Payer: MEDICAID

## 2022-10-19 VITALS
WEIGHT: 168 LBS | HEART RATE: 93 BPM | DIASTOLIC BLOOD PRESSURE: 76 MMHG | HEIGHT: 62 IN | SYSTOLIC BLOOD PRESSURE: 112 MMHG | BODY MASS INDEX: 30.91 KG/M2

## 2022-10-19 DIAGNOSIS — Q24.9 MATERNAL CONGENITAL CARDIAC ANOMALY AFFECTING PREGNANCY, ANTEPARTUM, THIRD TRIMESTER: ICD-10-CM

## 2022-10-19 DIAGNOSIS — E53.8 LOW FOLATE: ICD-10-CM

## 2022-10-19 DIAGNOSIS — O43.199 MARGINAL INSERTION OF UMBILICAL CORD AFFECTING MANAGEMENT OF MOTHER: ICD-10-CM

## 2022-10-19 DIAGNOSIS — D56.3 ALPHA THALASSEMIA SILENT CARRIER: Primary | ICD-10-CM

## 2022-10-19 DIAGNOSIS — O99.119 PROTEIN S DEFICIENCY AFFECTING PREGNANCY (HCC): ICD-10-CM

## 2022-10-19 DIAGNOSIS — E53.8 LOW VITAMIN B12 LEVEL: ICD-10-CM

## 2022-10-19 DIAGNOSIS — R80.9 URINE PROTEIN INCREASED: ICD-10-CM

## 2022-10-19 DIAGNOSIS — Z3A.35 35 WEEKS GESTATION OF PREGNANCY: ICD-10-CM

## 2022-10-19 DIAGNOSIS — E55.9 VITAMIN D DEFICIENCY: ICD-10-CM

## 2022-10-19 DIAGNOSIS — R79.0 LOW FERRITIN: ICD-10-CM

## 2022-10-19 DIAGNOSIS — O99.413 MATERNAL CONGENITAL CARDIAC ANOMALY AFFECTING PREGNANCY, ANTEPARTUM, THIRD TRIMESTER: ICD-10-CM

## 2022-10-19 DIAGNOSIS — Z15.89 HETEROZYGOUS MTHFR MUTATION C677T: ICD-10-CM

## 2022-10-19 DIAGNOSIS — O34.10 UTERINE FIBROID COMPLICATING ANTENATAL CARE, BABY NOT YET DELIVERED: ICD-10-CM

## 2022-10-19 DIAGNOSIS — D68.59 PROTEIN S DEFICIENCY AFFECTING PREGNANCY (HCC): ICD-10-CM

## 2022-10-19 DIAGNOSIS — D25.9 UTERINE FIBROID COMPLICATING ANTENATAL CARE, BABY NOT YET DELIVERED: ICD-10-CM

## 2022-10-19 DIAGNOSIS — I63.9 CEREBROVASCULAR ACCIDENT (CVA), UNSPECIFIED MECHANISM (HCC): ICD-10-CM

## 2022-10-19 DIAGNOSIS — N83.201 RIGHT OVARIAN CYST: ICD-10-CM

## 2022-10-19 LAB
ABO/RH: NORMAL
ALBUMIN SERPL-MCNC: 3.5 G/DL (ref 3.5–5.2)
ALP BLD-CCNC: 128 U/L (ref 35–104)
ALT SERPL-CCNC: 6 U/L (ref 0–32)
ANION GAP SERPL CALCULATED.3IONS-SCNC: 18 MMOL/L (ref 7–16)
ANTIBODY SCREEN: NORMAL
AST SERPL-CCNC: 14 U/L (ref 0–31)
BASOPHILS ABSOLUTE: 0.03 E9/L (ref 0–0.2)
BASOPHILS RELATIVE PERCENT: 0.3 % (ref 0–2)
BILIRUB SERPL-MCNC: <0.2 MG/DL (ref 0–1.2)
BUN BLDV-MCNC: 10 MG/DL (ref 6–20)
CALCIUM SERPL-MCNC: 9.2 MG/DL (ref 8.6–10.2)
CHLORIDE BLD-SCNC: 104 MMOL/L (ref 98–107)
CO2: 15 MMOL/L (ref 22–29)
CREAT SERPL-MCNC: 0.7 MG/DL (ref 0.5–1)
CYTOMEGALOVIRUS IGG ANTIBODY: NORMAL
CYTOMEGALOVIRUS IGM ANTIBODY: NORMAL
EOSINOPHILS ABSOLUTE: 0.1 E9/L (ref 0.05–0.5)
EOSINOPHILS RELATIVE PERCENT: 1 % (ref 0–6)
GFR SERPL CREATININE-BSD FRML MDRD: >60 ML/MIN/1.73
GLUCOSE BLD-MCNC: 81 MG/DL (ref 74–99)
GLUCOSE URINE, POC: NORMAL
HCT VFR BLD CALC: 33.7 % (ref 34–48)
HEMOGLOBIN: 10.9 G/DL (ref 11.5–15.5)
IMMATURE GRANULOCYTES #: 0.25 E9/L
IMMATURE GRANULOCYTES %: 2.5 % (ref 0–5)
LYMPHOCYTES ABSOLUTE: 2.62 E9/L (ref 1.5–4)
LYMPHOCYTES RELATIVE PERCENT: 26.2 % (ref 20–42)
MCH RBC QN AUTO: 29.9 PG (ref 26–35)
MCHC RBC AUTO-ENTMCNC: 32.3 % (ref 32–34.5)
MCV RBC AUTO: 92.6 FL (ref 80–99.9)
MONOCYTES ABSOLUTE: 0.64 E9/L (ref 0.1–0.95)
MONOCYTES RELATIVE PERCENT: 6.4 % (ref 2–12)
NEUTROPHILS ABSOLUTE: 6.36 E9/L (ref 1.8–7.3)
NEUTROPHILS RELATIVE PERCENT: 63.6 % (ref 43–80)
PDW BLD-RTO: 14.8 FL (ref 11.5–15)
PLATELET # BLD: 355 E9/L (ref 130–450)
PMV BLD AUTO: 10.6 FL (ref 7–12)
POTASSIUM SERPL-SCNC: 3.6 MMOL/L (ref 3.5–5)
PROTEIN UA: POSITIVE
RBC # BLD: 3.64 E12/L (ref 3.5–5.5)
SODIUM BLD-SCNC: 137 MMOL/L (ref 132–146)
T3 FREE: 2.8 PG/ML (ref 2–4.4)
T4 FREE: 0.98 NG/DL (ref 0.93–1.7)
TOTAL PROTEIN: 6.7 G/DL (ref 6.4–8.3)
TOXOPLASMA IGM ANTIBODY: NORMAL
TOXOPLASMOSIS IGG AB: NORMAL
WBC # BLD: 10 E9/L (ref 4.5–11.5)

## 2022-10-19 PROCEDURE — 4004F PT TOBACCO SCREEN RCVD TLK: CPT | Performed by: OBSTETRICS & GYNECOLOGY

## 2022-10-19 PROCEDURE — 76818 FETAL BIOPHYS PROFILE W/NST: CPT | Performed by: OBSTETRICS & GYNECOLOGY

## 2022-10-19 PROCEDURE — 76820 UMBILICAL ARTERY ECHO: CPT | Performed by: OBSTETRICS & GYNECOLOGY

## 2022-10-19 PROCEDURE — G8484 FLU IMMUNIZE NO ADMIN: HCPCS | Performed by: OBSTETRICS & GYNECOLOGY

## 2022-10-19 PROCEDURE — 76815 OB US LIMITED FETUS(S): CPT | Performed by: OBSTETRICS & GYNECOLOGY

## 2022-10-19 PROCEDURE — 99214 OFFICE O/P EST MOD 30 MIN: CPT | Performed by: OBSTETRICS & GYNECOLOGY

## 2022-10-19 PROCEDURE — 76821 MIDDLE CEREBRAL ARTERY ECHO: CPT | Performed by: OBSTETRICS & GYNECOLOGY

## 2022-10-19 PROCEDURE — 99213 OFFICE O/P EST LOW 20 MIN: CPT | Performed by: OBSTETRICS & GYNECOLOGY

## 2022-10-19 PROCEDURE — 81002 URINALYSIS NONAUTO W/O SCOPE: CPT | Performed by: OBSTETRICS & GYNECOLOGY

## 2022-10-19 PROCEDURE — G8419 CALC BMI OUT NRM PARAM NOF/U: HCPCS | Performed by: OBSTETRICS & GYNECOLOGY

## 2022-10-19 PROCEDURE — G8428 CUR MEDS NOT DOCUMENT: HCPCS | Performed by: OBSTETRICS & GYNECOLOGY

## 2022-10-19 RX ORDER — FOLIC ACID 1 MG/1
1 TABLET ORAL DAILY
Qty: 30 TABLET | Refills: 6 | Status: SHIPPED | OUTPATIENT
Start: 2022-10-19

## 2022-10-19 RX ORDER — LANOLIN ALCOHOL/MO/W.PET/CERES
1000 CREAM (GRAM) TOPICAL DAILY
Qty: 30 TABLET | Refills: 3 | Status: SHIPPED | OUTPATIENT
Start: 2022-10-19

## 2022-10-19 RX ORDER — ACETAMINOPHEN 160 MG
TABLET,DISINTEGRATING ORAL
Qty: 45 CAPSULE | Refills: 3 | Status: SHIPPED | OUTPATIENT
Start: 2022-10-19

## 2022-10-19 NOTE — PROGRESS NOTES
the 9-week ultrasound, the recommendation was made to use an GUERO of 11/17/2022 based on her LMP. Fetal growth was appropriate for the gestational age again today. 2.  History of stroke during pregnancy  -- The patient previously reported that she initially experienced symptoms of headache and aphasia in November 2021. She did not seek medical care at that time. The patient presented to the emergency department on April 17, 2022 with complaints of a severe headache that woke her up from sleep. She had a headache 4 days prior to her evaluation in the emergency department. Two days prior to her evaluation, she had slurred speech and aphasia which lasted for approximately 20 minutes and then resolved. The patient then developed paresthesias on her right arm. A CT of the head was initially completed. The report stated, \" hypodensity within the left insular cortex with abnormal white matter hypodensity extending into the adjacent subinsular white matter and left corona radiata. Findings may be suggestive of subacute infarction. For further evaluation brain MRI is recommended. \"     An MRI, MRV, and MRA of the head with and completed. Per the MRI report from 4/18/2022, acute infarct noted in left insula/left temporal lobe/left periventricular white matter with associated thrombosis of a superior M2 branch of the left middle cerebral artery. Chronic infarcts noted in the right corona radiata and left centrum semiovale. The MRV was unremarkable. No dural sinus thrombosis was noted. The patient was diagnosed with a subacute CVA and admitted to the hospital.  She was also noted to have bacteriuria. She was cared for by her primary care physician and she had a neurology consultation. A thrombophilia evaluation was completed. She was started on a low-dose aspirin and received Lovenox for DVT prophylaxis. A maternal echocardiogram was completed on 4/19/2022.   Per the report, the study was normal.  The patient reports having a repeat echocardiogram at the [facility] clinic and she has a small PFO or ASD. The patient remained stable and was discharged home on 4/19/2022. She was discharged home on Keflex and a low-dose aspirin. She was to follow-up with obstetrics and her primary care physician. The patient's thrombophilia evaluation was completed on 4/18/2022, her results included: Homocystine 5, protein C functional 111%, protein S antigen free 25% (), Antithrombin activity 99%, APC resistance 3.54 (normal), lupus anticoagulant negative, beta-2 glycoprotein antibody negative, cardiolipin antibody negative, prothrombin 2 gene mutation negative. A screening PARIS was completed and negative. A protein S antigen free was repeated on 4/19/2022 and again low at 27 (). Homocystine was repeated on 4/19/2022 and again normal at 7.8. Factor VIII activity was normal at 134% and factor VII activity was normal at 133%. Anticardiolipin antibody was repeated on 4/19/2022 and IgM was indeterminate at 17. Repeat screening for lupus anticoagulant was negative on 4/19/2022. A D-dimer was checked on 4/19/2022 and normal.  Patient's urine drug screen was positive for marijuana. The patient's urinalysis was abnormal.  The urine culture showed mixed jair including E. coli staph and Corynebacterium. No sensitivities were provided. The patient was seen for her initial consultation with [facility] on 5/9/2022. Counseling was provided and the recommendation was made for prophylactic anticoagulation with Lovenox, 40 mg daily. Both hematology and neurology were contacted regarding the patient and plan of care. The patient returned to the [facility] office on 5/31/2022 for a follow-up consultation. Prior to her visit, she reported to the nurse that she had recurrent strokelike symptoms including left-sided numbness and a headache.   She was immediately taken to the emergency department for evaluation. She was readmitted to the hospital on 5/31/2022 with an acute CVA. A CT of the head was repeated on 5/31/2022. Per the impression, there was left insular encephalomalacia at the site of prior infarct. It was an unremarkable CTA of the head and neck. Specifically, there was no evidence of residual thrombus in the left middle cerebral artery. The patient then had an MRI/MRV/MRA of the head without contrast.  Per the impression on the report, tiny foci of acute or early subacute infarction were seen in the right frontal lobe, with single tiny foci each within the right lateral basal ganglia, external capsule, and insular cortex. Mild stenosis was noted in the left lateral transverse sinus, which may be due to an arachnoid granulation. There is no evidence of dural venous thrombosis. She was evaluated by neurology on 6/2/2022. The recommendation was made for therapeutic anticoagulation with Lovenox. She was also evaluated by hematology. She was discharged home on 6/3/2022. The patient returned to the [facility] office at 16 weeks and 6 days for follow-up. She reported that she felt well. She still had intermittent headache symptoms that she rated as a 6 out of 10. She reported that she was tolerating the therapeutic Lovenox well. During her visit at 16 weeks 6 days, the patient requested a referral for additional evaluation at the [facility]. The patient was counseled that this would be reasonable given her complicated pregnancy. Additionally, it may be in the patient's best interest to plan for delivery at a center in which neurology and/or neurosurgery would be available in the event the patient had any complications during delivery or postpartum. The patient was agreeable with the plan. She requested a referral to the [facility] for additional evaluation and management. A referral was provided.      The patient's neurologist, Dr. Aidan Guzman, was also contacted regarding the patient. He agreed with the plan outlined above. The patient is following with maternal-fetal medicine, Dr. Kanchan Gustafson, at the [facility]. She is also following with Dr. Chelsea Posey from vascular surgery. She was also seen by a vascular neurologist, Dr. Lisy Sibley, at the [facility]. The patient returns the office today for follow-up and fetal testing secondary to the finding of poor fetal growth. She notes fetal movement. She again denies having any symptoms of leaking of fluid, vaginal bleeding, cramping, and/or contractions. Fetal growth was reevaluated at 35 weeks 1 day. There is a lag in the abdominal circumference. Fetal testing was otherwise reassuring. Testing was reassuring today with a biophysical profile score of 83/62, normal umbilical artery PI, and normal MCA PI. She denies having any subsequent strokelike symptoms. She again reports that her migraine headache symptoms have significantly improved. Counseling was previously provided to the patient. Counseling was reviewed. The patient did not have any additional questions or concerns. Again, pregnancy and the puerperium (postpartum period) are well-established risk factors for thromboembolism. Normal pregnancy is accompanied by an increase in clotting factors. The resulting hypercoagulable state likely evolved to protect women from hemorrhage in the event of a miscarriage and during childbirth. Thromboembolic disease during pregnancy and the puerperium is a significant cause of maternal morbidity and mortality. During pregnancy, the risk of venous thromboembolism increases 4-5 fold and the risk of arterial thromboembolism, myocardial infarction, and stroke increases 3-4 fold compared to the nonpregnant state. Postpartum, the risk of venous thromboembolism is 20 fold higher and the risk of arterial thromboembolism is similarly elevated compared to nonpregnant individuals.   Venous events are more common during pregnancy accounting for 4 out of 5 thromboembolic events. However, the risk of death is higher following an arterial thrombosis. Approximately 80% of venous thromboembolic events are deep venous thromboses and approximately 20% of pulmonary emboli. The most important risk factor for thrombosis during pregnancy is a history of thrombosis. The risk for a thrombotic event is further increased in women with an underlying acquired or inherited thrombophilia. Most studies have reported and equal distribution of thrombosis risk across all trimesters of pregnancy however, 2 large conflicting studies have reported a first trimester (50% prior to 15 weeks) and third trimester (60%) predominance. Additional risk factors for thrombosis during pregnancy include multifetal gestation, varicose veins, inflammatory bowel disease, urinary tract infection, diabetes, hospitalization, obesity, and maternal age greater than 28 years. Compared to the antepartum period, the thrombosis risk is 2-5 times higher during the postpartum period. This risk is highest during the first 6 weeks postpartum and declines to prepregnancy rates by 13-18 weeks postpartum. Risk factors for postpartum thrombosis include  section, medical co morbidities, obesity,  delivery, hemorrhage, fetal demise, advanced maternal age, hypertensive disorders of pregnancy, tobacco use, and infection. The patient was counseled to continue treatment with therapeutic Lovenox and a daily low-dose aspirin. She was counseled to continue to follow with the specialist at the [facility] for long-term monitoring and management. Per the consultation note with maternal-fetal medicine, the plan is for delivery at the [facility].   Per the consultation note with maternal-fetal medicine, the patient will follow-up at the [facility] clinic at  36 weeks with a growth ultrasound at that time and plan to transfer care to [facility] pregnancy loss, fetal malformations, poor fetal growth, and an increase for childhood cancers (increased from 1/2800 to 1/2000). Generally, these risks are not significantly increased unless the radiation exposure exceeds 50 mGy (5 rads). The estimated radiation exposure associated with a head CT is 1-10 mGy (0.1-1 rads). The International Commission of Radiologic Protection suggests that the radiation doses delivered in utero by imaging tests (such as those performed in the diagnosis of PE) present no measurable increased risk of fetal death or developmental abnormalities over the background incidence of these entities. The [facility] [employer] of Radiation Protection and Measurements considers the risk of radiation-associated abnormalities to be negligible, at less than 50 mGy when compared with other risks of pregnancy. Per available data, diagnostic imaging studies that expose the fetus to less than 0.05 Gray (50 mGy, 5 rads), do not appear to increase the risk for fetal anomalies, intellectual disability, growth restriction, or pregnancy loss. With exposure to more than 0.05 Gy (50 mGy, 5 rads), a definitive threshold at which the risk for, locations increases has not been determined. Evidence suggests the risks begin to increase at doses above 0.1 Gy (100 mGy, 10 rads) and especially above 0.15-0.2 Gy (150-200 mGy, 15-20 rads). 3.  Poor fetal growth -- The date fetal growth assessment at 31 weeks 1 day at the [facility]. There was a 1 week lag. The composite gestational age was 31 weeks 1 day. The [facility] was measuring at the 6 percentile. Fetal growth was reevaluated at 31 weeks 6 days and appropriate for the gestational age. Fetal growth was reevaluated at 35 weeks 1 day. The composite gestational age was 26 weeks 4 days. The estimated fetal weight was 4 pounds 15 ounces, 30th percentile. There is a 2-week lag in the abdominal circumference.   The [facility] was measuring at the 8th percentile. Surveillance was recommended by the patient's high risk provider in [facility]. We will facilitate twice-weekly fetal testing for the patient until she returns to [facility]. Fetal testing was reassuring with a biophysical profile score of 52/94, normal umbilical artery Doppler studies, and a normal MCA PI. Counseling was previously provided to the patient. Counseling was reviewed. She did not have any additional questions or concerns. Again, the overall estimated fetal weight is greater than the 10th percentile, however the abdominal circumference measures less than the 10th percentile. Reassuring findings included a normal amniotic fluid index, a biophysical profile score of 8/8, and normal Doppler studies. The patient was counseled that typically fetal growth restriction is formally diagnosed when the overall estimated fetal weight is less than the 10th percentile. However, a decline in the overall estimated fetal weight of greater than 20% and/or an abdominal circumference that measures less then the 10th percentile are findings that are also concerning for and/or consistent with fetal growth restriction. In over 70% of cases, small fetal size is constitutional. In approximately 30% of cases, there is a secondary cause related to placental insufficiency, a fetal issue, and/or an underlying maternal condition. Risk factors were reviewed with the patient including malnutrition, maternal chronic diseases (ie SLE, renal disease, antiphospholipid antibodies, anemia, diabetes), placental disease (chorioangioma, mosaicism), infections, fetal aneuploidy, and teratogen exposure. She was counseled regarding general management plans and that mild IUGR can generally be expectantly managed until 37-39 weeks' gestation. If there is severe growth restriction, delivery timing is based on the point at which the risk of fetal death exceeds that of  death.   There is an increased risk for fetal loss in the setting of growth restriction, thus, increased surveillance is indicated. The best predictors of outcome are fetal size and gestational age attained. Overall, the long term outcome depends on the etiology of the poor growth. At this time, I recommend continued increased fetal surveillance as outlined above. Given the concern for poor fetal growth, additional maternal evaluation was recommended. The following labs were ordered: Preeclampsia screening, antiphospholipid antibodies, thyroid function studies/thyroid peroxidase antibodies, a nutrition panel, and infection studies. -- Testing completed on 10/18/2022, the patient's results included: H/H 10.9/33.7, 86, platelet count 029,300, potassium 3.6, creatinine 0.7, calcium 9.2, ALT 6, AST 14 magnesium 1.7, uric acid 3.7, , ferritin 11, vitamin B-12 281, vitamin D 12, TSH 0.834, free T4 0.98, 8, TPO negative, antithyroglobulin antibody negative, toxoplasma Ig negative, CMV IgG positive/IgM negative, parvovirus IgG positive, IgM equivocal, urine culture mixed, urinalysis positive for ketones, glucose, and leukocyte esterase, urine protein creatinine ratio 0.3.  --Additional recommendations are below. Given the lag in fetal growth, a low dose aspirin was recommended. She was counseled to start 81 mg of aspirin daily. The patient was counseled to continue a daily low-dose aspirin as outlined above. 4.  Genetic counseling -- The patient was previously counseled regarding her options for genetic screening and/or diagnostic testing. She opted for screening with NIPT. Screening was completed on 5/9/2022. Her results were low risk for aneuploidy. The reported fetal fraction was 28% the reported fetal sex was female. The results were previously reviewed with the patient. The patient also opted for a [employer] 14 panel. Screening was completed on 5/9/2022. The results were reviewed with the patient.   She is a silent carrier for alpha thalassemia. Additional counseling was provided, please see below. Screening was otherwise negative for cystic fibrosis, spinal muscular atrophy, and Fragile X. The patient was also counseled regarding the recommendation for maternal serum AFP to screen for open neural tube defects. Risks and benefits of screening were reviewed. Screening is recommended at 15 to 22 weeks gestation. Testing was ordered today. 5.  History of  X1 -- The patient's first pregnancy was delivered via  secondary to arrest of dilation and nonreassuring fetal heart tones. The placenta is posterior. She plans to have a repeat  for delivery. Again, her delivery will be scheduled at the [facility] in order to facilitate additional maternal care. 6.  Subchronic hemorrhage, stable -- The ultrasound images were reviewed with patient. The subchorionic hemorrhage previously described was seen today and measured 4 x 0.8 x 1.7 cm. The size and appearance are stable. At 35 weeks 1 day, the subchronic hemorrhage measured 2.6 x 1.8 x 0.9 cm. The size and appearance were stable. At 31 weeks 6 days, a subchorionic hemorrhage was seen measuring 2.4 x 0.7 x 2 cm. The size is stable compared to prior imaging. At 22 weeks and 6 days, the subchronic hemorrhage measured 2.4 x 0.8 x 2 cm. The size is somewhat decreased compared to prior imaging. At 16 weeks 6 days, a subchronic hemorrhage was seen measuring 5.9 x 0.8 x 2.3 cm. The size is somewhat increased compared to prior imaging. Previously, at 12 weeks 4 days, the subchorionic hemorrhage measured 2.6 x 1.4 x 0.9 cm. No active bleeding was noted. The patient denied having any vaginal bleeding or cramping. She is noted to be Rh+. Counseling was previously provided to the patient. Counseling was reviewed. The patient did not have any additional questions or concerns.   A subchorionic hemorrhage can be associated with an increased risk for bleeding, infection, early pregnancy loss, poor fetal growth,  premature rupture of membranes,  labor and delivery, and stillbirth. Because of the increased risk for complications, close follow-up is recommended. Activity restrictions were again reviewed. The patient was counseled to avoid heavy lifting, prolonged standing, strenuous exercise, and sexual intercourse. The patient is scheduled for twice-weekly fetal testing. Bleeding precautions were reviewed. 7.  Uterine fibroids -- The ultrasound images were reviewed with the patient. Multiple uterine fibroids were again noted. The size and appearance are stable compared to previous imaging. The patient again denied any symptoms of fevers, chills, and/or abdominal pain. There is a lag in fetal growth as outlined above. Counseling was previously provided to the patient. Counseling was reviewed. The patient did not have any additional questions or concerns. Fibroids are common in women of reproductive age. Most studies that monitored the growth of fibroids during pregnancy have noted that the majority of uterine fibroids have less than 10% change in volume across gestation. Most of the growth usually occurs in the first trimester. Fibroids larger than 5 cms are more likely to grow, where as smaller fibroids most likely remain stable in size. As you know, there is a higher chance of antepartum bleeding with retroplacental fibroids and a small increase in  birth. Specifically, if placentation occurs adjacent to or overlying a fibroid. Submucosal and retroplacental fibroids with volumes more than 200 ml (corresponding to 7 cm in diameter) have the highest risk of abruption. Pain is another common complication of fibroid in pregnancy. It is especially high in fibroids greater than 5 cm in diameter.   Patients with degenerating fibroids tend to have localized pain, mild leukocytosis, pyrexia and nausea. This may result from decreased perfusion in the setting of rapid growth leading to ischemia. Some studies noted increased incidence of malpresentation if the uterus has multiple fibroids. Large retroplacental fibroids that distort the uterine cavity may be associated with adverse pregnancy event including  fetal growth restriction and  labor. Overall, the risks associated with uterine fibroids in pregnancy include malpresentation, lower uterine segment obstruction, degeneration, poor fetal growth, ,  birth, and antepartum/postpartum bleeding. The size and appearance of the fibroid(s) should be reevaluated on follow-up ultrasound. A screening cervical length  was completed at 16 weeks 5 days and normal at 4.2 cm without funneling. A cervical length was repeated at 31 weeks 6 days and again normal at 5.6 cm. Fetal growth should be monitored serially, every 3 to 4 weeks beginning at 24 to 26 weeks gestation. Precautions were reviewed with the patient. 8.  Tobacco use in pregnancy, quit -- The patient previously reported she has stopped smoking Black and milds. At 33 weeks 1 day, the patient reported that she was smoking occasionally. Today, at 35 weeks 1 day, the patient again reports that she has stopped smoking. She was again counseled regarding the increased risks of smoking in pregnancy including poor fetal growth, placental abruption, PPROM/ birth, and fetal loss. Additional  risks were again reviewed including asthma, allergies, infection, and an association with SIDS. Various techniques for cutting back and quitting were again reviewed. She was again counseled that smoking cessation is especially important in her case given her recurrent thrombotic events. She expressed verbal understanding of this counseling.      9. THC Use --  The patient previously reported that she was smoking marijuana daily. She reported that she was using marijuana for decreased appetite. The patient again reports that she has stopped smoking marijuana. She was congratulated and encouraged not to use marijuana during pregnancy. Counseling was previously provided to the patient. Counseling was reviewed. The patient did not have any additional questions or concerns. She was again counseled regarding risk of neurodevelopmental issues in children exposed to [facility] during pregnancy. Additionally, marijuana use may pose risks similar to that of cigarette use including increased risk for poor fetal growth, placental bleeding, PPROM/ delivery, and stillbirth. She was again counseled not to use THC while pregnant. Random urine drug screens should be monitored during pregnancy. 10.  First/second trimester alcohol exposure -- The patient previously reported that she drinks wine occasionally. She reports only taking sips of wine. Today, the patient again reports that she has stopped drinking alcohol. Counseling was previously provided to the patient. Counseling was reviewed. The patient did not have any additional questions or concerns. The patient was again counseled that a safe level of alcohol consumption during pregnancy has not been determined. Determination of the impact of alcohol use during pregnancy on fetal development can be challenging because of variation in maternal alcohol clearance rates, fetal sensitivity, genetic susceptibility, maternal drinking patterns (binge drinking versus daily consumption), and confounders such a substance abuse. The patient was counseled that alcohol is a teratogen that can impact fetal growth and development at all stages during pregnancy. Currently, national guidelines and multiple medical societies recommend abstinence during pregnancy.      In one large epidemiologic study, an increased rate of stillbirth was noted across all categories of alcohol intake, even after adjusting for confounders (eg, smoking, pre-pregnancy weight). There is also an increased risk for structural anomalies (craniofacial, cardiac), poor fetal growth, decreased IQ, and neurodevelopmental issues in childhood. Factors such as older age, increased parity, and  and  ethnicities are associated with an increased risk for FAS. Secondary to the increased risk for poor growth, fetal growth should be monitored serially, every 3 to 4 weeks starting at 24 to 26 weeks gestation. A fetal growth lag was noted at 31 weeks gestation by the [facility] [facility] Secondary to the increased risk for congenital heart anomalies, a fetal echocardiogram was recommended at 22 to 24 weeks gestation. A fetal echocardiogram was completed on 2022 and reported as normal.  The results were previously reviewed with the patient. Limitations of fetal echocardiography were reviewed. 11.  Right ovarian mass/cyst, stable -- The ultrasound images were reviewed with the patient. The right ovarian mass previously described was again seen today. The hyperechoic area measured 1.5 x 0.7 x 1.3 cm. The size and appearance are stable. At 33 weeks 1 day, the hyperechoic area measured 1.1 x 0.8 x 1.1 cm. The size and appearance are stable. At 31 weeks 6 days, the hyperechoic mass measured 1.3 x 1 x 1.5 cm. The size and appearance are stable compared to prior imaging. At 12 weeks 4 days, A hyperechoic mass was seen in the right ovary measuring 1.2 x 1 x 0.9 cm. No fluid was seen surrounding the area. The patient was again counseled that this may represent a hemorrhagic cyst versus teratoma versus a calcification, although no shadowing was seen. The size and appearance of the ovarian mass will be reevaluated on follow-up ultrasound. The adnexa will be reevaluated on follow-up exam.  The ovaries should be evaluated during the patient's . Otherwise, postpartum follow-up is recommended. The patient was again encouraged to discuss this issue with her provider at the [facility]. 12.   Nausea and vomiting of pregnancy, improved -- The patients previously reported having daily symptoms of nausea and decreased appetite. She was having occasional emesis. The patient reports that her symptoms of nausea and vomiting have significantly improved. She has gained 4 pounds since her last visit to our office. She has not gained any weight since her visit at 12 weeks. Today, the patient again reports that her symptoms are stable. She denied having any signs or symptoms of dehydration. The patient was again encouraged to eat small amounts of food every 2-3 hours during the day. She was also encouraged to stay well-hydrated. A baseline nutrition panel (CBC, CMP, magnesium, ferritin, folate, vitamin B12, vitamin D 25 OH) was recommended. Baseline testing was completed on 5/13/2022, her results included: A CBC was ordered but not done by the lab. A CBC was completed on 5/31/22, H/H 11.9/35.8, MCV 88.6, ,000. The remainder of the test results are from 5/13/2022 and included: Potassium 3.7, creatinine 0.7, calcium 9, ALT 7, AST 13, magnesium 2, uric acid 3, , ferritin 67, folate 19.8, vitamin B12 423, vitamin D 9, TSH 0.876, free T4 1.16, free T3 3, TPO negative, antithyroglobulin antibody negative, urinalysis negative, urine culture mixed, urine protein creatinine ratio 0.1, beta-2 lipoprotein antibody negative, anticardiolipin antibody negative, protein S antigen free, 28 (), protein S antigen total 64 (%). --Additional recommendations are below. The patient was counseled to continue taking vitamin B6, 25 mg every 8 hours. She was counseled to call and/or return with worsening symptoms.     Again, with persistent/worsening symptoms, I would recommend the addition of Pepcid, 20 mg twice daily and or Zofran. 13.  History of bacteruria -- The patient's hospital admission in April was reviewed. Her urinalysis was positive for Nitrites, leukocyte esterase, and bacteria. She was treated with Keflex. Her urine culture showed E. Coli and mixed gram-positive organisms including staph and Corynebacterium. She had a repeat urine culture on 5/5/2022 that was negative. She denied any signs or symptoms of recurrent infection. Precautions were reviewed. A repeat urine culture was completed on 5/13/2022 and showed less than 10,000 CFU per mL of mixed gram-positive organisms. A urine culture was repeated on 6/17/2022 and noted to be mixed. A repeat urine culture was completed and noted to be mixed on 10/18/2022. 14.  Anticardiolipin antibody IgM, indeterminate -- Antiphospholipid antibody screening was done secondary to the patient's CVA diagnosed in April 2022. Initial screening completed on 4/18/2022 as part of the thrombophilia panel was negative. Screening was repeated on 4/19/2022. Her anticardiolipin IgM antibody was indeterminate at 17 on 4/19/2022. Screening for lupus anticoagulant and beta-2 glycoprotein antibody was negative. The patient was counseled that this may be a false elevation, generally, the titers have to be greater than 20 for a true positive. The recommendation was made for the patient to continue taking a low dose aspirin, 81 mg daily. She should continue this for the remainder of pregnancy and 6 to 8 weeks postpartum. Repeat testing was completed on 5/13/2022 and negative. The results were reviewed with the patient. Testing was repeated on 6/17/2022. Testing for LAC, beta-2 glycoprotein, and anticardiolipin antibody was again negative. The patient was again counseled to continue to follow with hematology for long-term monitoring and management. 15.  Protein S deficiency -- The patient's labs were reviewed.   The patient initially had a thrombophilia panel on 4/18/2022. Her protein S, antigen, free was 25% (). Testing was repeated on 4/19/2022. Her protein S, antigen, free at that time was 27% (). The patient would have been 9 weeks at the time of the testing. She was being evaluated for a recent thrombotic stroke. Testing is not considered reliable during acute thrombosis or pregnancy. Protein S decreases during pregnancy. However, protein S is generally considered deficient if <30% in the second trimester and/or <24% in the third trimester. Again, the patient had testing in the first trimester and her protein S levels were significantly decreased. It is unclear at this time if the patient truly has a protein S deficiency. This will not be able to be determined until the patient is postpartum. The patient was counseled that a protein S deficiency is rare, and seen in 0.03-0.13% of the population. It is considered a lower risk thrombophilia. If someone does have a deficiency, then the risk for a thrombosis during pregnancy is estimated to be 0.1% (w/negative personal history). It is 0-22% with a personal history of thrombosis     Given the patient's recurrent, the recommendation was for therapeutic anticoagulation with Lovenox. Please see above. Testing was repeated on 6/17/2022. The protein S antigen, free was 25% (%), and protein S activity was 35% (%). Her protein S levels are again low, even for pregnancy. A hematology consultation was previously recommended. The patient is following with Dr. Iesha Ulloa. Repeat testing is recommended at 6 to 8 weeks postpartum. She should not be on an oral contraceptive pill at the time of repeat testing. The patient should avoid birth control containing estrogen. The patient was counseled to follow-up with hematology for long-term monitoring and management. The patient was counseled to continue taking a daily low-dose aspirin.   She should continue a daily low-dose aspirin for the remainder of pregnancy and 6 to 8 weeks postpartum. Given her history of a thrombotic stroke, the recommendation was also made for anticoagulation with prophylactic Lovenox. The plan was reviewed with neurology and hematology, see above. A prescription was provided following patient's consultation. She was scheduled to return for injection teaching. Increased fetal surveillance is recommended. Fetal growth should be monitored serially, every 3 to 4 weeks starting at 24 to 26 weeks gestation. The patient should monitor fetal kick counts daily starting at 28 weeks gestation. She should be scheduled for twice-weekly fetal testing starting at 32 weeks gestation. Delivery is recommended at/by 39 weeks gestation. I would defer delivery timing to the providers at the [facility] [place]. 12. Vaccination in pregnancy -- The patient was  previously counseled regarding the recommendations for vaccination in pregnancy. Counseling was reviewed. The patient did not have any additional questions or concerns. The patient was counseled regarding the recommendations for the Tdap vaccination in pregnancy. Risks and benefits were discussed. The vaccination is typically administered between 27 and 36 weeks gestation and recommended to confer protection against whooping cough to the . The patient plans to discuss this with her primary provider at her next visit. The patient was also counseled that her partner in any individuals who will be in close contact with the  should also be vaccinated for whooping cough. The patient was counseled regarding recommendation for the flu vaccination in pregnancy for both maternal and infant safety. Risks and benefits were reviewed. Counseling was provided regarding the recommendation for the Covid vaccination in pregnancy.   I advised the patient that the [employer] of Obstetrics and Gynecology and The Society for Maternal Fetal Medicine recommend that all pregnant women be vaccinated for COVID-19. \"Data have shown that COVID-19 infection puts pregnant people at increased risk of severe complications and even death; yet only about 22% of pregnant individuals have received 1 more doses of COVID-19 vaccine according to the [employer] for Disease Control and Prevention. \"     \" Recent data have shown that more than 95% of those were hospitalized and/or dying from COVID-19 are those who have remained unvaccinated. Pregnant individuals who have decided to wait until after delivery to be vaccinated may be inadvertently exposing themselves to an increased risk of severe illness or death. \"     \" COVID-19 vaccination is the best testing to reduce maternal and fetal complications of LOBUJ-97 infection among pregnant people,\" said Marija Johnson MD, president of the Society for Maternal Fetal Medicine, sub-specialists. The patient was counseled that in the event she opts not to have the Covid vaccination, she should alert her provider immediately if she test positive for Covid. Pregnant women are on the priority list for treatment with monoclonal antibody. This intervention has been shown to decrease the risk for hospitalization and complications related to Covid in pregnancy. The patient expressed verbal understanding of this counseling.      17.  Vitamin D deficiency -- The patient's vitamin D was deficient at 9  --Recommend vitamin D3 2000 IU daily  --Monitor nutrition panel q4-6 weeks (CBC, CMP, magnesium, ferritin, folate, vitamin B12, vitamin D25OH)     --Repeat testing was completed on 6/17/2022, her results included: H/H 11.2/33.7, MCV 87.1, platelet count 629,012, potassium 3.6, creatinine 0.7 calcium 7.2, ALT 15, AST 17, magnesium 1.6, ferritin 70, folate 11.2, vitamin B12 428, vitamin D 12, , uric acid 3.7, TSH 0.507, free T4 0.97, free T3 2.7, TPO negative, anticardiolipin antibody negative, beta-2 glycoprotein antibody negative, protein S antigen free 25%, hemoglobin A1c 4.9%, homocystine 6.6, protein S activity 35%, rheumatoid factor negative, C3 110, C4 23, [place] negative, LAC negative, maternal serum AFP 1.04 MOM, urinalysis negative, urine culture mixed, urine protein creatinine ratio 0.3, urinalysis negative for protein. -- The patient's vitamin D is still deficient but improving. She was counseled to continue her vitamin D supplement. --Repeat testing completed 10/18/2022, vitamin D unchanged at 12   -- The patient was encouraged to take her vitamin D supplement. She should continue the supplement for the remainder of pregnancy and 6 to 8 weeks postpartum. --Recommend a repeat nutrition panel at her 6-week postpartum visit. --Follow up with PCP for long term monitoring and management     18. 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 She was noted to be heterozygote for the MTHFR c.665C>T variant (previously designated as C677T). Counseling was previously provided  regarding the implications and management of this gene mutation in pregnancy. Counseling was reviewed. The patient did not have any additional questions or concerns. She was counseled that this gene mutation affects folic acid metabolism. It is fairly common and found in 20-30% of the population. It is generally only a problem if homocysteine levels are elevated. In the past, this gene mutation was thought to be associated with increased obstetric risks including thrombosis, early recurrent pregnancy loss, placental abruption, hypertensive disorders of pregnancy, poor fetal growth, and fetal loss. More recent studies did not report strong associations with these risks.  Because this genetic mutation affects folic acid metabolism, there is an increased risk for folic acid deficiency and other nutritional deficiencies in women with this condition. There is also an increased risk for having a child with an open neural tube defect in women with this mutation, especially if they're homozygous for the C677T mutation. Presently, this condition is not thought to be clinically significant. Generally, additional vitamin supplementation is recommended with folic acid or methyl folate, vitamin B6, and vitamin B12. The patient was counseled to take a low-dose aspirin. Fetal growth should be followed serially. She was counseled that there is a 50% chance that she will pass this mutation off to her child(sandra). She should relay this information to the pediatrician who cares for her child(sandra). She was also encouraged to review this diagnosis with her PCP. Because of this history, a baseline nutrition panel and homocysteine level were recommended. She was previously provided with orders for testing. Her homocystine level was normal at 6.6 on 6/17/2022. 23.  Silent carrier for alpha thalassemia -- The patient's records were previously reviewed. She is noted to be a silent carrier for alpha thalassemia. Counseling was previously provided to the patient. Counseling was reviewed. The patient did not have any additional questions or concerns. --Alpha thalassemia minima is another term for this condition. This condition results from the loss of a single alpha-chain gene (ie a-/aa). Blood tests are usually normal, though the red cells may be small, leading to a suspicion that the patient is a silent carrier. The only way to confirm a silent carrier is by DNA studies. This is usually a benign carrier state. Affected individuals are not typically anemic and their hemoglobin analysis is normal. These conditions may remain undiagnosed, or they may be detected incidentally on a routine complete blood count during evaluation of an unrelated condition or in the setting of preconception testing and counseling.      The patient's partner has not had testing for this condition. Carrier screening is recommended. The patient again plans to discuss this with her partner. If the patient's partner is not a carrier for alpha thalassemia, then there is a 50% chance that her child/children will also be a carrier for alpha thalassemia minima. If her partner is also a carrier of alpha thalassemia, there is an increased risk for having an affected child. Without having her partner tested, definitive counseling cannot be provided. This information should be relayed to pediatrics. I would defer any additional  evaluation to pediatrics. 20.   Migraine headaches -- The patient previously reported having continued headache symptoms after discharge from the hospital in . The headaches are a 6 out of 10 at the worst.  She reported having daily headaches. She has been using tylenol with some relief. She reports having associated symptoms of photosensitivity and phono sensitivity. Today, the patient again reports that her headache symptoms have significantly improved. The patient was again counseled that migraine headaches can improve during pregnancy. However, in some patients symptoms are exacerbated. She was counseled regarding the use of magnesium oxide 400 mg twice daily and riboflavin 100 mg daily in reducing the frequency and intensity of migraine headaches. Prescriptions were provided. Precautions were reviewed, and she was counseled that if she develops the worst headache of her life or a headache with neurological deficits, to present immediately for evaluation. She expressed verbal understanding of this counseling. Because of the patient's headache symptoms, a baseline nutrition panel and thyroid function testing was recommended. Baseline testing was completed on 2022. The results are summarized above.      21.  Eccentric umbilical cord insertion into the placenta --  The ultrasound results were reviewed with the patient. The umbilical cord inserts into the placenta 2 to 3 cm from the edge. Counseling was previously provided to the patient. Counseling was reviewed. She denies any additional questions or concerns. An eccentric cord insertion is diagnosed when the umbilical cord inserts into the placenta within 2-3 cm of the edge of the placenta. This is usually a benign finding, however, there can be an increased risk for poor fetal growth. Fetal growth should be monitored serially. There is a lag in fetal growth, see above. 22.  Abnormal urine protein creatinine ratio -- The patient's labs from 6/17/2022 were reviewed. Her urine protein creatinine ratio was abnormally elevated at 0.3. Her urine culture was mixed. Her urinalysis was negative for protein. Secondary to the conflicting results, a 35-KTYY urine collection was recommended. Nursing had previously contacted the patient regarding the instructions for testing. The patient had trace protein on a urine dip. Her blood pressure was normal at 112/76. Repeat testing was completed on 10/18/2022. Her urine protein creatinine ratio was again abnormally elevated at 0.3. The patient's blood pressure was normal and she denied having any signs or symptoms of preeclampsia. Precautions were reviewed. A 24-hour urine collection was recommended. She was provided with orders for testing. Instructions for testing were reviewed. 21.  Maternal ASD or PFO -- The patient reported that a small hole was found on the top part of her heart on follow-up imaging. She is unsure of her exact diagnosis. She is following at the [facility] clinic. She had a fetal echocardiogram that was normal on 9/9/2022. The limitations of fetal echocardiography were reviewed. The patient was counseled to relay this history to the pediatrician who cares for her child/children.      24.  Poor maternal weight gain -- The patient previously reported that she is 5'2\" tall and weighed 161 pounds at 12 weeks gestation. She weighed 168 pounds today. She has gained 2 pounds since her last visit. Her BMI is 30.73. She has gained 7 pounds since 12 weeks gestation. There is concern for a fetal growth lag. See above. The patient was again counseled that poor maternal weight gain or low maternal weight can be associated with an increased risk for complications such as poor fetal growth,  delivery, and nutritional deficiencies. A baseline nutrition panel was ordered on 2022. The patient was encouraged to complete testing at her earliest convenience. Fetal growth is being monitored serially, see above. 25.  Pelvic pressure, stable -- The patient previously had complaints of increased pelvic pressure. She denied having any associated leaking of fluid, vaginal bleeding, cramping, and/or dysuria. A transvaginal ultrasound was completed. The patient's cervix was normal and measured 5.6 cm without funneling at 31 weeks 6 days. Today, the patient reports her symptoms are stable.  labor and PPROM precautions were reviewed. 26.  Hip pain, right -- Today, the patient again had complaints of right hip and thigh pain. She reports that increased activity makes the pain worse. She denies having any associated fevers, chills, nausea, vomiting, and or dysuria. She was again counseled regarding the potential utility of a maternity belt in that it may take pressure off of her lower back and provide some relief of her hip pain. If her symptoms persist or worsen, she could be referred for physical therapy, chiropractic care, and/or massage therapy. The patient was counseled that these interventions are generally acceptable as long as the provider has been trained/certified to care for pregnant women. Precautions were reviewed with the patient.            27.  Low ferritin -- The patient's ferritin was low at 11 (considered low if <15 in absence of anemia or <40 in setting of anemia)  --Ferrous sulfate 325 mg BID prescribed  --Monitor levels serially  --Monitor nutrition panel q4-6 weeks (CBC, CMP, magnesium, ferritin, folate, vitamin B12, vitamin D25OH)    -- The patient was counseled to continue her supplement for the remainder of pregnancy and 6 to 8 weeks postpartum  --Recommend repeat nutrition panel at 6-week postpartum visit  --Follow-up with hematology  --Follow up with PCP for long term monitoring and management    28. Low vitamin B12 -- The patient's vitamin B12 level was borderline at 281. Individuals with vitamin B12 level between 200 and 400 are increased risk for anemia and side effects related to low vitamin B12. Thus, supplementation is recommended  --Recommend vitamin B12, 1000 mcg daily  --Monitor levels serially  --Repeat nutrition panel (CBC, CMP, magnesium, ferritin, folate, vitamin B12, vitamin D 25 OH) in 4 weeks    -- The patient should continue a supplement for the remainder of pregnancy and 6 to 8 weeks postpartum  --Repeat nutrition panel at 6-week postpartum visit  --Follow-up with hematology  --Long-term follow-up with PCP for monitoring and management      29. Low folate -- The patient's folate was low at 8.8. She was mildly anemic with an H/H of 10.1/24.5. Additional supplementation was recommended with 1 mg of folic acid daily. A prescription was provided. --Monitor levels serially  --Repeat nutrition panel (CBC, CMP, magnesium, ferritin, folate, vitamin B12, vitamin D 25 OH) at 6-week postpartum visit  --Long-term follow-up with PCP or hematology for monitoring and management    30. Abnormal parvovirus serology -- The patient's labs were reviewed. Baseline parvovirus serology was completed. She was positive for IgG and equivocal for IgM. The patient denies having any illnesses, fevers, joint pain, and or rashes during her pregnancy. She denies having any known exposures.   Counseling was provided. Fetal testing has been reassuring. There has been no evidence of fetal anemia. There is a fetal growth lag. Counseling was provided to the patient today. The patient was counseled that the incidence of parvovirus infection in pregnancy is estimated to be 3.3-3.8%. The highest risk professions are teaching (16%) and  workers (9%). Generally, adults experience 1-4 days of systemic symptoms prior to the onset of a rash. Joint pain is the most common symptom and can last for 1-2 weeks. Viremia typically develops 6 days of the infection and lasts for 1 week. IgM antibodies typically develop 10 days after exposure and prior to the onset of symptoms. The IgM antibodies can persist for 3 months or longer. IgG antibodies develop several days after the IgM antibodies and last for years. Generally, the infection is self limited an benign in an adult. However, when pregnant, there is a risk for congenital infection with subsequent hydrops and/or fetal demise. The risk for fetal loss is highest if the infection occurs in the first half of the pregnancy. One study reported that the risk of stillbirth is 13% in the first trimester, 9% from 13-20 weeks' gestation, and 0% after 20 weeks' gestation. Another study reported a risk of stillbirth as 11% with infection <20 weeks' gestation and <1% with infection >20 weeks' gestation. Other than stillbirth, transient pleural and pericardial effusions can occur in the fetus. Fetal hydrops can develop secondary to fetal anemia; the risk is estimated to be 3.9% and it is more common before 32 weeks' gestation. The average onset of hydrops is 3 weeks after maternal infection. 50% of cases develop 2-5 weeks after the infection and 93% cases occur within 8 weeks of the infection. Thus, increased fetal surveillance is recommended with weekly ultrasound to monitor for evidence of fetal infection and/or hydrops.   There was no evidence of fetal hydrops today.     Given the patient was unaware of any exposures and that she denied any recent illnesses, repeat serology was ordered. Additionally, parvovirus PCR was ordered. She should continue to have increased fetal testing as outlined above. --The patient will continue to follow with maternal-fetal medicine for twice-weekly fetal testing. --The patient has a follow-up appointment at [facility] on 10/21/2022. --I requested the patient return for a follow-up assessment in 1 week unless there is a clinical reason for her to return prior to that time. She is to call if she has any problems or questions prior to her next visit. Further evaluation and management will be dependent on her clinical presentation and the results of her testing. --The patient was advised to call if she has any increased vaginal discharge, vaginal bleeding, contractions, abdominal pain, back pain or any new significant symptomatology prior to her next visit. I advised her that these are signs and symptoms of cervical change and require follow-up assessment when they occur. Preeclampsia precautions were also reviewed with the patient. --The patient was also counseled to call and/or return with any concerns for decreased fetal activity. --The patient is to continue to follow with you in your office for ongoing obstetric care. --The total time spent on today's visit was 30 minutes. This included preparation for the visit (i.e. reviewing prior external notes and test results), performance of a medically appropriate history and examination, counseling, orders for medications, tests or other procedures, and coordination of care. Greater than 50% of the time was spent face-to-face with the patient. This time is exclusive of procedures performed. I answered all of  the patient's questions to her satisfaction. I asked her to call if she had any additional questions prior to her next visit.       --At the conclusion of the visit, the patient appeared to have a good understanding of the issues discussed. I answered all of her questions to her satisfaction. I asked her to call if she had any additional questions prior to her next visit. --Thank you for allowing me to participate in the care of this pleasant patient. Please don't hesitate to call me if you have any questions. Sincerely,      Kendall Moreno MD, [address]  [phone]      *All or parts of this note may have been generated using a voice recognition program. There may be typo, grammar, or Word substitution errors that have escaped my review of this note.

## 2022-10-19 NOTE — LETTER
[address] Maternal Fetal Medicine  [address] [place] [address]  Phone: [phone]  Fax: [phone]           Carmencita Almonte MD      2022     Patient: Lev Warren   MR Number: [id number]   YOB: 1990   Date of Visit: 10/19/2022       Dear Dr. Fidel Boggs: Thank you for referring Anthony Nieves to me for evaluation/treatment. Below are the relevant portions of my assessment and plan of care. If you have questions, please do not hesitate to call me. I look forward to following Mal Every along with you. Sincerely,        Carmencita Almonte MD    CC providers:  Abril Rosado MD  [place] [id number]  [employer]       2022      Abril Rosado [address]     RE:  James ARAUJO  : 1990   AGE: 33 y.o. This report has been created using voice recognition software. It may contain errors which are inherent in voice recognition technology. Dear Dr. Fidel Boggs:      I had the pleasure of meeting with Ms. Zuleima oCrtez for a return consultation. As you know, Ms. Zuleima Cortez  is a 33 y.o.  at 35w6d (LMP = [address]) who is being followed by our office for multiple medical issues. Today, Ms. Zuleima Cortez reports that she feels well. She notes good fetal movement and denies any symptoms of leaking of fluid, vaginal bleeding, and/or contractions. She had a fetal ultrasound that was notable for the following. There is a single intrauterine gestation in a cephalic presentation with a heart rate of 159 beats per minute. The placenta is posterior. The amniotic fluid index is 12.3 cm. BPP 10/10. Umbilical artery PI normal.  MCA PSV normal.  MCA PI normal.  Subchronic hemorrhage again seen measuring 4 x 0.8 x 1.7 cm. Hyperechoic right ovarian mass again seen measuring 1.5 x 0.7 x 1.3 cm.       PERTINENT PHYSICAL EXAMINATION:   /76   Pulse 93   Ht 5' 2\" (1.575 m)   Wt 168 lb (76.2 kg)   LMP 02/10/2022   BMI 30.73 kg/m²     Urine dipstick:   Trace for Glucose    Negative for Albumin      A fetal ultrasound assessment was performed today. A report is enclosed for your review. Assessment & Plan:  33 y.o.  at 35w6d (LMP = [address]) with:    1. Pregnancy dating -- The patient's pregnancy dating was previously reviewed. The patient reports that was having monthly periods. She discontinued Depo-Provera in 2021. She reports a sure last menstrual period. Her reported LMP was 2/10/2022, GUERO 2022. The patient's first ultrasound was 1822. The crown-rump length was 9 weeks 4 days, GUERO 2022. Given the patient had a sure LMP that agreed with the 9-week ultrasound, the recommendation was made to use an GUERO of 2022 based on her LMP. Fetal growth was appropriate for the gestational age again today. 2.  History of stroke during pregnancy  -- The patient previously reported that she initially experienced symptoms of headache and aphasia in 2021. She did not seek medical care at that time. The patient presented to the emergency department on 2022 with complaints of a severe headache that woke her up from sleep. She had a headache 4 days prior to her evaluation in the emergency department. Two days prior to her evaluation, she had slurred speech and aphasia which lasted for approximately 20 minutes and then resolved. The patient then developed paresthesias on her right arm. A CT of the head was initially completed. The report stated, \" hypodensity within the left insular cortex with abnormal white matter hypodensity extending into the adjacent subinsular white matter and left corona radiata. Findings may be suggestive of subacute infarction. For further evaluation brain MRI is recommended. \"     An MRI, MRV, and MRA of the head with and completed.   Per the MRI report from 2022, acute infarct noted in left insula/left temporal lobe/left periventricular white matter with associated thrombosis of a superior M2 branch of the left middle cerebral artery. Chronic infarcts noted in the right corona radiata and left centrum semiovale. The MRV was unremarkable. No dural sinus thrombosis was noted. The patient was diagnosed with a subacute CVA and admitted to the hospital.  She was also noted to have bacteriuria. She was cared for by her primary care physician and she had a neurology consultation. A thrombophilia evaluation was completed. She was started on a low-dose aspirin and received Lovenox for DVT prophylaxis. A maternal echocardiogram was completed on 4/19/2022. Per the report, the study was normal.  The patient reports having a repeat echocardiogram at the [facility] clinic and she has a small PFO or ASD. The patient remained stable and was discharged home on 4/19/2022. She was discharged home on Keflex and a low-dose aspirin. She was to follow-up with obstetrics and her primary care physician. The patient's thrombophilia evaluation was completed on 4/18/2022, her results included: Homocystine 5, protein C functional 111%, protein S antigen free 25% (), Antithrombin activity 99%, APC resistance 3.54 (normal), lupus anticoagulant negative, beta-2 glycoprotein antibody negative, cardiolipin antibody negative, prothrombin 2 gene mutation negative. A screening PARIS was completed and negative. A protein S antigen free was repeated on 4/19/2022 and again low at 27 (). Homocystine was repeated on 4/19/2022 and again normal at 7.8. Factor VIII activity was normal at 134% and factor VII activity was normal at 133%. Anticardiolipin antibody was repeated on 4/19/2022 and IgM was indeterminate at 17. Repeat screening for lupus anticoagulant was negative on 4/19/2022.   A D-dimer was checked on 4/19/2022 and normal.  Patient's urine drug screen was positive for marijuana. The patient's urinalysis was abnormal.  The urine culture showed mixed jair including E. coli staph and Corynebacterium. No sensitivities were provided. The patient was seen for her initial consultation with [facility] on 5/9/2022. Counseling was provided and the recommendation was made for prophylactic anticoagulation with Lovenox, 40 mg daily. Both hematology and neurology were contacted regarding the patient and plan of care. The patient returned to the [facility] office on 5/31/2022 for a follow-up consultation. Prior to her visit, she reported to the nurse that she had recurrent strokelike symptoms including left-sided numbness and a headache. She was immediately taken to the emergency department for evaluation. She was readmitted to the hospital on 5/31/2022 with an acute CVA. A CT of the head was repeated on 5/31/2022. Per the impression, there was left insular encephalomalacia at the site of prior infarct. It was an unremarkable CTA of the head and neck. Specifically, there was no evidence of residual thrombus in the left middle cerebral artery. The patient then had an MRI/MRV/MRA of the head without contrast.  Per the impression on the report, tiny foci of acute or early subacute infarction were seen in the right frontal lobe, with single tiny foci each within the right lateral basal ganglia, external capsule, and insular cortex. Mild stenosis was noted in the left lateral transverse sinus, which may be due to an arachnoid granulation. There is no evidence of dural venous thrombosis. She was evaluated by neurology on 6/2/2022. The recommendation was made for therapeutic anticoagulation with Lovenox. She was also evaluated by hematology. She was discharged home on 6/3/2022. The patient returned to the [facility] office at 16 weeks and 6 days for follow-up. She reported that she felt well. She still had intermittent headache symptoms that she rated as a 6 out of 10.   She reported that she was tolerating the therapeutic Lovenox well. During her visit at 16 weeks 6 days, the patient requested a referral for additional evaluation at the [address] [place] [address]. The patient was counseled that this would be reasonable given her complicated pregnancy. Additionally, it may be in the patient's best interest to plan for delivery at a center in which neurology and/or neurosurgery would be available in the event the patient had any complications during delivery or postpartum. The patient was agreeable with the plan. She requested a referral to the [address] [place] [address] for additional evaluation and management. A referral was provided. The patient's neurologist, Dr. Valente Watkins, was also contacted regarding the patient. He agreed with the plan outlined above. The patient is following with maternal-fetal medicine, Dr. Helio Kennedy, at the [address] [place] [address]. She is also following with Dr. Ming Metzger from vascular surgery. She was also seen by a vascular neurologist, Dr. Dalton Price, at the [address] [place] [address]. The patient returns the office today for follow-up and fetal testing secondary to the finding of poor fetal growth. She notes fetal movement. She again denies having any symptoms of leaking of fluid, vaginal bleeding, cramping, and/or contractions. Fetal growth was reevaluated at 35 weeks 1 day. There is a lag in the abdominal circumference. Fetal testing was otherwise reassuring. Testing was reassuring today with a biophysical profile score of 69/55, normal umbilical artery PI, and normal MCA PI. She denies having any subsequent strokelike symptoms. She again reports that her migraine headache symptoms have significantly improved. Counseling was previously provided to the patient. Counseling was reviewed. The patient did not have any additional questions or concerns. Again, pregnancy and the puerperium (postpartum period) are well-established risk factors for thromboembolism. Normal pregnancy is accompanied by an increase in clotting factors. The resulting hypercoagulable state likely evolved to protect women from hemorrhage in the event of a miscarriage and during childbirth. Thromboembolic disease during pregnancy and the puerperium is a significant cause of maternal morbidity and mortality. During pregnancy, the risk of venous thromboembolism increases 4-5 fold and the risk of arterial thromboembolism, myocardial infarction, and stroke increases 3-4 fold compared to the nonpregnant state. Postpartum, the risk of venous thromboembolism is 20 fold higher and the risk of arterial thromboembolism is similarly elevated compared to nonpregnant individuals. Venous events are more common during pregnancy accounting for 4 out of 5 thromboembolic events. However, the risk of death is higher following an arterial thrombosis. Approximately 80% of venous thromboembolic events are deep venous thromboses and approximately 20% of pulmonary emboli. The most important risk factor for thrombosis during pregnancy is a history of thrombosis. The risk for a thrombotic event is further increased in women with an underlying acquired or inherited thrombophilia. Most studies have reported and equal distribution of thrombosis risk across all trimesters of pregnancy however, 2 large conflicting studies have reported a first trimester (50% prior to 15 weeks) and third trimester (60%) predominance. Additional risk factors for thrombosis during pregnancy include multifetal gestation, varicose veins, inflammatory bowel disease, urinary tract infection, diabetes, hospitalization, obesity, and maternal age greater than 28 years. Compared to the antepartum period, the thrombosis risk is 2-5 times higher during the postpartum period. This risk is highest during the first 6 weeks postpartum and declines to prepregnancy rates by 13-18 weeks postpartum.  Risk factors for postpartum thrombosis include  section, medical co morbidities, obesity,  delivery, hemorrhage, fetal demise, advanced maternal age, hypertensive disorders of pregnancy, tobacco use, and infection. The patient was counseled to continue treatment with therapeutic Lovenox and a daily low-dose aspirin. She was counseled to continue to follow with the specialist at the [facility] for long-term monitoring and management. Per the consultation note with maternal-fetal medicine, the plan is for delivery at the [facility]. Per the consultation note with maternal-fetal medicine, the patient will follow-up at the [facility] at  36 weeks with a growth ultrasound at that time and plan to transfer care to [facility] for delivery at 36 weeks. The patient is scheduled for a follow-up visit on 10/21/2022. Again, it is unclear if the plan is for delivery at 36 weeks versus transfer of care at 36 weeks until delivery. The patient was again counseled to clarify this with her provider in [place]. Therapeutic anticoagulation should be continued throughout the pregnancy and for at least 6-8 weeks postpartum. Lovenox can be initiated 12 hours following a vaginal delivery and 24 hours following a  section (if there is no ongoing concern for bleeding). The risks and benefits of therapeutic anticoagulation in pregnancy were reviewed including the development of heparin-induced thrombocytopenia (HIT), osteopenia, bleeding, and poor fetal growth.  An anesthesia consultation is also recommended in the third trimester given she is on anticoagulation.  A hematology consult was recommended. The patient is following with Dr. Dorcas Alvarenga neurology consult was recommended. A referral was provided. The patient is following with a vascular neurologist at the [facility], Dr. Kenyetta Ford The patient was referred to the [facility] for additional maternal and fetal evaluation.  She is following with Dr. Chelita Reyes.  The patient should avoid estrogen containing birth control postpartum. Increased fetal surveillance is also recommended. Fetal growth should be monitored serially, every 3 to 4 weeks starting at 24 to 26 weeks gestation. The Patient should monitor fetal kick counts daily starting at 28 weeks gestation. She should be scheduled for twice-weekly fetal testing starting at 32 weeks gestation. Delivery is recommended at/by 39 weeks gestation. Counseling was previously provided regarding the radiation exposure from the CT scan. Counseling was reviewed. The patient did not have any additional questions or concerns. Increased risks associated with radiation exposure during pregnancy include that of early pregnancy loss, fetal malformations, poor fetal growth, and an increase for childhood cancers (increased from 1/2800 to 1/2000). Generally, these risks are not significantly increased unless the radiation exposure exceeds 50 mGy (5 rads). The estimated radiation exposure associated with a head CT is 1-10 mGy (0.1-1 rads). The International Commission of Radiologic Protection suggests that the radiation doses delivered in utero by imaging tests (such as those performed in the diagnosis of PE) present no measurable increased risk of fetal death or developmental abnormalities over the background incidence of these entities. The [facility] [employer] of Radiation Protection and Measurements considers the risk of radiation-associated abnormalities to be negligible, at less than 50 mGy when compared with other risks of pregnancy. Per available data, diagnostic imaging studies that expose the fetus to less than 0.05 Gray (50 mGy, 5 rads), do not appear to increase the risk for fetal anomalies, intellectual disability, growth restriction, or pregnancy loss.   With exposure to more than 0.05 Gy (50 mGy, 5 rads), a definitive threshold at which the risk for, locations increases has not been determined. Evidence suggests the risks begin to increase at doses above 0.1 Gy (100 mGy, 10 rads) and especially above 0.15-0.2 Gy (150-200 mGy, 15-20 rads). 3.  Poor fetal growth -- The date fetal growth assessment at 31 weeks 1 day at the [facility] clinic. There was a 1 week lag. The composite gestational age was 31 weeks 1 day. The [facility] was measuring at the 6 percentile. Fetal growth was reevaluated at 31 weeks 6 days and appropriate for the gestational age. Fetal growth was reevaluated at 35 weeks 1 day. The composite gestational age was 26 weeks 4 days. The estimated fetal weight was 4 pounds 15 ounces, 30th percentile. There is a 2-week lag in the abdominal circumference. The [facility] was measuring at the 8th percentile. Surveillance was recommended by the patient's high risk provider in [facility]. We will facilitate twice-weekly fetal testing for the patient until she returns to [facility]. Fetal testing was reassuring with a biophysical profile score of 64/50, normal umbilical artery Doppler studies, and a normal MCA PI. Counseling was previously provided to the patient. Counseling was reviewed. She did not have any additional questions or concerns. Again, the overall estimated fetal weight is greater than the 10th percentile, however the abdominal circumference measures less than the 10th percentile. Reassuring findings included a normal amniotic fluid index, a biophysical profile score of 8/8, and normal Doppler studies. The patient was counseled that typically fetal growth restriction is formally diagnosed when the overall estimated fetal weight is less than the 10th percentile. However, a decline in the overall estimated fetal weight of greater than 20% and/or an abdominal circumference that measures less then the 10th percentile are findings that are also concerning for and/or consistent with fetal growth restriction.  In over 70% of cases, small fetal size is constitutional. In approximately 30% of cases, there is a secondary cause related to placental insufficiency, a fetal issue, and/or an underlying maternal condition. Risk factors were reviewed with the patient including malnutrition, maternal chronic diseases (ie SLE, renal disease, antiphospholipid antibodies, anemia, diabetes), placental disease (chorioangioma, mosaicism), infections, fetal aneuploidy, and teratogen exposure. She was counseled regarding general management plans and that mild IUGR can generally be expectantly managed until 37-39 weeks' gestation. If there is severe growth restriction, delivery timing is based on the point at which the risk of fetal death exceeds that of  death. There is an increased risk for fetal loss in the setting of growth restriction, thus, increased surveillance is indicated. The best predictors of outcome are fetal size and gestational age attained. Overall, the long term outcome depends on the etiology of the poor growth. At this time, I recommend continued increased fetal surveillance as outlined above. Given the concern for poor fetal growth, additional maternal evaluation was recommended. The following labs were ordered: Preeclampsia screening, antiphospholipid antibodies, thyroid function studies/thyroid peroxidase antibodies, a nutrition panel, and infection studies.    -- Testing completed on 10/18/2022, the patient's results included: H/H 10.9/33.7, 86, platelet count 603,678, potassium 3.6, creatinine 0.7, calcium 9.2, ALT 6, AST 14 magnesium 1.7, uric acid 3.7, , ferritin 11, vitamin B-12 281, vitamin D 12, TSH 0.834, free T4 0.98, 8, TPO negative, antithyroglobulin antibody negative, toxoplasma Ig negative, CMV IgG positive/IgM negative, parvovirus IgG positive, IgM equivocal, urine culture mixed, urinalysis positive for ketones, glucose, and leukocyte esterase, urine protein creatinine ratio 0.3.  --Additional recommendations are below.     Given the lag in fetal growth, a low dose aspirin was recommended. She was counseled to start 81 mg of aspirin daily. The patient was counseled to continue a daily low-dose aspirin as outlined above. 4.  Genetic counseling -- The patient was previously counseled regarding her options for genetic screening and/or diagnostic testing. She opted for screening with NIPT. Screening was completed on 2022. Her results were low risk for aneuploidy. The reported fetal fraction was 28% the reported fetal sex was female. The results were previously reviewed with the patient. The patient also opted for a [employer] 14 panel. Screening was completed on 2022. The results were reviewed with the patient. She is a silent carrier for alpha thalassemia. Additional counseling was provided, please see below. Screening was otherwise negative for cystic fibrosis, spinal muscular atrophy, and Fragile X. The patient was also counseled regarding the recommendation for maternal serum AFP to screen for open neural tube defects. Risks and benefits of screening were reviewed. Screening is recommended at 15 to 22 weeks gestation. Testing was ordered today. 5.  History of  X1 -- The patient's first pregnancy was delivered via  secondary to arrest of dilation and nonreassuring fetal heart tones. The placenta is posterior. She plans to have a repeat  for delivery. Again, her delivery will be scheduled at the [facility] in order to facilitate additional maternal care. 6.  Subchronic hemorrhage, stable -- The ultrasound images were reviewed with patient. The subchorionic hemorrhage previously described was seen today and measured 4 x 0.8 x 1.7 cm. The size and appearance are stable. At 35 weeks 1 day, the subchronic hemorrhage measured 2.6 x 1.8 x 0.9 cm. The size and appearance were stable.      At 31 weeks 6 days, a subchorionic hemorrhage was seen measuring 2.4 x 0.7 x 2 cm. The size is stable compared to prior imaging. At 22 weeks and 6 days, the subchronic hemorrhage measured 2.4 x 0.8 x 2 cm. The size is somewhat decreased compared to prior imaging. At 16 weeks 6 days, a subchronic hemorrhage was seen measuring 5.9 x 0.8 x 2.3 cm. The size is somewhat increased compared to prior imaging. Previously, at 12 weeks 4 days, the subchorionic hemorrhage measured 2.6 x 1.4 x 0.9 cm. No active bleeding was noted. The patient denied having any vaginal bleeding or cramping. She is noted to be Rh+. Counseling was previously provided to the patient. Counseling was reviewed. The patient did not have any additional questions or concerns. A subchorionic hemorrhage can be associated with an increased risk for bleeding, infection, early pregnancy loss, poor fetal growth,  premature rupture of membranes,  labor and delivery, and stillbirth. Because of the increased risk for complications, close follow-up is recommended. Activity restrictions were again reviewed. The patient was counseled to avoid heavy lifting, prolonged standing, strenuous exercise, and sexual intercourse. The patient is scheduled for twice-weekly fetal testing. Bleeding precautions were reviewed. 7.  Uterine fibroids -- The ultrasound images were reviewed with the patient. Multiple uterine fibroids were again noted. The size and appearance are stable compared to previous imaging. The patient again denied any symptoms of fevers, chills, and/or abdominal pain. There is a lag in fetal growth as outlined above. Counseling was previously provided to the patient. Counseling was reviewed. The patient did not have any additional questions or concerns. Fibroids are common in women of reproductive age.   Most studies that monitored the growth of fibroids during pregnancy have noted that the majority of uterine fibroids have less than 10% change in volume across gestation. Most of the growth usually occurs in the first trimester. Fibroids larger than 5 cms are more likely to grow, where as smaller fibroids most likely remain stable in size. As you know, there is a higher chance of antepartum bleeding with retroplacental fibroids and a small increase in  birth. Specifically, if placentation occurs adjacent to or overlying a fibroid. Submucosal and retroplacental fibroids with volumes more than 200 ml (corresponding to 7 cm in diameter) have the highest risk of abruption. Pain is another common complication of fibroid in pregnancy. It is especially high in fibroids greater than 5 cm in diameter. Patients with degenerating fibroids tend to have localized pain, mild leukocytosis, pyrexia and nausea. This may result from decreased perfusion in the setting of rapid growth leading to ischemia. Some studies noted increased incidence of malpresentation if the uterus has multiple fibroids. Large retroplacental fibroids that distort the uterine cavity may be associated with adverse pregnancy event including  fetal growth restriction and  labor. Overall, the risks associated with uterine fibroids in pregnancy include malpresentation, lower uterine segment obstruction, degeneration, poor fetal growth, ,  birth, and antepartum/postpartum bleeding. The size and appearance of the fibroid(s) should be reevaluated on follow-up ultrasound. A screening cervical length  was completed at 16 weeks 5 days and normal at 4.2 cm without funneling. A cervical length was repeated at 31 weeks 6 days and again normal at 5.6 cm. Fetal growth should be monitored serially, every 3 to 4 weeks beginning at 24 to 26 weeks gestation. Precautions were reviewed with the patient. 8.  Tobacco use in pregnancy, quit -- The patient previously reported she has stopped smoking Black and milds.   At 33 weeks 1 day, the patient reported that she was smoking occasionally. Today, at 35 weeks 1 day, the patient again reports that she has stopped smoking. She was again counseled regarding the increased risks of smoking in pregnancy including poor fetal growth, placental abruption, PPROM/ birth, and fetal loss. Additional  risks were again reviewed including asthma, allergies, infection, and an association with SIDS. Various techniques for cutting back and quitting were again reviewed. She was again counseled that smoking cessation is especially important in her case given her recurrent thrombotic events. She expressed verbal understanding of this counseling. 9. THC Use --  The patient previously reported that she was smoking marijuana daily. She reported that she was using marijuana for decreased appetite. The patient again reports that she has stopped smoking marijuana. She was congratulated and encouraged not to use marijuana during pregnancy. Counseling was previously provided to the patient. Counseling was reviewed. The patient did not have any additional questions or concerns. She was again counseled regarding risk of neurodevelopmental issues in children exposed to [facility] during pregnancy. Additionally, marijuana use may pose risks similar to that of cigarette use including increased risk for poor fetal growth, placental bleeding, PPROM/ delivery, and stillbirth. She was again counseled not to use THC while pregnant. Random urine drug screens should be monitored during pregnancy. 10.  First/second trimester alcohol exposure -- The patient previously reported that she drinks wine occasionally. She reports only taking sips of wine. Today, the patient again reports that she has stopped drinking alcohol. Counseling was previously provided to the patient. Counseling was reviewed. The patient did not have any additional questions or concerns.      The patient was again counseled that a safe level of alcohol consumption during pregnancy has not been determined. Determination of the impact of alcohol use during pregnancy on fetal development can be challenging because of variation in maternal alcohol clearance rates, fetal sensitivity, genetic susceptibility, maternal drinking patterns (binge drinking versus daily consumption), and confounders such a substance abuse. The patient was counseled that alcohol is a teratogen that can impact fetal growth and development at all stages during pregnancy. Currently, national guidelines and multiple medical societies recommend abstinence during pregnancy. In one large epidemiologic study, an increased rate of stillbirth was noted across all categories of alcohol intake, even after adjusting for confounders (eg, smoking, pre-pregnancy weight). There is also an increased risk for structural anomalies (craniofacial, cardiac), poor fetal growth, decreased IQ, and neurodevelopmental issues in childhood. Factors such as older age, increased parity, and  and  ethnicities are associated with an increased risk for FAS. Secondary to the increased risk for poor growth, fetal growth should be monitored serially, every 3 to 4 weeks starting at 24 to 26 weeks gestation. A fetal growth lag was noted at 31 weeks gestation by the [address] [place] [address] clinic. Secondary to the increased risk for congenital heart anomalies, a fetal echocardiogram was recommended at 22 to 24 weeks gestation. A fetal echocardiogram was completed on 9/9/2022 and reported as normal.  The results were previously reviewed with the patient. Limitations of fetal echocardiography were reviewed. 11.  Right ovarian mass/cyst, stable -- The ultrasound images were reviewed with the patient. The right ovarian mass previously described was again seen today. The hyperechoic area measured 1.5 x 0.7 x 1.3 cm. The size and appearance are stable.       At 35 weeks 1 day, the hyperechoic area measured 1.1 x 0.8 x 1.1 cm. The size and appearance are stable. At 31 weeks 6 days, the hyperechoic mass measured 1.3 x 1 x 1.5 cm. The size and appearance are stable compared to prior imaging. At 12 weeks 4 days, A hyperechoic mass was seen in the right ovary measuring 1.2 x 1 x 0.9 cm. No fluid was seen surrounding the area. The patient was again counseled that this may represent a hemorrhagic cyst versus teratoma versus a calcification, although no shadowing was seen. The size and appearance of the ovarian mass will be reevaluated on follow-up ultrasound. The adnexa will be reevaluated on follow-up exam.  The ovaries should be evaluated during the patient's . Otherwise, postpartum follow-up is recommended. The patient was again encouraged to discuss this issue with her provider at the [facility]. 12.   Nausea and vomiting of pregnancy, improved -- The patients previously reported having daily symptoms of nausea and decreased appetite. She was having occasional emesis. The patient reports that her symptoms of nausea and vomiting have significantly improved. She has gained 4 pounds since her last visit to our office. She has not gained any weight since her visit at 12 weeks. Today, the patient again reports that her symptoms are stable. She denied having any signs or symptoms of dehydration. The patient was again encouraged to eat small amounts of food every 2-3 hours during the day. She was also encouraged to stay well-hydrated. A baseline nutrition panel (CBC, CMP, magnesium, ferritin, folate, vitamin B12, vitamin D 25 [place]) was recommended. Baseline testing was completed on 2022, her results included: A CBC was ordered but not done by the lab. A CBC was completed on 22, H/H 11.9/35.8, MCV 88.6, ,000.   The remainder of the test results are from 2022 and included: Potassium 3.7, creatinine 0.7, calcium 9, ALT 7, AST 13, magnesium 2, uric acid 3, , ferritin 67, folate 19.8, vitamin B12 423, vitamin D 9, TSH 0.876, free T4 1.16, free T3 3, TPO negative, antithyroglobulin antibody negative, urinalysis negative, urine culture mixed, urine protein creatinine ratio 0.1, beta-2 lipoprotein antibody negative, anticardiolipin antibody negative, protein S antigen free, 28 (), protein S antigen total 64 (%). --Additional recommendations are below. The patient was counseled to continue taking vitamin B6, 25 mg every 8 hours. She was counseled to call and/or return with worsening symptoms. Again, with persistent/worsening symptoms, I would recommend the addition of Pepcid, 20 mg twice daily and or Zofran. 13.  History of bacteruria -- The patient's hospital admission in April was reviewed. Her urinalysis was positive for Nitrites, leukocyte esterase, and bacteria. She was treated with Keflex. Her urine culture showed E. Coli and mixed gram-positive organisms including staph and Corynebacterium. She had a repeat urine culture on 5/5/2022 that was negative. She denied any signs or symptoms of recurrent infection. Precautions were reviewed. A repeat urine culture was completed on 5/13/2022 and showed less than 10,000 CFU per mL of mixed gram-positive organisms. A urine culture was repeated on 6/17/2022 and noted to be mixed. A repeat urine culture was completed and noted to be mixed on 10/18/2022. 14.  Anticardiolipin antibody IgM, indeterminate -- Antiphospholipid antibody screening was done secondary to the patient's CVA diagnosed in April 2022. Initial screening completed on 4/18/2022 as part of the thrombophilia panel was negative. Screening was repeated on 4/19/2022. Her anticardiolipin IgM antibody was indeterminate at 17 on 4/19/2022. Screening for lupus anticoagulant and beta-2 glycoprotein antibody was negative.        The patient was counseled that this may be a false elevation, generally, the titers have to be greater than 20 for a true positive. The recommendation was made for the patient to continue taking a low dose aspirin, 81 mg daily. She should continue this for the remainder of pregnancy and 6 to 8 weeks postpartum. Repeat testing was completed on 5/13/2022 and negative. The results were reviewed with the patient. Testing was repeated on 6/17/2022. Testing for LAC, beta-2 glycoprotein, and anticardiolipin antibody was again negative. The patient was again counseled to continue to follow with hematology for long-term monitoring and management. 15.  Protein S deficiency -- The patient's labs were reviewed. The patient initially had a thrombophilia panel on 4/18/2022. Her protein S, antigen, free was 25% (). Testing was repeated on 4/19/2022. Her protein S, antigen, free at that time was 27% (). The patient would have been 9 weeks at the time of the testing. She was being evaluated for a recent thrombotic stroke. Testing is not considered reliable during acute thrombosis or pregnancy. Protein S decreases during pregnancy. However, protein S is generally considered deficient if <30% in the second trimester and/or <24% in the third trimester. Again, the patient had testing in the first trimester and her protein S levels were significantly decreased. It is unclear at this time if the patient truly has a protein S deficiency. This will not be able to be determined until the patient is postpartum. The patient was counseled that a protein S deficiency is rare, and seen in 0.03-0.13% of the population. It is considered a lower risk thrombophilia. If someone does have a deficiency, then the risk for a thrombosis during pregnancy is estimated to be 0.1% (w/negative personal history).   It is 0-22% with a personal history of thrombosis     Given the patient's recurrent, the recommendation was for therapeutic anticoagulation with Lovenox. Please see above. Testing was repeated on 6/17/2022. The protein S antigen, free was 25% (%), and protein S activity was 35% (%). Her protein S levels are again low, even for pregnancy. A hematology consultation was previously recommended. The patient is following with Dr. Gali Andrews. Repeat testing is recommended at 6 to 8 weeks postpartum. She should not be on an oral contraceptive pill at the time of repeat testing. The patient should avoid birth control containing estrogen. The patient was counseled to follow-up with hematology for long-term monitoring and management. The patient was counseled to continue taking a daily low-dose aspirin. She should continue a daily low-dose aspirin for the remainder of pregnancy and 6 to 8 weeks postpartum. Given her history of a thrombotic stroke, the recommendation was also made for anticoagulation with prophylactic Lovenox. The plan was reviewed with neurology and hematology, see above. A prescription was provided following patient's consultation. She was scheduled to return for injection teaching. Increased fetal surveillance is recommended. Fetal growth should be monitored serially, every 3 to 4 weeks starting at 24 to 26 weeks gestation. The patient should monitor fetal kick counts daily starting at 28 weeks gestation. She should be scheduled for twice-weekly fetal testing starting at 32 weeks gestation. Delivery is recommended at/by 39 weeks gestation. I would defer delivery timing to the providers at the [facility]. 12. Vaccination in pregnancy -- The patient was  previously counseled regarding the recommendations for vaccination in pregnancy. Counseling was reviewed. The patient did not have any additional questions or concerns. The patient was counseled regarding the recommendations for the Tdap vaccination in pregnancy. Risks and benefits were discussed.  The vaccination is typically administered between 27 and 36 weeks gestation and recommended to confer protection against whooping cough to the . The patient plans to discuss this with her primary provider at her next visit. The patient was also counseled that her partner in any individuals who will be in close contact with the  should also be vaccinated for whooping cough. The patient was counseled regarding recommendation for the flu vaccination in pregnancy for both maternal and infant safety. Risks and benefits were reviewed. Counseling was provided regarding the recommendation for the Covid vaccination in pregnancy. I advised the patient that the [employer] of Obstetrics and Gynecology and The Society for Maternal Fetal Medicine recommend that all pregnant women be vaccinated for COVID-19. \"Data have shown that COVID-19 infection puts pregnant people at increased risk of severe complications and even death; yet only about 22% of pregnant individuals have received 1 more doses of COVID-19 vaccine according to the [employer] for Disease Control and Prevention. \"     \" Recent data have shown that more than 95% of those were hospitalized and/or dying from COVID-19 are those who have remained unvaccinated. Pregnant individuals who have decided to wait until after delivery to be vaccinated may be inadvertently exposing themselves to an increased risk of severe illness or death. \"     \" COVID-19 vaccination is the best testing to reduce maternal and fetal complications of CBBNW-40 infection among pregnant people,\" said Mikal Fuentes MD, president of the Society for Maternal Fetal Medicine, sub-specialists. The patient was counseled that in the event she opts not to have the Covid vaccination, she should alert her provider immediately if she test positive for Covid. Pregnant women are on the priority list for treatment with monoclonal antibody.   This intervention has been shown to decrease the risk for hospitalization and complications related to Covid in pregnancy. The patient expressed verbal understanding of this counseling. 17.  Vitamin D deficiency -- The patient's vitamin D was deficient at 9  --Recommend vitamin D3 2000 IU daily  --Monitor nutrition panel q4-6 weeks (CBC, CMP, magnesium, ferritin, folate, vitamin B12, vitamin D25OH)     --Repeat testing was completed on 6/17/2022, her results included: H/H 11.2/33.7, MCV 87.1, platelet count 213,514, potassium 3.6, creatinine 0.7 calcium 7.2, ALT 15, AST 17, magnesium 1.6, ferritin 70, folate 11.2, vitamin B12 428, vitamin D 12, , uric acid 3.7, TSH 0.507, free T4 0.97, free T3 2.7, TPO negative, anticardiolipin antibody negative, beta-2 glycoprotein antibody negative, protein S antigen free 25%, hemoglobin A1c 4.9%, homocystine 6.6, protein S activity 35%, rheumatoid factor negative, C3 110, C4 23, [place] negative, LAC negative, maternal serum AFP 1.04 MOM, urinalysis negative, urine culture mixed, urine protein creatinine ratio 0.3, urinalysis negative for protein. -- The patient's vitamin D is still deficient but improving. She was counseled to continue her vitamin D supplement. --Repeat testing completed 10/18/2022, vitamin D unchanged at 12   -- The patient was encouraged to take her vitamin D supplement. She should continue the supplement for the remainder of pregnancy and 6 to 8 weeks postpartum. --Recommend a repeat nutrition panel at her 6-week postpartum visit. --Follow up with PCP for long term monitoring and management     18. 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 She was noted to be heterozygote for the MTHFR c.665C>T variant (previously designated as C677T). Counseling was previously provided  regarding the implications and management of this gene mutation in pregnancy. Counseling was reviewed.   The patient did not have any additional questions or concerns. She was counseled that this gene mutation affects folic acid metabolism. It is fairly common and found in 20-30% of the population. It is generally only a problem if homocysteine levels are elevated. In the past, this gene mutation was thought to be associated with increased obstetric risks including thrombosis, early recurrent pregnancy loss, placental abruption, hypertensive disorders of pregnancy, poor fetal growth, and fetal loss. More recent studies did not report strong associations with these risks. Because this genetic mutation affects folic acid metabolism, there is an increased risk for folic acid deficiency and other nutritional deficiencies in women with this condition. There is also an increased risk for having a child with an open neural tube defect in women with this mutation, especially if they're homozygous for the C677T mutation. Presently, this condition is not thought to be clinically significant. Generally, additional vitamin supplementation is recommended with folic acid or methyl folate, vitamin B6, and vitamin B12. The patient was counseled to take a low-dose aspirin. Fetal growth should be followed serially. She was counseled that there is a 50% chance that she will pass this mutation off to her child(sandra). She should relay this information to the pediatrician who cares for her child(sandra). She was also encouraged to review this diagnosis with her PCP. Because of this history, a baseline nutrition panel and homocysteine level were recommended. She was previously provided with orders for testing. Her homocystine level was normal at 6.6 on 6/17/2022. 23.  Silent carrier for alpha thalassemia -- The patient's records were previously reviewed. She is noted to be a silent carrier for alpha thalassemia. Counseling was previously provided to the patient. Counseling was reviewed.   The patient did not have any additional questions or concerns. --Alpha thalassemia minima is another term for this condition. This condition results from the loss of a single alpha-chain gene (ie a-/aa). Blood tests are usually normal, though the red cells may be small, leading to a suspicion that the patient is a silent carrier. The only way to confirm a silent carrier is by DNA studies. This is usually a benign carrier state. Affected individuals are not typically anemic and their hemoglobin analysis is normal. These conditions may remain undiagnosed, or they may be detected incidentally on a routine complete blood count during evaluation of an unrelated condition or in the setting of preconception testing and counseling. The patient's partner has not had testing for this condition. Carrier screening is recommended. The patient again plans to discuss this with her partner. If the patient's partner is not a carrier for alpha thalassemia, then there is a 50% chance that her child/children will also be a carrier for alpha thalassemia minima. If her partner is also a carrier of alpha thalassemia, there is an increased risk for having an affected child. Without having her partner tested, definitive counseling cannot be provided. This information should be relayed to pediatrics. I would defer any additional  evaluation to pediatrics. 20.   Migraine headaches -- The patient previously reported having continued headache symptoms after discharge from the hospital in . The headaches are a 6 out of 10 at the worst.  She reported having daily headaches. She has been using tylenol with some relief. She reports having associated symptoms of photosensitivity and phono sensitivity. Today, the patient again reports that her headache symptoms have significantly improved. The patient was again counseled that migraine headaches can improve during pregnancy. However, in some patients symptoms are exacerbated.   She was counseled regarding the use of magnesium oxide 400 mg twice daily and riboflavin 100 mg daily in reducing the frequency and intensity of migraine headaches. Prescriptions were provided. Precautions were reviewed, and she was counseled that if she develops the worst headache of her life or a headache with neurological deficits, to present immediately for evaluation. She expressed verbal understanding of this counseling. Because of the patient's headache symptoms, a baseline nutrition panel and thyroid function testing was recommended. Baseline testing was completed on 5/13/2022. The results are summarized above. 21.  Eccentric umbilical cord insertion into the placenta --  The ultrasound results were reviewed with the patient. The umbilical cord inserts into the placenta 2 to 3 cm from the edge. Counseling was previously provided to the patient. Counseling was reviewed. She denies any additional questions or concerns. An eccentric cord insertion is diagnosed when the umbilical cord inserts into the placenta within 2-3 cm of the edge of the placenta. This is usually a benign finding, however, there can be an increased risk for poor fetal growth. Fetal growth should be monitored serially. There is a lag in fetal growth, see above. 22.  Abnormal urine protein creatinine ratio -- The patient's labs from 6/17/2022 were reviewed. Her urine protein creatinine ratio was abnormally elevated at 0.3. Her urine culture was mixed. Her urinalysis was negative for protein. Secondary to the conflicting results, a 91-WUHM urine collection was recommended. Nursing had previously contacted the patient regarding the instructions for testing. The patient had trace protein on a urine dip. Her blood pressure was normal at 112/76. Repeat testing was completed on 10/18/2022. Her urine protein creatinine ratio was again abnormally elevated at 0.3.       The patient's blood pressure was normal and she denied having any signs or symptoms of preeclampsia. Precautions were reviewed. A 24-hour urine collection was recommended. She was provided with orders for testing. Instructions for testing were reviewed. 21.  Maternal ASD or PFO -- The patient reported that a small hole was found on the top part of her heart on follow-up imaging. She is unsure of her exact diagnosis. She is following at the [facility]. She had a fetal echocardiogram that was normal on 2022. The limitations of fetal echocardiography were reviewed. The patient was counseled to relay this history to the pediatrician who cares for her child/children. 24.  Poor maternal weight gain -- The patient previously reported that she is 5'2\" tall and weighed 161 pounds at 12 weeks gestation. She weighed 168 pounds today. She has gained 2 pounds since her last visit. Her BMI is 30.73. She has gained 7 pounds since 12 weeks gestation. There is concern for a fetal growth lag. See above. The patient was again counseled that poor maternal weight gain or low maternal weight can be associated with an increased risk for complications such as poor fetal growth,  delivery, and nutritional deficiencies. A baseline nutrition panel was ordered on 2022. The patient was encouraged to complete testing at her earliest convenience. Fetal growth is being monitored serially, see above. 25.  Pelvic pressure, stable -- The patient previously had complaints of increased pelvic pressure. She denied having any associated leaking of fluid, vaginal bleeding, cramping, and/or dysuria. A transvaginal ultrasound was completed. The patient's cervix was normal and measured 5.6 cm without funneling at 31 weeks 6 days. Today, the patient reports her symptoms are stable.  labor and PPROM precautions were reviewed.      26.  Hip pain, right -- Today, the patient again had complaints of right hip and thigh pain.  She reports that increased activity makes the pain worse. She denies having any associated fevers, chills, nausea, vomiting, and or dysuria. She was again counseled regarding the potential utility of a maternity belt in that it may take pressure off of her lower back and provide some relief of her hip pain. If her symptoms persist or worsen, she could be referred for physical therapy, chiropractic care, and/or massage therapy. The patient was counseled that these interventions are generally acceptable as long as the provider has been trained/certified to care for pregnant women. Precautions were reviewed with the patient. 27.  Low ferritin -- The patient's ferritin was low at 11 (considered low if <15 in absence of anemia or <40 in setting of anemia)  --Ferrous sulfate 325 mg BID prescribed  --Monitor levels serially  --Monitor nutrition panel q4-6 weeks (CBC, CMP, magnesium, ferritin, folate, vitamin B12, vitamin D25OH)    -- The patient was counseled to continue her supplement for the remainder of pregnancy and 6 to 8 weeks postpartum  --Recommend repeat nutrition panel at 6-week postpartum visit  --Follow-up with hematology  --Follow up with PCP for long term monitoring and management    28. Low vitamin B12 -- The patient's vitamin B12 level was borderline at 281. Individuals with vitamin B12 level between 200 and 400 are increased risk for anemia and side effects related to low vitamin B12. Thus, supplementation is recommended  --Recommend vitamin B12, 1000 mcg daily  --Monitor levels serially  --Repeat nutrition panel (CBC, CMP, magnesium, ferritin, folate, vitamin B12, vitamin D 25 OH) in 4 weeks    -- The patient should continue a supplement for the remainder of pregnancy and 6 to 8 weeks postpartum  --Repeat nutrition panel at 6-week postpartum visit  --Follow-up with hematology  --Long-term follow-up with PCP for monitoring and management      29.   Low folate -- The patient's folate was low at 8.8. She was mildly anemic with an H/H of 10.1/24.5. Additional supplementation was recommended with 1 mg of folic acid daily. A prescription was provided. --Monitor levels serially  --Repeat nutrition panel (CBC, CMP, magnesium, ferritin, folate, vitamin B12, vitamin D 25 OH) at 6-week postpartum visit  --Long-term follow-up with PCP or hematology for monitoring and management    30. Abnormal parvovirus serology -- The patient's labs were reviewed. Baseline parvovirus serology was completed. She was positive for IgG and equivocal for IgM. The patient denies having any illnesses, fevers, joint pain, and or rashes during her pregnancy. She denies having any known exposures. Counseling was provided. Fetal testing has been reassuring. There has been no evidence of fetal anemia. There is a fetal growth lag. Counseling was provided to the patient today. The patient was counseled that the incidence of parvovirus infection in pregnancy is estimated to be 3.3-3.8%. The highest risk professions are teaching (16%) and  workers (9%). Generally, adults experience 1-4 days of systemic symptoms prior to the onset of a rash. Joint pain is the most common symptom and can last for 1-2 weeks. Viremia typically develops 6 days of the infection and lasts for 1 week. IgM antibodies typically develop 10 days after exposure and prior to the onset of symptoms. The IgM antibodies can persist for 3 months or longer. IgG antibodies develop several days after the IgM antibodies and last for years. Generally, the infection is self limited an benign in an adult. However, when pregnant, there is a risk for congenital infection with subsequent hydrops and/or fetal demise. The risk for fetal loss is highest if the infection occurs in the first half of the pregnancy.   One study reported that the risk of stillbirth is 13% in the first trimester, 9% from 13-20 weeks' gestation, and 0% after 20 weeks' gestation. Another study reported a risk of stillbirth as 11% with infection <20 weeks' gestation and <1% with infection >20 weeks' gestation. Other than stillbirth, transient pleural and pericardial effusions can occur in the fetus. Fetal hydrops can develop secondary to fetal anemia; the risk is estimated to be 3.9% and it is more common before 32 weeks' gestation. The average onset of hydrops is 3 weeks after maternal infection. 50% of cases develop 2-5 weeks after the infection and 93% cases occur within 8 weeks of the infection. Thus, increased fetal surveillance is recommended with weekly ultrasound to monitor for evidence of fetal infection and/or hydrops. There was no evidence of fetal hydrops today. Given the patient was unaware of any exposures and that she denied any recent illnesses, repeat serology was ordered. Additionally, parvovirus PCR was ordered. She should continue to have increased fetal testing as outlined above. --The patient will continue to follow with maternal-fetal medicine for twice-weekly fetal testing. --The patient has a follow-up appointment at the [facility] on 10/21/2022. --I requested the patient return for a follow-up assessment in 1 week unless there is a clinical reason for her to return prior to that time. She is to call if she has any problems or questions prior to her next visit. Further evaluation and management will be dependent on her clinical presentation and the results of her testing. --The patient was advised to call if she has any increased vaginal discharge, vaginal bleeding, contractions, abdominal pain, back pain or any new significant symptomatology prior to her next visit. I advised her that these are signs and symptoms of cervical change and require follow-up assessment when they occur. Preeclampsia precautions were also reviewed with the patient.      --The patient was also counseled to call and/or return with any concerns for decreased fetal activity. --The patient is to continue to follow with you in your office for ongoing obstetric care. --The total time spent on today's visit was 30 minutes. This included preparation for the visit (i.e. reviewing prior external notes and test results), performance of a medically appropriate history and examination, counseling, orders for medications, tests or other procedures, and coordination of care. Greater than 50% of the time was spent face-to-face with the patient. This time is exclusive of procedures performed. I answered all of  the patient's questions to her satisfaction. I asked her to call if she had any additional questions prior to her next visit. --At the conclusion of the visit, the patient appeared to have a good understanding of the issues discussed. I answered all of her questions to her satisfaction. I asked her to call if she had any additional questions prior to her next visit. --Thank you for allowing me to participate in the care of this pleasant patient. Please don't hesitate to call me if you have any questions. Sincerely,      Jael Terry MD, [facility] [address]  [phone]      *All or parts of this note may have been generated using a voice recognition program. There may be typo, grammar, or Word substitution errors that have escaped my review of this note.

## 2022-10-20 ENCOUNTER — TELEPHONE (OUTPATIENT)
Dept: INFUSION THERAPY | Age: 32
End: 2022-10-20

## 2022-10-20 ENCOUNTER — TELEPHONE (OUTPATIENT)
Dept: OBGYN CLINIC | Age: 32
End: 2022-10-20

## 2022-10-20 DIAGNOSIS — D50.9 IRON DEFICIENCY ANEMIA, UNSPECIFIED IRON DEFICIENCY ANEMIA TYPE: ICD-10-CM

## 2022-10-20 LAB — URINE CULTURE, ROUTINE: NORMAL

## 2022-10-20 RX ORDER — SODIUM CHLORIDE 9 MG/ML
5-250 INJECTION, SOLUTION INTRAVENOUS PRN
OUTPATIENT
Start: 2022-10-23

## 2022-10-20 RX ORDER — EPINEPHRINE 1 MG/ML
0.3 INJECTION, SOLUTION, CONCENTRATE INTRAVENOUS PRN
OUTPATIENT
Start: 2022-10-23

## 2022-10-20 RX ORDER — ALBUTEROL SULFATE 90 UG/1
4 AEROSOL, METERED RESPIRATORY (INHALATION) PRN
OUTPATIENT
Start: 2022-10-23

## 2022-10-20 RX ORDER — DIPHENHYDRAMINE HYDROCHLORIDE 50 MG/ML
50 INJECTION INTRAMUSCULAR; INTRAVENOUS
OUTPATIENT
Start: 2022-10-23

## 2022-10-20 RX ORDER — SODIUM CHLORIDE 9 MG/ML
INJECTION, SOLUTION INTRAVENOUS CONTINUOUS
OUTPATIENT
Start: 2022-10-23

## 2022-10-20 RX ORDER — ACETAMINOPHEN 500 MG
500 TABLET ORAL ONCE
OUTPATIENT
Start: 2022-10-23 | End: 2022-10-23

## 2022-10-20 RX ORDER — ACETAMINOPHEN 325 MG/1
650 TABLET ORAL
OUTPATIENT
Start: 2022-10-23

## 2022-10-20 RX ORDER — DIPHENHYDRAMINE HCL 25 MG
25 TABLET ORAL ONCE
OUTPATIENT
Start: 2022-10-23 | End: 2022-10-23

## 2022-10-20 RX ORDER — ONDANSETRON 2 MG/ML
8 INJECTION INTRAMUSCULAR; INTRAVENOUS
OUTPATIENT
Start: 2022-10-23

## 2022-10-20 RX ORDER — SODIUM CHLORIDE 0.9 % (FLUSH) 0.9 %
5-40 SYRINGE (ML) INJECTION PRN
OUTPATIENT
Start: 2022-10-23

## 2022-10-20 RX ORDER — MEPERIDINE HYDROCHLORIDE 25 MG/ML
12.5 INJECTION INTRAMUSCULAR; INTRAVENOUS; SUBCUTANEOUS PRN
OUTPATIENT
Start: 2022-10-23

## 2022-10-20 RX ORDER — HEPARIN SODIUM (PORCINE) LOCK FLUSH IV SOLN 100 UNIT/ML 100 UNIT/ML
500 SOLUTION INTRAVENOUS PRN
OUTPATIENT
Start: 2022-10-23

## 2022-10-20 NOTE — TELEPHONE ENCOUNTER
I talked with Emmanuel Suresh and let her know that Dr. Ashley Galvez has ordered iron infusions and that the infusion center will call her for the 5 infusions to schedule for her due to her H/H 10.9/33.7.

## 2022-10-20 NOTE — TELEPHONE ENCOUNTER
10/20/22 at 1445- called paced to TL in [place] to schedule patient to see Dr. Link Zhou on 10/26/22 Wednesday and to put on schedule for Iv iron.

## 2022-10-20 NOTE — TELEPHONE ENCOUNTER
----- Message from Roberto Avina MD sent at 10/19/2022  6:29 PM EDT -----  Please call the patient  The patient is anemic with an H/H of 10.9/33.7. Her ferritin is low. I discussed iron infusions during her office visit on 10/19/2022. Please call the patient with the results and refer for Venofer.

## 2022-10-21 LAB
THYROGLOBULIN ANTIBODY: 14 IU/ML (ref 0–40)
THYROID PEROXIDASE (TPO) ABS: <4 IU/ML (ref 0–25)

## 2022-10-22 LAB
Lab: NORMAL
REPORT: NORMAL
THIS TEST SENT TO: NORMAL

## 2022-10-25 ENCOUNTER — ROUTINE PRENATAL (OUTPATIENT)
Dept: OBGYN CLINIC | Age: 32
End: 2022-10-25
Payer: MEDICAID

## 2022-10-25 VITALS
DIASTOLIC BLOOD PRESSURE: 71 MMHG | HEART RATE: 99 BPM | SYSTOLIC BLOOD PRESSURE: 109 MMHG | BODY MASS INDEX: 29.36 KG/M2 | WEIGHT: 160.5 LBS

## 2022-10-25 DIAGNOSIS — O36.5930 POOR FETAL GROWTH AFFECTING MANAGEMENT OF MOTHER IN THIRD TRIMESTER, SINGLE OR UNSPECIFIED FETUS: ICD-10-CM

## 2022-10-25 DIAGNOSIS — Z3A.36 36 WEEKS GESTATION OF PREGNANCY: ICD-10-CM

## 2022-10-25 DIAGNOSIS — I63.9 ACUTE CEREBROVASCULAR ACCIDENT (CVA) (HCC): ICD-10-CM

## 2022-10-25 DIAGNOSIS — O43.199 MARGINAL INSERTION OF UMBILICAL CORD AFFECTING MANAGEMENT OF MOTHER: Primary | ICD-10-CM

## 2022-10-25 DIAGNOSIS — B34.3 PARVOVIRUS IGM PRESENT IN BLOOD: ICD-10-CM

## 2022-10-25 LAB
PARVOVIRUS B19 IGG ANTIBODY: 4.07 IV
PARVOVIRUS B19 IGM ANTIBODY: 0.96 IV

## 2022-10-25 PROCEDURE — G8484 FLU IMMUNIZE NO ADMIN: HCPCS | Performed by: OBSTETRICS & GYNECOLOGY

## 2022-10-25 PROCEDURE — 59025 FETAL NON-STRESS TEST: CPT | Performed by: OBSTETRICS & GYNECOLOGY

## 2022-10-25 PROCEDURE — 4004F PT TOBACCO SCREEN RCVD TLK: CPT | Performed by: OBSTETRICS & GYNECOLOGY

## 2022-10-25 PROCEDURE — G8419 CALC BMI OUT NRM PARAM NOF/U: HCPCS | Performed by: OBSTETRICS & GYNECOLOGY

## 2022-10-25 PROCEDURE — 99213 OFFICE O/P EST LOW 20 MIN: CPT | Performed by: OBSTETRICS & GYNECOLOGY

## 2022-10-25 PROCEDURE — G8427 DOCREV CUR MEDS BY ELIG CLIN: HCPCS | Performed by: OBSTETRICS & GYNECOLOGY

## 2022-10-25 NOTE — PATIENT INSTRUCTIONS

## 2022-10-25 NOTE — LETTER
[facility]  [address] [place] [id number]  Phone: [phone]  Fax: [phone]           Seda Perrin MD      2022    Patient: Panchito Trevizo   MR Number: [id number]   YOB: 1990   Date of Visit: 10/25/2022       Dear Dr. Sixto Shresthaor: Thank you for referring Earline Rowan to me for evaluation/treatment. Below are the relevant portions of my assessment and plan of care. If you have questions, please do not hesitate to call me. I look forward to following Jaden Saldivar along with you. Sincerely,        Seda Perrin MD    CC providers:  MD GUILLAUME Gonzalez    [facility]       2022      Octavia Hirsch, [place] [address] [place] [address]     RE:  Ines ARAUJO  : 1990   AGE: 33 y.o. This report has been created using voice recognition software. It may contain errors which are inherent in voice recognition technology. Dear Dr. Sixto Lorenzo:      I had the pleasure of meeting with Ms. Art Hoyos for fetal testing. As you know, Ms. Art Hoyos  is a 33 y.o.  at 36w5d (LMP = [address]) who is being followed by our office for multiple medical issues. Today, Ms. Art Hoyos reports that she feels well. She notes good fetal movement and denies any symptoms of leaking of fluid, vaginal bleeding, and/or contractions. The patient presented today for fetal testing. A nonstress test was completed and noted to be reactive. PERTINENT PHYSICAL EXAMINATION:   /71   Pulse 99   Wt 160 lb 8 oz (72.8 kg)   LMP 02/10/2022   BMI 29.36 kg/m²     Urine dipstick:   No urine specimen      A fetal ultrasound assessment was performed today. A report is enclosed for your review. Assessment & Plan:  33 y.o.  at 36w5d (LMP = 9 [address]) with:    1.   Fetal testing -- The patient presented to the office today for fetal testing secondary to a history of maternal stroke and poor fetal growth. A nonstress test was completed today noted to be reactive. 2.  Abnormal parvovirus serology -- The patient's labs were previously reviewed. Baseline parvovirus serology was completed. She was positive for IgG and equivocal for IgM. The patient denies having any illnesses, fevers, joint pain, and or rashes during her pregnancy. She denies having any known exposures. Counseling was provided. Fetal testing has been reassuring. There has been no evidence of fetal anemia. There is a fetal growth lag. Counseling was provided to the patient today. The patient was counseled that the incidence of parvovirus infection in pregnancy is estimated to be 3.3-3.8%. The highest risk professions are teaching (16%) and  workers (9%). Generally, adults experience 1-4 days of systemic symptoms prior to the onset of a rash. Joint pain is the most common symptom and can last for 1-2 weeks. Viremia typically develops 6 days of the infection and lasts for 1 week. IgM antibodies typically develop 10 days after exposure and prior to the onset of symptoms. The IgM antibodies can persist for 3 months or longer. IgG antibodies develop several days after the IgM antibodies and last for years. Generally, the infection is self limited an benign in an adult. However, when pregnant, there is a risk for congenital infection with subsequent hydrops and/or fetal demise. The risk for fetal loss is highest if the infection occurs in the first half of the pregnancy. One study reported that the risk of stillbirth is 13% in the first trimester, 9% from 13-20 weeks' gestation, and 0% after 20 weeks' gestation. Another study reported a risk of stillbirth as 11% with infection <20 weeks' gestation and <1% with infection >20 weeks' gestation. Other than stillbirth, transient pleural and pericardial effusions can occur in the fetus.   Fetal hydrops can develop secondary to fetal anemia; the risk is estimated to be 3.9% and it is more common before 32 weeks' gestation. The average onset of hydrops is 3 weeks after maternal infection. 50% of cases develop 2-5 weeks after the infection and 93% cases occur within 8 weeks of the infection. Thus, increased fetal surveillance is recommended with weekly ultrasound to monitor for evidence of fetal infection and/or hydrops. There was no evidence of fetal hydrops today. Given the patient was unaware of any exposures and that she denied any recent illnesses, repeat serology was ordered. Additionally, parvovirus PCR was ordered. The patient was encouraged to complete testing at her earliest convenience. She should continue to have increased fetal testing as outlined above. --The patient will continue to follow with maternal-fetal medicine for twice-weekly fetal testing. --The patient is scheduled for a repeat  on 11/3/2022. She will be admitted to the hospital on 2022 for anticoagulation management. --I requested the patient return for a follow-up assessment in 3 days unless there is a clinical reason for her to return prior to that time. She is to call if she has any problems or questions prior to her next visit. Further evaluation and management will be dependent on her clinical presentation and the results of her testing. --The patient was advised to call if she has any increased vaginal discharge, vaginal bleeding, contractions, abdominal pain, back pain or any new significant symptomatology prior to her next visit. I advised her that these are signs and symptoms of cervical change and require follow-up assessment when they occur. Preeclampsia precautions were also reviewed with the patient. --The patient is to continue to follow with you in your office for ongoing obstetric care. --The total time spent on today's visit was 20 minutes.   This included preparation for the visit (i.e. reviewing prior external notes and test results), performance of a medically appropriate history and examination, counseling, orders for medications, tests or other procedures, and coordination of care. Greater than 50% of the time was spent face-to-face with the patient. This time is exclusive of procedures performed. I answered all of  the patient's questions to her satisfaction. I asked her to call if she had any additional questions prior to her next visit. --At the conclusion of the visit, the patient appeared to have a good understanding of the issues discussed. I answered all of her questions to her satisfaction. I asked her to call if she had any additional questions prior to her next visit. --Thank you for allowing me to participate in the care of this pleasant patient. Please don't hesitate to call me if you have any questions. Sincerely,      Casimiro Best MD, [facility]estJ[facility] [address]  [phone]      *All or parts of this note may have been generated using a voice recognition program. There may be typo, grammar, or Word substitution errors that have escaped my review of this note.

## 2022-10-25 NOTE — PROGRESS NOTES
2022      Sandeep Zeeel, [address]  [facility],  [address] [place] [address]     RE:  Levi ARAUJO  : 1990   AGE: 33 y.o. This report has been created using voice recognition software. It may contain errors which are inherent in voice recognition technology. Dear Dr. Mj Abdul:      I had the pleasure of meeting with Ms. Jone Mata for fetal testing. As you know, Ms. Jone Mata  is a 33 y.o.  at 36w5d (LMP = 5 [address]) who is being followed by our office for multiple medical issues. Today, Ms. Jone Mata reports that she feels well. She notes good fetal movement and denies any symptoms of leaking of fluid, vaginal bleeding, and/or contractions. The patient presented today for fetal testing. A nonstress test was completed and noted to be reactive. PERTINENT PHYSICAL EXAMINATION:   /71   Pulse 99   Wt 160 lb 8 oz (72.8 kg)   LMP 02/10/2022   BMI 29.36 kg/m²     Urine dipstick:   No urine specimen      A fetal ultrasound assessment was performed today. A report is enclosed for your review. Assessment & Plan:  33 y.o.  at 36w5d (LMP = 9 [address]) with:    1. Fetal testing -- The patient presented to the office today for fetal testing secondary to a history of maternal stroke and poor fetal growth. A nonstress test was completed today noted to be reactive. 2.  Abnormal parvovirus serology -- The patient's labs were previously reviewed. Baseline parvovirus serology was completed. She was positive for IgG and equivocal for IgM. The patient denies having any illnesses, fevers, joint pain, and or rashes during her pregnancy. She denies having any known exposures. Counseling was provided. Fetal testing has been reassuring. There has been no evidence of fetal anemia. There is a fetal growth lag. Counseling was provided to the patient today. The patient was counseled that the incidence of parvovirus infection in pregnancy is estimated to be 3.3-3.8%.   The highest risk professions are teaching (16%) and  workers (9%). Generally, adults experience 1-4 days of systemic symptoms prior to the onset of a rash. Joint pain is the most common symptom and can last for 1-2 weeks. Viremia typically develops 6 days of the infection and lasts for 1 week. IgM antibodies typically develop 10 days after exposure and prior to the onset of symptoms. The IgM antibodies can persist for 3 months or longer. IgG antibodies develop several days after the IgM antibodies and last for years. Generally, the infection is self limited an benign in an adult. However, when pregnant, there is a risk for congenital infection with subsequent hydrops and/or fetal demise. The risk for fetal loss is highest if the infection occurs in the first half of the pregnancy. One study reported that the risk of stillbirth is 13% in the first trimester, 9% from 13-20 weeks' gestation, and 0% after 20 weeks' gestation. Another study reported a risk of stillbirth as 11% with infection <20 weeks' gestation and <1% with infection >20 weeks' gestation. Other than stillbirth, transient pleural and pericardial effusions can occur in the fetus. Fetal hydrops can develop secondary to fetal anemia; the risk is estimated to be 3.9% and it is more common before 32 weeks' gestation. The average onset of hydrops is 3 weeks after maternal infection. 50% of cases develop 2-5 weeks after the infection and 93% cases occur within 8 weeks of the infection. Thus, increased fetal surveillance is recommended with weekly ultrasound to monitor for evidence of fetal infection and/or hydrops. There was no evidence of fetal hydrops today. Given the patient was unaware of any exposures and that she denied any recent illnesses, repeat serology was ordered. Additionally, parvovirus PCR was ordered. The patient was encouraged to complete testing at her earliest convenience.      She should continue to have increased fetal testing as outlined above. --The patient will continue to follow with maternal-fetal medicine for twice-weekly fetal testing. --The patient is scheduled for a repeat  on 11/3/2022. She will be admitted to the hospital on 2022 for anticoagulation management. --I requested the patient return for a follow-up assessment in 3 days unless there is a clinical reason for her to return prior to that time. She is to call if she has any problems or questions prior to her next visit. Further evaluation and management will be dependent on her clinical presentation and the results of her testing. --The patient was advised to call if she has any increased vaginal discharge, vaginal bleeding, contractions, abdominal pain, back pain or any new significant symptomatology prior to her next visit. I advised her that these are signs and symptoms of cervical change and require follow-up assessment when they occur. Preeclampsia precautions were also reviewed with the patient. --The patient is to continue to follow with you in your office for ongoing obstetric care. --The total time spent on today's visit was 20 minutes. This included preparation for the visit (i.e. reviewing prior external notes and test results), performance of a medically appropriate history and examination, counseling, orders for medications, tests or other procedures, and coordination of care. Greater than 50% of the time was spent face-to-face with the patient. This time is exclusive of procedures performed. I answered all of  the patient's questions to her satisfaction. I asked her to call if she had any additional questions prior to her next visit. --At the conclusion of the visit, the patient appeared to have a good understanding of the issues discussed. I answered all of her questions to her satisfaction. I asked her to call if she had any additional questions prior to her next visit.       --Thank you for allowing me to participate in the care of this pleasant patient. Please don't hesitate to call me if you have any questions. Sincerely,      Dorcas Jones MD, [facility]  [phone]      *All or parts of this note may have been generated using a voice recognition program. There may be typo, grammar, or Word substitution errors that have escaped my review of this note.

## 2022-10-25 NOTE — PROGRESS NOTES
Pt here for NST  Pt denies any bleeding/cramping  Pt states good fetal movement  Pt scheduled for  at 0700 on 11/3/2022  Pt did not leave urine in triage area

## 2022-10-31 ENCOUNTER — ANCILLARY PROCEDURE (OUTPATIENT)
Dept: OBGYN CLINIC | Age: 32
End: 2022-10-31
Payer: MEDICAID

## 2022-10-31 ENCOUNTER — ROUTINE PRENATAL (OUTPATIENT)
Dept: OBGYN CLINIC | Age: 32
End: 2022-10-31
Payer: MEDICAID

## 2022-10-31 VITALS
BODY MASS INDEX: 31.18 KG/M2 | DIASTOLIC BLOOD PRESSURE: 74 MMHG | SYSTOLIC BLOOD PRESSURE: 109 MMHG | HEART RATE: 99 BPM | WEIGHT: 170.5 LBS

## 2022-10-31 DIAGNOSIS — E53.8 LOW VITAMIN B12 LEVEL: ICD-10-CM

## 2022-10-31 DIAGNOSIS — R79.0 LOW FERRITIN: ICD-10-CM

## 2022-10-31 DIAGNOSIS — E55.9 VITAMIN D DEFICIENCY: ICD-10-CM

## 2022-10-31 DIAGNOSIS — D56.3 ALPHA THALASSEMIA SILENT CARRIER: Primary | ICD-10-CM

## 2022-10-31 DIAGNOSIS — O43.199 MARGINAL INSERTION OF UMBILICAL CORD AFFECTING MANAGEMENT OF MOTHER: ICD-10-CM

## 2022-10-31 DIAGNOSIS — O34.10 UTERINE FIBROID COMPLICATING ANTENATAL CARE, BABY NOT YET DELIVERED: ICD-10-CM

## 2022-10-31 DIAGNOSIS — R80.9 URINE PROTEIN INCREASED: ICD-10-CM

## 2022-10-31 DIAGNOSIS — D25.9 UTERINE FIBROID COMPLICATING ANTENATAL CARE, BABY NOT YET DELIVERED: ICD-10-CM

## 2022-10-31 DIAGNOSIS — N83.201 RIGHT OVARIAN CYST: ICD-10-CM

## 2022-10-31 DIAGNOSIS — B34.3 PARVOVIRUS IGM PRESENT IN BLOOD: ICD-10-CM

## 2022-10-31 DIAGNOSIS — I63.9 ACUTE CEREBROVASCULAR ACCIDENT (CVA) (HCC): ICD-10-CM

## 2022-10-31 DIAGNOSIS — Q24.9 MATERNAL CONGENITAL CARDIAC ANOMALY AFFECTING PREGNANCY, ANTEPARTUM, THIRD TRIMESTER: ICD-10-CM

## 2022-10-31 DIAGNOSIS — O99.413 MATERNAL CONGENITAL CARDIAC ANOMALY AFFECTING PREGNANCY, ANTEPARTUM, THIRD TRIMESTER: ICD-10-CM

## 2022-10-31 PROBLEM — Z3A.35 35 WEEKS GESTATION OF PREGNANCY: Status: ACTIVE | Noted: 2022-10-31

## 2022-10-31 PROBLEM — R79.89 LOW VITAMIN B12 LEVEL: Status: ACTIVE | Noted: 2022-07-31

## 2022-10-31 LAB
GLUCOSE URINE, POC: NORMAL
PROTEIN UA: ABNORMAL

## 2022-10-31 PROCEDURE — 76820 UMBILICAL ARTERY ECHO: CPT | Performed by: OBSTETRICS & GYNECOLOGY

## 2022-10-31 PROCEDURE — G8419 CALC BMI OUT NRM PARAM NOF/U: HCPCS | Performed by: OBSTETRICS & GYNECOLOGY

## 2022-10-31 PROCEDURE — 76818 FETAL BIOPHYS PROFILE W/NST: CPT | Performed by: OBSTETRICS & GYNECOLOGY

## 2022-10-31 PROCEDURE — 4004F PT TOBACCO SCREEN RCVD TLK: CPT | Performed by: OBSTETRICS & GYNECOLOGY

## 2022-10-31 PROCEDURE — 99214 OFFICE O/P EST MOD 30 MIN: CPT | Performed by: OBSTETRICS & GYNECOLOGY

## 2022-10-31 PROCEDURE — G8427 DOCREV CUR MEDS BY ELIG CLIN: HCPCS | Performed by: OBSTETRICS & GYNECOLOGY

## 2022-10-31 PROCEDURE — 76816 OB US FOLLOW-UP PER FETUS: CPT | Performed by: OBSTETRICS & GYNECOLOGY

## 2022-10-31 PROCEDURE — 81002 URINALYSIS NONAUTO W/O SCOPE: CPT | Performed by: OBSTETRICS & GYNECOLOGY

## 2022-10-31 PROCEDURE — 76821 MIDDLE CEREBRAL ARTERY ECHO: CPT | Performed by: OBSTETRICS & GYNECOLOGY

## 2022-10-31 PROCEDURE — G8484 FLU IMMUNIZE NO ADMIN: HCPCS | Performed by: OBSTETRICS & GYNECOLOGY

## 2022-10-31 PROCEDURE — 99213 OFFICE O/P EST LOW 20 MIN: CPT | Performed by: OBSTETRICS & GYNECOLOGY

## 2022-10-31 NOTE — PROGRESS NOTES
patient had a sure LMP that agreed with the 9-week ultrasound, the recommendation was made to use an GUERO of 11/17/2022 based on her LMP. Fetal growth was appropriate for the gestational age again today. 2.  History of stroke during pregnancy  -- The patient previously reported that she initially experienced symptoms of headache and aphasia in November 2021. She did not seek medical care at that time. The patient presented to the emergency department on April 17, 2022 with complaints of a severe headache that woke her up from sleep. She had a headache 4 days prior to her evaluation in the emergency department. Two days prior to her evaluation, she had slurred speech and aphasia which lasted for approximately 20 minutes and then resolved. The patient then developed paresthesias on her right arm. A CT of the head was initially completed. The report stated, \" hypodensity within the left insular cortex with abnormal white matter hypodensity extending into the adjacent subinsular white matter and left corona radiata. Findings may be suggestive of subacute infarction. For further evaluation brain MRI is recommended. \"     An MRI, MRV, and MRA of the head with and completed. Per the MRI report from 4/18/2022, acute infarct noted in left insula/left temporal lobe/left periventricular white matter with associated thrombosis of a superior M2 branch of the left middle cerebral artery. Chronic infarcts noted in the right corona radiata and left centrum semiovale. The MRV was unremarkable. No dural sinus thrombosis was noted. The patient was diagnosed with a subacute CVA and admitted to the hospital.  She was also noted to have bacteriuria. She was cared for by her primary care physician and she had a neurology consultation. A thrombophilia evaluation was completed. She was started on a low-dose aspirin and received Lovenox for DVT prophylaxis.      A maternal echocardiogram was completed on 4/19/2022. Per the report, the study was normal.  The patient reports having a repeat echocardiogram at the [facility] clinic and she has a small PFO or ASD. The patient remained stable and was discharged home on 4/19/2022. She was discharged home on Keflex and a low-dose aspirin. She was to follow-up with obstetrics and her primary care physician. The patient's thrombophilia evaluation was completed on 4/18/2022, her results included: Homocystine 5, protein C functional 111%, protein S antigen free 25% (), Antithrombin activity 99%, APC resistance 3.54 (normal), lupus anticoagulant negative, beta-2 glycoprotein antibody negative, cardiolipin antibody negative, prothrombin 2 gene mutation negative. A screening PARIS was completed and negative. A protein S antigen free was repeated on 4/19/2022 and again low at 27 (). Homocystine was repeated on 4/19/2022 and again normal at 7.8. Factor VIII activity was normal at 134% and factor VII activity was normal at 133%. Anticardiolipin antibody was repeated on 4/19/2022 and IgM was indeterminate at 17. Repeat screening for lupus anticoagulant was negative on 4/19/2022. A D-dimer was checked on 4/19/2022 and normal.  Patient's urine drug screen was positive for marijuana. The patient's urinalysis was abnormal.  The urine culture showed mixed jair including E. coli staph and Corynebacterium. No sensitivities were provided. The patient was seen for her initial consultation with [facility] on 5/9/2022. Counseling was provided and the recommendation was made for prophylactic anticoagulation with Lovenox, 40 mg daily. Both hematology and neurology were contacted regarding the patient and plan of care. The patient returned to the [facility] office on 5/31/2022 for a follow-up consultation. Prior to her visit, she reported to the nurse that she had recurrent strokelike symptoms including left-sided numbness and a headache.   She was immediately taken to the emergency department for evaluation. She was readmitted to the hospital on 5/31/2022 with an acute CVA. A CT of the head was repeated on 5/31/2022. Per the impression, there was left insular encephalomalacia at the site of prior infarct. It was an unremarkable CTA of the head and neck. Specifically, there was no evidence of residual thrombus in the left middle cerebral artery. The patient then had an MRI/MRV/MRA of the head without contrast.  Per the impression on the report, tiny foci of acute or early subacute infarction were seen in the right frontal lobe, with single tiny foci each within the right lateral basal ganglia, external capsule, and insular cortex. Mild stenosis was noted in the left lateral transverse sinus, which may be due to an arachnoid granulation. There is no evidence of dural venous thrombosis. She was evaluated by neurology on 6/2/2022. The recommendation was made for therapeutic anticoagulation with Lovenox. She was also evaluated by hematology. She was discharged home on 6/3/2022. The patient returned to the [facility] office at 16 weeks and 6 days for follow-up. She reported that she felt well. She still had intermittent headache symptoms that she rated as a 6 out of 10. She reported that she was tolerating the therapeutic Lovenox well. During her visit at 16 weeks 6 days, the patient requested a referral for additional evaluation at the [facility]  [employer] [facility] clinic. The patient was counseled that this would be reasonable given her complicated pregnancy. Additionally, it may be in the patient's best interest to plan for delivery at a center in which neurology and/or neurosurgery would be available in the event the patient had any complications during delivery or postpartum. The patient was agreeable with the plan. She requested a referral to the [facility] clinic for additional evaluation and management. A referral was provided.      The patient's neurologist, Dr. Yasmani Sewell, was also contacted regarding the patient. He agreed with the plan outlined above. The patient is following with maternal-fetal medicine, Dr. Amy tSevens, at the [facility]. She is also following with Dr. Venancio Mazariegos from vascular surgery. She was also seen by a vascular neurologist, Dr. Rafael Bravo, at the [facility] clinic. The patient returns the office today for follow-up and fetal testing secondary to the finding of poor fetal growth. She notes fetal movement. She again denies having any symptoms of leaking of fluid, vaginal bleeding, cramping, and/or contractions. Fetal growth was reevaluated today at 37 weeks 4 days. The composite gestational age is 27 weeks 4 days. The estimated fetal weight is 5 pounds 13 ounces, 22nd percentile. [facility] is at the 4th percentile. There is a lag in the abdominal circumference. Fetal testing was otherwise reassuring. Testing was reassuring today with a biophysical profile score of 12/51, normal umbilical artery PI, and normal CPR PI. She denies having any subsequent strokelike symptoms. She again reports that her migraine headache symptoms have significantly improved. Counseling was previously provided to the patient. Counseling was reviewed. The patient did not have any additional questions or concerns. Again, pregnancy and the puerperium (postpartum period) are well-established risk factors for thromboembolism. Normal pregnancy is accompanied by an increase in clotting factors. The resulting hypercoagulable state likely evolved to protect women from hemorrhage in the event of a miscarriage and during childbirth. Thromboembolic disease during pregnancy and the puerperium is a significant cause of maternal morbidity and mortality. During pregnancy, the risk of venous thromboembolism increases 4-5 fold and the risk of arterial thromboembolism, myocardial infarction, and stroke increases 3-4 fold compared to the nonpregnant state. Postpartum, the risk of venous thromboembolism is 20 fold higher and the risk of arterial thromboembolism is similarly elevated compared to nonpregnant individuals. Venous events are more common during pregnancy accounting for 4 out of 5 thromboembolic events. However, the risk of death is higher following an arterial thrombosis. Approximately 80% of venous thromboembolic events are deep venous thromboses and approximately 20% of pulmonary emboli. The most important risk factor for thrombosis during pregnancy is a history of thrombosis. The risk for a thrombotic event is further increased in women with an underlying acquired or inherited thrombophilia. Most studies have reported and equal distribution of thrombosis risk across all trimesters of pregnancy however, 2 large conflicting studies have reported a first trimester (50% prior to 15 weeks) and third trimester (60%) predominance. Additional risk factors for thrombosis during pregnancy include multifetal gestation, varicose veins, inflammatory bowel disease, urinary tract infection, diabetes, hospitalization, obesity, and maternal age greater than 28 years. Compared to the antepartum period, the thrombosis risk is 2-5 times higher during the postpartum period. This risk is highest during the first 6 weeks postpartum and declines to prepregnancy rates by 13-18 weeks postpartum. Risk factors for postpartum thrombosis include  section, medical co morbidities, obesity,  delivery, hemorrhage, fetal demise, advanced maternal age, hypertensive disorders of pregnancy, tobacco use, and infection. The patient was counseled to continue treatment with therapeutic Lovenox and a daily low-dose aspirin. She was counseled to continue to follow with the specialist at the [facility] clinic for long-term monitoring and management.       Per the patient's most recent consultation completed on 10/21/2022, the patient is scheduled for hospital admission on 2022 to allow for anticoagulation management. She is scheduled for a repeat  on 11/3/2022. Therapeutic anticoagulation should be continued throughout the pregnancy and for at least 6-8 weeks postpartum. Lovenox can be initiated 12 hours following a vaginal delivery and 24 hours following a  section (if there is no ongoing concern for bleeding). The risks and benefits of therapeutic anticoagulation in pregnancy were reviewed including the development of heparin-induced thrombocytopenia (HIT), osteopenia, bleeding, and poor fetal growth. An anesthesia consultation is also recommended in the third trimester given she is on anticoagulation. A hematology consult was recommended. The patient is following with Dr. Marietta Posadas neurology consult was recommended. A referral was provided. The patient is following with a vascular neurologist at the [facility] clinic, Dr. Lisy Sibley  The patient was referred to the [facility] clinic for additional maternal and fetal evaluation. She is following with Dr. Kanchan Gustafson. The patient should avoid estrogen containing birth control postpartum. Increased fetal surveillance is also recommended. Fetal growth should be monitored serially, every 3 to 4 weeks starting at 24 to 26 weeks gestation. The Patient should monitor fetal kick counts daily starting at 28 weeks gestation. She should be scheduled for twice-weekly fetal testing starting at 32 weeks gestation. Delivery is recommended at/by 39 weeks gestation. Counseling was previously provided regarding the radiation exposure from the CT scan. Counseling was reviewed. The patient did not have any additional questions or concerns. Increased risks associated with radiation exposure during pregnancy include that of early pregnancy loss, fetal malformations, poor fetal growth, and an increase for childhood cancers (increased from 2800 to 2000).  Generally, these risks are not significantly increased unless the radiation exposure exceeds 50 mGy (5 rads). The estimated radiation exposure associated with a head CT is 1-10 mGy (0.1-1 rads). The International Commission of Radiologic Protection suggests that the radiation doses delivered in utero by imaging tests (such as those performed in the diagnosis of PE) present no measurable increased risk of fetal death or developmental abnormalities over the background incidence of these entities. The [facility] [employer] of Radiation Protection and Measurements considers the risk of radiation-associated abnormalities to be negligible, at less than 50 mGy when compared with other risks of pregnancy. Per available data, diagnostic imaging studies that expose the fetus to less than 0.05 Gray (50 mGy, 5 rads), do not appear to increase the risk for fetal anomalies, intellectual disability, growth restriction, or pregnancy loss. With exposure to more than 0.05 Gy (50 mGy, 5 rads), a definitive threshold at which the risk for, locations increases has not been determined. Evidence suggests the risks begin to increase at doses above 0.1 Gy (100 mGy, 10 rads) and especially above 0.15-0.2 Gy (150-200 mGy, 15-20 rads). 3.  Poor fetal growth -- The date fetal growth assessment at 31 weeks 1 day at the [facility]. There was a 1 week lag. The composite gestational age was 31 weeks 1 day. [facility] was measuring at the 6 percentile. Fetal growth was reevaluated at 31 weeks 6 days and appropriate for the gestational age. Fetal growth was reevaluated at 35 weeks 1 day. The composite gestational age was 26 weeks 4 days. The estimated fetal weight was 4 pounds 15 ounces, 30th percentile. There is a 2-week lag in the abdominal circumference. [facility] was measuring at the 8th percentile. Fetal growth was reevaluated today at 37 weeks 4 days. The composite gestational age was 27 weeks 4 days.   The estimated fetal weight was 5 pounds 13 ounces, 22nd percentile. [facility] was at the 4th percentile. There is been positive interval fetal growth. Fetal testing was reassuring with a biophysical profile score of 40/63, normal umbilical artery Dopplers, and a normal CPR PI. Surveillance was recommended by the patient's high risk provider in [facility]. We will facilitate twice-weekly fetal testing for the patient until she returns to [facility]. Counseling was previously provided to the patient. Counseling was reviewed. She did not have any additional questions or concerns. Again, the overall estimated fetal weight is greater than the 10th percentile, however the abdominal circumference measures less than the 10th percentile. Thus, there is concern for poor fetal growth. The patient was counseled that typically fetal growth restriction is formally diagnosed when the overall estimated fetal weight is less than the 10th percentile. However, a decline in the overall estimated fetal weight of greater than 20% and/or an abdominal circumference that measures less then the 10th percentile are findings that are also concerning for and/or consistent with fetal growth restriction. In over 70% of cases, small fetal size is constitutional. In approximately 30% of cases, there is a secondary cause related to placental insufficiency, a fetal issue, and/or an underlying maternal condition. Risk factors were reviewed with the patient including malnutrition, maternal chronic diseases (ie SLE, renal disease, antiphospholipid antibodies, anemia, diabetes), placental disease (chorioangioma, mosaicism), infections, fetal aneuploidy, and teratogen exposure. She was counseled regarding general management plans and that mild IUGR can generally be expectantly managed until 37-39 weeks' gestation. If there is severe growth restriction, delivery timing is based on the point at which the risk of fetal death exceeds that of  death.   There is an increased risk for fetal loss in the setting of growth restriction, thus, increased surveillance is indicated. The best predictors of outcome are fetal size and gestational age attained. Overall, the long term outcome depends on the etiology of the poor growth. At this time, I recommend continued increased fetal surveillance as outlined above. Given the concern for poor fetal growth, additional maternal evaluation was recommended. The following labs were ordered: Preeclampsia screening, antiphospholipid antibodies, thyroid function studies/thyroid peroxidase antibodies, a nutrition panel, and infection studies. -- Testing completed on 10/18/2022, the patient's results included: H/H 10.9/33.7, 86, platelet count 328,636, potassium 3.6, creatinine 0.7, calcium 9.2, ALT 6, AST 14 magnesium 1.7, uric acid 3.7, , ferritin 11, vitamin B-12 281, vitamin D 12, TSH 0.834, free T4 0.98, 8, TPO negative, antithyroglobulin antibody negative, toxoplasma Ig negative, CMV IgG positive/IgM negative, parvovirus IgG positive, IgM equivocal, urine culture mixed, urinalysis positive for ketones, glucose, and leukocyte esterase, urine protein creatinine ratio 0.3.  --Additional recommendations are below. Given the lag in fetal growth, a low dose aspirin was recommended. She was counseled to start 81 mg of aspirin daily. The patient was counseled to continue a daily low-dose aspirin as outlined above. 4.  Genetic counseling -- The patient was previously counseled regarding her options for genetic screening and/or diagnostic testing. She opted for screening with NIPT. Screening was completed on 5/9/2022. Her results were low risk for aneuploidy. The reported fetal fraction was 28% the reported fetal sex was female. The results were previously reviewed with the patient. The patient also opted for a Horizon 14 panel. Screening was completed on 5/9/2022. The results were reviewed with the patient.   She is a silent carrier for alpha thalassemia. Additional counseling was provided, please see below. Screening was otherwise negative for cystic fibrosis, spinal muscular atrophy, and Fragile X. The patient was also counseled regarding the recommendation for maternal serum AFP to screen for open neural tube defects. Risks and benefits of screening were reviewed. Screening is recommended at 15 to 22 weeks gestation. Testing was ordered today. 5.  History of  X1 -- The patient's first pregnancy was delivered via  secondary to arrest of dilation and nonreassuring fetal heart tones. The placenta is posterior. She plans to have a repeat  for delivery. Again, her delivery will be scheduled at the [facility] in order to facilitate additional maternal care. --Repeat  scheduled 11/3/2022     6. Subchronic hemorrhage, stable -- The ultrasound images were reviewed with patient. The subchorionic hemorrhage previously described was seen today and appeared stable. At 35 weeks 6 days, subchorionic hemorrhage measured 4 x 0.8 x 1.7 cm. The size and appearance are stable. At 35 weeks 1 day, the subchronic hemorrhage measured 2.6 x 1.8 x 0.9 cm. The size and appearance were stable. At 31 weeks 6 days, a subchorionic hemorrhage was seen measuring 2.4 x 0.7 x 2 cm. The size is stable compared to prior imaging. At 22 weeks and 6 days, the subchronic hemorrhage measured 2.4 x 0.8 x 2 cm. The size is somewhat decreased compared to prior imaging. At 16 weeks 6 days, a subchronic hemorrhage was seen measuring 5.9 x 0.8 x 2.3 cm. The size is somewhat increased compared to prior imaging. Previously, at 12 weeks 4 days, the subchorionic hemorrhage measured 2.6 x 1.4 x 0.9 cm. No active bleeding was noted. The patient denied having any vaginal bleeding or cramping. She is noted to be Rh+. Counseling was previously provided to the patient. Counseling was reviewed. The patient did not have any additional questions or concerns. A subchorionic hemorrhage can be associated with an increased risk for bleeding, infection, early pregnancy loss, poor fetal growth,  premature rupture of membranes,  labor and delivery, and stillbirth. Because of the increased risk for complications, close follow-up is recommended. The patient is scheduled for twice-weekly fetal testing. Bleeding precautions were reviewed. 7.  Uterine fibroids -- The ultrasound images were reviewed with the patient. Multiple uterine fibroids were again noted. The size and appearance are stable compared to previous imaging. The patient again denied any symptoms of fevers, chills, and/or abdominal pain. There is a lag in fetal growth as outlined above. Counseling was previously provided to the patient. Counseling was reviewed. The patient did not have any additional questions or concerns. Fibroids are common in women of reproductive age. Most studies that monitored the growth of fibroids during pregnancy have noted that the majority of uterine fibroids have less than 10% change in volume across gestation. Most of the growth usually occurs in the first trimester. Fibroids larger than 5 cms are more likely to grow, where as smaller fibroids most likely remain stable in size. As you know, there is a higher chance of antepartum bleeding with retroplacental fibroids and a small increase in  birth. Specifically, if placentation occurs adjacent to or overlying a fibroid. Submucosal and retroplacental fibroids with volumes more than 200 ml (corresponding to 7 cm in diameter) have the highest risk of abruption. Pain is another common complication of fibroid in pregnancy. It is especially high in fibroids greater than 5 cm in diameter. Patients with degenerating fibroids tend to have localized pain, mild leukocytosis, pyrexia and nausea.   This may result from marijuana daily. She reported that she was using marijuana for decreased appetite. The patient again reports that she has stopped smoking marijuana. She was congratulated and encouraged not to use marijuana during pregnancy. Counseling was previously provided to the patient. Counseling was reviewed. The patient did not have any additional questions or concerns. She was again counseled regarding risk of neurodevelopmental issues in children exposed to [facility] during pregnancy. Additionally, marijuana use may pose risks similar to that of cigarette use including increased risk for poor fetal growth, placental bleeding, PPROM/ delivery, and stillbirth. She was again counseled not to use THC while pregnant. Random urine drug screens should be monitored during pregnancy. 10.  First/second trimester alcohol exposure -- The patient previously reported that she drinks wine occasionally. She reports only taking sips of wine. Today, the patient again reports that she has stopped drinking alcohol. Counseling was previously provided to the patient. Counseling was reviewed. The patient did not have any additional questions or concerns. Secondary to the increased risk for poor growth, fetal growth should be monitored serially, every 3 to 4 weeks starting at 24 to 26 weeks gestation. The patient is being monitored closely secondary to a lag in the fetal abdominal circumference. Secondary to the increased risk for congenital heart anomalies, a fetal echocardiogram was recommended at 22 to 24 weeks gestation. A fetal echocardiogram was completed on 2022 and reported as normal.  The results were previously reviewed with the patient. Limitations of fetal echocardiography were reviewed. 11.  Right ovarian mass/cyst, stable -- The ultrasound images were reviewed with the patient. The right ovarian mass previously described was again seen today.   Right ovarian mass measured 1.6 x 0.9 x 1. 5 cm. The size and appearance are stable. At 35 weeks 6 days, the hyperechoic area measured 1.5 x 0.7 x 1.3 cm. The size and appearance were stable. At 33 weeks 1 day, the hyperechoic area measured 1.1 x 0.8 x 1.1 cm. The size and appearance were stable. At 31 weeks 6 days, the hyperechoic mass measured 1.3 x 1 x 1.5 cm. The size and appearance were stable compared to prior imaging. At 12 weeks 4 days, A hyperechoic mass was seen in the right ovary measuring 1.2 x 1 x 0.9 cm. No fluid was seen surrounding the area. The patient was again counseled that this may represent a hemorrhagic cyst versus teratoma versus a calcification, although no shadowing was seen. The size and appearance of the ovarian mass will be reevaluated on follow-up ultrasound. The adnexa will be reevaluated on follow-up exam.  The ovaries should be evaluated during the patient's . Otherwise, postpartum follow-up is recommended. The patient was again encouraged to discuss this issue with her provider at the [facility]. 12.   Nausea and vomiting of pregnancy, improved -- The patients previously reported having daily symptoms of nausea and decreased appetite. She was having occasional emesis. The patient reports that her symptoms of nausea and vomiting have significantly improved. She has gained 4 pounds since her last visit to our office. She has not gained any weight since her visit at 12 weeks. Today, the patient again reports that her symptoms are stable. She denied having any signs or symptoms of dehydration. The patient was again encouraged to eat small amounts of food every 2-3 hours during the day. She was also encouraged to stay well-hydrated. A baseline nutrition panel (CBC, CMP, magnesium, ferritin, folate, vitamin B12, vitamin D 25 OH) was recommended.   Baseline testing was completed on 2022, her results included: A CBC was ordered but not done by the lab.  A CBC was completed on 5/31/22, H/H 11.9/35.8, MCV 88.6, ,000. The remainder of the test results are from 5/13/2022 and included: Potassium 3.7, creatinine 0.7, calcium 9, ALT 7, AST 13, magnesium 2, uric acid 3, , ferritin 67, folate 19.8, vitamin B12 423, vitamin D 9, TSH 0.876, free T4 1.16, free T3 3, TPO negative, antithyroglobulin antibody negative, urinalysis negative, urine culture mixed, urine protein creatinine ratio 0.1, beta-2 lipoprotein antibody negative, anticardiolipin antibody negative, protein S antigen free, 28 (), protein S antigen total 64 (%). --Additional recommendations are below. The patient was counseled to continue taking vitamin B6, 25 mg every 8 hours. She was counseled to call and/or return with worsening symptoms. Again, with persistent/worsening symptoms, I would recommend the addition of Pepcid, 20 mg twice daily and or Zofran. 13.  History of bacteruria -- The patient's hospital admission in April was reviewed. Her urinalysis was positive for Nitrites, leukocyte esterase, and bacteria. She was treated with Keflex. Her urine culture showed E. Coli and mixed gram-positive organisms including staph and Corynebacterium. She had a repeat urine culture on 5/5/2022 that was negative. She denied any signs or symptoms of recurrent infection. Precautions were reviewed. A repeat urine culture was completed on 5/13/2022 and showed less than 10,000 CFU per mL of mixed gram-positive organisms. A urine culture was repeated on 6/17/2022 and noted to be mixed. A repeat urine culture was completed and noted to be mixed on 10/18/2022. 14.  Anticardiolipin antibody IgM, indeterminate -- Antiphospholipid antibody screening was done secondary to the patient's CVA diagnosed in April 2022. Initial screening completed on 4/18/2022 as part of the thrombophilia panel was negative. Screening was repeated on 4/19/2022.   Her anticardiolipin estimated to be 0.1% (w/negative personal history). It is 0-22% with a personal history of thrombosis     Given the patient's recurrent, the recommendation was for therapeutic anticoagulation with Lovenox. Please see above. Testing was repeated on 6/17/2022. The protein S antigen, free was 25% (%), and protein S activity was 35% (%). Her protein S levels are again low, even for pregnancy. A hematology consultation was previously recommended. The patient is following with Dr. Chanelle Alarcon. Repeat testing is recommended at 6 to 8 weeks postpartum. She should not be on an oral contraceptive pill at the time of repeat testing. The patient should avoid birth control containing estrogen. The patient was counseled to follow-up with hematology for long-term monitoring and management. The patient was counseled to continue taking a daily low-dose aspirin. She should continue a daily low-dose aspirin for the remainder of pregnancy and 6 to 8 weeks postpartum. Given her history of a thrombotic stroke, the recommendation was also made for anticoagulation with prophylactic Lovenox. The plan was reviewed with neurology and hematology, see above. A prescription was provided following patient's consultation. She was scheduled to return for injection teaching. Increased fetal surveillance is recommended. Fetal growth should be monitored serially, every 3 to 4 weeks starting at 24 to 26 weeks gestation. The patient should monitor fetal kick counts daily starting at 28 weeks gestation. She should be scheduled for twice-weekly fetal testing starting at 32 weeks gestation. Delivery is recommended at/by 39 weeks gestation. I would defer delivery timing to the providers at the [facility]. 12. Vaccination in pregnancy -- The patient was  previously counseled regarding the recommendations for vaccination in pregnancy. Counseling was reviewed.   The patient did not have any additional questions or concerns. The patient was counseled regarding the recommendations for the Tdap vaccination in pregnancy. Risks and benefits were discussed. The vaccination is typically administered between 27 and 36 weeks gestation and recommended to confer protection against whooping cough to the . The patient plans to discuss this with her primary provider at her next visit. The patient was also counseled that her partner in any individuals who will be in close contact with the  should also be vaccinated for whooping cough. The patient was counseled regarding recommendation for the flu vaccination in pregnancy for both maternal and infant safety. Risks and benefits were reviewed. Counseling was provided regarding the recommendation for the Covid vaccination in pregnancy. I advised the patient that the [employer] of Obstetrics and Gynecology and The Society for Maternal Fetal Medicine recommend that all pregnant women be vaccinated for COVID-19. \"Data have shown that COVID-19 infection puts pregnant people at increased risk of severe complications and even death; yet only about 22% of pregnant individuals have received 1 more doses of COVID-19 vaccine according to the [employer] for Disease Control and Prevention. \"     \" Recent data have shown that more than 95% of those were hospitalized and/or dying from COVID-19 are those who have remained unvaccinated. Pregnant individuals who have decided to wait until after delivery to be vaccinated may be inadvertently exposing themselves to an increased risk of severe illness or death. \"     \" COVID-19 vaccination is the best testing to reduce maternal and fetal complications of [id number] infection among pregnant people,\" said Avelino Hallman MD, president of the Society for Maternal Fetal Medicine, sub-specialists.      The patient was counseled that in the event she opts not to have the Covid vaccination, she should alert her provider immediately if she test positive for Covid. Pregnant women are on the priority list for treatment with monoclonal antibody. This intervention has been shown to decrease the risk for hospitalization and complications related to Covid in pregnancy. The patient expressed verbal understanding of this counseling. 17.  Vitamin D deficiency -- The patient's vitamin D was deficient at 9  --Recommend vitamin D3 2000 IU daily  --Monitor nutrition panel q4-6 weeks (CBC, CMP, magnesium, ferritin, folate, vitamin B12, vitamin D25OH)     --Repeat testing was completed on 6/17/2022, her results included: H/H 11.2/33.7, MCV 87.1, platelet count 647,342, potassium 3.6, creatinine 0.7 calcium 7.2, ALT 15, AST 17, magnesium 1.6, ferritin 70, folate 11.2, vitamin B12 428, vitamin D 12, , uric acid 3.7, TSH 0.507, free T4 0.97, free T3 2.7, TPO negative, anticardiolipin antibody negative, beta-2 glycoprotein antibody negative, protein S antigen free 25%, hemoglobin A1c 4.9%, homocystine 6.6, protein S activity 35%, rheumatoid factor negative, C3 110, C4 23, PARIS negative, LAC negative, maternal serum AFP 1.04 MOM, urinalysis negative, urine culture mixed, urine protein creatinine ratio 0.3, urinalysis negative for protein. -- The patient's vitamin D is still deficient but improving. She was counseled to continue her vitamin D supplement. --Repeat testing completed 10/18/2022, vitamin D unchanged at 12   -- The patient was encouraged to take her vitamin D supplement. She should continue the supplement for the remainder of pregnancy and 6 to 8 weeks postpartum. --Recommend a repeat nutrition panel at her 6-week postpartum visit. --Follow up with PCP for long term monitoring and management     18. 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   She was noted to be heterozygote for the MTHFR c.665C>T variant (previously designated as C677T). Counseling was previously provided  regarding the implications and management of this gene mutation in pregnancy. Counseling was reviewed. The patient did not have any additional questions or concerns. She was counseled that this gene mutation affects folic acid metabolism. It is fairly common and found in 20-30% of the population. It is generally only a problem if homocysteine levels are elevated. In the past, this gene mutation was thought to be associated with increased obstetric risks including thrombosis, early recurrent pregnancy loss, placental abruption, hypertensive disorders of pregnancy, poor fetal growth, and fetal loss. More recent studies did not report strong associations with these risks. Because this genetic mutation affects folic acid metabolism, there is an increased risk for folic acid deficiency and other nutritional deficiencies in women with this condition. There is also an increased risk for having a child with an open neural tube defect in women with this mutation, especially if they're homozygous for the C677T mutation. Presently, this condition is not thought to be clinically significant. Generally, additional vitamin supplementation is recommended with folic acid or methyl folate, vitamin B6, and vitamin B12. The patient was counseled to take a low-dose aspirin. Fetal growth should be followed serially. She was counseled that there is a 50% chance that she will pass this mutation off to her child(sandra). She should relay this information to the pediatrician who cares for her child(sandra). She was also encouraged to review this diagnosis with her PCP. Because of this history, a baseline nutrition panel and homocysteine level were recommended. She was previously provided with orders for testing. Her homocystine level was normal at 6.6 on 6/17/2022. 23.  Silent carrier for alpha thalassemia -- The patient's records were previously reviewed.   She is noted to be a silent carrier for alpha thalassemia. Counseling was previously provided to the patient. Counseling was reviewed. The patient did not have any additional questions or concerns. --Alpha thalassemia minima is another term for this condition. This condition results from the loss of a single alpha-chain gene (ie a-/aa). Blood tests are usually normal, though the red cells may be small, leading to a suspicion that the patient is a silent carrier. The only way to confirm a silent carrier is by DNA studies. This is usually a benign carrier state. Affected individuals are not typically anemic and their hemoglobin analysis is normal. These conditions may remain undiagnosed, or they may be detected incidentally on a routine complete blood count during evaluation of an unrelated condition or in the setting of preconception testing and counseling. The patient's partner has not had testing for this condition. Carrier screening was recommended. If the patient's partner is not a carrier for alpha thalassemia, then there is a 50% chance that her child/children will also be a carrier for alpha thalassemia minima. If her partner is also a carrier of alpha thalassemia, there is an increased risk for having an affected child. Without having her partner tested, definitive counseling cannot be provided. This information should be relayed to pediatrics. I would defer any additional  evaluation to pediatrics. 20.   Migraine headaches -- The patient previously reported having continued headache symptoms after discharge from the hospital in . The headaches are a 6 out of 10 at the worst.  She reported having daily headaches. She has been using tylenol with some relief. She reports having associated symptoms of photosensitivity and phono sensitivity. Today, the patient again reports that her headache symptoms have significantly improved.      The patient was again counseled that migraine headaches can improve during pregnancy. However, in some patients symptoms are exacerbated. She was counseled regarding the use of magnesium oxide 400 mg twice daily and riboflavin 100 mg daily in reducing the frequency and intensity of migraine headaches. Prescriptions were provided. Precautions were reviewed, and she was counseled that if she develops the worst headache of her life or a headache with neurological deficits, to present immediately for evaluation. She expressed verbal understanding of this counseling. Because of the patient's headache symptoms, a baseline nutrition panel and thyroid function testing was recommended. Baseline testing was completed on 5/13/2022. The results are summarized above. 21.  Eccentric umbilical cord insertion into the placenta --  The ultrasound results were reviewed with the patient. The umbilical cord inserts into the placenta 2 to 3 cm from the edge. Counseling was previously provided to the patient. Counseling was reviewed. She denies any additional questions or concerns. An eccentric cord insertion is diagnosed when the umbilical cord inserts into the placenta within 2-3 cm of the edge of the placenta. This is usually a benign finding, however, there can be an increased risk for poor fetal growth. Fetal growth should be monitored serially. There is a lag in fetal growth, see above. 22.  Abnormal urine protein creatinine ratio -- The patient's labs from 6/17/2022 were reviewed. Her urine protein creatinine ratio was abnormally elevated at 0.3. Her urine culture was mixed. Her urinalysis was negative for protein. Secondary to the conflicting results, a 43-JDLO urine collection was recommended. Nursing had previously contacted the patient regarding the instructions for testing. The patient's urine dip was positive for +1 protein today. Her blood pressure was normal at 109/74.      Repeat testing was completed on 10/18/2022. Her urine protein creatinine ratio was again abnormally elevated at 0.3. The patient's blood pressure was normal and she denied having any signs or symptoms of preeclampsia. Precautions were reviewed. A 24-hour urine collection was recommended. She was provided with orders for testing on 10/25/2022. Instructions for testing were again reviewed. 21.  Maternal ASD or PFO -- The patient reported that a small hole was found on the top part of her heart on follow-up imaging. She is unsure of her exact diagnosis. She is following at the [facility]. She had a fetal echocardiogram that was normal on 2022. The limitations of fetal echocardiography were reviewed. The patient was counseled to relay this history to the pediatrician who cares for her child/children. 24.  Poor maternal weight gain -- The patient previously reported that she is 5'2\" tall and weighed 161 pounds at 12 weeks gestation. She weighed 170 pounds today. She has gained 2 pounds since her last visit. Her BMI is 31. 18. She has gained 9 pounds since 12 weeks gestation. There is concern for a fetal growth lag. See above. The patient was again counseled that poor maternal weight gain or low maternal weight can be associated with an increased risk for complications such as poor fetal growth,  delivery, and nutritional deficiencies. A baseline nutrition panel was ordered on 2022. The patient was encouraged to complete testing at her earliest convenience. Fetal growth is being monitored serially, see above. 25.  Pelvic pressure, stable -- The patient previously had complaints of increased pelvic pressure. She denied having any associated leaking of fluid, vaginal bleeding, cramping, and/or dysuria. A transvaginal ultrasound was completed. The patient's cervix was normal and measured 5.6 cm without funneling at 31 weeks 6 days.      Today, the patient again reports her symptoms are stable.  labor and PPROM precautions were reviewed. 26.  Hip pain, right, improved -- The patient previously reported had complaints of right hip and thigh pain. She reports that increased activity makes the pain worse. She denied having any associated fevers, chills, nausea, vomiting, and or dysuria. Today, the patient reports that her symptoms have somewhat improved. She was again counseled regarding the potential utility of a maternity belt in that it may take pressure off of her lower back and provide some relief of her hip pain. If her symptoms persist or worsen, she could be referred for physical therapy, chiropractic care, and/or massage therapy. The patient was counseled that these interventions are generally acceptable as long as the provider has been trained/certified to care for pregnant women. Precautions were reviewed with the patient. 27.  Low ferritin -- The patient's ferritin was low at 11 (considered low if <15 in absence of anemia or <40 in setting of anemia)  --Ferrous sulfate 325 mg BID prescribed  --Monitor levels serially  --Monitor nutrition panel q4-6 weeks (CBC, CMP, magnesium, ferritin, folate, vitamin B12, vitamin D25OH)     -- The patient was counseled to continue her supplement for the remainder of pregnancy and 6 to 8 weeks postpartum  --Recommend repeat nutrition panel at 6-week postpartum visit  --Follow-up with hematology  --Follow up with PCP for long term monitoring and management     28. Low vitamin B12 -- The patient's vitamin B12 level was borderline at 281. Individuals with vitamin B12 level between 200 and 400 are increased risk for anemia and side effects related to low vitamin B12.   Thus, supplementation is recommended  --Recommend vitamin B12, 1000 mcg daily  --Monitor levels serially  --Repeat nutrition panel (CBC, CMP, magnesium, ferritin, folate, vitamin B12, vitamin D 25 OH) in 4 weeks     -- The patient should continue a supplement for the remainder of pregnancy and 6 to 8 weeks postpartum  --Repeat nutrition panel at 6-week postpartum visit  --Follow-up with hematology  --Long-term follow-up with PCP for monitoring and management        29. Low folate -- The patient's folate was low at 8.8. She was mildly anemic with an H/H of 10.1/24.5. Additional supplementation was recommended with 1 mg of folic acid daily. A prescription was provided. --Monitor levels serially  --Repeat nutrition panel (CBC, CMP, magnesium, ferritin, folate, vitamin B12, vitamin D 25 OH) at 6-week postpartum visit  --Long-term follow-up with PCP or hematology for monitoring and management     30. Abnormal parvovirus serology -- The patient's labs were previously reviewed. Baseline parvovirus serology was completed. She was positive for IgG and equivocal for IgM. The patient denied having any illnesses, fevers, joint pain, and or rashes during her pregnancy. She denies having any known exposures. Counseling was previously provided. Counseling was reviewed. She did not have any additional questions or concerns. Fetal testing has been reassuring. There has been no evidence of fetal anemia. There is a fetal growth lag. The patient was counseled that the incidence of parvovirus infection in pregnancy is estimated to be 3.3-3.8%. The highest risk professions are teaching (16%) and  workers (9%). Generally, adults experience 1-4 days of systemic symptoms prior to the onset of a rash. Joint pain is the most common symptom and can last for 1-2 weeks. Viremia typically develops 6 days of the infection and lasts for 1 week. IgM antibodies typically develop 10 days after exposure and prior to the onset of symptoms. The IgM antibodies can persist for 3 months or longer. IgG antibodies develop several days after the IgM antibodies and last for years. Generally, the infection is self limited an benign in an adult. However, when pregnant, there is a risk for congenital infection with subsequent hydrops and/or fetal demise. The risk for fetal loss is highest if the infection occurs in the first half of the pregnancy. One study reported that the risk of stillbirth is 13% in the first trimester, 9% from 13-20 weeks' gestation, and 0% after 20 weeks' gestation. Another study reported a risk of stillbirth as 11% with infection <20 weeks' gestation and <1% with infection >20 weeks' gestation. Other than stillbirth, transient pleural and pericardial effusions can occur in the fetus. Fetal hydrops can develop secondary to fetal anemia; the risk is estimated to be 3.9% and it is more common before 32 weeks' gestation. The average onset of hydrops is 3 weeks after maternal infection. 50% of cases develop 2-5 weeks after the infection and 93% cases occur within 8 weeks of the infection. Thus, increased fetal surveillance is recommended with weekly ultrasound to monitor for evidence of fetal infection and/or hydrops. There was no evidence of fetal hydrops today. Given the patient was unaware of any exposures and that she denied any recent illnesses, repeat serology was ordered. Additionally, parvovirus PCR was ordered. Testing was ordered on 10/25/2022. She was encouraged to complete the blood work at her earliest convenience. She should continue to have increased fetal testing as outlined above. --The patient is to be admitted at the [facility] on 2022. She is scheduled for repeat  on 11/3/2022. --The patient was counseled to follow-up with her delivering provider for postpartum care. --The patient was advised to call if she has any increased vaginal discharge, vaginal bleeding, contractions, abdominal pain, back pain or any new significant symptomatology prior to her next visit.  I advised her that these are signs and symptoms of cervical change and require follow-up assessment when they occur. Preeclampsia precautions were also reviewed with the patient. --The patient was also counseled to call and/or return with any concerns for decreased fetal activity. --The patient is to continue to follow with you in your office for ongoing obstetric care. --The total time spent on today's visit was 30 minutes. This included preparation for the visit (i.e. reviewing prior external notes and test results), performance of a medically appropriate history and examination, counseling, orders for medications, tests or other procedures, and coordination of care. Greater than 50% of the time was spent face-to-face with the patient. This time is exclusive of procedures performed. I answered all of  the patient's questions to her satisfaction. I asked her to call if she had any additional questions prior to her next visit. --At the conclusion of the visit, the patient appeared to have a good understanding of the issues discussed. I answered all of her questions to her satisfaction. I asked her to call if she had any additional questions prior to her next visit. --Thank you for allowing me to participate in the care of this pleasant patient. Please don't hesitate to call me if you have any questions. Sincerely,      Mariah Hernandez MD, [address]  [phone]      *All or parts of this note may have been generated using a voice recognition program. There may be typo, grammar, or Word substitution errors that have escaped my review of this note.

## 2022-10-31 NOTE — PROGRESS NOTES
Pt here for BPP/NST  Pt denies any bleeding/cramping/lof  Pt states good fetal movement  Pt having  on November 3 at 0700  Pt did not leave urine in triage area

## 2022-10-31 NOTE — LETTER
[address] [facility]aternal Fetal [facility]  [address] [place] [id number]  Phone: [phone]  Fax: [phone]           Tony Styles MD      2022    Patient: Nora Brambila   MR Number: [id number]   YOB: 1990   Date of Visit: 10/31/2022       Dear Dr. Natalie Jack: Thank you for referring Ann Rodriguez to me for evaluation/treatment. Below are the relevant portions of my assessment and plan of care. If you have questions, please do not hesitate to call me. I look forward to following Hulen Box along with you. Sincerely,        Tony Styles MD    CC providers:  MD [address]LLAU[id number]  [facility] [employer]       2022      Uriel Tucker, [address]  [facility],  [address] [place] [address]     RE:  Sergio ARAUJO  : 1990   AGE: 33 y.o. This report has been created using voice recognition software. It may contain errors which are inherent in voice recognition technology. Dear Dr. Natalie Jack:      I had the pleasure of meeting with Ms. Yeison Burks for a return consultation. As you know, Ms. Yeison Burks  is a 33 y.o.  at 37w4d (LMP =9 wk US) who is being followed by our office for multiple medical issues. Today, Ms. Yeison Burks reports that she feels well. She notes good fetal movement and denies any symptoms of leaking of fluid, vaginal bleeding, and/or contractions. She had a fetal ultrasound that was notable for the following. There is a single intrauterine gestation in a cephalic presentation with a heart rate of 153 beats per minute. The placenta is posterior. The amniotic fluid index is 9.7 cm. The composite gestational age is 29w2d. The estimated fetal weight is at the 22nd percentile. AC 4th percentile. BPP 10/10. Umbilical artery PI normal.  MCA PSV normal.  CPR PI normal.  A hyperechoic right ovarian mass is again seen measuring 1.6 x 0.9 x 1.5 cm.       PERTINENT PHYSICAL EXAMINATION:   /74   Pulse 99   Wt 170 lb 8 oz (77.3 kg)   LMP 02/10/2022   BMI 31.18 kg/m²     Urine dipstick:   Negative for Glucose    1+ for Albumin      A fetal ultrasound assessment was performed today. A report is enclosed for your review. Assessment & Plan:  33 y.o.  at 37w4d (LMP = [address]) with:    1. Pregnancy dating -- The patient's pregnancy dating was previously reviewed. The patient reports that was having monthly periods. She discontinued Depo-Provera in 2021. She reports a sure last menstrual period. Her reported LMP was 2/10/2022, GUERO 2022. The patient's first ultrasound was 1822. The crown-rump length was 9 weeks 4 days, GUERO 2022. Given the patient had a sure LMP that agreed with the 9-week ultrasound, the recommendation was made to use an GUERO of 2022 based on her LMP. Fetal growth was appropriate for the gestational age again today. 2.  History of stroke during pregnancy  -- The patient previously reported that she initially experienced symptoms of headache and aphasia in 2021. She did not seek medical care at that time. The patient presented to the emergency department on 2022 with complaints of a severe headache that woke her up from sleep. She had a headache 4 days prior to her evaluation in the emergency department. Two days prior to her evaluation, she had slurred speech and aphasia which lasted for approximately 20 minutes and then resolved. The patient then developed paresthesias on her right arm. A CT of the head was initially completed. The report stated, \" hypodensity within the left insular cortex with abnormal white matter hypodensity extending into the adjacent subinsular white matter and left corona radiata. Findings may be suggestive of subacute infarction. For further evaluation brain MRI is recommended. \"     An MRI, MRV, and MRA of the head with and completed.   Per the MRI report from 4/18/2022, acute infarct noted in left insula/left temporal lobe/left periventricular white matter with associated thrombosis of a superior M2 branch of the left middle cerebral artery. Chronic infarcts noted in the right corona radiata and left centrum semiovale. The MRV was unremarkable. No dural sinus thrombosis was noted. The patient was diagnosed with a subacute CVA and admitted to the hospital.  She was also noted to have bacteriuria. She was cared for by her primary care physician and she had a neurology consultation. A thrombophilia evaluation was completed. She was started on a low-dose aspirin and received Lovenox for DVT prophylaxis. A maternal echocardiogram was completed on 4/19/2022. Per the report, the study was normal.  The patient reports having a repeat echocardiogram at the [facility] clinic and she has a small PFO or ASD. The patient remained stable and was discharged home on 4/19/2022. She was discharged home on Keflex and a low-dose aspirin. She was to follow-up with obstetrics and her primary care physician. The patient's thrombophilia evaluation was completed on 4/18/2022, her results included: Homocystine 5, protein C functional 111%, protein S antigen free 25% (), Antithrombin activity 99%, APC resistance 3.54 (normal), lupus anticoagulant negative, beta-2 glycoprotein antibody negative, cardiolipin antibody negative, prothrombin 2 gene mutation negative. A screening PARIS was completed and negative. A protein S antigen free was repeated on 4/19/2022 and again low at 27 (). Homocystine was repeated on 4/19/2022 and again normal at 7.8. Factor VIII activity was normal at 134% and factor VII activity was normal at 133%. Anticardiolipin antibody was repeated on 4/19/2022 and IgM was indeterminate at 17. Repeat screening for lupus anticoagulant was negative on 4/19/2022.   A D-dimer was checked on 4/19/2022 and normal.  Patient's urine drug screen was positive for marijuana. The patient's urinalysis was abnormal.  The urine culture showed mixed jair including E. coli staph and Corynebacterium. No sensitivities were provided. The patient was seen for her initial consultation with [facility] on 5/9/2022. Counseling was provided and the recommendation was made for prophylactic anticoagulation with Lovenox, 40 mg daily. Both hematology and neurology were contacted regarding the patient and plan of care. The patient returned to the [facility] office on 5/31/2022 for a follow-up consultation. Prior to her visit, she reported to the nurse that she had recurrent strokelike symptoms including left-sided numbness and a headache. She was immediately taken to the emergency department for evaluation. She was readmitted to the hospital on 5/31/2022 with an acute CVA. A CT of the head was repeated on 5/31/2022. Per the impression, there was left insular encephalomalacia at the site of prior infarct. It was an unremarkable CTA of the head and neck. Specifically, there was no evidence of residual thrombus in the left middle cerebral artery. The patient then had an MRI/MRV/MRA of the head without contrast.  Per the impression on the report, tiny foci of acute or early subacute infarction were seen in the right frontal lobe, with single tiny foci each within the right lateral basal ganglia, external capsule, and insular cortex. Mild stenosis was noted in the left lateral transverse sinus, which may be due to an arachnoid granulation. There is no evidence of dural venous thrombosis. She was evaluated by neurology on 6/2/2022. The recommendation was made for therapeutic anticoagulation with Lovenox. She was also evaluated by hematology. She was discharged home on 6/3/2022. The patient returned to the [facility] office at 16 weeks and 6 days for follow-up. She reported that she felt well.   She still had intermittent headache symptoms that she rated as a 6 out of 10. She reported that she was tolerating the therapeutic Lovenox well. During her visit at 16 weeks 6 days, the patient requested a referral for additional evaluation at the [facility]. The patient was counseled that this would be reasonable given her complicated pregnancy. Additionally, it may be in the patient's best interest to plan for delivery at a center in which neurology and/or neurosurgery would be available in the event the patient had any complications during delivery or postpartum. The patient was agreeable with the plan. She requested a referral to the [facility] for additional evaluation and management. A referral was provided. The patient's neurologist, Dr. Andrew Webb, was also contacted regarding the patient. He agreed with the plan outlined above. The patient is following with maternal-fetal medicine, Dr. Suresh Riggins, at the [facility]. She is also following with Dr. Ezio Barfield from vascular surgery. She was also seen by a vascular neurologist, Dr. Frank Burton, at the [facility]. The patient returns the office today for follow-up and fetal testing secondary to the finding of poor fetal growth. She notes fetal movement. She again denies having any symptoms of leaking of fluid, vaginal bleeding, cramping, and/or contractions. Fetal growth was reevaluated today at 37 weeks 4 days. The composite gestational age is 27 weeks 4 days. The estimated fetal weight is 5 pounds 13 ounces, 22nd percentile. The [facility] is at the 4th percentile. There is a lag in the abdominal circumference. Fetal testing was otherwise reassuring. Testing was reassuring today with a biophysical profile score of 80/30, normal umbilical artery PI, and normal CPR PI. She denies having any subsequent strokelike symptoms. She again reports that her migraine headache symptoms have significantly improved. Counseling was previously provided to the patient. Counseling was reviewed. The patient did not have any additional questions or concerns. Again, pregnancy and the puerperium (postpartum period) are well-established risk factors for thromboembolism. Normal pregnancy is accompanied by an increase in clotting factors. The resulting hypercoagulable state likely evolved to protect women from hemorrhage in the event of a miscarriage and during childbirth. Thromboembolic disease during pregnancy and the puerperium is a significant cause of maternal morbidity and mortality. During pregnancy, the risk of venous thromboembolism increases 4-5 fold and the risk of arterial thromboembolism, myocardial infarction, and stroke increases 3-4 fold compared to the nonpregnant state. Postpartum, the risk of venous thromboembolism is 20 fold higher and the risk of arterial thromboembolism is similarly elevated compared to nonpregnant individuals. Venous events are more common during pregnancy accounting for 4 out of 5 thromboembolic events. However, the risk of death is higher following an arterial thrombosis. Approximately 80% of venous thromboembolic events are deep venous thromboses and approximately 20% of pulmonary emboli. The most important risk factor for thrombosis during pregnancy is a history of thrombosis. The risk for a thrombotic event is further increased in women with an underlying acquired or inherited thrombophilia. Most studies have reported and equal distribution of thrombosis risk across all trimesters of pregnancy however, 2 large conflicting studies have reported a first trimester (50% prior to 15 weeks) and third trimester (60%) predominance. Additional risk factors for thrombosis during pregnancy include multifetal gestation, varicose veins, inflammatory bowel disease, urinary tract infection, diabetes, hospitalization, obesity, and maternal age greater than 28 years.  Compared to the antepartum period, the thrombosis risk is 2-5 times higher during the postpartum period. This risk is highest during the first 6 weeks postpartum and declines to prepregnancy rates by 13-18 weeks postpartum. Risk factors for postpartum thrombosis include  section, medical co morbidities, obesity,  delivery, hemorrhage, fetal demise, advanced maternal age, hypertensive disorders of pregnancy, tobacco use, and infection. The patient was counseled to continue treatment with therapeutic Lovenox and a daily low-dose aspirin. She was counseled to continue to follow with the specialist at the [facility] for long-term monitoring and management. Per the patient's most recent consultation completed on 10/21/2022, the patient is scheduled for hospital admission on 2022 to allow for anticoagulation management. She is scheduled for a repeat  on 11/3/2022. Therapeutic anticoagulation should be continued throughout the pregnancy and for at least 6-8 weeks postpartum. Lovenox can be initiated 12 hours following a vaginal delivery and 24 hours following a  section (if there is no ongoing concern for bleeding). The risks and benefits of therapeutic anticoagulation in pregnancy were reviewed including the development of heparin-induced thrombocytopenia (HIT), osteopenia, bleeding, and poor fetal growth.  An anesthesia consultation is also recommended in the third trimester given she is on anticoagulation.  A hematology consult was recommended. The patient is following with Dr. Dorcas Alvarenga neurology consult was recommended. A referral was provided. The patient is following with a vascular neurologist at the [facility] clinic, Dr. Kenyetta Ford The patient was referred to the [facility] clinic for additional maternal and fetal evaluation. She is following with Dr. Mer Ross.  The patient should avoid estrogen containing birth control postpartum. Increased fetal surveillance is also recommended.   Fetal growth should be monitored serially, every 3 to 4 weeks starting at 24 to 26 weeks gestation. The Patient should monitor fetal kick counts daily starting at 28 weeks gestation. She should be scheduled for twice-weekly fetal testing starting at 32 weeks gestation. Delivery is recommended at/by 39 weeks gestation. Counseling was previously provided regarding the radiation exposure from the CT scan. Counseling was reviewed. The patient did not have any additional questions or concerns. Increased risks associated with radiation exposure during pregnancy include that of early pregnancy loss, fetal malformations, poor fetal growth, and an increase for childhood cancers (increased from 1/2800 to 1/2000). Generally, these risks are not significantly increased unless the radiation exposure exceeds 50 mGy (5 rads). The estimated radiation exposure associated with a head CT is 1-10 mGy (0.1-1 rads). The International Commission of Radiologic Protection suggests that the radiation doses delivered in utero by imaging tests (such as those performed in the diagnosis of PE) present no measurable increased risk of fetal death or developmental abnormalities over the background incidence of these entities. The [employer] of Radiation Protection and Measurements considers the risk of radiation-associated abnormalities to be negligible, at less than 50 mGy when compared with other risks of pregnancy. Per available data, diagnostic imaging studies that expose the fetus to less than 0.05 Gray (50 mGy, 5 rads), do not appear to increase the risk for fetal anomalies, intellectual disability, growth restriction, or pregnancy loss. With exposure to more than 0.05 Gy (50 mGy, 5 rads), a definitive threshold at which the risk for, locations increases has not been determined. Evidence suggests the risks begin to increase at doses above 0.1 Gy (100 mGy, 10 rads) and especially above 0.15-0.2 Gy (150-200 mGy, 15-20 rads).       3.  Poor fetal growth -- The date fetal growth assessment at 31 weeks 1 day at the [facility]. There was a 1 week lag. The composite gestational age was 31 weeks 1 day. The [facility] was measuring at the 6 percentile. Fetal growth was reevaluated at 31 weeks 6 days and appropriate for the gestational age. Fetal growth was reevaluated at 35 weeks 1 day. The composite gestational age was 26 weeks 4 days. The estimated fetal weight was 4 pounds 15 ounces, 30th percentile. There is a 2-week lag in the abdominal circumference. The [facility] was measuring at the 8th percentile. Fetal growth was reevaluated today at 37 weeks 4 days. The composite gestational age was 27 weeks 4 days. The estimated fetal weight was 5 pounds 13 ounces, 22nd percentile. The [facility] was at the 4th percentile. There is been positive interval fetal growth. Fetal testing was reassuring with a biophysical profile score of 25/11, normal umbilical artery Dopplers, and a normal CPR PI. Surveillance was recommended by the patient's high risk provider in [place]. We will facilitate twice-weekly fetal testing for the patient until she returns to [place]. Counseling was previously provided to the patient. Counseling was reviewed. She did not have any additional questions or concerns. Again, the overall estimated fetal weight is greater than the 10th percentile, however the abdominal circumference measures less than the 10th percentile. Thus, there is concern for poor fetal growth. The patient was counseled that typically fetal growth restriction is formally diagnosed when the overall estimated fetal weight is less than the 10th percentile. However, a decline in the overall estimated fetal weight of greater than 20% and/or an abdominal circumference that measures less then the 10th percentile are findings that are also concerning for and/or consistent with fetal growth restriction.  In over 70% of cases, small fetal size is constitutional. In approximately 30% of cases, there is a secondary cause related to placental insufficiency, a fetal issue, and/or an underlying maternal condition. Risk factors were reviewed with the patient including malnutrition, maternal chronic diseases (ie SLE, renal disease, antiphospholipid antibodies, anemia, diabetes), placental disease (chorioangioma, mosaicism), infections, fetal aneuploidy, and teratogen exposure. She was counseled regarding general management plans and that mild IUGR can generally be expectantly managed until 37-39 weeks' gestation. If there is severe growth restriction, delivery timing is based on the point at which the risk of fetal death exceeds that of  death. There is an increased risk for fetal loss in the setting of growth restriction, thus, increased surveillance is indicated. The best predictors of outcome are fetal size and gestational age attained. Overall, the long term outcome depends on the etiology of the poor growth. At this time, I recommend continued increased fetal surveillance as outlined above. Given the concern for poor fetal growth, additional maternal evaluation was recommended. The following labs were ordered: Preeclampsia screening, antiphospholipid antibodies, thyroid function studies/thyroid peroxidase antibodies, a nutrition panel, and infection studies.    -- Testing completed on 10/18/2022, the patient's results included: H/H 10.9/33.7, 86, platelet count 500,974, potassium 3.6, creatinine 0.7, calcium 9.2, ALT 6, AST 14 magnesium 1.7, uric acid 3.7, , ferritin 11, vitamin B-12 281, vitamin D 12, TSH 0.834, free T4 0.98, 8, TPO negative, antithyroglobulin antibody negative, toxoplasma Ig negative, CMV IgG positive/IgM negative, parvovirus IgG positive, IgM equivocal, urine culture mixed, urinalysis positive for ketones, glucose, and leukocyte esterase, urine protein creatinine ratio 0.3.  --Additional recommendations are below.     Given the lag in fetal growth, a low dose aspirin was recommended. She was counseled to start 81 mg of aspirin daily. The patient was counseled to continue a daily low-dose aspirin as outlined above. 4.  Genetic counseling -- The patient was previously counseled regarding her options for genetic screening and/or diagnostic testing. She opted for screening with NIPT. Screening was completed on 2022. Her results were low risk for aneuploidy. The reported fetal fraction was 28% the reported fetal sex was female. The results were previously reviewed with the patient. The patient also opted for a [employer] 14 panel. Screening was completed on 2022. The results were reviewed with the patient. She is a silent carrier for alpha thalassemia. Additional counseling was provided, please see below. Screening was otherwise negative for cystic fibrosis, spinal muscular atrophy, and Fragile X. The patient was also counseled regarding the recommendation for maternal serum AFP to screen for open neural tube defects. Risks and benefits of screening were reviewed. Screening is recommended at 15 to 22 weeks gestation. Testing was ordered today. 5.  History of  X1 -- The patient's first pregnancy was delivered via  secondary to arrest of dilation and nonreassuring fetal heart tones. The placenta is posterior. She plans to have a repeat  for delivery. Again, her delivery will be scheduled at the [facility] in order to facilitate additional maternal care. --Repeat  scheduled 11/3/2022     6. Subchronic hemorrhage, stable -- The ultrasound images were reviewed with patient. The subchorionic hemorrhage previously described was seen today and appeared stable. At 35 weeks 6 days, subchorionic hemorrhage measured 4 x 0.8 x 1.7 cm. The size and appearance are stable. At 35 weeks 1 day, the subchronic hemorrhage measured 2.6 x 1.8 x 0.9 cm. The size and appearance were stable. At 31 weeks 6 days, a subchorionic hemorrhage was seen measuring 2.4 x 0.7 x 2 cm. The size is stable compared to prior imaging. At 22 weeks and 6 days, the subchronic hemorrhage measured 2.4 x 0.8 x 2 cm. The size is somewhat decreased compared to prior imaging. At 16 weeks 6 days, a subchronic hemorrhage was seen measuring 5.9 x 0.8 x 2.3 cm. The size is somewhat increased compared to prior imaging. Previously, at 12 weeks 4 days, the subchorionic hemorrhage measured 2.6 x 1.4 x 0.9 cm. No active bleeding was noted. The patient denied having any vaginal bleeding or cramping. She is noted to be Rh+. Counseling was previously provided to the patient. Counseling was reviewed. The patient did not have any additional questions or concerns. A subchorionic hemorrhage can be associated with an increased risk for bleeding, infection, early pregnancy loss, poor fetal growth,  premature rupture of membranes,  labor and delivery, and stillbirth. Because of the increased risk for complications, close follow-up is recommended. The patient is scheduled for twice-weekly fetal testing. Bleeding precautions were reviewed. 7.  Uterine fibroids -- The ultrasound images were reviewed with the patient. Multiple uterine fibroids were again noted. The size and appearance are stable compared to previous imaging. The patient again denied any symptoms of fevers, chills, and/or abdominal pain. There is a lag in fetal growth as outlined above. Counseling was previously provided to the patient. Counseling was reviewed. The patient did not have any additional questions or concerns. Fibroids are common in women of reproductive age. Most studies that monitored the growth of fibroids during pregnancy have noted that the majority of uterine fibroids have less than 10% change in volume across gestation.     Most of the growth usually occurs in the first trimester. Fibroids larger than 5 cms are more likely to grow, where as smaller fibroids most likely remain stable in size. As you know, there is a higher chance of antepartum bleeding with retroplacental fibroids and a small increase in  birth. Specifically, if placentation occurs adjacent to or overlying a fibroid. Submucosal and retroplacental fibroids with volumes more than 200 ml (corresponding to 7 cm in diameter) have the highest risk of abruption. Pain is another common complication of fibroid in pregnancy. It is especially high in fibroids greater than 5 cm in diameter. Patients with degenerating fibroids tend to have localized pain, mild leukocytosis, pyrexia and nausea. This may result from decreased perfusion in the setting of rapid growth leading to ischemia. Some studies noted increased incidence of malpresentation if the uterus has multiple fibroids. Large retroplacental fibroids that distort the uterine cavity may be associated with adverse pregnancy event including  fetal growth restriction and  labor. Overall, the risks associated with uterine fibroids in pregnancy include malpresentation, lower uterine segment obstruction, degeneration, poor fetal growth, ,  birth, and antepartum/postpartum bleeding. The size and appearance of the fibroid(s) should be reevaluated on follow-up ultrasound. A screening cervical length  was completed at 16 weeks 5 days and normal at 4.2 cm without funneling. A cervical length was repeated at 31 weeks 6 days and again normal at 5.6 cm. Fetal growth should be monitored serially, every 3 to 4 weeks beginning at 24 to 26 weeks gestation. Precautions were reviewed with the patient. 8.  Tobacco use in pregnancy, quit -- The patient previously reported she has stopped smoking Black and milds. At 33 weeks 1 day, the patient reported that she was smoking occasionally.   Today, the patient again reports that she has been smoking occasionally. Cessation was again encouraged. She was again counseled regarding the increased risks of smoking in pregnancy including poor fetal growth, placental abruption, PPROM/ birth, and fetal loss. Additional  risks were again reviewed including asthma, allergies, infection, and an association with SIDS. Various techniques for cutting back and quitting were again reviewed. Smoking cessation was again encouraged. She was again counseled that smoking cessation is especially important in her case given her recurrent thrombotic events. She expressed verbal understanding of this counseling. 9. THC Use --  The patient previously reported that she was smoking marijuana daily. She reported that she was using marijuana for decreased appetite. The patient again reports that she has stopped smoking marijuana. She was congratulated and encouraged not to use marijuana during pregnancy. Counseling was previously provided to the patient. Counseling was reviewed. The patient did not have any additional questions or concerns. She was again counseled regarding risk of neurodevelopmental issues in children exposed to [facility] during pregnancy. Additionally, marijuana use may pose risks similar to that of cigarette use including increased risk for poor fetal growth, placental bleeding, PPROM/ delivery, and stillbirth. She was again counseled not to use THC while pregnant. Random urine drug screens should be monitored during pregnancy. 10.  First/second trimester alcohol exposure -- The patient previously reported that she drinks wine occasionally. She reports only taking sips of wine. Today, the patient again reports that she has stopped drinking alcohol. Counseling was previously provided to the patient. Counseling was reviewed. The patient did not have any additional questions or concerns.      Secondary to the increased risk for poor growth, fetal growth should be monitored serially, every 3 to 4 weeks starting at 24 to 26 weeks gestation. The patient is being monitored closely secondary to a lag in the fetal abdominal circumference. Secondary to the increased risk for congenital heart anomalies, a fetal echocardiogram was recommended at 22 to 24 weeks gestation. A fetal echocardiogram was completed on 2022 and reported as normal.  The results were previously reviewed with the patient. Limitations of fetal echocardiography were reviewed. 11.  Right ovarian mass/cyst, stable -- The ultrasound images were reviewed with the patient. The right ovarian mass previously described was again seen today. Right ovarian mass measured 1.6 x 0.9 x 1.5 cm. The size and appearance are stable. At 35 weeks 6 days, the hyperechoic area measured 1.5 x 0.7 x 1.3 cm. The size and appearance were stable. At 33 weeks 1 day, the hyperechoic area measured 1.1 x 0.8 x 1.1 cm. The size and appearance were stable. At 31 weeks 6 days, the hyperechoic mass measured 1.3 x 1 x 1.5 cm. The size and appearance were stable compared to prior imaging. At 12 weeks 4 days, A hyperechoic mass was seen in the right ovary measuring 1.2 x 1 x 0.9 cm. No fluid was seen surrounding the area. The patient was again counseled that this may represent a hemorrhagic cyst versus teratoma versus a calcification, although no shadowing was seen. The size and appearance of the ovarian mass will be reevaluated on follow-up ultrasound. The adnexa will be reevaluated on follow-up exam.  The ovaries should be evaluated during the patient's . Otherwise, postpartum follow-up is recommended. The patient was again encouraged to discuss this issue with her provider at the [facility].         12.   Nausea and vomiting of pregnancy, improved -- The patients previously reported having daily symptoms of nausea and decreased appetite. She was having occasional emesis. The patient reports that her symptoms of nausea and vomiting have significantly improved. She has gained 4 pounds since her last visit to our office. She has not gained any weight since her visit at 12 weeks. Today, the patient again reports that her symptoms are stable. She denied having any signs or symptoms of dehydration. The patient was again encouraged to eat small amounts of food every 2-3 hours during the day. She was also encouraged to stay well-hydrated. A baseline nutrition panel (CBC, CMP, magnesium, ferritin, folate, vitamin B12, vitamin D 25 [place]) was recommended. Baseline testing was completed on 5/13/2022, her results included: A CBC was ordered but not done by the lab. A CBC was completed on 5/31/22, H/H 11.9/35.8, MCV 88.6, ,000. The remainder of the test results are from 5/13/2022 and included: Potassium 3.7, creatinine 0.7, calcium 9, ALT 7, AST 13, magnesium 2, uric acid 3, , ferritin 67, folate 19.8, vitamin B12 423, vitamin D 9, TSH 0.876, free T4 1.16, free T3 3, TPO negative, antithyroglobulin antibody negative, urinalysis negative, urine culture mixed, urine protein creatinine ratio 0.1, beta-2 lipoprotein antibody negative, anticardiolipin antibody negative, protein S antigen free, 28 (), protein S antigen total 64 (%). --Additional recommendations are below. The patient was counseled to continue taking vitamin B6, 25 mg every 8 hours. She was counseled to call and/or return with worsening symptoms. Again, with persistent/worsening symptoms, I would recommend the addition of Pepcid, 20 mg twice daily and or Zofran. 13.  History of bacteruria -- The patient's hospital admission in April was reviewed. Her urinalysis was positive for Nitrites, leukocyte esterase, and bacteria. She was treated with Keflex. Her urine culture showed E.  Coli and mixed gram-positive organisms including staph and Corynebacterium. She had a repeat urine culture on 5/5/2022 that was negative. She denied any signs or symptoms of recurrent infection. Precautions were reviewed. A repeat urine culture was completed on 5/13/2022 and showed less than 10,000 CFU per mL of mixed gram-positive organisms. A urine culture was repeated on 6/17/2022 and noted to be mixed. A repeat urine culture was completed and noted to be mixed on 10/18/2022. 14.  Anticardiolipin antibody IgM, indeterminate -- Antiphospholipid antibody screening was done secondary to the patient's CVA diagnosed in April 2022. Initial screening completed on 4/18/2022 as part of the thrombophilia panel was negative. Screening was repeated on 4/19/2022. Her anticardiolipin IgM antibody was indeterminate at 17 on 4/19/2022. Screening for lupus anticoagulant and beta-2 glycoprotein antibody was negative. The patient was counseled that this may be a false elevation, generally, the titers have to be greater than 20 for a true positive. The recommendation was made for the patient to continue taking a low dose aspirin, 81 mg daily. She should continue this for the remainder of pregnancy and 6 to 8 weeks postpartum. Repeat testing was completed on 5/13/2022 and negative. The results were reviewed with the patient. Testing was repeated on 6/17/2022. Testing for LAC, beta-2 glycoprotein, and anticardiolipin antibody was again negative. The patient was again counseled to continue to follow with hematology for long-term monitoring and management. 15.  Protein S deficiency -- The patient's labs were reviewed. The patient initially had a thrombophilia panel on 4/18/2022. Her protein S, antigen, free was 25% (). Testing was repeated on 4/19/2022. Her protein S, antigen, free at that time was 27% (). The patient would have been 9 weeks at the time of the testing.   She was being evaluated for a recent thrombotic stroke. Testing is not considered reliable during acute thrombosis or pregnancy. Protein S decreases during pregnancy. However, protein S is generally considered deficient if <30% in the second trimester and/or <24% in the third trimester. Again, the patient had testing in the first trimester and her protein S levels were significantly decreased. It is unclear at this time if the patient truly has a protein S deficiency. This will not be able to be determined until the patient is postpartum. The patient was counseled that a protein S deficiency is rare, and seen in 0.03-0.13% of the population. It is considered a lower risk thrombophilia. If someone does have a deficiency, then the risk for a thrombosis during pregnancy is estimated to be 0.1% (w/negative personal history). It is 0-22% with a personal history of thrombosis     Given the patient's recurrent, the recommendation was for therapeutic anticoagulation with Lovenox. Please see above. Testing was repeated on 6/17/2022. The protein S antigen, free was 25% (%), and protein S activity was 35% (%). Her protein S levels are again low, even for pregnancy. A hematology consultation was previously recommended. The patient is following with Dr. Chelo Giron. Repeat testing is recommended at 6 to 8 weeks postpartum. She should not be on an oral contraceptive pill at the time of repeat testing. The patient should avoid birth control containing estrogen. The patient was counseled to follow-up with hematology for long-term monitoring and management. The patient was counseled to continue taking a daily low-dose aspirin. She should continue a daily low-dose aspirin for the remainder of pregnancy and 6 to 8 weeks postpartum. Given her history of a thrombotic stroke, the recommendation was also made for anticoagulation with prophylactic Lovenox. The plan was reviewed with neurology and hematology, see above.   A prescription was provided following patient's consultation. She was scheduled to return for injection teaching. Increased fetal surveillance is recommended. Fetal growth should be monitored serially, every 3 to 4 weeks starting at 24 to 26 weeks gestation. The patient should monitor fetal kick counts daily starting at 28 weeks gestation. She should be scheduled for twice-weekly fetal testing starting at 32 weeks gestation. Delivery is recommended at/by 39 weeks gestation. I would defer delivery timing to the providers at the [facility]. 12. Vaccination in pregnancy -- The patient was  previously counseled regarding the recommendations for vaccination in pregnancy. Counseling was reviewed. The patient did not have any additional questions or concerns. The patient was counseled regarding the recommendations for the Tdap vaccination in pregnancy. Risks and benefits were discussed. The vaccination is typically administered between 27 and 36 weeks gestation and recommended to confer protection against whooping cough to the . The patient plans to discuss this with her primary provider at her next visit. The patient was also counseled that her partner in any individuals who will be in close contact with the  should also be vaccinated for whooping cough. The patient was counseled regarding recommendation for the flu vaccination in pregnancy for both maternal and infant safety. Risks and benefits were reviewed. Counseling was provided regarding the recommendation for the Covid vaccination in pregnancy. I advised the patient that the [employer] of Obstetrics and Gynecology and The Society for Maternal Fetal Medicine recommend that all pregnant women be vaccinated for COVID-19.   \"Data have shown that COVID-19 infection puts pregnant people at increased risk of severe complications and even death; yet only about 22% of pregnant individuals have received 1 more doses of COVID-19 vaccine according to the [employer] for Disease Control and Prevention. \"     \" Recent data have shown that more than 95% of those were hospitalized and/or dying from COVID-19 are those who have remained unvaccinated. Pregnant individuals who have decided to wait until after delivery to be vaccinated may be inadvertently exposing themselves to an increased risk of severe illness or death. \"     \" COVID-19 vaccination is the best testing to reduce maternal and fetal complications of [id number] infection among pregnant people,\" said Cary Maher MD, president of the Society for Maternal Fetal Medicine, sub-specialists. The patient was counseled that in the event she opts not to have the Covid vaccination, she should alert her provider immediately if she test positive for Covid. Pregnant women are on the priority list for treatment with monoclonal antibody. This intervention has been shown to decrease the risk for hospitalization and complications related to Covid in pregnancy. The patient expressed verbal understanding of this counseling.      17.  Vitamin D deficiency -- The patient's vitamin D was deficient at 9  --Recommend vitamin D3 2000 IU daily  --Monitor nutrition panel q4-6 weeks (CBC, CMP, magnesium, ferritin, folate, vitamin B12, vitamin D25OH)     --Repeat testing was completed on 6/17/2022, her results included: H/H 11.2/33.7, MCV 87.1, platelet count 282,863, potassium 3.6, creatinine 0.7 calcium 7.2, ALT 15, AST 17, magnesium 1.6, ferritin 70, folate 11.2, vitamin B12 428, vitamin D 12, , uric acid 3.7, TSH 0.507, free T4 0.97, free T3 2.7, TPO negative, anticardiolipin antibody negative, beta-2 glycoprotein antibody negative, protein S antigen free 25%, hemoglobin A1c 4.9%, homocystine 6.6, protein S activity 35%, rheumatoid factor negative, C3 110, C4 23, [place] negative, LAC negative, maternal serum AFP 1.04 MOM, urinalysis negative, urine culture mixed, urine protein creatinine ratio 0.3, urinalysis negative for protein. -- The patient's vitamin D is still deficient but improving. She was counseled to continue her vitamin D supplement. --Repeat testing completed 10/18/2022, vitamin D unchanged at 12   -- The patient was encouraged to take her vitamin D supplement. She should continue the supplement for the remainder of pregnancy and 6 to 8 weeks postpartum. --Recommend a repeat nutrition panel at her 6-week postpartum visit. --Follow up with PCP for long term monitoring and management     18. 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 She was noted to be heterozygote for the MTHFR c.665C>T variant (previously designated as C677T). Counseling was previously provided  regarding the implications and management of this gene mutation in pregnancy. Counseling was reviewed. The patient did not have any additional questions or concerns. She was counseled that this gene mutation affects folic acid metabolism. It is fairly common and found in 20-30% of the population. It is generally only a problem if homocysteine levels are elevated. In the past, this gene mutation was thought to be associated with increased obstetric risks including thrombosis, early recurrent pregnancy loss, placental abruption, hypertensive disorders of pregnancy, poor fetal growth, and fetal loss. More recent studies did not report strong associations with these risks. Because this genetic mutation affects folic acid metabolism, there is an increased risk for folic acid deficiency and other nutritional deficiencies in women with this condition. There is also an increased risk for having a child with an open neural tube defect in women with this mutation, especially if they're homozygous for the C677T mutation. Presently, this condition is not thought to be clinically significant.      Generally, additional vitamin supplementation is recommended with folic acid or methyl folate, vitamin B6, and vitamin B12. The patient was counseled to take a low-dose aspirin. Fetal growth should be followed serially. She was counseled that there is a 50% chance that she will pass this mutation off to her child(sandra). She should relay this information to the pediatrician who cares for her child(sandra). She was also encouraged to review this diagnosis with her PCP. Because of this history, a baseline nutrition panel and homocysteine level were recommended. She was previously provided with orders for testing. Her homocystine level was normal at 6.6 on 6/17/2022. 23.  Silent carrier for alpha thalassemia -- The patient's records were previously reviewed. She is noted to be a silent carrier for alpha thalassemia. Counseling was previously provided to the patient. Counseling was reviewed. The patient did not have any additional questions or concerns. --Alpha thalassemia minima is another term for this condition. This condition results from the loss of a single alpha-chain gene (ie a-/aa). Blood tests are usually normal, though the red cells may be small, leading to a suspicion that the patient is a silent carrier. The only way to confirm a silent carrier is by DNA studies. This is usually a benign carrier state. Affected individuals are not typically anemic and their hemoglobin analysis is normal. These conditions may remain undiagnosed, or they may be detected incidentally on a routine complete blood count during evaluation of an unrelated condition or in the setting of preconception testing and counseling. The patient's partner has not had testing for this condition. Carrier screening was recommended. If the patient's partner is not a carrier for alpha thalassemia, then there is a 50% chance that her child/children will also be a carrier for alpha thalassemia minima.   If her partner is also a carrier of alpha thalassemia, there is an increased risk for having an affected child. Without having her partner tested, definitive counseling cannot be provided. This information should be relayed to pediatrics. I would defer any additional  evaluation to pediatrics. 20.   Migraine headaches -- The patient previously reported having continued headache symptoms after discharge from the hospital in . The headaches are a 6 out of 10 at the worst.  She reported having daily headaches. She has been using tylenol with some relief. She reports having associated symptoms of photosensitivity and phono sensitivity. Today, the patient again reports that her headache symptoms have significantly improved. The patient was again counseled that migraine headaches can improve during pregnancy. However, in some patients symptoms are exacerbated. She was counseled regarding the use of magnesium oxide 400 mg twice daily and riboflavin 100 mg daily in reducing the frequency and intensity of migraine headaches. Prescriptions were provided. Precautions were reviewed, and she was counseled that if she develops the worst headache of her life or a headache with neurological deficits, to present immediately for evaluation. She expressed verbal understanding of this counseling. Because of the patient's headache symptoms, a baseline nutrition panel and thyroid function testing was recommended. Baseline testing was completed on 2022. The results are summarized above. 21.  Eccentric umbilical cord insertion into the placenta --  The ultrasound results were reviewed with the patient. The umbilical cord inserts into the placenta 2 to 3 cm from the edge. Counseling was previously provided to the patient. Counseling was reviewed. She denies any additional questions or concerns.   An eccentric cord insertion is diagnosed when the umbilical cord inserts into the placenta within 2-3 cm of the edge of the placenta. This is usually a benign finding, however, there can be an increased risk for poor fetal growth. Fetal growth should be monitored serially. There is a lag in fetal growth, see above. 22.  Abnormal urine protein creatinine ratio -- The patient's labs from 6/17/2022 were reviewed. Her urine protein creatinine ratio was abnormally elevated at 0.3. Her urine culture was mixed. Her urinalysis was negative for protein. Secondary to the conflicting results, a 72-BFGG urine collection was recommended. Nursing had previously contacted the patient regarding the instructions for testing. The patient's urine dip was positive for +1 protein today. Her blood pressure was normal at 109/74. Repeat testing was completed on 10/18/2022. Her urine protein creatinine ratio was again abnormally elevated at 0.3. The patient's blood pressure was normal and she denied having any signs or symptoms of preeclampsia. Precautions were reviewed. A 24-hour urine collection was recommended. She was provided with orders for testing on 10/25/2022. Instructions for testing were again reviewed. 21.  Maternal ASD or PFO -- The patient reported that a small hole was found on the top part of her heart on follow-up imaging. She is unsure of her exact diagnosis. [facility]. She had a fetal echocardiogram that was normal on 9/9/2022. The limitations of fetal echocardiography were reviewed. The patient was counseled to relay this history to the pediatrician who cares for her child/children. 24.  Poor maternal weight gain -- The patient previously reported that she is 5'2\" tall and weighed 161 pounds at 12 weeks gestation. She weighed 170 pounds today. She has gained 2 pounds since her last visit. Her BMI is 31. 18. She has gained 9 pounds since 12 weeks gestation. There is concern for a fetal growth lag. See above.      The patient was again counseled that poor maternal weight gain or low maternal weight can be associated with an increased risk for complications such as poor fetal growth,  delivery, and nutritional deficiencies. A baseline nutrition panel was ordered on 2022. The patient was encouraged to complete testing at her earliest convenience. Fetal growth is being monitored serially, see above. 25.  Pelvic pressure, stable -- The patient previously had complaints of increased pelvic pressure. She denied having any associated leaking of fluid, vaginal bleeding, cramping, and/or dysuria. A transvaginal ultrasound was completed. The patient's cervix was normal and measured 5.6 cm without funneling at 31 weeks 6 days. Today, the patient again reports her symptoms are stable.  labor and PPROM precautions were reviewed. 26.  Hip pain, right, improved -- The patient previously reported had complaints of right hip and thigh pain. She reports that increased activity makes the pain worse. She denied having any associated fevers, chills, nausea, vomiting, and or dysuria. Today, the patient reports that her symptoms have somewhat improved. She was again counseled regarding the potential utility of a maternity belt in that it may take pressure off of her lower back and provide some relief of her hip pain. If her symptoms persist or worsen, she could be referred for physical therapy, chiropractic care, and/or massage therapy. The patient was counseled that these interventions are generally acceptable as long as the provider has been trained/certified to care for pregnant women. Precautions were reviewed with the patient.            27.  Low ferritin -- The patient's ferritin was low at 11 (considered low if <15 in absence of anemia or <40 in setting of anemia)  --Ferrous sulfate 325 mg BID prescribed  --Monitor levels serially  --Monitor nutrition panel q4-6 weeks (CBC, CMP, magnesium, ferritin, folate, vitamin B12, vitamin D25OH)     -- The patient was counseled to continue her supplement for the remainder of pregnancy and 6 to 8 weeks postpartum  --Recommend repeat nutrition panel at 6-week postpartum visit  --Follow-up with hematology  --Follow up with PCP for long term monitoring and management     28. Low vitamin B12 -- The patient's vitamin B12 level was borderline at 281. Individuals with vitamin B12 level between 200 and 400 are increased risk for anemia and side effects related to low vitamin B12. Thus, supplementation is recommended  --Recommend vitamin B12, 1000 mcg daily  --Monitor levels serially  --Repeat nutrition panel (CBC, CMP, magnesium, ferritin, folate, vitamin B12, vitamin D 25 OH) in 4 weeks     -- The patient should continue a supplement for the remainder of pregnancy and 6 to 8 weeks postpartum  --Repeat nutrition panel at 6-week postpartum visit  --Follow-up with hematology  --Long-term follow-up with PCP for monitoring and management        29. Low folate -- The patient's folate was low at 8.8. She was mildly anemic with an H/H of 10.1/24.5. Additional supplementation was recommended with 1 mg of folic acid daily. A prescription was provided. --Monitor levels serially  --Repeat nutrition panel (CBC, CMP, magnesium, ferritin, folate, vitamin B12, vitamin D 25 OH) at 6-week postpartum visit  --Long-term follow-up with PCP or hematology for monitoring and management     30. Abnormal parvovirus serology -- The patient's labs were previously reviewed. Baseline parvovirus serology was completed. She was positive for IgG and equivocal for IgM. The patient denied having any illnesses, fevers, joint pain, and or rashes during her pregnancy. She denies having any known exposures. Counseling was previously provided. Counseling was reviewed. She did not have any additional questions or concerns. Fetal testing has been reassuring. There has been no evidence of fetal anemia.   There is a fetal growth lag. The patient was counseled that the incidence of parvovirus infection in pregnancy is estimated to be 3.3-3.8%. The highest risk professions are teaching (16%) and  workers (9%). Generally, adults experience 1-4 days of systemic symptoms prior to the onset of a rash. Joint pain is the most common symptom and can last for 1-2 weeks. Viremia typically develops 6 days of the infection and lasts for 1 week. IgM antibodies typically develop 10 days after exposure and prior to the onset of symptoms. The IgM antibodies can persist for 3 months or longer. IgG antibodies develop several days after the IgM antibodies and last for years. Generally, the infection is self limited an benign in an adult. However, when pregnant, there is a risk for congenital infection with subsequent hydrops and/or fetal demise. The risk for fetal loss is highest if the infection occurs in the first half of the pregnancy. One study reported that the risk of stillbirth is 13% in the first trimester, 9% from 13-20 weeks' gestation, and 0% after 20 weeks' gestation. Another study reported a risk of stillbirth as 11% with infection <20 weeks' gestation and <1% with infection >20 weeks' gestation. Other than stillbirth, transient pleural and pericardial effusions can occur in the fetus. Fetal hydrops can develop secondary to fetal anemia; the risk is estimated to be 3.9% and it is more common before 32 weeks' gestation. The average onset of hydrops is 3 weeks after maternal infection. 50% of cases develop 2-5 weeks after the infection and 93% cases occur within 8 weeks of the infection. Thus, increased fetal surveillance is recommended with weekly ultrasound to monitor for evidence of fetal infection and/or hydrops. There was no evidence of fetal hydrops today. Given the patient was unaware of any exposures and that she denied any recent illnesses, repeat serology was ordered.   Additionally, parvovirus PCR was ordered. Testing was ordered on 10/25/2022. She was encouraged to complete the blood work at her earliest convenience. She should continue to have increased fetal testing as outlined above. --The patient is to be admitted at the [facility] clinic on 2022. She is scheduled for repeat  on 11/3/2022. --The patient was counseled to follow-up with her delivering provider for postpartum care. --The patient was advised to call if she has any increased vaginal discharge, vaginal bleeding, contractions, abdominal pain, back pain or any new significant symptomatology prior to her next visit. I advised her that these are signs and symptoms of cervical change and require follow-up assessment when they occur. Preeclampsia precautions were also reviewed with the patient. --The patient was also counseled to call and/or return with any concerns for decreased fetal activity. --The patient is to continue to follow with you in your office for ongoing obstetric care. --The total time spent on today's visit was 30 minutes. This included preparation for the visit (i.e. reviewing prior external notes and test results), performance of a medically appropriate history and examination, counseling, orders for medications, tests or other procedures, and coordination of care. Greater than 50% of the time was spent face-to-face with the patient. This time is exclusive of procedures performed. I answered all of  the patient's questions to her satisfaction. I asked her to call if she had any additional questions prior to her next visit. --At the conclusion of the visit, the patient appeared to have a good understanding of the issues discussed. I answered all of her questions to her satisfaction. I asked her to call if she had any additional questions prior to her next visit. --Thank you for allowing me to participate in the care of this pleasant patient.   Please don't hesitate to call me if you have any questions. Sincerely,      Lin Harrison MD, [address]  [phone]      *All or parts of this note may have been generated using a voice recognition program. There may be typo, grammar, or Word substitution errors that have escaped my review of this note.

## 2022-11-02 ENCOUNTER — HOSPITAL ENCOUNTER (OUTPATIENT)
Age: 32
Discharge: HOME | End: 2022-11-02
Payer: MEDICAID

## 2022-11-02 DIAGNOSIS — R80.9 URINE PROTEIN INCREASED: ICD-10-CM

## 2022-11-02 LAB
24HR URINE VOLUME (ML): 600 ML
CREATININE 24 HOUR URINE: 1044 MG/24H (ref 720–1510)
Lab: 24 HOURS
PROTEIN 24 HOUR URINE: 0.38 G/24HR (ref 0–0.14)

## 2022-11-02 PROCEDURE — 84156 ASSAY OF PROTEIN URINE: CPT

## 2022-11-07 PROBLEM — B34.3 PARVOVIRUS IGM PRESENT IN BLOOD: Status: ACTIVE | Noted: 2022-11-07

## 2022-11-14 DIAGNOSIS — D50.9 IRON DEFICIENCY ANEMIA, UNSPECIFIED IRON DEFICIENCY ANEMIA TYPE: ICD-10-CM

## 2022-11-14 DIAGNOSIS — I63.9 ACUTE CEREBROVASCULAR ACCIDENT (CVA) (HCC): Primary | ICD-10-CM

## 2022-11-16 ENCOUNTER — HOSPITAL ENCOUNTER (OUTPATIENT)
Dept: INFUSION THERAPY | Age: 32
Discharge: HOME | End: 2022-11-16

## 2022-11-23 ENCOUNTER — HOSPITAL ENCOUNTER (OUTPATIENT)
Dept: INFUSION THERAPY | Age: 32
Discharge: HOME | End: 2022-11-23

## 2022-12-12 ENCOUNTER — TELEPHONE (OUTPATIENT)
Dept: ADMINISTRATIVE | Age: 32
End: 2022-12-12

## 2023-01-01 NOTE — PROGRESS NOTES
----- Message from Nancy García sent at 2023  3:31 PM CDT -----  Contact: mom Essie   Mom would radhames a call back again She said Laiden is spitting up a lot      Spoke with mom stated patient is spitting up formula a lot after feeding, she has to place a towel on floor to catch spit up. Mom burps in between feeding around 1 1/2 ounces. Mom advised to decrease feeding to about 2 ounces every two hours until appointment to danny to see if that will help. Mom also advised if she feel she can not wait until tomorrow can take baby to ER. Mom was informed the earliest appointment is on tomorrow.     NON STRESS TEST INTERPRETATION    10/25/22    RE:  Nora Brambila   : 1990   AGE: 33 y.o.     GESTATIONAL AGE:  36w5d    DIAGNOSIS:   Poor fetal growth, history of maternal CVA on lovenox    INDICATION:  Same as above    TIME ON:  1447      TIME OFF:  1521      RESULT:   REACTIVE      FHR Baseline Rate:   140 bpm    PERIODIC CHANGES:    Accelerations present, variability moderate, occasional mild variable decelerations noted    COMMENTS:      She is to continue having NST's every 3-4 days, and BPP with umbilical artery doppler studies once per week        Tony Styles MD, [facility] Chim[facility]

## 2023-01-31 ENCOUNTER — POSTPARTUM VISIT (OUTPATIENT)
Dept: OBGYN | Age: 33
End: 2023-01-31
Payer: MEDICAID

## 2023-01-31 VITALS
HEIGHT: 62 IN | DIASTOLIC BLOOD PRESSURE: 75 MMHG | HEART RATE: 89 BPM | BODY MASS INDEX: 26.68 KG/M2 | SYSTOLIC BLOOD PRESSURE: 125 MMHG | WEIGHT: 145 LBS

## 2023-01-31 DIAGNOSIS — N93.9 ABNORMAL UTERINE BLEEDING: Primary | ICD-10-CM

## 2023-01-31 DIAGNOSIS — Z30.431 IUD CHECK UP: ICD-10-CM

## 2023-01-31 PROCEDURE — 99213 OFFICE O/P EST LOW 20 MIN: CPT | Performed by: OBSTETRICS & GYNECOLOGY

## 2023-01-31 PROCEDURE — G8484 FLU IMMUNIZE NO ADMIN: HCPCS | Performed by: OBSTETRICS & GYNECOLOGY

## 2023-01-31 PROCEDURE — G8427 DOCREV CUR MEDS BY ELIG CLIN: HCPCS | Performed by: OBSTETRICS & GYNECOLOGY

## 2023-01-31 PROCEDURE — G8419 CALC BMI OUT NRM PARAM NOF/U: HCPCS | Performed by: OBSTETRICS & GYNECOLOGY

## 2023-01-31 PROCEDURE — 4004F PT TOBACCO SCREEN RCVD TLK: CPT | Performed by: OBSTETRICS & GYNECOLOGY

## 2023-01-31 NOTE — PROGRESS NOTES
Jerral Fothergill     Patient presents for postpartum visit. Patient is s/p scheduled repeat c/s 11/3/22. Patient delivered in [facility] [employer] due to complicated history. Patient had a stroke during pregnancy. She was on lovenox and low dose aspirin daily. Poor fetal growth was noted as well. Patient states during her c/s the spinal wore off. She had to go under general anesthesia midway through the procedure. States she had an uncomplicated postpartum period. A Mirena was placed the day after her c/s while in the hospital. Patient has been bleeding daily while on it. States the bleeding can be heavy at times. She did have two weeks of no bleeding. A small 1.6cm right ovarian cyst was followed throughout pregnancy. Plan to repeat scan to assess stability/ resolution. Patient had decreased protein S antigen during pregnancy (evaluated after stroke). Patient is following with a hematologist in [facility] [employer]. Her protein S was still low. She is still on lovenox and daily aspirin. She will likely need lifelong anticoagulation. Patient is doing well. Denies problems with bowel and bladder. Baby is doing well. Patient has good support at home. Denies feeling sad/ overwhelmed.        Past Medical History:   Diagnosis Date    Back pain     CVA (cerebral vascular accident) ([address] 11/15/2021    Stroke 2021, 21, and 22 during pregnancy    Heterozygous for MTHFR gene mutation 2022    Iron deficiency anemia, unspecified 10/20/2022    Neck pain     Protein S deficiency affecting pregnancy ([address] 2022        Past Surgical History:   Procedure Laterality Date     SECTION      DILATION AND CURETTAGE      NERVE BLOCK Left 2020    LEFT CERVICAL MEDIAL BRANCH NERVE  BLOCK UNDER FLUOROSCOPIC GUIDANCE C3, C4, C5 AND C6 performed by Jenifer Rodriguez MD at [facility] OR        Family History   Problem Relation Age of Onset    Pulmonary Embolism Mother 48        After Hysterectomy    Asthma Father     Asthma Brother     Asthma Brother         Social History       Tobacco History       Smoking Status  Every Day Smoking Frequency  0.50 packs/day Smoking Tobacco Type  Cigarettes, Cigars      Smokeless Tobacco Use  Never      Tobacco Comments  black and mild              Alcohol History       Alcohol Use Status  No              Drug Use       Drug Use Status  Yes Types  Marijuana (Weed)              Sexual Activity       Sexually Active  Yes Partners  Male                      Current Outpatient Medications:     enoxaparin (LOVENOX) 300 MG/3ML injection, Inject 70 mg into the skin in the morning and 70 mg before bedtime. , Disp: , Rfl:     aspirin 81 MG chewable tablet, Take 1 tablet by mouth daily, Disp: 90 tablet, Rfl: 3    Cholecalciferol (VITAMIN D3) 50 MCG (2000 UT) CAPS, 2 Tabs daily x 14 days then 1 tab daily (Patient not taking: Reported on 1/31/2023), Disp: 45 capsule, Rfl: 3    folic acid (FOLVITE) 1 MG tablet, Take 1 tablet by mouth daily (Patient not taking: Reported on 1/31/2023), Disp: 30 tablet, Rfl: 6    vitamin B-12 (CYANOCOBALAMIN) 1000 MCG tablet, Take 1 tablet by mouth daily (Patient not taking: Reported on 1/31/2023), Disp: 30 tablet, Rfl: 3    Misc. Devices (BREAST PUMP) MISC, DOUBLE ELECTRIC BREAST PUMP; Breast pump per lactation recommendations, Disp: 1 each, Rfl: 0    Misc.  Devices (BREAST PUMP) MISC, DOUBLE ELECTRIC BREAST PUMP; Breast pump per lactation recommendations, Disp: 1 each, Rfl: 0    folic acid (FOLVITE) 1 MG tablet, Take 1 tablet by mouth daily (Patient not taking: Reported on 1/31/2023), Disp: 30 tablet, Rfl: 5    Magnesium Oxide 200 MG TABS, Take 200 mg by mouth daily (Patient not taking: Reported on 1/31/2023), Disp: 30 tablet, Rfl: 4    vitamin B-6 (PYRIDOXINE) 100 MG tablet, Take 100 mg by mouth 3 times daily as needed (FOR NAUSEA) (Patient not taking: Reported on 1/31/2023), Disp: , Rfl:     vitamin D3 (CHOLECALCIFEROL) 25 MCG (1000 UT) TABS tablet, Take 2 tablets by mouth daily (Patient not taking: Reported on 1/31/2023), Disp: 90 tablet, Rfl: 2    Prenatal MV-Min-Fe Fum-FA-DHA (PRENATAL 1 PO), Take 1 tablet by mouth daily  (Patient not taking: Reported on 1/31/2023), Disp: , Rfl:      No Known Allergies     Vitals:    01/31/23 1352   BP: 125/75   Pulse: 89        Physical Exam:  General: pleasant, alert     Breasts: deferred     Pelvic exam: deferred due to bleeding     Jhonny Sharp was seen today for postpartum care. Diagnoses and all orders for this visit:    Abnormal uterine bleeding  -     US PELVIS COMPLETE; Future    IUD check up  -     [address]; Future    Discussed that with her history of stroke estrogen must be avoided. Patient had wanted to remove her Mirena and restart depo provera. Discussed that depo provera carries a higher risk then Mirena and POPs for stroke. Reviewed that Mirena may take up to 6 months to show results. Recommend with patient proceeding with pelvic ultrasound to assess IUD location and lining. Will call with results to discuss next steps. Patient agrees. Return for will call with results .      Margi Oliver MD

## 2023-01-31 NOTE — PROGRESS NOTES
Patient alert and pleasant with concerns about IUD that was placed after giving birth  Here today for post-partum exam  Discharge instructions have been discussed with the patient. Patient advised to call our office with any questions or concerns. Voiced understanding.

## 2023-01-31 NOTE — PATIENT INSTRUCTIONS
Please call the number below to schedule your imaging we've requested:  [phone], select option #1

## 2023-08-13 ENCOUNTER — HOSPITAL ENCOUNTER (EMERGENCY)
Age: 33
Discharge: HOME | End: 2023-08-13
Attending: STUDENT IN AN ORGANIZED HEALTH CARE EDUCATION/TRAINING PROGRAM
Payer: MEDICAID

## 2023-08-13 ENCOUNTER — APPOINTMENT (OUTPATIENT)
Dept: ULTRASOUND IMAGING | Age: 33
End: 2023-08-13
Payer: MEDICAID

## 2023-08-13 ENCOUNTER — APPOINTMENT (OUTPATIENT)
Dept: CT IMAGING | Age: 33
End: 2023-08-13
Attending: STUDENT IN AN ORGANIZED HEALTH CARE EDUCATION/TRAINING PROGRAM
Payer: MEDICAID

## 2023-08-13 VITALS
DIASTOLIC BLOOD PRESSURE: 77 MMHG | RESPIRATION RATE: 16 BRPM | WEIGHT: 137 LBS | TEMPERATURE: 97.9 F | SYSTOLIC BLOOD PRESSURE: 107 MMHG | OXYGEN SATURATION: 100 % | HEIGHT: 62 IN | BODY MASS INDEX: 25.21 KG/M2 | HEART RATE: 98 BPM

## 2023-08-13 DIAGNOSIS — K52.9 ENTERITIS: Primary | ICD-10-CM

## 2023-08-13 LAB
ALBUMIN SERPL-MCNC: 4 G/DL (ref 3.5–5.2)
ALP SERPL-CCNC: 85 U/L (ref 35–104)
ALT SERPL-CCNC: 16 U/L (ref 0–32)
ANION GAP SERPL CALCULATED.3IONS-SCNC: 12 MMOL/L (ref 7–16)
AST SERPL-CCNC: 15 U/L (ref 0–31)
BASOPHILS # BLD: 0.02 K/UL (ref 0–0.2)
BASOPHILS NFR BLD: 0 % (ref 0–2)
BILIRUB SERPL-MCNC: <0.2 MG/DL (ref 0–1.2)
BILIRUB UR QL STRIP: NEGATIVE
BUN SERPL-MCNC: 18 MG/DL (ref 6–20)
CALCIUM SERPL-MCNC: 8.7 MG/DL (ref 8.6–10.2)
CHLORIDE SERPL-SCNC: 111 MMOL/L (ref 98–107)
CLARITY UR: CLEAR
CO2 SERPL-SCNC: 22 MMOL/L (ref 22–29)
COLOR UR: YELLOW
COMMENT: ABNORMAL
CREAT SERPL-MCNC: 0.8 MG/DL (ref 0.5–1)
EOSINOPHIL # BLD: 0.03 K/UL (ref 0.05–0.5)
EOSINOPHILS RELATIVE PERCENT: 0 % (ref 0–6)
ERYTHROCYTE [DISTWIDTH] IN BLOOD BY AUTOMATED COUNT: 13.8 % (ref 11.5–15)
GFR SERPL CREATININE-BSD FRML MDRD: >60 ML/MIN/1.73M2
GLUCOSE SERPL-MCNC: 103 MG/DL (ref 74–99)
GLUCOSE UR STRIP-MCNC: NEGATIVE MG/DL
HCG, URINE, POC: NEGATIVE
HCT VFR BLD AUTO: 48.1 % (ref 34–48)
HGB BLD-MCNC: 15 G/DL (ref 11.5–15.5)
HGB UR QL STRIP.AUTO: NEGATIVE
IMM GRANULOCYTES # BLD AUTO: 0.03 K/UL (ref 0–0.58)
IMM GRANULOCYTES NFR BLD: 0 % (ref 0–5)
KETONES UR STRIP-MCNC: NEGATIVE MG/DL
LEUKOCYTE ESTERASE UR QL STRIP: NEGATIVE
LIPASE SERPL-CCNC: 23 U/L (ref 13–60)
LYMPHOCYTES NFR BLD: 2.41 K/UL (ref 1.5–4)
LYMPHOCYTES RELATIVE PERCENT: 28 % (ref 20–42)
Lab: NORMAL
MCH RBC QN AUTO: 28.7 PG (ref 26–35)
MCHC RBC AUTO-ENTMCNC: 31.2 G/DL (ref 32–34.5)
MCV RBC AUTO: 92.1 FL (ref 80–99.9)
MONOCYTES NFR BLD: 0.36 K/UL (ref 0.1–0.95)
MONOCYTES NFR BLD: 4 % (ref 2–12)
NEGATIVE QC PASS/FAIL: NORMAL
NEUTROPHILS NFR BLD: 67 % (ref 43–80)
NEUTS SEG NFR BLD: 5.77 K/UL (ref 1.8–7.3)
NITRITE UR QL STRIP: NEGATIVE
PH UR STRIP: 5.5 [PH] (ref 5–9)
PLATELET # BLD AUTO: 337 K/UL (ref 130–450)
PMV BLD AUTO: 9.9 FL (ref 7–12)
POSITIVE QC PASS/FAIL: NORMAL
POTASSIUM SERPL-SCNC: 4.4 MMOL/L (ref 3.5–5)
PROT SERPL-MCNC: 7 G/DL (ref 6.4–8.3)
PROT UR STRIP-MCNC: NEGATIVE MG/DL
RBC # BLD AUTO: 5.22 M/UL (ref 3.5–5.5)
SODIUM SERPL-SCNC: 145 MMOL/L (ref 132–146)
SP GR UR STRIP: >1.03 (ref 1–1.03)
UROBILINOGEN UR STRIP-ACNC: 0.2 EU/DL (ref 0–1)
WBC OTHER # BLD: 8.6 K/UL (ref 4.5–11.5)

## 2023-08-13 PROCEDURE — 99285 EMERGENCY DEPT VISIT HI MDM: CPT

## 2023-08-13 PROCEDURE — 87491 CHLMYD TRACH DNA AMP PROBE: CPT

## 2023-08-13 PROCEDURE — 85025 COMPLETE CBC W/AUTO DIFF WBC: CPT

## 2023-08-13 PROCEDURE — 96361 HYDRATE IV INFUSION ADD-ON: CPT

## 2023-08-13 PROCEDURE — 2580000003 HC RX 258: Performed by: STUDENT IN AN ORGANIZED HEALTH CARE EDUCATION/TRAINING PROGRAM

## 2023-08-13 PROCEDURE — 76830 TRANSVAGINAL US NON-OB: CPT

## 2023-08-13 PROCEDURE — 74177 CT ABD & PELVIS W/CONTRAST: CPT

## 2023-08-13 PROCEDURE — 83690 ASSAY OF LIPASE: CPT

## 2023-08-13 PROCEDURE — 93975 VASCULAR STUDY: CPT

## 2023-08-13 PROCEDURE — 96374 THER/PROPH/DIAG INJ IV PUSH: CPT

## 2023-08-13 PROCEDURE — 87591 N.GONORRHOEAE DNA AMP PROB: CPT

## 2023-08-13 PROCEDURE — 6360000002 HC RX W HCPCS: Performed by: STUDENT IN AN ORGANIZED HEALTH CARE EDUCATION/TRAINING PROGRAM

## 2023-08-13 PROCEDURE — 6360000004 HC RX CONTRAST MEDICATION: Performed by: RADIOLOGY

## 2023-08-13 PROCEDURE — 81003 URINALYSIS AUTO W/O SCOPE: CPT

## 2023-08-13 PROCEDURE — 80053 COMPREHEN METABOLIC PANEL: CPT

## 2023-08-13 RX ORDER — AMOXICILLIN AND CLAVULANATE POTASSIUM 875; 125 MG/1; MG/1
1 TABLET, FILM COATED ORAL EVERY 8 HOURS SCHEDULED
Qty: 21 TABLET | Refills: 0 | Status: SHIPPED | OUTPATIENT
Start: 2023-08-13 | End: 2023-08-20

## 2023-08-13 RX ORDER — KETOROLAC TROMETHAMINE 30 MG/ML
15 INJECTION, SOLUTION INTRAMUSCULAR; INTRAVENOUS ONCE
Status: COMPLETED | OUTPATIENT
Start: 2023-08-13 | End: 2023-08-13

## 2023-08-13 RX ORDER — NAPROXEN 500 MG/1
500 TABLET ORAL 2 TIMES DAILY PRN
Qty: 14 TABLET | Refills: 0 | Status: SHIPPED | OUTPATIENT
Start: 2023-08-13 | End: 2023-08-20

## 2023-08-13 RX ORDER — 0.9 % SODIUM CHLORIDE 0.9 %
1000 INTRAVENOUS SOLUTION INTRAVENOUS ONCE
Status: COMPLETED | OUTPATIENT
Start: 2023-08-13 | End: 2023-08-13

## 2023-08-13 RX ADMIN — KETOROLAC TROMETHAMINE 15 MG: 30 INJECTION, SOLUTION INTRAMUSCULAR; INTRAVENOUS at 13:42

## 2023-08-13 RX ADMIN — SODIUM CHLORIDE 1000 ML: 9 INJECTION, SOLUTION INTRAVENOUS at 11:52

## 2023-08-13 RX ADMIN — IOPAMIDOL 75 ML: 755 INJECTION, SOLUTION INTRAVENOUS at 14:21

## 2023-08-13 ASSESSMENT — PAIN SCALES - GENERAL
PAINLEVEL_OUTOF10: 3
PAINLEVEL_OUTOF10: 10

## 2023-08-13 ASSESSMENT — PAIN DESCRIPTION - LOCATION: LOCATION: ABDOMEN

## 2023-08-13 ASSESSMENT — PAIN - FUNCTIONAL ASSESSMENT
PAIN_FUNCTIONAL_ASSESSMENT: 0-10
PAIN_FUNCTIONAL_ASSESSMENT: 0-10

## 2023-08-13 ASSESSMENT — PAIN DESCRIPTION - FREQUENCY: FREQUENCY: INTERMITTENT

## 2023-08-13 ASSESSMENT — PAIN DESCRIPTION - DESCRIPTORS
DESCRIPTORS: THROBBING
DESCRIPTORS: ACHING

## 2023-08-13 ASSESSMENT — PAIN DESCRIPTION - ORIENTATION: ORIENTATION: RIGHT;LEFT;LOWER

## 2023-08-13 NOTE — ED PROVIDER NOTES
[facility]        Pt Name: Imer Swanson  MRN: [id number]  [address] 1990  Date of evaluation: 8/13/2023  Provider: Lanette David DO  PCP: Peyton Servin DO  Note Started: 11:38 AM EDT 8/13/23    CHIEF COMPLAINT       Chief Complaint   Patient presents with    Abdominal Pain     Lower abd pain starting this morning, emesis x1          HISTORY OF PRESENT ILLNESS: 1 or more Elements   History From: Patient    Limitations to history : None    Imer Swanson is a 29 y.o. female who presents to the emergency department with chief complaint of lower abdominal pain starting this morning. Patient states she woke with pain in her lower abdomen. Noted that she has had this pain before and tried to sleep it off. Woke after a few hours still having pain. Pain comes and goes and it stabbing in nature. States it is not bad enough to need to take any analgesics, but it did not go away like it has in the past so she decided to come be evaluated. Nothing makes the pain worse and nothing alleviates the pain. Has hx of ovarian cyst as well as multiple strokes, on Lovenox chronically for Protein S deficiency. She notes no blood in her urine, but states she believes she started her menstrual cycle yesterday. She is irregular since having a Mirena IUD placed. She does have unprotected sex with her partner, though she is monogamous, she cannot say for certain her partner is. She states she has no concerns for STDs, no abnormal vaginal discharge. No fevers or chills. Nursing Notes were all reviewed and agreed with or any disagreements were addressed in the HPI. REVIEW OF SYSTEMS :      Positives and Pertinent negatives as per HPI.      PAST MEDICAL HISTORY/Chronic Conditions Affecting Care      has a past medical history of Back pain, CVA (cerebral vascular accident) ([address]) (11/15/2021), Heterozygous for MTHFR gene

## 2023-08-15 LAB
CHLAMYDIA DNA UR QL NAA+PROBE: NEGATIVE
N GONORRHOEA DNA UR QL NAA+PROBE: NEGATIVE
SPECIMEN DESCRIPTION: NORMAL

## 2023-11-15 ENCOUNTER — HOSPITAL ENCOUNTER (OUTPATIENT)
Age: 33
Discharge: HOME | End: 2023-11-15
Payer: MEDICAID

## 2023-11-15 LAB
CRP SERPL HS-MCNC: <3 MG/L (ref 0–5)
ERYTHROCYTE [SEDIMENTATION RATE] IN BLOOD BY WESTERGREN METHOD: 1 MM/HR (ref 0–20)
T3 SERPL-MCNC: 127 NG/DL (ref 80–200)
T4 FREE SERPL-MCNC: 1.4 NG/DL (ref 0.9–1.7)
TSH SERPL DL<=0.05 MIU/L-ACNC: 0.69 UIU/ML (ref 0.27–4.2)

## 2023-11-15 PROCEDURE — 85652 RBC SED RATE AUTOMATED: CPT

## 2023-11-15 PROCEDURE — 36415 COLL VENOUS BLD VENIPUNCTURE: CPT

## 2023-11-15 PROCEDURE — 86039 ANTINUCLEAR ANTIBODIES (ANA): CPT

## 2023-11-15 PROCEDURE — 84443 ASSAY THYROID STIM HORMONE: CPT

## 2023-11-15 PROCEDURE — 83516 IMMUNOASSAY NONANTIBODY: CPT

## 2023-11-15 PROCEDURE — 86038 ANTINUCLEAR ANTIBODIES: CPT

## 2023-11-15 PROCEDURE — 84480 ASSAY TRIIODOTHYRONINE (T3): CPT

## 2023-11-15 PROCEDURE — 86140 C-REACTIVE PROTEIN: CPT

## 2023-11-15 PROCEDURE — 84439 ASSAY OF FREE THYROXINE: CPT

## 2023-11-16 LAB — ANA SER QL IA: NEGATIVE

## 2023-11-17 ENCOUNTER — HOSPITAL ENCOUNTER (OUTPATIENT)
Age: 33
Discharge: HOME | End: 2023-11-17
Payer: MEDICAID

## 2023-11-17 PROCEDURE — 87449 NOS EACH ORGANISM AG IA: CPT

## 2023-11-17 PROCEDURE — 87205 SMEAR GRAM STAIN: CPT

## 2023-11-17 PROCEDURE — 83993 ASSAY FOR CALPROTECTIN FECAL: CPT

## 2023-11-17 PROCEDURE — 87045 FECES CULTURE AEROBIC BACT: CPT

## 2023-11-17 PROCEDURE — 83520 IMMUNOASSAY QUANT NOS NONAB: CPT

## 2023-11-17 PROCEDURE — 87328 CRYPTOSPORIDIUM AG IA: CPT

## 2023-11-17 PROCEDURE — 87427 SHIGA-LIKE TOXIN AG IA: CPT

## 2023-11-17 PROCEDURE — 87077 CULTURE AEROBIC IDENTIFY: CPT

## 2023-11-17 PROCEDURE — 87329 GIARDIA AG IA: CPT

## 2023-11-17 PROCEDURE — 87046 STOOL CULTR AEROBIC BACT EA: CPT

## 2023-11-17 PROCEDURE — 87324 CLOSTRIDIUM AG IA: CPT

## 2023-11-19 LAB
MICROORGANISM SPEC CULT: ABNORMAL
MICROORGANISM SPEC CULT: ABNORMAL
SPECIMEN DESCRIPTION: ABNORMAL

## 2023-11-20 LAB
GLIADIN IGA SER IA-ACNC: 2.1 U/ML
GLIADIN IGG SER IA-ACNC: 0.6 U/ML
TISSUE TRANSGLUTAMINASE ANTIBODY IGG: 1.1 U/ML
TTG IGA SER IA-ACNC: 0.6 U/ML

## 2023-11-21 LAB
C DIFF GDH + TOXINS A+B STL QL IA.RAPID: NORMAL
SPECIMEN DESCRIPTION: NORMAL

## 2023-11-22 LAB
C PARVUM AG STL QL IA: NEGATIVE
G LAMBLIA AG STL QL IA: NEGATIVE
SOURCE, 60200063: NORMAL
SOURCE: NORMAL
SPECIMEN DESCRIPTION: NORMAL
SPECIMEN DESCRIPTION: NORMAL

## 2023-11-23 LAB — CALPROTECTIN, FECAL: 11 UG/G

## 2023-11-24 LAB — FECAL PANCREATIC ELASTASE-1: 533 UG/G

## 2023-11-26 LAB
SEND OUT REPORT: NORMAL
TEST NAME: NORMAL

## 2023-11-29 ENCOUNTER — APPOINTMENT (OUTPATIENT)
Dept: CT IMAGING | Age: 33
DRG: 058 | End: 2023-11-29
Payer: MEDICAID

## 2023-11-29 ENCOUNTER — APPOINTMENT (OUTPATIENT)
Dept: MRI IMAGING | Age: 33
DRG: 058 | End: 2023-11-29
Payer: MEDICAID

## 2023-11-29 ENCOUNTER — APPOINTMENT (OUTPATIENT)
Dept: ULTRASOUND IMAGING | Age: 33
DRG: 058 | End: 2023-11-29
Payer: MEDICAID

## 2023-11-29 ENCOUNTER — HOSPITAL ENCOUNTER (INPATIENT)
Age: 33
LOS: 2 days | Discharge: HOME | DRG: 058 | End: 2023-12-01
Attending: EMERGENCY MEDICINE | Admitting: INTERNAL MEDICINE
Payer: MEDICAID

## 2023-11-29 ENCOUNTER — APPOINTMENT (OUTPATIENT)
Dept: GENERAL RADIOLOGY | Age: 33
DRG: 058 | End: 2023-11-29
Payer: MEDICAID

## 2023-11-29 DIAGNOSIS — R29.90 STROKE-LIKE SYMPTOMS: ICD-10-CM

## 2023-11-29 DIAGNOSIS — Z15.89 HETEROZYGOUS MTHFR MUTATION C677T: ICD-10-CM

## 2023-11-29 DIAGNOSIS — R29.90 STROKE-LIKE SYMPTOM: Primary | ICD-10-CM

## 2023-11-29 DIAGNOSIS — D68.59 PROTEIN S DEFICIENCY AFFECTING PREGNANCY (HCC): ICD-10-CM

## 2023-11-29 DIAGNOSIS — O99.119 PROTEIN S DEFICIENCY AFFECTING PREGNANCY (HCC): ICD-10-CM

## 2023-11-29 DIAGNOSIS — Z79.01 CHRONIC ANTICOAGULATION: ICD-10-CM

## 2023-11-29 LAB
ALBUMIN SERPL-MCNC: 4.1 G/DL (ref 3.5–5.2)
ALP SERPL-CCNC: 70 U/L (ref 35–104)
ALT SERPL-CCNC: 14 U/L (ref 0–32)
ANION GAP SERPL CALCULATED.3IONS-SCNC: 12 MMOL/L (ref 7–16)
AST SERPL-CCNC: 15 U/L (ref 0–31)
BASOPHILS # BLD: 0.04 K/UL (ref 0–0.2)
BASOPHILS NFR BLD: 1 % (ref 0–2)
BILIRUB SERPL-MCNC: 0.3 MG/DL (ref 0–1.2)
BUN SERPL-MCNC: 17 MG/DL (ref 6–20)
CALCIUM SERPL-MCNC: 8.5 MG/DL (ref 8.6–10.2)
CHLORIDE SERPL-SCNC: 109 MMOL/L (ref 98–107)
CO2 SERPL-SCNC: 22 MMOL/L (ref 22–29)
CREAT SERPL-MCNC: 0.8 MG/DL (ref 0.5–1)
EKG ATRIAL RATE: 81 BPM
EKG P AXIS: 61 DEGREES
EKG P-R INTERVAL: 162 MS
EKG Q-T INTERVAL: 386 MS
EKG QRS DURATION: 82 MS
EKG QTC CALCULATION (BAZETT): 448 MS
EKG R AXIS: 5 DEGREES
EKG T AXIS: -6 DEGREES
EKG VENTRICULAR RATE: 81 BPM
EOSINOPHIL # BLD: 0.12 K/UL (ref 0.05–0.5)
EOSINOPHILS RELATIVE PERCENT: 1 % (ref 0–6)
ERYTHROCYTE [DISTWIDTH] IN BLOOD BY AUTOMATED COUNT: 14.6 % (ref 11.5–15)
GFR SERPL CREATININE-BSD FRML MDRD: >60 ML/MIN/1.73M2
GLUCOSE BLD-MCNC: 86 MG/DL (ref 74–99)
GLUCOSE SERPL-MCNC: 89 MG/DL (ref 74–99)
HCG SERPL QL: NEGATIVE
HCT VFR BLD AUTO: 42.2 % (ref 34–48)
HGB BLD-MCNC: 13.6 G/DL (ref 11.5–15.5)
IMM GRANULOCYTES # BLD AUTO: <0.03 K/UL (ref 0–0.58)
IMM GRANULOCYTES NFR BLD: 0 % (ref 0–5)
INR PPP: 0.9
LYMPHOCYTES NFR BLD: 4.66 K/UL (ref 1.5–4)
LYMPHOCYTES RELATIVE PERCENT: 53 % (ref 20–42)
MCH RBC QN AUTO: 29.5 PG (ref 26–35)
MCHC RBC AUTO-ENTMCNC: 32.2 G/DL (ref 32–34.5)
MCV RBC AUTO: 91.5 FL (ref 80–99.9)
MONOCYTES NFR BLD: 0.67 K/UL (ref 0.1–0.95)
MONOCYTES NFR BLD: 8 % (ref 2–12)
NEUTROPHILS NFR BLD: 37 % (ref 43–80)
NEUTS SEG NFR BLD: 3.24 K/UL (ref 1.8–7.3)
PARTIAL THROMBOPLASTIN TIME: 34.9 SEC (ref 24.5–35.1)
PLATELET # BLD AUTO: 314 K/UL (ref 130–450)
PMV BLD AUTO: 9.4 FL (ref 7–12)
POTASSIUM SERPL-SCNC: 4.1 MMOL/L (ref 3.5–5)
PROT SERPL-MCNC: 7 G/DL (ref 6.4–8.3)
PROTHROMBIN TIME: 10.4 SEC (ref 9.3–12.4)
RBC # BLD AUTO: 4.61 M/UL (ref 3.5–5.5)
SODIUM SERPL-SCNC: 143 MMOL/L (ref 132–146)
TROPONIN I SERPL HS-MCNC: <6 NG/L (ref 0–9)
WBC OTHER # BLD: 8.8 K/UL (ref 4.5–11.5)

## 2023-11-29 PROCEDURE — 80053 COMPREHEN METABOLIC PANEL: CPT

## 2023-11-29 PROCEDURE — 70551 MRI BRAIN STEM W/O DYE: CPT

## 2023-11-29 PROCEDURE — 96374 THER/PROPH/DIAG INJ IV PUSH: CPT

## 2023-11-29 PROCEDURE — 99285 EMERGENCY DEPT VISIT HI MDM: CPT

## 2023-11-29 PROCEDURE — 85730 THROMBOPLASTIN TIME PARTIAL: CPT

## 2023-11-29 PROCEDURE — 93970 EXTREMITY STUDY: CPT

## 2023-11-29 PROCEDURE — 85025 COMPLETE CBC W/AUTO DIFF WBC: CPT

## 2023-11-29 PROCEDURE — 6360000002 HC RX W HCPCS: Performed by: EMERGENCY MEDICINE

## 2023-11-29 PROCEDURE — 96375 TX/PRO/DX INJ NEW DRUG ADDON: CPT

## 2023-11-29 PROCEDURE — 2060000000 HC ICU INTERMEDIATE R&B

## 2023-11-29 PROCEDURE — G0378 HOSPITAL OBSERVATION PER HR: HCPCS

## 2023-11-29 PROCEDURE — 6360000002 HC RX W HCPCS: Performed by: NURSE PRACTITIONER

## 2023-11-29 PROCEDURE — 82962 GLUCOSE BLOOD TEST: CPT

## 2023-11-29 PROCEDURE — 6370000000 HC RX 637 (ALT 250 FOR IP): Performed by: NURSE PRACTITIONER

## 2023-11-29 PROCEDURE — 96372 THER/PROPH/DIAG INJ SC/IM: CPT

## 2023-11-29 PROCEDURE — 2580000003 HC RX 258: Performed by: NURSE PRACTITIONER

## 2023-11-29 PROCEDURE — 84484 ASSAY OF TROPONIN QUANT: CPT

## 2023-11-29 PROCEDURE — 85610 PROTHROMBIN TIME: CPT

## 2023-11-29 PROCEDURE — 93010 ELECTROCARDIOGRAM REPORT: CPT | Performed by: INTERNAL MEDICINE

## 2023-11-29 PROCEDURE — 99222 1ST HOSP IP/OBS MODERATE 55: CPT | Performed by: PSYCHIATRY & NEUROLOGY

## 2023-11-29 PROCEDURE — 92523 SPEECH SOUND LANG COMPREHEN: CPT

## 2023-11-29 PROCEDURE — 70498 CT ANGIOGRAPHY NECK: CPT

## 2023-11-29 PROCEDURE — 96376 TX/PRO/DX INJ SAME DRUG ADON: CPT

## 2023-11-29 PROCEDURE — 70496 CT ANGIOGRAPHY HEAD: CPT

## 2023-11-29 PROCEDURE — 6360000004 HC RX CONTRAST MEDICATION: Performed by: EMERGENCY MEDICINE

## 2023-11-29 PROCEDURE — 70450 CT HEAD/BRAIN W/O DYE: CPT

## 2023-11-29 PROCEDURE — 84703 CHORIONIC GONADOTROPIN ASSAY: CPT

## 2023-11-29 PROCEDURE — 93005 ELECTROCARDIOGRAM TRACING: CPT | Performed by: STUDENT IN AN ORGANIZED HEALTH CARE EDUCATION/TRAINING PROGRAM

## 2023-11-29 PROCEDURE — 71045 X-RAY EXAM CHEST 1 VIEW: CPT

## 2023-11-29 PROCEDURE — 99233 SBSQ HOSP IP/OBS HIGH 50: CPT | Performed by: INTERNAL MEDICINE

## 2023-11-29 RX ORDER — FENTANYL CITRATE 50 UG/ML
50 INJECTION, SOLUTION INTRAMUSCULAR; INTRAVENOUS ONCE
Status: COMPLETED | OUTPATIENT
Start: 2023-11-29 | End: 2023-11-29

## 2023-11-29 RX ORDER — ONDANSETRON 4 MG/1
4 TABLET, ORALLY DISINTEGRATING ORAL EVERY 8 HOURS PRN
Status: DISCONTINUED | OUTPATIENT
Start: 2023-11-29 | End: 2023-12-01 | Stop reason: HOSPADM

## 2023-11-29 RX ORDER — SODIUM CHLORIDE 9 MG/ML
INJECTION, SOLUTION INTRAVENOUS PRN
Status: DISCONTINUED | OUTPATIENT
Start: 2023-11-29 | End: 2023-12-01 | Stop reason: HOSPADM

## 2023-11-29 RX ORDER — ENOXAPARIN SODIUM 100 MG/ML
70 INJECTION SUBCUTANEOUS 2 TIMES DAILY
Status: DISCONTINUED | OUTPATIENT
Start: 2023-11-29 | End: 2023-12-01 | Stop reason: HOSPADM

## 2023-11-29 RX ORDER — SODIUM CHLORIDE 0.9 % (FLUSH) 0.9 %
5-40 SYRINGE (ML) INJECTION PRN
Status: DISCONTINUED | OUTPATIENT
Start: 2023-11-29 | End: 2023-12-01 | Stop reason: HOSPADM

## 2023-11-29 RX ORDER — MAGNESIUM SULFATE IN WATER 40 MG/ML
2000 INJECTION, SOLUTION INTRAVENOUS PRN
Status: DISCONTINUED | OUTPATIENT
Start: 2023-11-29 | End: 2023-12-01 | Stop reason: HOSPADM

## 2023-11-29 RX ORDER — ACETAMINOPHEN 650 MG/1
650 SUPPOSITORY RECTAL EVERY 6 HOURS PRN
Status: DISCONTINUED | OUTPATIENT
Start: 2023-11-29 | End: 2023-12-01 | Stop reason: HOSPADM

## 2023-11-29 RX ORDER — POLYETHYLENE GLYCOL 3350 17 G/17G
17 POWDER, FOR SOLUTION ORAL DAILY PRN
Status: DISCONTINUED | OUTPATIENT
Start: 2023-11-29 | End: 2023-12-01 | Stop reason: HOSPADM

## 2023-11-29 RX ORDER — SODIUM CHLORIDE 0.9 % (FLUSH) 0.9 %
5-40 SYRINGE (ML) INJECTION EVERY 12 HOURS SCHEDULED
Status: DISCONTINUED | OUTPATIENT
Start: 2023-11-29 | End: 2023-12-01 | Stop reason: HOSPADM

## 2023-11-29 RX ORDER — ENOXAPARIN SODIUM 100 MG/ML
INJECTION SUBCUTANEOUS 2 TIMES DAILY
COMMUNITY

## 2023-11-29 RX ORDER — POTASSIUM CHLORIDE 7.45 MG/ML
10 INJECTION INTRAVENOUS PRN
Status: DISCONTINUED | OUTPATIENT
Start: 2023-11-29 | End: 2023-12-01 | Stop reason: HOSPADM

## 2023-11-29 RX ORDER — FAMOTIDINE 40 MG/1
40 TABLET, FILM COATED ORAL NIGHTLY
COMMUNITY

## 2023-11-29 RX ORDER — POTASSIUM CHLORIDE 20 MEQ/1
40 TABLET, EXTENDED RELEASE ORAL PRN
Status: DISCONTINUED | OUTPATIENT
Start: 2023-11-29 | End: 2023-12-01 | Stop reason: HOSPADM

## 2023-11-29 RX ORDER — ONDANSETRON 2 MG/ML
4 INJECTION INTRAMUSCULAR; INTRAVENOUS EVERY 6 HOURS PRN
Status: DISCONTINUED | OUTPATIENT
Start: 2023-11-29 | End: 2023-12-01 | Stop reason: HOSPADM

## 2023-11-29 RX ORDER — METOCLOPRAMIDE HYDROCHLORIDE 5 MG/ML
5 INJECTION INTRAMUSCULAR; INTRAVENOUS ONCE
Status: COMPLETED | OUTPATIENT
Start: 2023-11-29 | End: 2023-11-29

## 2023-11-29 RX ORDER — ACETAMINOPHEN 325 MG/1
650 TABLET ORAL EVERY 6 HOURS PRN
Status: DISCONTINUED | OUTPATIENT
Start: 2023-11-29 | End: 2023-12-01 | Stop reason: HOSPADM

## 2023-11-29 RX ORDER — ASPIRIN 81 MG/1
81 TABLET, CHEWABLE ORAL DAILY
Status: DISCONTINUED | OUTPATIENT
Start: 2023-11-29 | End: 2023-12-01 | Stop reason: HOSPADM

## 2023-11-29 RX ORDER — KETOROLAC TROMETHAMINE 30 MG/ML
15 INJECTION, SOLUTION INTRAMUSCULAR; INTRAVENOUS ONCE
Status: COMPLETED | OUTPATIENT
Start: 2023-11-29 | End: 2023-11-29

## 2023-11-29 RX ORDER — DICYCLOMINE HYDROCHLORIDE 10 MG/1
10 CAPSULE ORAL 2 TIMES DAILY
COMMUNITY

## 2023-11-29 RX ORDER — DIPHENHYDRAMINE HYDROCHLORIDE 50 MG/ML
10 INJECTION INTRAMUSCULAR; INTRAVENOUS ONCE
Status: COMPLETED | OUTPATIENT
Start: 2023-11-29 | End: 2023-11-29

## 2023-11-29 RX ADMIN — FENTANYL CITRATE 50 MCG: 50 INJECTION INTRAMUSCULAR; INTRAVENOUS at 12:05

## 2023-11-29 RX ADMIN — KETOROLAC TROMETHAMINE 15 MG: 30 INJECTION, SOLUTION INTRAMUSCULAR at 12:04

## 2023-11-29 RX ADMIN — ENOXAPARIN SODIUM 70 MG: 100 INJECTION SUBCUTANEOUS at 21:08

## 2023-11-29 RX ADMIN — Medication 10 ML: at 10:51

## 2023-11-29 RX ADMIN — DIPHENHYDRAMINE HYDROCHLORIDE 10 MG: 50 INJECTION INTRAMUSCULAR; INTRAVENOUS at 08:49

## 2023-11-29 RX ADMIN — ASPIRIN 81 MG CHEWABLE TABLET 81 MG: 81 TABLET CHEWABLE at 12:09

## 2023-11-29 RX ADMIN — IOPAMIDOL 75 ML: 755 INJECTION, SOLUTION INTRAVENOUS at 07:07

## 2023-11-29 RX ADMIN — Medication 10 ML: at 21:08

## 2023-11-29 RX ADMIN — ACETAMINOPHEN 650 MG: 325 TABLET ORAL at 21:07

## 2023-11-29 RX ADMIN — METOCLOPRAMIDE 5 MG: 5 INJECTION, SOLUTION INTRAMUSCULAR; INTRAVENOUS at 08:46

## 2023-11-29 RX ADMIN — FENTANYL CITRATE 50 MCG: 50 INJECTION INTRAMUSCULAR; INTRAVENOUS at 08:51

## 2023-11-29 ASSESSMENT — PAIN DESCRIPTION - DESCRIPTORS
DESCRIPTORS: THROBBING
DESCRIPTORS: ACHING;THROBBING;SHARP
DESCRIPTORS: ACHING
DESCRIPTORS: ACHING

## 2023-11-29 ASSESSMENT — PAIN DESCRIPTION - LOCATION
LOCATION: HEAD

## 2023-11-29 ASSESSMENT — PAIN SCALES - GENERAL
PAINLEVEL_OUTOF10: 10
PAINLEVEL_OUTOF10: 10
PAINLEVEL_OUTOF10: 3
PAINLEVEL_OUTOF10: 3
PAINLEVEL_OUTOF10: 0

## 2023-11-29 ASSESSMENT — PAIN DESCRIPTION - PAIN TYPE: TYPE: ACUTE PAIN

## 2023-11-29 ASSESSMENT — PAIN - FUNCTIONAL ASSESSMENT: PAIN_FUNCTIONAL_ASSESSMENT: 0-10

## 2023-11-29 NOTE — PROGRESS NOTES
SPEECH/LANGUAGE PATHOLOGY  SPEECH/LANGUAGE/COGNITIVE EVALUATION   and PLAN OF CARE      PATIENT NAME:  Latosha Harding  (female)     MRN:  [id number]    :  1990  (29 y.o.)  STATUS:  Inpatient: Room     TODAY'S DATE:  2023  REFERRING PROVIDER:   Dr. Dustin Collado: SLP eval and treat  Date of order:  2023  REASON FOR REFERRAL:  stroke-like symptoms  EVALUATING THERAPIST: BARBI Rojas    ADMITTING DIAGNOSIS: Stroke-like symptoms [R29.90]    VISIT DIAGNOSIS:   Visit Diagnoses         Codes    Stroke-like symptom    -  Primary R29.90    Chronic anticoagulation     Z79.01               SPEECH THERAPY  PLAN OF CARE   The speech therapy  POC is established based on physician order, speech pathology diagnosis and results of clinical assessment     SPEECH PATHOLOGY DIAGNOSIS:    mild cognitive deficit; mild expressive aphasia with anomia    Speech Pathology intervention is recommended 3-6 times per week for LOS or when goals are met with emphasis on the following:      Conditions Requiring Skilled Therapeutic Intervention for speech, language and/or cognition    Expressive Aphasia   Anomia  Cognitive linguistic impairment  Decreased problem solving skills   Decreased thought organization    Specific Speech Therapy Interventions to Include:   Expressive language training   Therapeutic tasks for Cognition    Specific instructions for next treatment: To initiate POC    SHORT/LONG TERM GOALS  Pt will improve problem solving/thought organization during structured and unstructured tasks with 80% accuracy   Pt will improve expressive language skills with adequate thought content, organization, and word finding skills to facilitate improved communication with minimal cues.     Patient goals: Patient/family involved in developing goals and treatment plan:   Treatment goals discussed with Patient    The Patient understand(s) the diagnosis, prognosis and plan of

## 2023-11-29 NOTE — CONSULTS
[address] [facility]  Neurology Consult    Date:  11/29/2023  Patient Name:  Dennis Smith  YOB: 1990  MRN: [id number]     PCP:  Lata Guidry DO   Referring:  No ref. provider found      Chief Complaint: blurring of vision, headache    History obtained from: patient    Assessment  Dennis Smith is a 29 y.o. female with 3 previous ischemic strokes, history of protein S deficiency, heterozygous MTHFR mutation and PFO (uncorrected) who presented with left visual field blurring of vision, headache and numbness of left side of the body concerning for a new stroke vs venous sinus thrombosis vs complex migraine. Hx of strokes  November 2021, was on depot provera contraceptive  April 2022 during 1st trimester of pregnancy  May 2022 still during same pregnancy, while on aspirin 81 mg and prophylactic dose of lovenox, was started on therapeutic dose of lovenox at this time. Lovenox initially at 80 mg BID and has since been decreased as she lost weight, last decreased May 2023 to 60 mg BID. She follows at [facility]  Ordered repeat MRI brain wo contrast for tomorrow morning  Ordered MRV to rule out venous sinus thrombosis  Ordered bilateral lower extremity doppler U/S  Continue lovenox 60 mg BID         History of Present Illness:  Dennis Smith is a 29 y.o. right handed female presenting for evaluation of blurring of vision and headache. She  woke up at [facility] on 11/29/23 with blurring of left visual field and a headache. She denied nausea and vomiting, slurring of speech, palpitations, sob. She admits to missing some doses of her lovenox shots because it causes swelling and tenderness afterwards. She could  not quantify her missed doses but says she at least takes it once a day. She presented to ED where initial NIHS score was 2 for mild-moderate sensory deficit on the left side of body and slight drooping of nasolabial fold.  While in ED, the blurring of vision on the left eye has become more of blurring of vision of her peripheral vision on the left eye, her headache has decreased in severity, and she developed a heavy feeling when trying to move her left lower extremity. Vitals were stable. CMP and CBC unremarkable. B-hCG negative. Troponin not elevated. EKG unremarkable. CXR unremarkable. CT head was negative for acute hemorrhage or new lesions and did show stable previously seen left insular stroke. CTA head and neck were negative for significant stenosis. MRI brain w/o contrast done within 2-4 hours of symptom onset was negative for new infarct. She was given migraine cocktail in the ED. Of note, she has an IUD (mirena) for contraception.       Medical History:   Past Medical History:   Diagnosis Date    Back pain     CVA (cerebral vascular accident) ([address]) 11/15/2021    Stroke 2021, 21, and 22 during pregnancy    Heterozygous for MTHFR gene mutation 2022    Iron deficiency anemia, unspecified 10/20/2022    Neck pain     Protein S deficiency affecting pregnancy ([address]) 2022        Surgical History:   Past Surgical History:   Procedure Laterality Date     SECTION      DILATION AND CURETTAGE      NERVE BLOCK Left 2020    LEFT CERVICAL MEDIAL BRANCH NERVE  BLOCK UNDER FLUOROSCOPIC GUIDANCE C3, C4, C5 AND C6 performed by Maia Grace MD at [facility] OR        Family History:   Family History   Problem Relation Age of Onset    Pulmonary Embolism Mother 48        After Hysterectomy    Asthma Father     Asthma Brother     Asthma Brother        Social History:  Social History     Tobacco Use    Smoking status: Every Day     Types: Cigars    Smokeless tobacco: Never    Tobacco comments:     black and mild   Vaping Use    Vaping Use: Never used   Substance Use Topics    Alcohol use: No    Drug use: Yes     Types: Marijuana Kimberlyvineric Enrique)     Comment: OCC        Current Medications:      Current Facility-Administered Medications   Medication Dose

## 2023-11-29 NOTE — PROGRESS NOTES
Name: Alaina Amos  : 1990  MRN: [id number]    Date: 2023    Benefits of immediately proceeding with Radiology exam outweigh the risks and therefore the following is being waived:      [x] Pregnancy test    [] Protocol for Iodine allergy    [] MRI questionnaire    [x] BUN/Creatinine        Judy Guevara DO

## 2023-11-30 ENCOUNTER — APPOINTMENT (OUTPATIENT)
Dept: NUCLEAR MEDICINE | Age: 33
DRG: 058 | End: 2023-11-30
Payer: MEDICAID

## 2023-11-30 ENCOUNTER — HOSPITAL ENCOUNTER (OUTPATIENT)
Dept: NUCLEAR MEDICINE | Age: 33
Discharge: HOME | End: 2023-11-30

## 2023-11-30 ENCOUNTER — APPOINTMENT (OUTPATIENT)
Dept: MRI IMAGING | Age: 33
DRG: 058 | End: 2023-11-30
Payer: MEDICAID

## 2023-11-30 DIAGNOSIS — R11.0 NAUSEA: ICD-10-CM

## 2023-11-30 DIAGNOSIS — R10.84 ABDOMINAL PAIN, GENERALIZED: ICD-10-CM

## 2023-11-30 DIAGNOSIS — R93.89 ABNORMAL CT SCAN: ICD-10-CM

## 2023-11-30 PROBLEM — R51.9 ACUTE INTRACTABLE HEADACHE: Status: ACTIVE | Noted: 2023-11-30

## 2023-11-30 LAB
ANION GAP SERPL CALCULATED.3IONS-SCNC: 12 MMOL/L (ref 7–16)
BASOPHILS # BLD: 0.03 K/UL (ref 0–0.2)
BASOPHILS NFR BLD: 0 % (ref 0–2)
BUN SERPL-MCNC: 18 MG/DL (ref 6–20)
CALCIUM SERPL-MCNC: 8.1 MG/DL (ref 8.6–10.2)
CHLORIDE SERPL-SCNC: 106 MMOL/L (ref 98–107)
CHOLEST SERPL-MCNC: 133 MG/DL
CO2 SERPL-SCNC: 21 MMOL/L (ref 22–29)
CREAT SERPL-MCNC: 0.8 MG/DL (ref 0.5–1)
EOSINOPHIL # BLD: 0.14 K/UL (ref 0.05–0.5)
EOSINOPHILS RELATIVE PERCENT: 2 % (ref 0–6)
ERYTHROCYTE [DISTWIDTH] IN BLOOD BY AUTOMATED COUNT: 14.6 % (ref 11.5–15)
GFR SERPL CREATININE-BSD FRML MDRD: >60 ML/MIN/1.73M2
GLUCOSE SERPL-MCNC: 80 MG/DL (ref 74–99)
HBA1C MFR BLD: 5 % (ref 4–5.6)
HCT VFR BLD AUTO: 41.6 % (ref 34–48)
HDLC SERPL-MCNC: 46 MG/DL
HGB BLD-MCNC: 13.4 G/DL (ref 11.5–15.5)
IMM GRANULOCYTES # BLD AUTO: <0.03 K/UL (ref 0–0.58)
IMM GRANULOCYTES NFR BLD: 0 % (ref 0–5)
LDLC SERPL CALC-MCNC: 77 MG/DL
LYMPHOCYTES NFR BLD: 4.63 K/UL (ref 1.5–4)
LYMPHOCYTES RELATIVE PERCENT: 60 % (ref 20–42)
MCH RBC QN AUTO: 29.3 PG (ref 26–35)
MCHC RBC AUTO-ENTMCNC: 32.2 G/DL (ref 32–34.5)
MCV RBC AUTO: 91 FL (ref 80–99.9)
MONOCYTES NFR BLD: 0.56 K/UL (ref 0.1–0.95)
MONOCYTES NFR BLD: 7 % (ref 2–12)
NEUTROPHILS NFR BLD: 31 % (ref 43–80)
NEUTS SEG NFR BLD: 2.38 K/UL (ref 1.8–7.3)
PLATELET # BLD AUTO: 303 K/UL (ref 130–450)
PMV BLD AUTO: 9.8 FL (ref 7–12)
POTASSIUM SERPL-SCNC: 4 MMOL/L (ref 3.5–5)
RBC # BLD AUTO: 4.57 M/UL (ref 3.5–5.5)
SODIUM SERPL-SCNC: 139 MMOL/L (ref 132–146)
TRIGL SERPL-MCNC: 48 MG/DL
VLDLC SERPL CALC-MCNC: 10 MG/DL
WBC OTHER # BLD: 7.8 K/UL (ref 4.5–11.5)

## 2023-11-30 PROCEDURE — A9537 TC99M MEBROFENIN: HCPCS | Performed by: RADIOLOGY

## 2023-11-30 PROCEDURE — 80061 LIPID PANEL: CPT

## 2023-11-30 PROCEDURE — 96372 THER/PROPH/DIAG INJ SC/IM: CPT

## 2023-11-30 PROCEDURE — 2060000000 HC ICU INTERMEDIATE R&B

## 2023-11-30 PROCEDURE — 85025 COMPLETE CBC W/AUTO DIFF WBC: CPT

## 2023-11-30 PROCEDURE — G0378 HOSPITAL OBSERVATION PER HR: HCPCS

## 2023-11-30 PROCEDURE — 70544 MR ANGIOGRAPHY HEAD W/O DYE: CPT

## 2023-11-30 PROCEDURE — 83036 HEMOGLOBIN GLYCOSYLATED A1C: CPT

## 2023-11-30 PROCEDURE — 6360000004 HC RX CONTRAST MEDICATION: Performed by: RADIOLOGY

## 2023-11-30 PROCEDURE — 96365 THER/PROPH/DIAG IV INF INIT: CPT

## 2023-11-30 PROCEDURE — 78227 HEPATOBIL SYST IMAGE W/DRUG: CPT

## 2023-11-30 PROCEDURE — 2580000003 HC RX 258: Performed by: NURSE PRACTITIONER

## 2023-11-30 PROCEDURE — 6360000002 HC RX W HCPCS: Performed by: NURSE PRACTITIONER

## 2023-11-30 PROCEDURE — 97165 OT EVAL LOW COMPLEX 30 MIN: CPT

## 2023-11-30 PROCEDURE — 6370000000 HC RX 637 (ALT 250 FOR IP): Performed by: NURSE PRACTITIONER

## 2023-11-30 PROCEDURE — 2580000003 HC RX 258: Performed by: INTERNAL MEDICINE

## 2023-11-30 PROCEDURE — 6360000002 HC RX W HCPCS: Performed by: INTERNAL MEDICINE

## 2023-11-30 PROCEDURE — 99232 SBSQ HOSP IP/OBS MODERATE 35: CPT | Performed by: INTERNAL MEDICINE

## 2023-11-30 PROCEDURE — 97161 PT EVAL LOW COMPLEX 20 MIN: CPT

## 2023-11-30 PROCEDURE — 80048 BASIC METABOLIC PNL TOTAL CA: CPT

## 2023-11-30 PROCEDURE — 36415 COLL VENOUS BLD VENIPUNCTURE: CPT

## 2023-11-30 PROCEDURE — 97535 SELF CARE MNGMENT TRAINING: CPT

## 2023-11-30 PROCEDURE — 3430000000 HC RX DIAGNOSTIC RADIOPHARMACEUTICAL: Performed by: RADIOLOGY

## 2023-11-30 PROCEDURE — 97530 THERAPEUTIC ACTIVITIES: CPT

## 2023-11-30 PROCEDURE — 70553 MRI BRAIN STEM W/O & W/DYE: CPT

## 2023-11-30 PROCEDURE — A9577 INJ MULTIHANCE: HCPCS | Performed by: RADIOLOGY

## 2023-11-30 PROCEDURE — 99232 SBSQ HOSP IP/OBS MODERATE 35: CPT | Performed by: NURSE PRACTITIONER

## 2023-11-30 RX ADMIN — SODIUM CHLORIDE 1.29 MCG: 9 INJECTION, SOLUTION INTRAVENOUS at 09:30

## 2023-11-30 RX ADMIN — Medication 10 ML: at 10:50

## 2023-11-30 RX ADMIN — ASPIRIN 81 MG CHEWABLE TABLET 81 MG: 81 TABLET CHEWABLE at 10:50

## 2023-11-30 RX ADMIN — ACETAMINOPHEN 650 MG: 325 TABLET ORAL at 21:10

## 2023-11-30 RX ADMIN — GADOBENATE DIMEGLUMINE 13 ML: 529 INJECTION, SOLUTION INTRAVENOUS at 18:32

## 2023-11-30 RX ADMIN — Medication 9.5 MILLICURIE: at 08:17

## 2023-11-30 RX ADMIN — ENOXAPARIN SODIUM 70 MG: 100 INJECTION SUBCUTANEOUS at 10:50

## 2023-11-30 RX ADMIN — Medication 10 ML: at 21:10

## 2023-11-30 RX ADMIN — ENOXAPARIN SODIUM 70 MG: 100 INJECTION SUBCUTANEOUS at 21:09

## 2023-11-30 ASSESSMENT — PAIN - FUNCTIONAL ASSESSMENT: PAIN_FUNCTIONAL_ASSESSMENT: ACTIVITIES ARE NOT PREVENTED

## 2023-11-30 ASSESSMENT — PAIN DESCRIPTION - LOCATION: LOCATION: HEAD

## 2023-11-30 ASSESSMENT — PAIN SCALES - GENERAL
PAINLEVEL_OUTOF10: 0
PAINLEVEL_OUTOF10: 7
PAINLEVEL_OUTOF10: 4
PAINLEVEL_OUTOF10: 7

## 2023-11-30 ASSESSMENT — PAIN DESCRIPTION - DESCRIPTORS: DESCRIPTORS: ACHING;DISCOMFORT;DULL

## 2023-11-30 NOTE — PROGRESS NOTES
[address]        EXCN:                                                  Patient Name: Juvencio Hurley    MRN: [id number]    : 1990    Room: [address] [place] [address]A      Evaluating OT: Flako Gaxiola OTR/L; [id number]      Referring Provider: ANGELINA Ferrera    Specific Provider Orders/Date: OT Eval and Treat 23      Diagnosis: Stroke-like symptom     Acute intractable headache    Surgery: none this admission     Pertinent Medical History:  has a past medical history of Back pain, CVA (cerebral vascular accident) ([address]), Heterozygous for MTHFR gene mutation, Iron deficiency anemia, unspecified, Neck pain, and Protein S deficiency affecting pregnancy ([address]).       Reason for Admission: blurred vision, mild headache, L sided numbness    Recommended Adaptive Equipment:  none at this time     Precautions:  Fall Risk     Assessment of current deficits:    [x] Functional mobility  [x]ADLs  [x] Strength               [x]Cognition    [x] Functional transfers   [x] IADLs         [x] Safety Awareness   [x]Endurance    [x] Fine Coordination              [x] Balance      [] Vision/perception   []Sensation     []Gross Motor Coordination  [] ROM  [] Delirium                   [] Motor Control     OT PLAN OF CARE   OT POC based on physician orders, patient diagnosis and results of clinical assessment    Frequency/Duration: 1-3 days/wk for 2 weeks PRN   Specific OT Treatment Interventions to include:   * Instruction/training on adapted ADL techniques and AE recommendations to increase functional independence within precautions       * Training on energy conservation strategies, correct breathing pattern and techniques to improve independence/tolerance for self-care routine  * Functional transfer/mobility training/DME recommendations for increased independence, safety, and fall

## 2023-11-30 NOTE — PROGRESS NOTES
Physical Therapy  Physical Therapy Initial Assessment    Name: Sylvie Cancer  : 1990  MRN: [id number]      Date of Service: 2023    Evaluating PT:  Eduardo Snyder PT, DPT [id number]    Room #:  5720/1700-V  Diagnosis:  Chronic anticoagulation [Z79.01]  Stroke-like symptoms [R29.90]  Stroke-like symptom [R29.90]  Heterozygous MTHFR mutation C677T [Z15.89]  Protein S deficiency affecting pregnancy ([address]) [O99.119, D68.59]  PMHx/PSHx:   has a past medical history of Back pain, CVA (cerebral vascular accident) ([address]), Heterozygous for MTHFR gene mutation, Iron deficiency anemia, unspecified, Neck pain, and Protein S deficiency affecting pregnancy ([address] has a past surgical history that includes  section; Nerve Block (Left, 2020); and Dilation & curettage. Procedure/Surgery:  None  Precautions:  Falls  Equipment Needs:  None    SUBJECTIVE:    Pt lives with  in an apartment within a townhouse with 3 + full flight of stair(s) to enter and 1 rail(s). Pt ambulated with no AD prior to admission. OBJECTIVE:   Initial Evaluation  Date: 2023 Treatment Short Term/ Long Term   Goals   AM-PAC 6 Clicks      Was pt agreeable to Eval/treatment? Yes     Does pt have pain? Mild headache     Bed Mobility  Rolling: NT  Supine to sit: Independent  Sit to supine: Independent  Scooting: NT  Rolling: Independent  Supine to sit: Independent  Sit to supine: Independent  Scooting: Independent   Transfers Sit to stand: Independent  Stand to sit: Independent  Stand pivot: NT  Sit to stand: Independent  Stand to sit:  Independent  Stand pivot: Independent   Ambulation    135 feet x2 with no AD Supervision  500 feet with no AD Independent   Stair negotiation: ascended and descended  12 step(s) with 1 rail(s) Supervision  12 step(s) with 1 rail(s) Mod I   ROM BUE:  See OT note  BLE:  WFL     Strength BUE:  See OT note  BLE:  Grossly 5/5     Balance Sitting EOB:  Independent  Dynamic Standing: minutes  [] Therapeutic exercises 21940 0 minutes  [] Neuromuscular reeducation 45838 0 minutes     Philomena Gilford, PT, DPT  [id number]

## 2023-11-30 NOTE — CARE COORDINATION
Social Work Discharge Planning:  SW met with patient explained role and transition of care. Patient lives with a partner Jeremy Butcher in a \"apt and townhouse\". Patient independent no assistive devices. Patient has no hx with [facility]. PCP is Dr Bailey Suarez. Patient's partner will transport the patient home at discharge.  SW will continue to follow and assist.  Electronically signed by ERASMO Martinez on 11/29/2023 at 7:30 PM

## 2023-11-30 NOTE — CARE COORDINATION
Social Work/ Case Management Transition of Care Planning (1 Basia Covarrubias, [address]):   Per report and chart review Pt is on room air. Pt is for MRI of head and MRV of head. PT pending, OT 19. Discharge plan is home with no needs and Partner will transport when medically clear. SW/CM to follow.    [place], [place]  11/30/2023

## 2023-12-01 VITALS
SYSTOLIC BLOOD PRESSURE: 116 MMHG | WEIGHT: 142 LBS | DIASTOLIC BLOOD PRESSURE: 91 MMHG | BODY MASS INDEX: 26.13 KG/M2 | TEMPERATURE: 99.1 F | OXYGEN SATURATION: 98 % | RESPIRATION RATE: 16 BRPM | HEART RATE: 91 BPM | HEIGHT: 62 IN

## 2023-12-01 PROCEDURE — G0378 HOSPITAL OBSERVATION PER HR: HCPCS

## 2023-12-01 PROCEDURE — 99232 SBSQ HOSP IP/OBS MODERATE 35: CPT | Performed by: NURSE PRACTITIONER

## 2023-12-01 PROCEDURE — 96372 THER/PROPH/DIAG INJ SC/IM: CPT

## 2023-12-01 PROCEDURE — 6370000000 HC RX 637 (ALT 250 FOR IP): Performed by: NURSE PRACTITIONER

## 2023-12-01 PROCEDURE — 2580000003 HC RX 258: Performed by: NURSE PRACTITIONER

## 2023-12-01 PROCEDURE — 6360000002 HC RX W HCPCS: Performed by: NURSE PRACTITIONER

## 2023-12-01 RX ORDER — DOXEPIN HYDROCHLORIDE 10 MG/1
10 CAPSULE ORAL NIGHTLY
Qty: 30 CAPSULE | Refills: 3 | Status: SHIPPED | OUTPATIENT
Start: 2023-12-01

## 2023-12-01 RX ORDER — DOXEPIN HYDROCHLORIDE 10 MG/1
10 CAPSULE ORAL NIGHTLY
Status: DISCONTINUED | OUTPATIENT
Start: 2023-12-01 | End: 2023-12-01 | Stop reason: HOSPADM

## 2023-12-01 RX ADMIN — ACETAMINOPHEN 650 MG: 325 TABLET ORAL at 10:47

## 2023-12-01 RX ADMIN — Medication 10 ML: at 10:47

## 2023-12-01 RX ADMIN — ENOXAPARIN SODIUM 70 MG: 100 INJECTION SUBCUTANEOUS at 10:47

## 2023-12-01 RX ADMIN — ASPIRIN 81 MG CHEWABLE TABLET 81 MG: 81 TABLET CHEWABLE at 10:47

## 2023-12-01 ASSESSMENT — PAIN SCALES - GENERAL
PAINLEVEL_OUTOF10: 7
PAINLEVEL_OUTOF10: 0
PAINLEVEL_OUTOF10: 7

## 2023-12-01 ASSESSMENT — PAIN DESCRIPTION - LOCATION
LOCATION: HEAD
LOCATION: HEAD

## 2023-12-01 ASSESSMENT — PAIN DESCRIPTION - DESCRIPTORS
DESCRIPTORS: DISCOMFORT;ACHING;THROBBING
DESCRIPTORS: DISCOMFORT

## 2023-12-01 ASSESSMENT — PAIN DESCRIPTION - FREQUENCY
FREQUENCY: INTERMITTENT
FREQUENCY: INTERMITTENT

## 2023-12-01 ASSESSMENT — PAIN DESCRIPTION - ONSET: ONSET: ON-GOING

## 2023-12-01 ASSESSMENT — PAIN - FUNCTIONAL ASSESSMENT
PAIN_FUNCTIONAL_ASSESSMENT: ACTIVITIES ARE NOT PREVENTED
PAIN_FUNCTIONAL_ASSESSMENT: ACTIVITIES ARE NOT PREVENTED

## 2023-12-01 ASSESSMENT — PAIN DESCRIPTION - PAIN TYPE
TYPE: ACUTE PAIN
TYPE: ACUTE PAIN

## 2023-12-01 NOTE — DISCHARGE INSTRUCTIONS
Internal Medicine Discharge Instruction    Discharge to:  Home  Diet: Regular   Activity: As tolerated       Be compliant with medications  May start on Doxepin 10 mg capsule nightly   Please take medications as prescribed   Please follow-up with Neurology for further workup        Electronically signed by Benny Avitia MD on 12/1/2023 at 11:51 AM

## 2023-12-01 NOTE — PROGRESS NOTES
Notified Dr Radha Jacobs neuro signed off and stated \"MS workup as outpatient recommended. Will start low-dose doxepin for headache prophylaxis at home.   Patient will continue Lovenox for clotting disorders\"

## 2023-12-01 NOTE — PLAN OF CARE
Problem: Chronic Conditions and Co-morbidities  Goal: Patient's chronic conditions and co-morbidity symptoms are monitored and maintained or improved  12/1/2023 1204 by Azeb Good RN  Outcome: Completed  12/1/2023 0226 by Jacki Lanes, RN  Outcome: Progressing     Problem: Discharge Planning  Goal: Discharge to home or other facility with appropriate resources  12/1/2023 1204 by Azeb Good RN  Outcome: Completed  12/1/2023 0226 by Jacki Lanes, RN  Outcome: Progressing     Problem: Pain  Goal: Verbalizes/displays adequate comfort level or baseline comfort level  12/1/2023 1204 by Azeb Good RN  Outcome: Completed  12/1/2023 0226 by Jacki Lanes, RN  Outcome: Progressing

## 2023-12-01 NOTE — CARE COORDINATION
Social Work/ Case Management Transition of Care Planning (1 Basia Covarrubias, [address]):   Discharge order noted. Pt will get ride from her partner.    1 Basia Covarrubias [place]  12/1/2023

## 2023-12-01 NOTE — PROGRESS NOTES
CLINICAL PHARMACY NOTE: MEDS TO BEDS    Total # of Prescriptions Filled: 1   The following medications were delivered to the patient:  Doxepin 10    Additional Documentation:

## 2023-12-01 NOTE — DISCHARGE SUMMARY
[facility] Physician Discharge Summary       No follow-up provider specified. Activity level: As tolerated     Dispo: Home      Condition on discharge: Stable     Patient ID:  Imer Swanson  [id number]  62 y.o.  1990    Admit date: 11/29/2023    Discharge date and time:  12/1/2023  11:08 AM    Admission Diagnoses: Principal Problem:    Stroke-like symptom  Active Problems:    Acute intractable headache  Resolved Problems:    * No resolved hospital problems. *      Discharge Diagnoses: Principal Problem:    Stroke-like symptom  Active Problems:    Acute intractable headache  Resolved Problems:    * No resolved hospital problems. *      Consults:  IP CONSULT TO INTERNAL MEDICINE  IP CONSULT TO NEUROLOGY    Hospital Course:   Patient Imer Swanson is a 29 y.o. presented with Chronic anticoagulation [Z79.01]  Stroke-like symptoms [R29.90]  Stroke-like symptom [R29.90]  Heterozygous MTHFR mutation C677T [Z15.89]  Protein S deficiency affecting pregnancy ([address] [facility]) [O99.119, D68.59]      Pt is a 30 yo female pt who follows with PCP Dr. Soumya Jaeger with a past medical history of CVA ( 2021), MTHFR, iron deficiency anemia, protein S deficiency, chronic back pain. She presented to the ER with c/o blurred vision and HA starting this am around 5:15 am. She does have a significant history of prior CVA x 3 in the last 2 years and protein S deficiency which she is currently on daily lovenox which she did take this am. Reporting she has generalized weakness from prior strokes. No issues ambulating. Reporting that the lovenox shots do cause bruising and firm areas in abd- so she does \" skip a dose here and there. \" Unable to tell me how many doses she has missed. Reporting headache is mild. Associated left sided numbness. Reporting her left leg feels \" heavy. \" Reporting peripheral vision loss on the left side. Reporting she is following with [facility] neurology. Told she has a PFO. 7. 8   RBC 4.61 4.57   HGB 13.6 13.4   HCT 42.2 41.6   MCV 91.5 91.0   MCH 29.5 29.3   MCHC 32.2 32.2   RDW 14.6 14.6    303   MPV 9.4 9.8       Recent Labs     11/29/23  0642   POCGLU 86       Imaging:  MRI BRAIN W WO CONTRAST    Result Date: 11/30/2023  EXAMINATION: MRI OF THE BRAIN WITHOUT AND WITH CONTRAST  11/30/2023 5:24 pm TECHNIQUE: Multiplanar multisequence MRI of the head/brain was performed without and with the administration of intravenous contrast. Additional thin section images are obtained of the CP angle regions. COMPARISON: MRI of the brain from yesterday. HISTORY: ORDERING SYSTEM PROVIDED HISTORY: blurring of vision, left-sided tingling, rule out new stroke TECHNOLOGIST PROVIDED HISTORY: 1 cm thin slices please Reason for exam:->blurring of vision, left-sided tingling, rule out new stroke What reading provider will be dictating this exam?->CRC FINDINGS: INTRACRANIAL STRUCTURES/VENTRICLES:  There is no acute infarct. No mass effect or midline shift. No evidence of an acute intracranial hemorrhage. The ventricles and sulci are normal in size and configuration. There are subtle areas of old infarction in the superficial cortex of the left insula. A few linear areas of increased T2 signal are seen in the right mid parietal corona radiata. A moderate-sized area of T2 signal abnormality is again seen in the anterior left parietal corona radiata. These are more likely to be areas of demyelination than small vessel ischemia in a patient of this age, but the appearance is nonspecific. None of these regions of increased T2 signal show any enhancement. The sellar/suprasellar regions appear unremarkable. The normal signal voids within the major intracranial vessels appear maintained. No abnormal focus of enhancement is seen within the brain. ORBITS: The visualized portion of the orbits demonstrate no acute abnormality.  SINUSES: The visualized paranasal sinuses and mastoid air cells demonstrate no CTA OF THE HEAD WITH CONTRAST; CTA OF THE NECK; CT OF THE HEAD WITHOUT CONTRAST 11/29/2023 7:07 am TECHNIQUE: CTA of the head/brain was performed with the administration of intravenous contrast. Multiplanar reformatted images are provided for review. MIP images are provided for review. Automated exposure control, iterative reconstruction, and/or weight based adjustment of the mA/kV was utilized to reduce the radiation dose to as low as reasonably achievable.; CTA of the neck was performed with the administration of intravenous contrast. Multiplanar reformatted images are provided for review. MIP images are provided for review. Stenosis of the internal carotid arteries measured using NASCET criteria. Automated exposure control, iterative reconstruction, and/or weight based adjustment of the mA/kV was utilized to reduce the radiation dose to as low as reasonably achievable.; CT of the head was performed without the administration of intravenous contrast. Automated exposure control, iterative reconstruction, and/or weight based adjustment of the mA/kV was utilized to reduce the radiation dose to as low as reasonably achievable. Noncontrast CT of the head with reconstructed 2-D images are also provided for review. This scan was analyzed using Viz. ai contact LVO. Identification of suspected findings is not for diagnostic use beyond notification. Viz LVO is limited to analysis of imaging data and should not be used in-lieu of full patient evaluation or relied upon to make or confirm diagnosis. COMPARISON: 05/31/2022 HISTORY: ORDERING SYSTEM PROVIDED HISTORY: stroke TECHNOLOGIST PROVIDED HISTORY: Reason for exam:->stroke Has a \"code stroke\" or \"stroke alert\" been called? ->Yes Decision Support Exception - unselect if not a suspected or confirmed emergency medical condition->Emergency Medical Condition (MA) What reading provider will be dictating this exam?->CRC; ORDERING SYSTEM PROVIDED HISTORY: stroke alert TECHNOLOGIST

## 2023-12-14 NOTE — PROGRESS NOTES
Physician Progress Note      PATIENT:               Mercedes Feng  CSN #:                  [id number]  :                       1990  ADMIT DATE:       2023 6:49 AM  DISCH DATE:        2023 1:37 PM  RESPONDING  PROVIDER #:        Tam Murrieta MD          QUERY TEXT:    Patient admitted for stroke like symptoms. MRI no acute infarct, stable old   infarct. Also concern for MS, for outpatient work up. Additional consideration   for migraine headaches, started on Doxepin. If possible, please document in   progress notes and discharge summary if you are evaluating and /or treating   any of the following: The medical record reflects the following:  Risk Factors: history of prior CVA x 3 in the last 2 years and protein S   deficiency  Clinical Indicators: Pt presented to the ER with c/o blurred vision and HA,   Reporting she has generalized weakness from prior strokes. Reporting headache   is mild. Associated left sided numbness. Reporting her left leg feels \"   heavy. \" Reporting peripheral vision loss on the left side. DC Summary notes   \"Neurology was consult. Repeat MRI and MRV were ordered. -  No sign of acute   infarction or injury to the brain. Stable old minimal left insular cortex   infarction. Stable old areas of T2 signal abnormality in the centrum semiovale   bilaterally consider demyelination such as seen i  Treatment: MRI, Doxepin, Neuro MS workup outpt    Thank you,  Reyna Patrick, RN, BSN, CRCR, Clinical Documentation Improvement  Options provided:  -- Stroke like symptoms due to sequela of previous stroke  -- Stroke like symptoms due to migraine headaches  -- Stroke like symptoms due to suspected MS  -- Stroke like symptoms due to other specified, please specify cause.   -- Other - I will add my own diagnosis  -- Disagree - Not applicable / Not valid  -- Disagree - Clinically unable to determine / Unknown  -- Refer to Clinical Documentation Reviewer    PROVIDER RESPONSE

## 2024-02-01 ENCOUNTER — TELEPHONE (OUTPATIENT)
Dept: ADMINISTRATIVE | Age: 34
End: 2024-02-01

## 2024-02-01 NOTE — TELEPHONE ENCOUNTER
Pt was hospitalized 11-29-23 for stroke like symptoms, headache, needs hospital follow up. Pt states she needs to schedule spinal tap, needs checked for ms. Does she need to schedule with MS clinic or neurology? Office staff was unavailable due to pt care. Please call pt back. Thanks!

## 2024-04-02 ENCOUNTER — OFFICE VISIT (OUTPATIENT)
Dept: NEUROLOGY | Age: 34
End: 2024-04-02
Payer: MEDICAID

## 2024-04-02 VITALS
DIASTOLIC BLOOD PRESSURE: 88 MMHG | SYSTOLIC BLOOD PRESSURE: 123 MMHG | TEMPERATURE: 97.6 F | OXYGEN SATURATION: 100 % | BODY MASS INDEX: 25.61 KG/M2 | HEART RATE: 100 BPM | WEIGHT: 140 LBS

## 2024-04-02 DIAGNOSIS — R51.9 CHRONIC DAILY HEADACHE: ICD-10-CM

## 2024-04-02 DIAGNOSIS — R90.89 ABNORMAL FINDING ON MRI OF BRAIN: Primary | ICD-10-CM

## 2024-04-02 DIAGNOSIS — Z86.73 HISTORY OF STROKE: ICD-10-CM

## 2024-04-02 PROCEDURE — G8419 CALC BMI OUT NRM PARAM NOF/U: HCPCS | Performed by: NURSE PRACTITIONER

## 2024-04-02 PROCEDURE — 99205 OFFICE O/P NEW HI 60 MIN: CPT | Performed by: NURSE PRACTITIONER

## 2024-04-02 PROCEDURE — 4004F PT TOBACCO SCREEN RCVD TLK: CPT | Performed by: NURSE PRACTITIONER

## 2024-04-02 PROCEDURE — G8427 DOCREV CUR MEDS BY ELIG CLIN: HCPCS | Performed by: NURSE PRACTITIONER

## 2024-04-02 RX ORDER — DOXEPIN HYDROCHLORIDE 10 MG/1
10 CAPSULE ORAL NIGHTLY
Qty: 30 CAPSULE | Refills: 0 | Status: SHIPPED | OUTPATIENT
Start: 2024-04-02 | End: 2024-05-02

## 2024-04-02 NOTE — PROGRESS NOTES
[facility]  NEUROLOGY  Hussain Fraser Jr., M.D., F.A.C.P.  John Lawrence, DNP, APRN, ACNS-BC  Chuy Gilmore, MSN, APRN-FNP-C  Tamiko Salazar, MSPAS, PA-C  Natividad Barclay, MSN, APRN-FNP-C  Virginia Johnson, MSN, APRN-FNP-C   Nicole Sullivan, MSN, APRN-FNP-C  [facility] [address]  Phone: [phone] [address] [address]  Phone: [phone]  Fax: [phone]       Gita Kohli is a 34 y.o. right handed female     Presents to neurology clinic today for hospital follow-up    Patient presents alone and is deemed a good historian    Past Medical History:     Past Medical History:   Diagnosis Date    Back pain     CVA (cerebral vascular accident) (HCC) 11/15/2021    Stroke 2021, 21, and 22 during pregnancy    Heterozygous for MTHFR gene mutation 2022    Iron deficiency anemia, unspecified 10/20/2022    Neck pain     PFO (patent foramen ovale)     Protein S deficiency affecting pregnancy (HCC) 2022        Past Surgical History:       Past Surgical History:   Procedure Laterality Date     SECTION      DILATION AND CURETTAGE      NERVE BLOCK Left 2020    LEFT CERVICAL MEDIAL BRANCH NERVE  BLOCK UNDER FLUOROSCOPIC GUIDANCE C3, C4, C5 AND C6 performed by Richard Hassan MD at [facility] OR       Allergies:       Patient has no known allergies.    Medications:     Prior to Admission medications    Medication Sig Start Date End Date Taking? Authorizing Provider   doxepin (SINEQUAN) 10 MG capsule Take 1 capsule by mouth nightly 24 Yes Virginia Johnson APRN - NP   enoxaparin (LOVENOX) 60 MG/0.6ML Inject into the skin 2 times daily   Yes Provider, MD Vanessa   famotidine (PEPCID) 40 MG tablet Take 1 tablet by mouth at bedtime   Yes Provider, MD Vanessa   dicyclomine (BENTYL) 10 MG capsule Take 1 capsule by mouth 2 times daily   Yes Provider, Historical, MD   aspirin 81 MG chewable tablet Take 1 tablet by mouth

## 2024-04-23 ENCOUNTER — HOSPITAL ENCOUNTER (OUTPATIENT)
Dept: GENERAL RADIOLOGY | Age: 34
Discharge: HOME | End: 2024-04-25
Payer: MEDICAID

## 2024-04-23 VITALS
OXYGEN SATURATION: 100 % | TEMPERATURE: 97.9 F | DIASTOLIC BLOOD PRESSURE: 89 MMHG | HEART RATE: 86 BPM | SYSTOLIC BLOOD PRESSURE: 115 MMHG | RESPIRATION RATE: 20 BRPM

## 2024-04-23 DIAGNOSIS — R90.89 ABNORMAL FINDING ON MRI OF BRAIN: ICD-10-CM

## 2024-04-23 LAB
APPEARANCE CSF: CLEAR
BASOPHILS # BLD: 0.02 K/UL (ref 0–0.2)
BASOPHILS NFR BLD: 0 % (ref 0–2)
C GATTII+NEOFOR DNA CSF QL NAA+NON-PROBE: NOT DETECTED
CLOT CHECK: NORMAL
CMV DNA CSF QL NAA+NON-PROBE: NOT DETECTED
COLOR CSF: COLORLESS
E COLI K1 DNA CSF QL NAA+NON-PROBE: NOT DETECTED
EOSINOPHIL # BLD: 0.1 K/UL (ref 0.05–0.5)
EOSINOPHILS RELATIVE PERCENT: 1 % (ref 0–6)
ERYTHROCYTE [DISTWIDTH] IN BLOOD BY AUTOMATED COUNT: 14.6 % (ref 11.5–15)
EV RNA CSF QL NAA+NON-PROBE: NOT DETECTED
GLUCOSE CSF-MCNC: 64 MG/DL (ref 40–70)
GP B STREP DNA CSF QL NAA+NON-PROBE: NOT DETECTED
HAEM INFLU DNA CSF QL NAA+NON-PROBE: NOT DETECTED
HCT VFR BLD AUTO: 43.6 % (ref 34–48)
HGB BLD-MCNC: 14.4 G/DL (ref 11.5–15.5)
HHV6 DNA CSF QL NAA+NON-PROBE: NOT DETECTED
HSV1 DNA CSF QL NAA+NON-PROBE: NOT DETECTED
HSV2 DNA CSF QL NAA+NON-PROBE: NOT DETECTED
IMM GRANULOCYTES # BLD AUTO: <0.03 K/UL (ref 0–0.58)
IMM GRANULOCYTES NFR BLD: 0 % (ref 0–5)
INR PPP: 1
L MONOCYTOG DNA CSF QL NAA+NON-PROBE: NOT DETECTED
LYMPHOCYTES NFR BLD: 3.99 K/UL (ref 1.5–4)
LYMPHOCYTES RELATIVE PERCENT: 47 % (ref 20–42)
MCH RBC QN AUTO: 29.8 PG (ref 26–35)
MCHC RBC AUTO-ENTMCNC: 33 G/DL (ref 32–34.5)
MCV RBC AUTO: 90.1 FL (ref 80–99.9)
MONOCYTES NFR BLD: 0.5 K/UL (ref 0.1–0.95)
MONOCYTES NFR BLD: 6 % (ref 2–12)
N MEN DNA CSF QL NAA+NON-PROBE: NOT DETECTED
NEUTROPHILS NFR BLD: 46 % (ref 43–80)
NEUTS SEG NFR BLD: 3.91 K/UL (ref 1.8–7.3)
NUC CELL # FLD MANUAL: <3 CELLS/UL (ref 0–5)
PARECHOVIRUS A RNA CSF QL NAA+NON-PROBE: NOT DETECTED
PLATELET # BLD AUTO: 290 K/UL (ref 130–450)
PMV BLD AUTO: 9.5 FL (ref 7–12)
PROT CSF-MCNC: 17.3 MG/DL (ref 15–40)
PROTHROMBIN TIME: 10.4 SEC (ref 9.3–12.4)
RBC # BLD AUTO: 4.84 M/UL (ref 3.5–5.5)
RBC # FLD MANUAL: <2000 CELLS/UL
S PNEUM DNA CSF QL NAA+NON-PROBE: NOT DETECTED
SPECIMEN DESCRIPTION: NORMAL
SPECIMEN VOL CSF: NORMAL ML
TUBE # CSF: 4
VZV DNA CSF QL NAA+NON-PROBE: NOT DETECTED
WBC OTHER # BLD: 8.5 K/UL (ref 4.5–11.5)

## 2024-04-23 PROCEDURE — 7100000010 HC PHASE II RECOVERY - FIRST 15 MIN

## 2024-04-23 PROCEDURE — 89050 BODY FLUID CELL COUNT: CPT

## 2024-04-23 PROCEDURE — 85610 PROTHROMBIN TIME: CPT

## 2024-04-23 PROCEDURE — 82784 ASSAY IGA/IGD/IGG/IGM EACH: CPT

## 2024-04-23 PROCEDURE — 87015 SPECIMEN INFECT AGNT CONCNTJ: CPT

## 2024-04-23 PROCEDURE — 87205 SMEAR GRAM STAIN: CPT

## 2024-04-23 PROCEDURE — 7100000011 HC PHASE II RECOVERY - ADDTL 15 MIN

## 2024-04-23 PROCEDURE — 84157 ASSAY OF PROTEIN OTHER: CPT

## 2024-04-23 PROCEDURE — 36415 COLL VENOUS BLD VENIPUNCTURE: CPT

## 2024-04-23 PROCEDURE — 82042 OTHER SOURCE ALBUMIN QUAN EA: CPT

## 2024-04-23 PROCEDURE — 87070 CULTURE OTHR SPECIMN AEROBIC: CPT

## 2024-04-23 PROCEDURE — 83916 OLIGOCLONAL BANDS: CPT

## 2024-04-23 PROCEDURE — 82945 GLUCOSE OTHER FLUID: CPT

## 2024-04-23 PROCEDURE — 82040 ASSAY OF SERUM ALBUMIN: CPT

## 2024-04-23 PROCEDURE — 85025 COMPLETE CBC W/AUTO DIFF WBC: CPT

## 2024-04-23 PROCEDURE — 87483 CNS DNA AMP PROBE TYPE 12-25: CPT

## 2024-04-23 PROCEDURE — 62328 DX LMBR SPI PNXR W/FLUOR/CT: CPT

## 2024-04-23 ASSESSMENT — PAIN - FUNCTIONAL ASSESSMENT: PAIN_FUNCTIONAL_ASSESSMENT: NONE - DENIES PAIN

## 2024-04-23 NOTE — BRIEF OP NOTE
Brief-Op Note  ______________________________________________________________      IR/FL  LUMBAR PUNCTURE  [facility]    Patient Name: Gita Kohli   YOB: 1990  Medical Record Number: [id number]  Date of Procedure: 4/23/24  Room/Bed: Room/bed info not found    Preoperative diagnosis: Abnormal MRI and Concern for MS    Postoperative diagnosis: Same    CONSENT:  Informed consent was obtained from the patient prior to the procedure.     Procedure: Fluoroscopic-guided lumbar puncture    Anesthesia: Local anesthesia administered subcutaneously.    Performed by: ERICKA Nance under on-site supervision by Juan Llamas MD.    Estimated blood loss: None    Specimen Obtained: 4 vials of CSF (see lab order sheet for fluid details)    Complications: None.    Implants: None    Electronically signed by ERICKA Nance   DD: 4/23/24  12:47 PM

## 2024-04-23 NOTE — OP NOTE
Operative Note  ______________________________________________________________      IR/FL  LUMBAR PUNCTURE  [facility] RADIOLOGY    Patient Name: Gita Kohli   YOB: 1990  Medical Record Number: [id number]  Date of Procedure: 4/23/24  Room/Bed: Room/bed info not found    Preoperative diagnosis: Abnormal MRI and Concern for MS    Postoperative diagnosis: Same    CONSENT:  Informed consent was obtained from the patient prior to the procedure. The details of the procedure, as well is its risks, benefits, and alternatives, were explained. These risks include, but are not limited to, nerve root injury, bleeding, infection (including meningitis), dural leak (possibly requiring blood patch treatment), and spinal headache. Opportunity was provided to ask questions, which were answered.     Procedure: Fluoroscopic-guided lumbar puncture    Anesthesia: Local anesthesia with approximately 5 mL of 1% Lidocaine without epinephrine administered subcutaneously.    Performed by: ERICKA Nance under on-site supervision by Juan Llamas MD.    Estimated blood loss: None    Complications: None.    Implants: None    Procedure details:   A preprocedure timeout was performed. With the patient in a left anterior oblique prone position on the exam table, fluoroscopy was used to select a suitable interlaminar space for lumbar puncture. The site was marked on the skin. The area was sterilely prepped and draped. Lidocaine (1%) was subcutaneously injected for local anesthesia. Under intermittent fluoroscopic guidance, a 20-gauge spinal needle was percutaneously advanced through the interlaminar space at L2-L3 and advanced into the thecal sac, with return of clear colorless cerebral spinal fluid.     .      A total of  9 mL of CSF was serially collected in four sterile tubes. The specimens were submitted for laboratory analysis by Radiology staff according to the referring physician's orders. The stylet was

## 2024-04-23 NOTE — PROCEDURES
Patient returned from procedure. Dressing checked, clean, dry, and intact. Patient stable. No s/s of complications noted or reported. Vitals will be checked q 15min, see flow sheets.    Patient eating and drinking well with no s/s of complications noted or reported.    Patient discharged, site was checked with every set of vitals. Site clean dry and intact. Discharge papers reviewed with patient, questions answered, discharge paper signed. Patient taken to door via 1315. Patient in stable condition, no s/s of complications noted or reported.

## 2024-04-23 NOTE — PROCEDURES
Patient arrived to Radiology department for lumbar puncture. Allergies, home medications, H&P and fasting instructions reviewed with patient. Vital signs taken. 20g IV placed, blood obtained, IV flushed and prn adapter attached. Blood sample sent to lab for ordered tests.  Procedural instructions given, questions answered, understanding expressed and consent signed. Patient given fluoroscopy education, no questions at this time.

## 2024-04-23 NOTE — H&P
Interventional Radiology  Pre-operative History and Physical  [facility] RADIOLOGY      DIAGNOSIS:    Patient Active Problem List   Diagnosis    Sprain of shoulder, left    Cervicalgia    Cervical spondylolysis    Cervical strain    Sprain of ligaments of cervical spine    Acute cerebrovascular accident (CVA) (HCC)    Cerebrovascular accident (CVA) ([facility])    Nausea/vomiting in pregnancy    History of UTI    History of thrombosis    Protein S deficiency affecting pregnancy ([facility])    Vitamin D deficiency    Low vitamin D level    Heterozygous MTHFR mutation C677T    Urine protein increased    Low vitamin B12 level    Maternal exposure to alcohol in first trimester    Alpha thalassemia silent carrier    Right ovarian cyst    Marginal insertion of umbilical cord affecting management of mother    Uterine fibroid complicating  care, baby not yet delivered    Poor fetal growth affecting management of mother in third trimester    Maternal congenital cardiac anomaly affecting pregnancy, antepartum, third trimester    BMI 30.0-30.9,adult    34 weeks gestation of pregnancy    Third pregnancy    Iron deficiency anemia, unspecified    35 weeks gestation of pregnancy    Low ferritin    Parvovirus IgM present in blood    Stroke-like symptom    Acute intractable headache       Active Problems:  Paresthesias to lower extr.  Abnormal MRI      PROCEDURE:  Fluoroscopic Guided Lumbar Puncture      Current Outpatient Medications:     doxepin (SINEQUAN) 10 MG capsule, Take 1 capsule by mouth nightly, Disp: 30 capsule, Rfl: 0    enoxaparin (LOVENOX) 60 MG/0.6ML, Inject into the skin 2 times daily, Disp: , Rfl:     famotidine (PEPCID) 40 MG tablet, Take 1 tablet by mouth at bedtime, Disp: , Rfl:     dicyclomine (BENTYL) 10 MG capsule, Take 1 capsule by mouth 2 times daily, Disp: , Rfl:     aspirin 81 MG chewable tablet, Take 1 tablet by mouth daily, Disp: 90 tablet, Rfl: 3  Allergy: No Known Allergies  Past Medical

## 2024-04-27 LAB
ALB CSF/SERPL: 3.8 RATIO (ref 0–9)
ALBUMIN CSF-MCNC: 14 MG/DL (ref 0–35)
ALBUMIN SERPL-MCNC: 3721 MG/DL (ref 3500–5200)
IGG CSF-MCNC: 2.8 MG/DL (ref 0–6)
IGG SERPL-MCNC: 1031 MG/DL (ref 768–1632)
IGG SYNTH RATE SER+CSF CALC-MRATE: 1.3 MG/D
IGG/ALB CLEAR SER+CSF-RTO: 0.72 RATIO (ref 0.28–0.66)
IGG/ALB CSF: 0.2 RATIO (ref 0.09–0.25)
OLIGOCLONAL BANDS CSF ELPH-IMP: ABNORMAL
OLIGOCLONAL BANDS CSF ELPH-IMP: NEGATIVE
OLIGOCLONAL BANDS CSF IEF: 0 BANDS (ref 0–1)

## 2024-04-28 LAB
MICROORGANISM SPEC CULT: NORMAL
MICROORGANISM/AGENT SPEC: NORMAL
SPECIMEN DESCRIPTION: NORMAL

## 2024-04-30 LAB
MICROORGANISM SPEC CULT: NO GROWTH
MICROORGANISM/AGENT SPEC: NORMAL
SPECIMEN DESCRIPTION: NORMAL

## 2024-05-01 RX ORDER — DOXEPIN HYDROCHLORIDE 10 MG/1
10 CAPSULE ORAL NIGHTLY
Qty: 90 CAPSULE | Refills: 0 | Status: SHIPPED | OUTPATIENT
Start: 2024-05-01 | End: 2024-07-30

## 2024-07-22 ENCOUNTER — HOSPITAL ENCOUNTER (OUTPATIENT)
Age: 34
Discharge: HOME | End: 2024-07-22
Payer: MEDICAID

## 2024-07-22 LAB
ALBUMIN SERPL-MCNC: 3.8 G/DL (ref 3.5–5.2)
ALP SERPL-CCNC: 77 U/L (ref 35–104)
ALT SERPL-CCNC: 16 U/L (ref 0–32)
ANION GAP SERPL CALCULATED.3IONS-SCNC: 10 MMOL/L (ref 7–16)
AST SERPL-CCNC: 16 U/L (ref 0–31)
BASOPHILS # BLD: 0.04 K/UL (ref 0–0.2)
BASOPHILS NFR BLD: 1 % (ref 0–2)
BILIRUB SERPL-MCNC: <0.2 MG/DL (ref 0–1.2)
BUN SERPL-MCNC: 10 MG/DL (ref 6–20)
CALCIUM SERPL-MCNC: 8.4 MG/DL (ref 8.6–10.2)
CHLORIDE SERPL-SCNC: 106 MMOL/L (ref 98–107)
CO2 SERPL-SCNC: 24 MMOL/L (ref 22–29)
CREAT SERPL-MCNC: 0.8 MG/DL (ref 0.5–1)
EOSINOPHIL # BLD: 0.03 K/UL (ref 0.05–0.5)
EOSINOPHILS RELATIVE PERCENT: 0 % (ref 0–6)
ERYTHROCYTE [DISTWIDTH] IN BLOOD BY AUTOMATED COUNT: 13.8 % (ref 11.5–15)
GFR, ESTIMATED: >90 ML/MIN/1.73M2
GLUCOSE SERPL-MCNC: 86 MG/DL (ref 74–99)
HCT VFR BLD AUTO: 45.4 % (ref 34–48)
HGB BLD-MCNC: 14.9 G/DL (ref 11.5–15.5)
IMM GRANULOCYTES # BLD AUTO: <0.03 K/UL (ref 0–0.58)
IMM GRANULOCYTES NFR BLD: 0 % (ref 0–5)
LYMPHOCYTES NFR BLD: 3.14 K/UL (ref 1.5–4)
LYMPHOCYTES RELATIVE PERCENT: 42 % (ref 20–42)
MCH RBC QN AUTO: 30.1 PG (ref 26–35)
MCHC RBC AUTO-ENTMCNC: 32.8 G/DL (ref 32–34.5)
MCV RBC AUTO: 91.7 FL (ref 80–99.9)
MONOCYTES NFR BLD: 0.5 K/UL (ref 0.1–0.95)
MONOCYTES NFR BLD: 7 % (ref 2–12)
NEUTROPHILS NFR BLD: 50 % (ref 43–80)
NEUTS SEG NFR BLD: 3.71 K/UL (ref 1.8–7.3)
PLATELET # BLD AUTO: 290 K/UL (ref 130–450)
PMV BLD AUTO: 10.3 FL (ref 7–12)
POTASSIUM SERPL-SCNC: 4.2 MMOL/L (ref 3.5–5)
PROT SERPL-MCNC: 6.8 G/DL (ref 6.4–8.3)
RBC # BLD AUTO: 4.95 M/UL (ref 3.5–5.5)
SODIUM SERPL-SCNC: 140 MMOL/L (ref 132–146)
T3FREE SERPL-MCNC: 2.44 PG/ML (ref 2–4.4)
T4 FREE SERPL-MCNC: 1.1 NG/DL (ref 0.9–1.7)
TSH SERPL DL<=0.05 MIU/L-ACNC: 1 UIU/ML (ref 0.27–4.2)
WBC OTHER # BLD: 7.4 K/UL (ref 4.5–11.5)

## 2024-07-22 PROCEDURE — 84439 ASSAY OF FREE THYROXINE: CPT

## 2024-07-22 PROCEDURE — 80053 COMPREHEN METABOLIC PANEL: CPT

## 2024-07-22 PROCEDURE — 36415 COLL VENOUS BLD VENIPUNCTURE: CPT

## 2024-07-22 PROCEDURE — 86039 ANTINUCLEAR ANTIBODIES (ANA): CPT

## 2024-07-22 PROCEDURE — 84270 ASSAY OF SEX HORMONE GLOBUL: CPT

## 2024-07-22 PROCEDURE — 85025 COMPLETE CBC W/AUTO DIFF WBC: CPT

## 2024-07-22 PROCEDURE — 84481 FREE ASSAY (FT-3): CPT

## 2024-07-22 PROCEDURE — 84443 ASSAY THYROID STIM HORMONE: CPT

## 2024-07-22 PROCEDURE — 86038 ANTINUCLEAR ANTIBODIES: CPT

## 2024-07-22 PROCEDURE — 84403 ASSAY OF TOTAL TESTOSTERONE: CPT

## 2024-07-23 LAB
ANA SER QL IA: NEGATIVE
SHBG SERPL-SCNC: 56 NMOL/L (ref 25–122)
TESTOST FREE MFR SERPL: 3 PG/ML (ref 1.3–9.2)
TESTOST SERPL-MCNC: 24 NG/DL (ref 8–48)

## 2024-08-30 NOTE — TELEPHONE ENCOUNTER
Warren Hughes was sent to hospital on 5/31 from [address] and was admitted. Left message on her voicemail asking her to call [facility] to reschedule her appointment with Dr. Lety Le. show

## (undated) DEVICE — NON-DEHP CATHETER EXTENSION SET, MALE LUER LOCK ADAPTER

## (undated) DEVICE — 6 ML SYRINGE LUER-LOCK TIP: Brand: MONOJECT

## (undated) DEVICE — Z DISCONTINUED APPLICATOR SURG PREP 0.35OZ 2% CHG 70% ISO ALC W/ HI LT

## (undated) DEVICE — NEEDLE HYPO 25GA L1.5IN BLU POLYPR HUB S STL REG BVL STR

## (undated) DEVICE — SYRINGE, LUER LOCK, 5ML: Brand: MEDLINE

## (undated) DEVICE — 3M™ RED DOT™ MONITORING ELECTRODE WITH FOAM TAPE AND STICKY GEL 2560, 50/BAG, 20/CASE, 72/PLT: Brand: RED DOT™

## (undated) DEVICE — Device: Brand: PORTEX

## (undated) DEVICE — GLOVE ORANGE PI 7 1/2   MSG9075

## (undated) DEVICE — NEEDLE HYPO 18GA L1.5IN PNK POLYPR HUB S STL THN WALL FILL

## (undated) DEVICE — BANDAGE ADH W1XL3IN NAT FAB WVN FLX DURABLE N ADH PD SEAL

## (undated) DEVICE — 12 ML SYRINGE,LUER-LOCK TIP: Brand: MONOJECT

## (undated) DEVICE — GAUZE,SPONGE,4"X4",12PLY,STERILE,LF,2'S: Brand: MEDLINE